# Patient Record
Sex: MALE | Race: WHITE | NOT HISPANIC OR LATINO | Employment: OTHER | URBAN - METROPOLITAN AREA
[De-identification: names, ages, dates, MRNs, and addresses within clinical notes are randomized per-mention and may not be internally consistent; named-entity substitution may affect disease eponyms.]

---

## 2017-01-26 DIAGNOSIS — I10 ESSENTIAL HYPERTENSION: ICD-10-CM

## 2017-01-27 RX ORDER — AMLODIPINE BESYLATE 5 MG/1
5 TABLET ORAL DAILY
Qty: 90 TAB | Refills: 3 | Status: SHIPPED | OUTPATIENT
Start: 2017-01-27 | End: 2018-03-18 | Stop reason: SDUPTHER

## 2017-01-27 RX ORDER — VALSARTAN AND HYDROCHLOROTHIAZIDE 320; 12.5 MG/1; MG/1
TABLET, FILM COATED ORAL
Qty: 90 TAB | Refills: 3 | Status: SHIPPED | OUTPATIENT
Start: 2017-01-27 | End: 2018-01-29 | Stop reason: SDUPTHER

## 2017-02-06 ENCOUNTER — OFFICE VISIT (OUTPATIENT)
Dept: MEDICAL GROUP | Facility: MEDICAL CENTER | Age: 72
End: 2017-02-06
Payer: MEDICARE

## 2017-02-06 VITALS
RESPIRATION RATE: 20 BRPM | HEART RATE: 78 BPM | WEIGHT: 197 LBS | TEMPERATURE: 97.1 F | SYSTOLIC BLOOD PRESSURE: 140 MMHG | OXYGEN SATURATION: 96 % | BODY MASS INDEX: 28.2 KG/M2 | DIASTOLIC BLOOD PRESSURE: 80 MMHG | HEIGHT: 70 IN

## 2017-02-06 DIAGNOSIS — Z85.038 HISTORY OF COLON CANCER, STAGE IV: ICD-10-CM

## 2017-02-06 DIAGNOSIS — I10 ESSENTIAL HYPERTENSION: ICD-10-CM

## 2017-02-06 DIAGNOSIS — E11.9 CONTROLLED TYPE 2 DIABETES MELLITUS WITHOUT COMPLICATION, WITHOUT LONG-TERM CURRENT USE OF INSULIN (HCC): ICD-10-CM

## 2017-02-06 PROCEDURE — 1036F TOBACCO NON-USER: CPT | Performed by: FAMILY MEDICINE

## 2017-02-06 PROCEDURE — 99214 OFFICE O/P EST MOD 30 MIN: CPT | Performed by: FAMILY MEDICINE

## 2017-02-06 PROCEDURE — 3017F COLORECTAL CA SCREEN DOC REV: CPT | Mod: 1P | Performed by: FAMILY MEDICINE

## 2017-02-06 PROCEDURE — 4040F PNEUMOC VAC/ADMIN/RCVD: CPT | Mod: 8P | Performed by: FAMILY MEDICINE

## 2017-02-06 PROCEDURE — G8420 CALC BMI NORM PARAMETERS: HCPCS | Performed by: FAMILY MEDICINE

## 2017-02-06 PROCEDURE — G8432 DEP SCR NOT DOC, RNG: HCPCS | Performed by: FAMILY MEDICINE

## 2017-02-06 PROCEDURE — 1101F PT FALLS ASSESS-DOCD LE1/YR: CPT | Performed by: FAMILY MEDICINE

## 2017-02-06 PROCEDURE — G8482 FLU IMMUNIZE ORDER/ADMIN: HCPCS | Performed by: FAMILY MEDICINE

## 2017-02-06 PROCEDURE — 3045F PR MOST RECENT HEMOGLOBIN A1C LEVEL 7.0-9.0%: CPT | Performed by: FAMILY MEDICINE

## 2017-02-06 NOTE — MR AVS SNAPSHOT
"Magnus Claudio   2017 10:40 AM   Office Visit   MRN: [id number]    Department:  [facility]   Dept Phone:  [phone]    Description:  Male : 1945   Provider:  Barrett Cespedes M.D.           Reason for Visit     Diabetes patient is here for a follow up      Allergies as of 2017     Allergen Noted Reactions    Morphine 2016   Unspecified    Hallucinations, 15+ years ago      You were diagnosed with     Controlled type 2 diabetes mellitus without complication, without long-term current use of insulin (CMS-HCC)   [1309979]         Vital Signs     Blood Pressure Pulse Temperature Respirations Height Weight    140/80 mmHg 78 36.2 °C (97.1 °F) 20 1.778 m (5' 10\") 89.359 kg (197 lb)    Body Mass Index Oxygen Saturation Smoking Status             28.27 kg/m2 96% Never Smoker          Basic Information     Date Of Birth Sex Race Ethnicity Preferred Language    1945 Male White Non- English      Your appointments     Mar 13, 2017  9:00 AM   MR ARAGON WITH/WO with [address] MRI 1   [facility] MRI - [address]    [address]  [facility] [address]   [phone]            Mar 14, 2017 10:40 AM   ONCOLOGY EST PATIENT 30 MIN with Radhika Moreau M.D.   [facility] (--)    [address], [address]  [facility] [address]   [phone]            Aug 08, 2017 10:20 AM   Established Patient with Barrett Cespedes M.D.   [facility] (--)    [address]  [address]  [facility] [id number] [phone]           You will be receiving a confirmation call a few days before your appointment from our automated call confirmation system.              Problem List              ICD-10-CM Priority Class Noted - Resolved    History of colon cancer, stage IV Z85.038   10/8/2015 - Present    Essential hypertension I10   10/8/2015 - Present    Type 2 diabetes mellitus, uncontrolled (CMS-[facility]) E11.65   10/8/2015 - Present    Vertigo R42   " 10/8/2015 - Present    Decreased hearing of both ears H91.93   4/26/2016 - Present      Health Maintenance        Date Due Completion Dates    RETINAL SCREENING 6/15/1962 ---    IMM PNEUMOCOCCAL 65+ (ADULT) LOW/MEDIUM RISK SERIES (1 of 2 - PCV13) 6/15/2009 ---    A1C SCREENING 2/1/2017 8/1/2016, 4/27/2016, 10/12/2015    IMM DTaP/Tdap/Td Vaccine (1 - Tdap) 1/18/2018 (Originally 6/15/1963) ---    IMM ZOSTER VACCINE 1/18/2018 (Originally 6/15/2004) ---    FASTING LIPID PROFILE 4/27/2017 4/27/2016, 10/12/2015    URINE ACR / MICROALBUMIN 4/27/2017 4/27/2016, 10/12/2015    DIABETES MONOFILAMENT / LE EXAM 8/1/2017 8/1/2016, 4/26/2016 (Done)    Override on 4/26/2016: Done    SERUM CREATININE 9/27/2017 9/27/2016, 6/28/2016, 4/27/2016, 10/12/2015    COLONOSCOPY 5/17/2026 5/17/2016 (Postponed)    Override on 5/17/2016: Postponed (Pt states he had a colonoscopy in 2013, is on a 10 yr schedule)            Current Immunizations     Influenza Vaccine Adult HD 10/25/2016, 10/8/2015      Below and/or attached are the medications your provider expects you to take. Review all of your home medications and newly ordered medications with your provider and/or pharmacist. Follow medication instructions as directed by your provider and/or pharmacist. Please keep your medication list with you and share with your provider. Update the information when medications are discontinued, doses are changed, or new medications (including over-the-counter products) are added; and carry medication information at all times in the event of emergency situations     Allergies:  MORPHINE - Unspecified               Medications  Valid as of: February 06, 2017 - 11:12 AM    Generic Name Brand Name Tablet Size Instructions for use    AmLODIPine Besylate (Tab) NORVASC 5 MG Take 1 Tab by mouth every day. Indications: High Blood Pressure        Aspirin (Tablet Delayed Response) aspirin 81 MG Take  by mouth.        Blood Glucose Monitoring Suppl (Misc) Blood Glucose  Monitoring Suppl SUPPLIES 1 Each by Does not apply route 3 times a day. Test strips order: Test strips for Breeze 2 meter. Sig: use three times daily and prn ssx high or low sugar #100 RF x 3        Glimepiride (Tab) AMARYL 4 MG TAKE TWO TABLETS BY MOUTH ONCE DAILY WITH BREAKFAST OR THE FIRST MEAL OF THE DAY        Lancets (Misc) MICROLET LANCETS  by Does not apply route.        MetFORMIN HCl (TABLET SR 24 HR) GLUCOPHAGE  MG TAKE 2 TABLETS BY MOUTH TWICE DAILY        Multiple Vitamins-Minerals (Tab) THERAGRAN-M  Take 1 Tab by mouth every day.        Valsartan-Hydrochlorothiazide (Tab) DIOVAN--12.5 MG TAKE ONE TABLET BY MOUTH EVERY DAY        .                 Medicines prescribed today were sent to:     [facility] [address] [place] [address] AT [facility] & [address] [address]    Phone: [phone] Fax: [phone]    Open 24 Hours?: No      Medication refill instructions:       If your prescription bottle indicates you have medication refills left, it is not necessary to call your provider’s office. Please contact your pharmacy and they will refill your medication.    If your prescription bottle indicates you do not have any refills left, you may request refills at any time through one of the following ways: The online [employer] system (except Urgent Care), by calling your provider’s office, or by asking your pharmacy to contact your provider’s office with a refill request. Medication refills are processed only during regular business hours and may not be available until the next business day. Your provider may request additional information or to have a follow-up visit with you prior to refilling your medication.   *Please Note: Medication refills are assigned a new Rx number when refilled electronically. Your pharmacy may indicate that no refills were authorized even though a new prescription for the same medication is available at the pharmacy.  Please request the medicine by name with the pharmacy before contacting your provider for a refill.        Your To Do List     Future Labs/Procedures Complete By Expires    HEMOGLOBIN A1C  As directed 2/7/2018    LIPID PROFILE  As directed 2/7/2018    MICROALBUMIN CREAT RATIO URINE  As directed 2/7/2018         MyChart Access Code: Activation code not generated  Current [employer] Status: Active

## 2017-02-06 NOTE — PROGRESS NOTES
Chief Complaint   Patient presents with   • Diabetes     patient is here for a follow up     Multiple medical problems    HISTORY OF PRESENT ILLNESS: Patient is a 71 y.o. male established patient who presents today for the following.      1. Controlled type 2 diabetes mellitus without complication, without long-term current use of insulin (CMS-HCC)  Reviewed labs with the patient. Diabetes is well controlled. Takes medications as prescribed. No lower extremity loss of sensation or burning pain. Retinal exam up-to-date. No concerning sores on the feet. Gets regular foot exams.      2. Essential hypertension  Doing well.  Reviewed labs with the patient. Taking medications as prescribed. No chest pain palpitations or shortness of breath. No headache loss or change in vision.        3. History of colon cancer, stage IV  Patient has a history of colon cancer. He recently saw his oncologist. CT scan reveals a lesion on his liver. He is scheduled to have an MRI. Patient is asymptomatic however.      No problem-specific assessment & plan notes found for this encounter.      He  has a past medical history of Hypertension; Diabetes; Cancer; Colon cancer; Hyperlipidemia; and Vertigo, benign positional.    Patient Active Problem List    Diagnosis Date Noted   • Controlled type 2 diabetes mellitus without complication, without long-term current use of insulin (CMS-HCC) 02/06/2017   • Decreased hearing of both ears 04/26/2016   • History of colon cancer, stage IV 10/08/2015   • Essential hypertension 10/08/2015       Allergies:Morphine    Current Outpatient Prescriptions   Medication Sig Dispense Refill   • valsartan-hydrochlorothiazide (DIOVAN-HCT) 320-12.5 MG per tablet TAKE ONE TABLET BY MOUTH EVERY DAY 90 Tab 3   • amlodipine (NORVASC) 5 MG Tab Take 1 Tab by mouth every day. Indications: High Blood Pressure 90 Tab 3   • glimepiride (AMARYL) 4 MG Tab TAKE TWO TABLETS BY MOUTH ONCE DAILY WITH BREAKFAST OR THE FIRST MEAL OF THE  " Tab 1   • therapeutic multivitamin-minerals (THERAGRAN-M) Tab Take 1 Tab by mouth every day.     • Blood Glucose Monitoring Suppl SUPPLIES Misc 1 Each by Does not apply route 3 times a day. Test strips order: Test strips for Breeze 2 meter. Sig: use three times daily and prn ssx high or low sugar #100 RF x 3 100 Each 11   • metformin ER (GLUCOPHAGE XR) 500 MG TABLET SR 24 HR TAKE 2 TABLETS BY MOUTH TWICE DAILY 360 Tab 3   • aspirin (CLAUDINE ASPIRIN EC LOW DOSE) 81 MG EC tablet Take  by mouth.     • Microlet Lancets Misc by Does not apply route.       No current facility-administered medications for this visit.       Social History   Substance Use Topics   • Smoking status: Never Smoker    • Smokeless tobacco: Never Used   • Alcohol Use: No       Family History   Problem Relation Age of Onset   • Diabetes Sister    • Hypertension Father    • Heart Attack Father    • Hyperlipidemia Neg Hx      unknown   • Cancer Mother      Breast       Health Maintenance:      Review of Systems   No fever, chills, nausea, vomiting, diarrhea, chest pain or shortness of breath.  See HPI    Exam:  Blood pressure 140/80, pulse 78, temperature 36.2 °C (97.1 °F), resp. rate 20, height 1.778 m (5' 10\"), weight 89.359 kg (197 lb), SpO2 96 %. Body mass index is 28.27 kg/(m^2).  Constitutional:  NAD, well appearing.  HEENT:   NC/AT, PERRLA, EOMI, OP clear, no lymphadenopathy, no thyromegaly.    Cardiovascular: RRR.   No m/r/g. No carotid bruits.       Lungs:   CTAB, no w/r/r, no respiratory distress.  Abdomen: Soft, NT/ND + BS, no masses, no suprapubic tenderness, no hepatomegaly.  Extremities:  2+ DP and radial pulses bilaterally.  No c/c/e.  Skin:  Warm and dry.    Neurologic: Alert & oriented x 3, CN II-XII grossly intact, strength and sensation grossly intact.  No focal deficits noted.  Psychiatric:  Affect normal, mood normal, judgment normal.    Assessment/Plan:     1. Controlled type 2 diabetes mellitus without complication, " without long-term current use of insulin (CMS-HCC)  Diabetes is well controlled. Patient has normal sensation in the lower extremities. Microfilament exam normal. Continue hypoglycemic medications.  Continue ACEI/ARB  and statin .  Labs as indicated. Continue to monitor    - HEMOGLOBIN A1C; Future  - LIPID PROFILE; Future  - MICROALBUMIN CREAT RATIO URINE; Future    2. Essential hypertension  Well-controlled. Labs as indicated. Continue antihypertensive medications. Discussed decreasing salt intake. Discussed benefits of exercise and diet. Continue to monitor.  Followup as indicated      3. History of colon cancer, stage IV  This is an ongoing problem for the patient. 5 mm lesion on liver seen on CT. MRI pending. Will follow along with oncology.        Followup: Return in about 6 months (around 8/6/2017) for Short.    Please note that this dictation was created using voice recognition software. I have made every reasonable attempt to correct obvious errors, but I expect that there are errors of grammar and possibly content that I did not discover before finalizing the note.

## 2017-02-08 ENCOUNTER — HOSPITAL ENCOUNTER (OUTPATIENT)
Dept: LAB | Facility: MEDICAL CENTER | Age: 72
End: 2017-02-08
Attending: FAMILY MEDICINE
Payer: MEDICARE

## 2017-02-08 ENCOUNTER — HOSPITAL ENCOUNTER (OUTPATIENT)
Dept: LAB | Facility: MEDICAL CENTER | Age: 72
End: 2017-02-08
Attending: INTERNAL MEDICINE
Payer: MEDICARE

## 2017-02-08 DIAGNOSIS — E11.9 CONTROLLED TYPE 2 DIABETES MELLITUS WITHOUT COMPLICATION, WITHOUT LONG-TERM CURRENT USE OF INSULIN (HCC): ICD-10-CM

## 2017-02-08 DIAGNOSIS — Z85.038 HISTORY OF COLON CANCER, STAGE IV: ICD-10-CM

## 2017-02-08 LAB
ALBUMIN SERPL BCP-MCNC: 3.9 G/DL (ref 3.2–4.9)
ALBUMIN/GLOB SERPL: 1.4 G/DL
ALP SERPL-CCNC: 53 U/L (ref 30–99)
ALT SERPL-CCNC: 30 U/L (ref 2–50)
ANION GAP SERPL CALC-SCNC: 8 MMOL/L (ref 0–11.9)
AST SERPL-CCNC: 31 U/L (ref 12–45)
BASOPHILS # BLD AUTO: 0.7 % (ref 0–1.8)
BASOPHILS # BLD: 0.04 K/UL (ref 0–0.12)
BILIRUB SERPL-MCNC: 2.1 MG/DL (ref 0.1–1.5)
BUN SERPL-MCNC: 14 MG/DL (ref 8–22)
CALCIUM SERPL-MCNC: 8.9 MG/DL (ref 8.4–10.2)
CEA SERPL-MCNC: 2.8 NG/ML (ref 0–3)
CHLORIDE SERPL-SCNC: 104 MMOL/L (ref 96–112)
CHOLEST SERPL-MCNC: 142 MG/DL (ref 100–199)
CO2 SERPL-SCNC: 26 MMOL/L (ref 20–33)
CREAT SERPL-MCNC: 1.13 MG/DL (ref 0.5–1.4)
CREAT UR-MCNC: 177.7 MG/DL
EOSINOPHIL # BLD AUTO: 0.07 K/UL (ref 0–0.51)
EOSINOPHIL NFR BLD: 1.2 % (ref 0–6.9)
ERYTHROCYTE [DISTWIDTH] IN BLOOD BY AUTOMATED COUNT: 40.6 FL (ref 35.9–50)
EST. AVERAGE GLUCOSE BLD GHB EST-MCNC: 197 MG/DL
GFR SERPL CREATININE-BSD FRML MDRD: >60 ML/MIN/1.73 M 2
GLOBULIN SER CALC-MCNC: 2.8 G/DL (ref 1.9–3.5)
GLUCOSE SERPL-MCNC: 202 MG/DL (ref 65–99)
HBA1C MFR BLD: 8.5 % (ref 0–5.6)
HCT VFR BLD AUTO: 43.2 % (ref 42–52)
HDLC SERPL-MCNC: 30 MG/DL
HGB BLD-MCNC: 15.2 G/DL (ref 14–18)
IMM GRANULOCYTES # BLD AUTO: 0.01 K/UL (ref 0–0.11)
IMM GRANULOCYTES NFR BLD AUTO: 0.2 % (ref 0–0.9)
LDLC SERPL CALC-MCNC: 69 MG/DL
LYMPHOCYTES # BLD AUTO: 1.41 K/UL (ref 1–4.8)
LYMPHOCYTES NFR BLD: 23.9 % (ref 22–41)
MCH RBC QN AUTO: 30 PG (ref 27–33)
MCHC RBC AUTO-ENTMCNC: 35.2 G/DL (ref 33.7–35.3)
MCV RBC AUTO: 85.4 FL (ref 81.4–97.8)
MICROALBUMIN UR-MCNC: 0.8 MG/DL
MICROALBUMIN/CREAT UR: 5 MG/G (ref 0–30)
MONOCYTES # BLD AUTO: 0.37 K/UL (ref 0–0.85)
MONOCYTES NFR BLD AUTO: 6.3 % (ref 0–13.4)
NEUTROPHILS # BLD AUTO: 4.01 K/UL (ref 1.82–7.42)
NEUTROPHILS NFR BLD: 67.7 % (ref 44–72)
NRBC # BLD AUTO: 0 K/UL
NRBC BLD AUTO-RTO: 0 /100 WBC
PLATELET # BLD AUTO: 140 K/UL (ref 164–446)
PMV BLD AUTO: 9.9 FL (ref 9–12.9)
POTASSIUM SERPL-SCNC: 3.9 MMOL/L (ref 3.6–5.5)
PROT SERPL-MCNC: 6.7 G/DL (ref 6–8.2)
RBC # BLD AUTO: 5.06 M/UL (ref 4.7–6.1)
SODIUM SERPL-SCNC: 138 MMOL/L (ref 135–145)
TRIGL SERPL-MCNC: 217 MG/DL (ref 0–149)
WBC # BLD AUTO: 5.9 K/UL (ref 4.8–10.8)

## 2017-02-08 PROCEDURE — 82570 ASSAY OF URINE CREATININE: CPT

## 2017-02-08 PROCEDURE — 80061 LIPID PANEL: CPT

## 2017-02-08 PROCEDURE — 83036 HEMOGLOBIN GLYCOSYLATED A1C: CPT

## 2017-02-08 PROCEDURE — 82043 UR ALBUMIN QUANTITATIVE: CPT

## 2017-02-08 PROCEDURE — 36415 COLL VENOUS BLD VENIPUNCTURE: CPT

## 2017-03-09 ENCOUNTER — RX ONLY (OUTPATIENT)
Age: 72
Setting detail: RX ONLY
End: 2017-03-09

## 2017-03-13 ENCOUNTER — HOSPITAL ENCOUNTER (OUTPATIENT)
Dept: RADIOLOGY | Facility: MEDICAL CENTER | Age: 72
End: 2017-03-13
Attending: INTERNAL MEDICINE
Payer: MEDICARE

## 2017-03-13 DIAGNOSIS — Z85.038 HISTORY OF COLON CANCER, STAGE IV: ICD-10-CM

## 2017-03-13 PROCEDURE — A9579 GAD-BASE MR CONTRAST NOS,1ML: HCPCS | Performed by: INTERNAL MEDICINE

## 2017-03-13 PROCEDURE — 74183 MRI ABD W/O CNTR FLWD CNTR: CPT

## 2017-03-13 PROCEDURE — 700117 HCHG RX CONTRAST REV CODE 255: Performed by: INTERNAL MEDICINE

## 2017-03-13 RX ADMIN — GADODIAMIDE 20 ML: 287 INJECTION INTRAVENOUS at 09:52

## 2017-03-14 ENCOUNTER — OFFICE VISIT (OUTPATIENT)
Dept: HEMATOLOGY ONCOLOGY | Facility: MEDICAL CENTER | Age: 72
End: 2017-03-14
Payer: MEDICARE

## 2017-03-14 VITALS
DIASTOLIC BLOOD PRESSURE: 92 MMHG | HEART RATE: 71 BPM | RESPIRATION RATE: 16 BRPM | OXYGEN SATURATION: 95 % | SYSTOLIC BLOOD PRESSURE: 158 MMHG | TEMPERATURE: 98.2 F | WEIGHT: 197.97 LBS | BODY MASS INDEX: 28.41 KG/M2

## 2017-03-14 DIAGNOSIS — Z85.038 HISTORY OF COLON CANCER, STAGE IV: ICD-10-CM

## 2017-03-14 PROCEDURE — 1101F PT FALLS ASSESS-DOCD LE1/YR: CPT | Performed by: INTERNAL MEDICINE

## 2017-03-14 PROCEDURE — 1036F TOBACCO NON-USER: CPT | Performed by: INTERNAL MEDICINE

## 2017-03-14 PROCEDURE — 4040F PNEUMOC VAC/ADMIN/RCVD: CPT | Mod: 8P | Performed by: INTERNAL MEDICINE

## 2017-03-14 PROCEDURE — G8432 DEP SCR NOT DOC, RNG: HCPCS | Performed by: INTERNAL MEDICINE

## 2017-03-14 PROCEDURE — 99214 OFFICE O/P EST MOD 30 MIN: CPT | Performed by: INTERNAL MEDICINE

## 2017-03-14 PROCEDURE — 3017F COLORECTAL CA SCREEN DOC REV: CPT | Performed by: INTERNAL MEDICINE

## 2017-03-14 PROCEDURE — G8420 CALC BMI NORM PARAMETERS: HCPCS | Performed by: INTERNAL MEDICINE

## 2017-03-14 PROCEDURE — G8482 FLU IMMUNIZE ORDER/ADMIN: HCPCS | Performed by: INTERNAL MEDICINE

## 2017-03-14 ASSESSMENT — PAIN SCALES - GENERAL: PAINLEVEL: NO PAIN

## 2017-03-14 NOTE — MR AVS SNAPSHOT
Magnus Claudio   3/14/2017 10:40 AM   Office Visit   MRN: [id number]    Department:  [facility]   Dept Phone:  [phone]    Description:  Male : 1945   Provider:  Radhika Moreau M.D.           Reason for Visit     Follow-Up 3m FV      Allergies as of 3/14/2017     Allergen Noted Reactions    Morphine 2016   Unspecified    Hallucinations, 15+ years ago      You were diagnosed with     History of colon cancer, stage IV   [725971]         Vital Signs     Blood Pressure Pulse Temperature Respirations Weight Oxygen Saturation    158/92 mmHg 71 36.8 °C (98.2 °F) 16 89.8 kg (197 lb 15.6 oz) 95%    Smoking Status                   Never Smoker            Basic Information     Date Of Birth Sex Race Ethnicity Preferred Language    1945 Male White Non- English      Your appointments     Aug 08, 2017 10:20 AM   Established Patient with Barrett Cespedes M.D.   [facility] (--)    [address]  [address]  [facility] [address] [phone]           You will be receiving a confirmation call a few days before your appointment from our automated call confirmation system.            Sep 12, 2017 11:00 AM   ONCOLOGY EST PATIENT 30 MIN with Radhika Moreau M.D.   [facility] (--)    [address], [address]  [facility] [address] [phone]              Problem List              ICD-10-CM Priority Class Noted - Resolved    History of colon cancer, stage IV Z85.038   10/8/2015 - Present    Essential hypertension I10   10/8/2015 - Present    Decreased hearing of both ears H91.93   2016 - Present    Controlled type 2 diabetes mellitus without complication, without long-term current use of insulin (CMS-HCC) E11.9   2017 - Present      Health Maintenance        Date Due Completion Dates    RETINAL SCREENING 6/15/1962 ---    IMM PNEUMOCOCCAL 65+ (ADULT) LOW/MEDIUM RISK SERIES (1 of 2 - PCV13) 6/15/2009 ---    IMM DTaP/Tdap/Td Vaccine (1 -  Tdap) 1/18/2018 (Originally 6/15/1963) ---    IMM ZOSTER VACCINE 1/18/2018 (Originally 6/15/2004) ---    DIABETES MONOFILAMENT / LE EXAM 8/1/2017 8/1/2016, 4/26/2016 (Done)    Override on 4/26/2016: Done    A1C SCREENING 8/8/2017 2/8/2017, 8/1/2016, 4/27/2016, 10/12/2015    FASTING LIPID PROFILE 2/8/2018 2/8/2017, 4/27/2016, 10/12/2015    URINE ACR / MICROALBUMIN 2/8/2018 2/8/2017, 4/27/2016, 10/12/2015    SERUM CREATININE 2/8/2018 2/8/2017, 9/27/2016, 6/28/2016, 4/27/2016, 10/12/2015    COLONOSCOPY 2/16/2020 2/16/2017            Current Immunizations     Influenza Vaccine Adult HD 10/25/2016, 10/8/2015      Below and/or attached are the medications your provider expects you to take. Review all of your home medications and newly ordered medications with your provider and/or pharmacist. Follow medication instructions as directed by your provider and/or pharmacist. Please keep your medication list with you and share with your provider. Update the information when medications are discontinued, doses are changed, or new medications (including over-the-counter products) are added; and carry medication information at all times in the event of emergency situations     Allergies:  MORPHINE - Unspecified               Medications  Valid as of: March 14, 2017 - 11:31 AM    Generic Name Brand Name Tablet Size Instructions for use    AmLODIPine Besylate (Tab) NORVASC 5 MG Take 1 Tab by mouth every day. Indications: High Blood Pressure        Aspirin (Tablet Delayed Response) aspirin 81 MG Take  by mouth.        Blood Glucose Monitoring Suppl (Misc) Blood Glucose Monitoring Suppl SUPPLIES 1 Each by Does not apply route 3 times a day. Test strips order: Test strips for Breeze 2 meter. Sig: use three times daily and prn ssx high or low sugar #100 RF x 3        Glimepiride (Tab) AMARYL 4 MG TAKE TWO TABLETS BY MOUTH ONCE DAILY WITH BREAKFAST OR THE FIRST MEAL OF THE DAY        Lancets (Misc) MICROLET LANCETS  by Does not apply  route.        MetFORMIN HCl (TABLET SR 24 HR) GLUCOPHAGE  MG TAKE 2 TABLETS BY MOUTH TWICE DAILY        Multiple Vitamins-Minerals (Tab) THERAGRAN-M  Take 1 Tab by mouth every day.        Valsartan-Hydrochlorothiazide (Tab) DIOVAN--12.5 MG TAKE ONE TABLET BY MOUTH EVERY DAY        .                 Medicines prescribed today were sent to:     [email or web address] [address] [place] [address], [place] - [address] [facility]    [address] [facility] [place] [address]    Phone: [phone] Fax: [phone]    Open 24 Hours?: No      Medication refill instructions:       If your prescription bottle indicates you have medication refills left, it is not necessary to call your provider’s office. Please contact your pharmacy and they will refill your medication.    If your prescription bottle indicates you do not have any refills left, you may request refills at any time through one of the following ways: The online [employer] system (except Urgent Care), by calling your provider’s office, or by asking your pharmacy to contact your provider’s office with a refill request. Medication refills are processed only during regular business hours and may not be available until the next business day. Your provider may request additional information or to have a follow-up visit with you prior to refilling your medication.   *Please Note: Medication refills are assigned a new Rx number when refilled electronically. Your pharmacy may indicate that no refills were authorized even though a new prescription for the same medication is available at the pharmacy. Please request the medicine by name with the pharmacy before contacting your provider for a refill.        Your To Do List     Future Labs/Procedures Complete By Expires    CBC WITH DIFFERENTIAL  As directed 3/14/2018    CEA  As directed 3/14/2018    COMP METABOLIC PANEL  As directed 3/14/2018         [employer] Access Code: Activation code not  generated  Current MyChart Status: Active

## 2017-03-14 NOTE — PROGRESS NOTES
Follow Up Note: Oncology     Date: 3/14/17  Time: 10:40 am     Primary care physician: Dr. Jere Cespedes  Oncologist: Dr. Kemp ([place])   Dr. Cardoso ([place])  Dermatology: Dr. Morin    Diagnosis: colon cancer with liver metastatic disease    Chief Compliant: He is here for a follow up visit.    History of presenting illness:  Mr. Claudio is a pleasant 71-year-old male diagnosed in 2001 with colon cancer.  He was diagnosed secondary to nausea, vomiting and abdominal pain as well as significant anemia requiring blood transfusions. At diagnosis he was found to have a large mass in the colon as well as multiple liver lesions. Anoscopy showed a near obstructing mass biopsy which was positive for a primary colon malignancy. He underwent hemicolectomy on 4/2011 and was found to have T3 N0 M1 disease secondary to liver metastatic disease. Records are currently not available despite multiple requests. Patient also is unable to recall full details of his therapy. He was treated with adjuvant chemotherapy with 5-FU doublet therapy which was given weekly. After 3 months of therapy repeat imaging showed good responses with decrease in size of the liver lesions. He then underwent a partial hepatectomy in 10/2001. Postsurgery he had been on observation without any evidence of recurrence.  CT scan in 10/2015 showed a 5 mm nonspecific low-attenuation lesion in the anterior aspect of the left hepatic lobe and no other evidence of disease.  Continue on observation with low CEAs. 2/16/17 colonoscopy showed previous surgery, diverticulum in the sigmoid colon and internal hemorrhoids. Repeat colonoscopy in 3 years was recommended. MRI of the abdomen done on 3/13/17 showed prior right partial hepatectomy and small cyst in the left lobe of the liver anteriorly corresponding to CT findings. There is no other concerning findings on imaging.      Interval history:   He is here for follow-up visit and is accompanied by his wife  was present in the room. Patient has been feeling fairly well since his last visit without any significant changes. He has good appetite with stable weight. He has stable energy. He is having regular bowel movements without any diarrhea, constipation or black stools a day per rectum. He denies any nausea, vomiting or abdominal pain. He denies any cough or shortness of breath. He denies any fevers, chills or night sweats.      Past Medical History:  1. Colon cancer with metastatic lesion to liver  2. Diabetes  3. Hypertension  4. 2001: PE/DVT, coumadin x 6 months   5. Diverticulosis/diverticulitis  6. Hiatal hernia with h/o GERD  7. Questionable history of nonmelanoma skin cancers      Allergies:  Morphine      Medications:    Current outpatient prescriptions:   •  valsartan-hydrochlorothiazide (DIOVAN-HCT) 320-12.5 MG per tablet, TAKE ONE TABLET BY MOUTH EVERY DAY, Disp: 90 Tab, Rfl: 3  •  amlodipine (NORVASC) 5 MG Tab, Take 1 Tab by mouth every day. Indications: High Blood Pressure, Disp: 90 Tab, Rfl: 3  •  glimepiride (AMARYL) 4 MG Tab, TAKE TWO TABLETS BY MOUTH ONCE DAILY WITH BREAKFAST OR THE FIRST MEAL OF THE DAY, Disp: 180 Tab, Rfl: 1  •  therapeutic multivitamin-minerals (THERAGRAN-M) Tab, Take 1 Tab by mouth every day., Disp: , Rfl:   •  Blood Glucose Monitoring Suppl SUPPLIES Misc, 1 Each by Does not apply route 3 times a day. Test strips order: Test strips for Breeze 2 meter. Sig: use three times daily and prn ssx high or low sugar #100 RF x 3, Disp: 100 Each, Rfl: 11  •  metformin ER (GLUCOPHAGE XR) 500 MG TABLET SR 24 HR, TAKE 2 TABLETS BY MOUTH TWICE DAILY, Disp: 360 Tab, Rfl: 3  •  aspirin (CLAUDINE ASPIRIN EC LOW DOSE) 81 MG EC tablet, Take  by mouth., Disp: , Rfl:   •  Microlet Lancets Misc, by Does not apply route., Disp: , Rfl:       Review of Systems:  All other review of systems are negative except what was mentioned above in the HPI.  Constitutional: Negative for fever, chills, weight loss and  malaise/fatigue.    HENT: Negative for ear pain and nosebleeds.     Eyes: Negative for blurred vision.    Respiratory: Negative for cough, sputum production, and shortness of breath.    Cardiovascular: Negative for chest pain.  Gastrointestinal: Negative for nausea, vomiting and abdominal pain.    Genitourinary: Negative for dysuria.    Musculoskeletal: Negative for myalgias, back pain and joint pain.    Skin: Negative for rash.    Neurological: Negative for dizziness, sensory change, focal weakness and headaches.    Endo/Heme/Allergies: No bruise/bleed easily.    Psychiatric/Behavioral: Negative for depression and memory loss.      Physical Exam:  Vitals:  Filed Vitals:    03/14/17 1032   BP: 158/92   Pulse: 71   Temp: 36.8 °C (98.2 °F)   Resp: 16   Weight: 89.8 kg (197 lb 15.6 oz)   SpO2: 95%       General: Not in acute distress, alert and oriented x 3  HEENT: normalcephalic, atraumatic, pupils equally reactive to light bilaterally, extra ocular muscles intact, moist oral mucus membranes, and oral cavity without any lesions.  Neck: Supple neck and full range of motion  Lymph nodes: No palpable bilateral cervical and supraclavicular lymphadenopathy.    CVS: regular rate and rhythm  RESP: Clear to auscultate bilaterally  ABD: Soft, non tender, non distended, positive bowel sounds, and no hepatomegaly  EXT: Edema of left lower extremity  CNS: Alert and oriented x3, cranial nerves grossly intact, muscle strength grossly intact, and normal gait.     Labs:  No visits with results within 1 Day(s) from this visit.  Latest known visit with results is:    Hospital Outpatient Visit on 02/08/2017   Component Date Value Ref Range Status   • WBC 02/08/2017 5.9  4.8 - 10.8 K/uL Final   • RBC 02/08/2017 5.06  4.70 - 6.10 M/uL Final   • Hemoglobin 02/08/2017 15.2  14.0 - 18.0 g/dL Final   • Hematocrit 02/08/2017 43.2  42.0 - 52.0 % Final   • MCV 02/08/2017 85.4  81.4 - 97.8 fL Final   • MCH 02/08/2017 30.0  27.0 - 33.0 pg Final    • MCHC 02/08/2017 35.2  33.7 - 35.3 g/dL Final   • RDW 02/08/2017 40.6  35.9 - 50.0 fL Final   • Platelet Count 02/08/2017 140* 164 - 446 K/uL Final   • MPV 02/08/2017 9.9  9.0 - 12.9 fL Final   • Neutrophils-Polys 02/08/2017 67.70  44.00 - 72.00 % Final   • Lymphocytes 02/08/2017 23.90  22.00 - 41.00 % Final   • Monocytes 02/08/2017 6.30  0.00 - 13.40 % Final   • Eosinophils 02/08/2017 1.20  0.00 - 6.90 % Final   • Basophils 02/08/2017 0.70  0.00 - 1.80 % Final   • Immature Granulocytes 02/08/2017 0.20  0.00 - 0.90 % Final   • Nucleated RBC 02/08/2017 0.00   Final   • Neutrophils (Absolute) 02/08/2017 4.01  1.82 - 7.42 K/uL Final    Includes immature neutrophils, if present.   • Lymphs (Absolute) 02/08/2017 1.41  1.00 - 4.80 K/uL Final   • Monos (Absolute) 02/08/2017 0.37  0.00 - 0.85 K/uL Final   • Eos (Absolute) 02/08/2017 0.07  0.00 - 0.51 K/uL Final   • Baso (Absolute) 02/08/2017 0.04  0.00 - 0.12 K/uL Final   • Immature Granulocytes (abs) 02/08/2017 0.01  0.00 - 0.11 K/uL Final   • NRBC (Absolute) 02/08/2017 0.00   Final   • Calcium 02/08/2017 8.9  8.4 - 10.2 mg/dL Final   • Sodium 02/08/2017 138  135 - 145 mmol/L Final   • Potassium 02/08/2017 3.9  3.6 - 5.5 mmol/L Final   • Chloride 02/08/2017 104  96 - 112 mmol/L Final   • Co2 02/08/2017 26  20 - 33 mmol/L Final   • Anion Gap 02/08/2017 8.0  0.0 - 11.9 Final   • Glucose 02/08/2017 202* 65 - 99 mg/dL Final   • Bun 02/08/2017 14  8 - 22 mg/dL Final   • Creatinine 02/08/2017 1.13  0.50 - 1.40 mg/dL Final   • AST(SGOT) 02/08/2017 31  12 - 45 U/L Final   • ALT(SGPT) 02/08/2017 30  2 - 50 U/L Final   • Alkaline Phosphatase 02/08/2017 53  30 - 99 U/L Final   • Total Bilirubin 02/08/2017 2.1* 0.1 - 1.5 mg/dL Final   • Albumin 02/08/2017 3.9  3.2 - 4.9 g/dL Final   • Total Protein 02/08/2017 6.7  6.0 - 8.2 g/dL Final   • Globulin 02/08/2017 2.8  1.9 - 3.5 g/dL Final   • A-G Ratio 02/08/2017 1.4   Final   • Carcinoembryonic Antigen 02/08/2017 2.8  0.0 - 3.0 ng/mL  Final    Comment: Effective 2013 the quantitative determination  of Carcinoembryonic Antigen (CEA) will now be performed at  [facility].  The Access CEA paramagnetic  particle chemiluminescent immunoassay is used.  Results  obtained with different test methods or kits cannot be used interchangeably.  Measurement of CEA has been shown to be  clinically relevant in the management of patients with  colorectal, breast, lung, prostatic, pancreatic, and ovarian  carcinomas.  Smokers may have slightly elevated levels of CEA.     • GFR If  2017 >60  >60 mL/min/1.73 m 2 Final   • GFR If Non  2017 >60  >60 mL/min/1.73 m 2 Final   ]    Imagin. 3/13/17 MRI abdomen: Prior RIGHT partial hepatectomy.  Small cyst in the LEFT lobe anteriorly, corresponding to findings on prior CT scan.  No enhancing liver mass.    Assessment and Plan:   1. History of oligo metastatic lung cancer diagnosed in , T3 N0 M1: As diagnosed in primary treated in . At diagnosis he was found to have multiple liver lesions. He completed 3 cycles of 5-FU based chemotherapy followed by partial hepatectomy. Since then he has been on observation without any evidence of disease. His CEAs have been low and stable. Imaging has not shown any evidence of recurrence. Most recent MRI showed liver cysts without any evidence of metastatic disease. He is continued on observation. His next colonoscopy is due in 2020.  2. History of nonmelanoma skin cancers:  He has referral to dermatology  3. He is to get labs with CEA every 3 months and to follow up again in 6 months or sooner as needed.     He agreed and verbalized his agreement and understanding with the current plan.  I answered all questions and concerns he has at this time and advised him to call at any time in the interim with questions or concerns in regards to his care.  Thank you for allowing me to participate in his care, I  will continue to follow.    Please note that this dictation was created using voice recognition software. I have made every reasonable attempt to correct obvious errors, but I expect that there are errors of grammar and possibly content that I did not discover before finalizing the note.

## 2017-03-27 RX ORDER — METFORMIN HYDROCHLORIDE 500 MG/1
TABLET, EXTENDED RELEASE ORAL
Qty: 360 TAB | Refills: 3 | Status: SHIPPED | OUTPATIENT
Start: 2017-03-27 | End: 2018-07-09 | Stop reason: SDUPTHER

## 2017-03-27 NOTE — TELEPHONE ENCOUNTER
Was the patient seen in the last year in this department? Yes    Does patient have an active prescription for medications requested? No   Received Request Via: Pharmacy

## 2017-04-12 ENCOUNTER — TELEPHONE (OUTPATIENT)
Dept: HEMATOLOGY ONCOLOGY | Facility: MEDICAL CENTER | Age: 72
End: 2017-04-12

## 2017-04-12 NOTE — TELEPHONE ENCOUNTER
Patient received letter about Dr. Moreau leaving and wanted to change the provider to Dr. Palacios.

## 2017-06-30 RX ORDER — GLIMEPIRIDE 4 MG/1
TABLET ORAL
Qty: 180 TAB | Refills: 1 | Status: SHIPPED | OUTPATIENT
Start: 2017-06-30 | End: 2018-02-28 | Stop reason: SDUPTHER

## 2017-08-08 ENCOUNTER — OFFICE VISIT (OUTPATIENT)
Dept: URGENT CARE | Facility: CLINIC | Age: 72
End: 2017-08-08
Payer: MEDICARE

## 2017-08-08 ENCOUNTER — OFFICE VISIT (OUTPATIENT)
Dept: MEDICAL GROUP | Facility: MEDICAL CENTER | Age: 72
End: 2017-08-08
Payer: MEDICARE

## 2017-08-08 VITALS
TEMPERATURE: 98.5 F | HEART RATE: 74 BPM | RESPIRATION RATE: 16 BRPM | DIASTOLIC BLOOD PRESSURE: 79 MMHG | SYSTOLIC BLOOD PRESSURE: 140 MMHG | OXYGEN SATURATION: 98 %

## 2017-08-08 VITALS
HEIGHT: 70 IN | DIASTOLIC BLOOD PRESSURE: 90 MMHG | BODY MASS INDEX: 28.49 KG/M2 | RESPIRATION RATE: 20 BRPM | OXYGEN SATURATION: 97 % | HEART RATE: 78 BPM | TEMPERATURE: 97.8 F | SYSTOLIC BLOOD PRESSURE: 150 MMHG | WEIGHT: 199 LBS

## 2017-08-08 DIAGNOSIS — E11.69 TYPE 2 DIABETES MELLITUS WITH OTHER SPECIFIED COMPLICATION (HCC): ICD-10-CM

## 2017-08-08 DIAGNOSIS — H61.23 IMPACTED CERUMEN OF BOTH EARS: ICD-10-CM

## 2017-08-08 DIAGNOSIS — I10 ESSENTIAL HYPERTENSION: ICD-10-CM

## 2017-08-08 DIAGNOSIS — E11.9 CONTROLLED TYPE 2 DIABETES MELLITUS WITHOUT COMPLICATION, WITHOUT LONG-TERM CURRENT USE OF INSULIN (HCC): ICD-10-CM

## 2017-08-08 LAB
HBA1C MFR BLD: 8.3 % (ref ?–5.8)
INT CON NEG: NEGATIVE
INT CON POS: POSITIVE

## 2017-08-08 PROCEDURE — 99213 OFFICE O/P EST LOW 20 MIN: CPT | Performed by: NURSE PRACTITIONER

## 2017-08-08 PROCEDURE — 99214 OFFICE O/P EST MOD 30 MIN: CPT | Performed by: PHYSICIAN ASSISTANT

## 2017-08-08 PROCEDURE — 83036 HEMOGLOBIN GLYCOSYLATED A1C: CPT | Performed by: PHYSICIAN ASSISTANT

## 2017-08-08 RX ORDER — PIOGLITAZONEHYDROCHLORIDE 15 MG/1
15 TABLET ORAL DAILY
Qty: 30 TAB | Refills: 11 | Status: SHIPPED | OUTPATIENT
Start: 2017-08-08 | End: 2017-10-10

## 2017-08-08 ASSESSMENT — ENCOUNTER SYMPTOMS: HEADACHES: 0

## 2017-08-08 ASSESSMENT — PATIENT HEALTH QUESTIONNAIRE - PHQ9: CLINICAL INTERPRETATION OF PHQ2 SCORE: 0

## 2017-08-08 NOTE — PROGRESS NOTES
Subjective:      Magnus Claudio is a 72 y.o. male who presents with Cerumen Impaction            Cerumen Impaction  This is a new problem. Episode onset: Pt states that he recently saw his PCP and he commented on the impacted ear wax in both ears and would like him to have it removed. He does wear hearing aids. The problem occurs constantly. The problem has been unchanged. Pertinent negatives include no headaches. He has tried nothing for the symptoms.       Review of Systems   HENT:        Cerumen impaction   Neurological: Negative for headaches.   All other systems reviewed and are negative.    Past Medical History   Diagnosis Date   • Hypertension    • Diabetes (CMS-HCC)    • Cancer (CMS-HCC)    • Colon cancer (CMS-HCC)    • Hyperlipidemia    • Vertigo, benign positional       Past Surgical History   Procedure Laterality Date   • Colon resection     • Pr resec liver,part lobectomy     • Laminotomy        Social History     Social History   • Marital Status:      Spouse Name: N/A   • Number of Children: N/A   • Years of Education: N/A     Occupational History   • Not on file.     Social History Main Topics   • Smoking status: Never Smoker    • Smokeless tobacco: Never Used   • Alcohol Use: No   • Drug Use: No   • Sexual Activity:     Partners: Female     Other Topics Concern   • Not on file     Social History Narrative         Objective:     /79 mmHg  Pulse 74  Temp(Src) 36.9 °C (98.5 °F)  Resp 16  SpO2 98%     Physical Exam   Constitutional: He is oriented to person, place, and time. Vital signs are normal. He appears well-developed and well-nourished.   HENT:   Head: Normocephalic and atraumatic.   Right Ear: External ear normal.   Left Ear: External ear normal.   Cerumen impaction noted bilaterally   Eyes: EOM are normal. Pupils are equal, round, and reactive to light.   Neck: Normal range of motion.   Cardiovascular: Normal rate and regular rhythm.    Pulmonary/Chest: Effort normal.    Musculoskeletal: Normal range of motion.   Neurological: He is alert and oriented to person, place, and time.   Skin: Skin is warm and dry.   Psychiatric: He has a normal mood and affect. His speech is normal and behavior is normal. Thought content normal.   Vitals reviewed.         Procedure: Cerumen Removal  Risks and benefits of procedure discussed  Cerumen removed with curette and lavage after softening agent instilled  Patient tolerated well  Post procedure exam with clear canal and normal TM       Assessment/Plan:     1. Impacted cerumen of both ears    Pt states his ears feel better after procedure  Hearing aids replaced  Supportive care, differential diagnoses, and indications for immediate follow-up discussed with patient.    Pathogenesis of diagnosis discussed including typical length and natural progression.      Instructed to return to  or nearest emergency department if symptoms fail to improve, for any change in condition, further concerns, or new concerning symptoms.  Patient states understanding of the plan of care and discharge instructions.

## 2017-08-08 NOTE — MR AVS SNAPSHOT
"        Magnus Claudio   2017 10:20 AM   Office Visit   MRN: [id number]    Department:  [facility]   Dept Phone:  [phone]    Description:  Male : 1945   Provider:  Saida Harris PA-C           Reason for Visit     Diabetes patient states he is here for a 6 month follow up      Allergies as of 2017     Allergen Noted Reactions    Morphine 2016   Unspecified    Hallucinations, 15+ years ago      You were diagnosed with     Need for pneumococcal vaccination   [113413]       Type 2 diabetes mellitus with other specified complication (CMS-HCC)   [5614300]       Essential hypertension   [6602395]       Bilateral impacted cerumen   [452017]         Vital Signs     Blood Pressure Pulse Temperature Respirations Height Weight    150/90 mmHg 78 36.6 °C (97.8 °F) 20 1.778 m (5' 10\") 90.266 kg (199 lb)    Body Mass Index Oxygen Saturation Smoking Status             28.55 kg/m2 97% Never Smoker          Basic Information     Date Of Birth Sex Race Ethnicity Preferred Language    1945 Male White Non- English      Your appointments     Aug 15, 2017  1:00 PM   Established Patient with Saida Harris PA-C   [facility] (--)    [address]  [address]  [facility] [address] [phone]           You will be receiving a confirmation call a few days before your appointment from our automated call confirmation system.            Sep 12, 2017 11:00 AM   ONCOLOGY NEW PATIENT 60 MIN with Baljinder Palacios M.D.   [facility] (--)    [address], [address]  [facility] [address] [phone]              Problem List              ICD-10-CM Priority Class Noted - Resolved    History of colon cancer, stage IV Z85.038   10/8/2015 - Present    Essential hypertension I10   10/8/2015 - Present    Decreased hearing of both ears H91.93   2016 - Present    Controlled type 2 diabetes mellitus without complication, without long-term current use of " insulin (CMS-[facility]) E11.9   2/6/2017 - Present      Health Maintenance        Date Due Completion Dates    RETINAL SCREENING 6/15/1962 ---    IMM PNEUMOCOCCAL 65+ (ADULT) LOW/MEDIUM RISK SERIES (1 of 2 - PCV13) 6/15/2009 ---    IMM DTaP/Tdap/Td Vaccine (1 - Tdap) 1/18/2018 (Originally 6/15/1964) ---    IMM ZOSTER VACCINE 1/18/2018 (Originally 6/15/2004) ---    IMM INFLUENZA (1) 9/1/2017 10/25/2016, 10/8/2015    A1C SCREENING 2/8/2018 8/8/2017, 2/8/2017, 8/1/2016, 4/27/2016, 10/12/2015    FASTING LIPID PROFILE 2/8/2018 2/8/2017, 4/27/2016, 10/12/2015    URINE ACR / MICROALBUMIN 2/8/2018 2/8/2017, 4/27/2016, 10/12/2015    SERUM CREATININE 2/8/2018 2/8/2017, 9/27/2016, 6/28/2016, 4/27/2016, 10/12/2015    DIABETES MONOFILAMENT / LE EXAM 8/8/2018 8/8/2017, 8/1/2016, 4/26/2016 (Done)    Override on 4/26/2016: Done    COLONOSCOPY 2/16/2020 2/16/2017            Results     POCT Hemoglobin A1c      Component    Glycohemoglobin    8.3    Internal Control Negative    Negative    Internal Control Positive    Positive                        Current Immunizations     13-VALENT PCV PREVNAR  Incomplete    Influenza Vaccine Adult HD 10/25/2016, 10/8/2015      Below and/or attached are the medications your provider expects you to take. Review all of your home medications and newly ordered medications with your provider and/or pharmacist. Follow medication instructions as directed by your provider and/or pharmacist. Please keep your medication list with you and share with your provider. Update the information when medications are discontinued, doses are changed, or new medications (including over-the-counter products) are added; and carry medication information at all times in the event of emergency situations     Allergies:  MORPHINE - Unspecified               Medications  Valid as of: August 08, 2017 - 11:12 AM    Generic Name Brand Name Tablet Size Instructions for use    AmLODIPine Besylate (Tab) NORVASC 5 MG Take 1 Tab by mouth  every day. Indications: High Blood Pressure        Aspirin (Tablet Delayed Response) aspirin 81 MG Take  by mouth.        Blood Glucose Monitoring Suppl (Misc) Blood Glucose Monitoring Suppl SUPPLIES 1 Each by Does not apply route 3 times a day. Test strips order: Test strips for Breeze 2 meter. Sig: use three times daily and prn ssx high or low sugar #100 RF x 3        Dapagliflozin-Metformin HCl (TABLET SR 24 HR) Dapagliflozin-Metformin HCl ER 5-500 MG Take 1 Tab by mouth 2 Times a Day.        Glimepiride (Tab) AMARYL 4 MG TAKE 2 TABLETS BY MOUTH DAILY WITH BREAKFAST OR THE FIRST MEAL OF THE DAY        Lancets (Misc) MICROLET LANCETS  by Does not apply route.        MetFORMIN HCl (TABLET SR 24 HR) GLUCOPHAGE  MG TAKE TWO TABLETS BY MOUTH TWICE DAILY        Multiple Vitamins-Minerals (Tab) THERAGRAN-M  Take 1 Tab by mouth every day.        Pioglitazone HCl (Tab) ACTOS 15 MG Take 1 Tab by mouth every day.        Valsartan-Hydrochlorothiazide (Tab) DIOVAN--12.5 MG TAKE ONE TABLET BY MOUTH EVERY DAY        .                 Medicines prescribed today were sent to:     [facility] [address] [place] [address] - [id number] [place] [address] & [place] [id number] [facility] [address]    Phone: [phone] Fax: [phone]    Open 24 Hours?: No      Medication refill instructions:       If your prescription bottle indicates you have medication refills left, it is not necessary to call your provider’s office. Please contact your pharmacy and they will refill your medication.    If your prescription bottle indicates you do not have any refills left, you may request refills at any time through one of the following ways: The online [employer] system (except Urgent Care), by calling your provider’s office, or by asking your pharmacy to contact your provider’s office with a refill request. Medication refills are processed only during regular business hours and may not be available until the  next business day. Your provider may request additional information or to have a follow-up visit with you prior to refilling your medication.   *Please Note: Medication refills are assigned a new Rx number when refilled electronically. Your pharmacy may indicate that no refills were authorized even though a new prescription for the same medication is available at the pharmacy. Please request the medicine by name with the pharmacy before contacting your provider for a refill.        Referral     A referral request has been sent to our patient care coordination department. Please allow 3-5 business days for us to process this request and contact you either by phone or mail. If you do not hear from us by the 5th business day, please call us at [phone].        Instructions    Check BG 3 times a day:  Fasting  Before dinner  Before bed time    Stop metformin  Stop Glimepiride    Start Xigduo   Start Actos           MyChart Access Code: Activation code not generated  Current [employer] Status: Active

## 2017-08-08 NOTE — ASSESSMENT & PLAN NOTE
Patient is currently on metformin 1000 twice a day and glimepiride 4 MG twice a day. His diabetes is not controlled as evidenced by A1c of 8.3, last A1c was 8.5.  Patient admits being non-compliant with diet, eating pizza and loving sweets. Does not take BG regularly

## 2017-08-08 NOTE — PATIENT INSTRUCTIONS
Check BG 3 times a day:  Fasting  Before dinner  Before bed time    Stop metformin  Stop Glimepiride    Start Xigduo   Start Actos

## 2017-08-08 NOTE — ASSESSMENT & PLAN NOTE
Pt has known HTN, currently on Diovan--12.5 and amlodipine 5 mg qd. He denies HA, blurred vision, dizziness, shortness of breath, palpitations, or chest pain, lower extremity edema. States he checks his BP at home sometime w numbers not being elevated DBP 80-90.

## 2017-08-08 NOTE — MR AVS SNAPSHOT
Magnus Michelet   2017 2:15 PM   Office Visit   MRN: [id number]    Department:  [facility]   Dept Phone:  [phone]    Description:  Male : 1945   Provider:  MIRZA Thomason           Reason for Visit     Cerumen Impaction told by ear dr hudson get cleaned      Allergies as of 2017     Allergen Noted Reactions    Morphine 2016   Unspecified    Hallucinations, 15+ years ago      You were diagnosed with     Impacted cerumen of both ears   [[id number]]         Vital Signs     Blood Pressure Pulse Temperature Respirations Oxygen Saturation Smoking Status    140/79 mmHg 74 36.9 °C (98.5 °F) 16 98% Never Smoker       Basic Information     Date Of Birth Sex Race Ethnicity Preferred Language    1945 Male White Non- English      Your appointments     Aug 15, 2017  1:00 PM   Established Patient with Saida Harris PA-C   [facility] (--)    [address]  [address]  [place] [facility] [place] [address] [phone]           You will be receiving a confirmation call a few days before your appointment from our automated call confirmation system.            Sep 12, 2017 11:00 AM   ONCOLOGY NEW PATIENT 60 MIN with Baljinder Palacios M.D.   [facility] (--)    [address], [address]  [place] [place] [address] [phone]              Problem List              ICD-10-CM Priority Class Noted - Resolved    History of colon cancer, stage IV Z85.038   10/8/2015 - Present    Essential hypertension I10   10/8/2015 - Present    Decreased hearing of both ears H91.93   2016 - Present    Controlled type 2 diabetes mellitus without complication, without long-term current use of insulin (CMS-HCC) E11.9   2017 - Present      Health Maintenance        Date Due Completion Dates    RETINAL SCREENING 6/15/1962 ---    IMM PNEUMOCOCCAL 65+ (ADULT) LOW/MEDIUM RISK SERIES (1 of 2 - PCV13) 6/15/2009 ---    IMM DTaP/Tdap/Td Vaccine (1 - Tdap) 2018  (Originally 6/15/1963) ---    IMM ZOSTER VACCINE 1/18/2018 (Originally 6/15/2004) ---    IMM INFLUENZA (1) 9/1/2017 10/25/2016, 10/8/2015    A1C SCREENING 2/8/2018 8/8/2017, 2/8/2017, 8/1/2016, 4/27/2016, 10/12/2015    FASTING LIPID PROFILE 2/8/2018 2/8/2017, 4/27/2016, 10/12/2015    URINE ACR / MICROALBUMIN 2/8/2018 2/8/2017, 4/27/2016, 10/12/2015    SERUM CREATININE 2/8/2018 2/8/2017, 9/27/2016, 6/28/2016, 4/27/2016, 10/12/2015    DIABETES MONOFILAMENT / LE EXAM 8/8/2018 8/8/2017, 8/1/2016, 4/26/2016 (Done)    Override on 4/26/2016: Done    COLONOSCOPY 2/16/2020 2/16/2017            Current Immunizations     Influenza Vaccine Adult HD 10/25/2016, 10/8/2015      Below and/or attached are the medications your provider expects you to take. Review all of your home medications and newly ordered medications with your provider and/or pharmacist. Follow medication instructions as directed by your provider and/or pharmacist. Please keep your medication list with you and share with your provider. Update the information when medications are discontinued, doses are changed, or new medications (including over-the-counter products) are added; and carry medication information at all times in the event of emergency situations     Allergies:  MORPHINE - Unspecified               Medications  Valid as of: August 08, 2017 -  3:09 PM    Generic Name Brand Name Tablet Size Instructions for use    AmLODIPine Besylate (Tab) NORVASC 5 MG Take 1 Tab by mouth every day. Indications: High Blood Pressure        Aspirin (Tablet Delayed Response) aspirin 81 MG Take  by mouth.        Blood Glucose Monitoring Suppl (Misc) Blood Glucose Monitoring Suppl SUPPLIES 1 Each by Does not apply route 3 times a day. Test strips order: Test strips for Breeze 2 meter. Sig: use three times daily and prn ssx high or low sugar #100 RF x 3        Dapagliflozin-Metformin HCl (TABLET SR 24 HR) Dapagliflozin-Metformin HCl ER 5-500 MG Take 1 Tab by mouth 2 Times a  Day.        Glimepiride (Tab) AMARYL 4 MG TAKE 2 TABLETS BY MOUTH DAILY WITH BREAKFAST OR THE FIRST MEAL OF THE DAY        Lancets (Misc) MICROLET LANCETS  by Does not apply route.        MetFORMIN HCl (TABLET SR 24 HR) GLUCOPHAGE  MG TAKE TWO TABLETS BY MOUTH TWICE DAILY        Multiple Vitamins-Minerals (Tab) THERAGRAN-M  Take 1 Tab by mouth every day.        Pioglitazone HCl (Tab) ACTOS 15 MG Take 1 Tab by mouth every day.        Valsartan-Hydrochlorothiazide (Tab) DIOVAN--12.5 MG TAKE ONE TABLET BY MOUTH EVERY DAY        .                 Medicines prescribed today were sent to:     [facility] [address] [place] [address], [place] - [address] [facility] [facility]    [address] [facility] [place] [address]    Phone: [phone] Fax: [phone]    Open 24 Hours?: No      Medication refill instructions:       If your prescription bottle indicates you have medication refills left, it is not necessary to call your provider’s office. Please contact your pharmacy and they will refill your medication.    If your prescription bottle indicates you do not have any refills left, you may request refills at any time through one of the following ways: The online [employer] system (except Urgent Care), by calling your provider’s office, or by asking your pharmacy to contact your provider’s office with a refill request. Medication refills are processed only during regular business hours and may not be available until the next business day. Your provider may request additional information or to have a follow-up visit with you prior to refilling your medication.   *Please Note: Medication refills are assigned a new Rx number when refilled electronically. Your pharmacy may indicate that no refills were authorized even though a new prescription for the same medication is available at the pharmacy. Please request the medicine by name with the pharmacy before contacting your provider for a refill.                [employer] Access Code: Activation code not generated  Current [employer] Status: Active

## 2017-08-08 NOTE — PROGRESS NOTES
Subjective:   CC: Magnus Claudio is a 72 y.o. male here today for follow-up on diabetes and hypertension.    Essential hypertension  Pt has known HTN, currently on Diovan--12.5 and amlodipine 5 mg qd. He denies HA, blurred vision, dizziness, shortness of breath, palpitations, or chest pain, pos occasional L lower extremity edema. States he checks his BP at home sometime w numbers not being elevated DBP 80-90.       Controlled type 2 diabetes mellitus without complication, without long-term current use of insulin (CMS-[facility])  Patient is currently on metformin 1000 twice a day and glimepiride 4 MG twice a day. His diabetes is not controlled as evidenced by A1c of 8.3, last A1c was 8.5.  Patient admits being non-compliant with diet, eating pizza and loving sweets. Does not take BG regularly. Denies polyuria polydipsia.       Current medicines (including changes today)  Current Outpatient Prescriptions   Medication Sig Dispense Refill   • Dapagliflozin-Metformin HCl ER 5-500 MG TABLET SR 24 HR Take 1 Tab by mouth 2 Times a Day. 180 Tab 3   • pioglitazone (ACTOS) 15 MG Tab Take 1 Tab by mouth every day. 30 Tab 11   • glimepiride (AMARYL) 4 MG Tab TAKE 2 TABLETS BY MOUTH DAILY WITH BREAKFAST OR THE FIRST MEAL OF THE  Tab 1   • metformin ER (GLUCOPHAGE XR) 500 MG TABLET SR 24 HR TAKE TWO TABLETS BY MOUTH TWICE DAILY 360 Tab 3   • valsartan-hydrochlorothiazide (DIOVAN-HCT) 320-12.5 MG per tablet TAKE ONE TABLET BY MOUTH EVERY DAY 90 Tab 3   • amlodipine (NORVASC) 5 MG Tab Take 1 Tab by mouth every day. Indications: High Blood Pressure 90 Tab 3   • therapeutic multivitamin-minerals (THERAGRAN-M) Tab Take 1 Tab by mouth every day.     • Blood Glucose Monitoring Suppl SUPPLIES Misc 1 Each by Does not apply route 3 times a day. Test strips order: Test strips for Breeze 2 meter. Sig: use three times daily and prn ssx high or low sugar #100 RF x 3 100 Each 11   • aspirin (CLAUDINE ASPIRIN EC LOW DOSE) 81 MG EC  "tablet Take  by mouth.     • Microlet Lancets Misc by Does not apply route.       No current facility-administered medications for this visit.         Past medical, surgical, family, and social history are reviewed in Epic chart by me today.   Medications and allergies reviewed in Epic chart by me today.         ROS   No chest pain, no shortness of breath, no abdominal pain  As documented in history of present illness above     Objective:     Blood pressure 150/90, pulse 78, temperature 36.6 °C (97.8 °F), resp. rate 20, height 1.778 m (5' 10\"), weight 90.266 kg (199 lb), SpO2 97 %. Body mass index is 28.55 kg/(m^2).   Physical Exam:  Constitutional: Alert, oriented in no acute distress.  Psych: Eye contact is good, speech goal directed, affect calm  Lungs: Unlabored respiratory effort, clear to auscultation bilaterally with good excursion, no wheez or rhonci  CV: regular rate and rhythm. No lower extremity edema, pos L extremity swelling, weight skin changes consistent with stasis dermatitis     Ext: no edema, color normal, vascularity normal, temperature normal    Monofilament testing with a 10 gram force: sensation intact: decreased bilaterally  Visual Inspection: Feet without maceration, ulcers, fissures. Nails hypertrophic w yellow discoloration   Pedal pulses: intact bilaterally    Last lab results were reviewed by me today and discussed w patient.    Assessment and Plan:   The following treatment plan was discussed    1.  Type 2 diabetes mellitus with other specified complication (CMS-HCC) [E11.69]   discussed importance of good feet hygiene and inspecting feet daily   patient will check BG 3 times daily: Fasting, before dinner, before bed time. Return w number in 7 days   Stop metformin and glimepiride   Start Xigduo and Actos  ( GFR> 60 --> can start dapagliflozin 5mg bid)   LDL Chol--> 69 at goal, I don't think we need to start statin at this level of LDL      - POCT Hemoglobin A1c --> 8.3   - Diabetic " Monofilament LE Exam  - REFERRAL TO OPHTHALMOLOGY  - Dapagliflozin-Metformin HCl ER 5-500 MG TABLET SR 24 HR; Take 1 Tab by mouth 2 Times a Day.  Dispense: 180 Tab; Refill: 3  - pioglitazone (ACTOS) 15 MG Tab; Take 1 Tab by mouth every day.  Dispense: 30 Tab; Refill: 11  - REFERRAL TO PODIATRY    2. Essential hypertension  Blood pressure can be controlled better  Continue current meds  Dapagliflozin can help improve BP.         Followup: Return in about 1 week (around 8/15/2017).         Please note that this dictation was created using voice recognition software. I have made every reasonable attempt to correct obvious errors, but I expect that there are errors of grammar and possibly content that I did not discover before finalizing the note.

## 2017-08-25 ENCOUNTER — HOSPITAL ENCOUNTER (OUTPATIENT)
Dept: LAB | Facility: MEDICAL CENTER | Age: 72
End: 2017-08-25
Attending: INTERNAL MEDICINE
Payer: MEDICARE

## 2017-08-25 ENCOUNTER — SUPERVISING PHYSICIAN REVIEW (OUTPATIENT)
Dept: MEDICAL GROUP | Facility: MEDICAL CENTER | Age: 72
End: 2017-08-25

## 2017-08-25 DIAGNOSIS — Z85.038 HISTORY OF COLON CANCER, STAGE IV: ICD-10-CM

## 2017-08-25 LAB
ALBUMIN SERPL BCP-MCNC: 3.8 G/DL (ref 3.2–4.9)
ALBUMIN/GLOB SERPL: 1.5 G/DL
ALP SERPL-CCNC: 45 U/L (ref 30–99)
ALT SERPL-CCNC: 36 U/L (ref 2–50)
ANION GAP SERPL CALC-SCNC: 9 MMOL/L (ref 0–11.9)
AST SERPL-CCNC: 33 U/L (ref 12–45)
BASOPHILS # BLD AUTO: 0.6 % (ref 0–1.8)
BASOPHILS # BLD: 0.03 K/UL (ref 0–0.12)
BILIRUB SERPL-MCNC: 1.9 MG/DL (ref 0.1–1.5)
BUN SERPL-MCNC: 14 MG/DL (ref 8–22)
CALCIUM SERPL-MCNC: 8.8 MG/DL (ref 8.5–10.5)
CEA SERPL-MCNC: 2.1 NG/ML (ref 0–3)
CHLORIDE SERPL-SCNC: 107 MMOL/L (ref 96–112)
CO2 SERPL-SCNC: 24 MMOL/L (ref 20–33)
CREAT SERPL-MCNC: 0.91 MG/DL (ref 0.5–1.4)
EOSINOPHIL # BLD AUTO: 0.04 K/UL (ref 0–0.51)
EOSINOPHIL NFR BLD: 0.8 % (ref 0–6.9)
ERYTHROCYTE [DISTWIDTH] IN BLOOD BY AUTOMATED COUNT: 39.5 FL (ref 35.9–50)
GFR SERPL CREATININE-BSD FRML MDRD: >60 ML/MIN/1.73 M 2
GLOBULIN SER CALC-MCNC: 2.6 G/DL (ref 1.9–3.5)
GLUCOSE SERPL-MCNC: 161 MG/DL (ref 65–99)
HCT VFR BLD AUTO: 40.9 % (ref 42–52)
HGB BLD-MCNC: 14.4 G/DL (ref 14–18)
IMM GRANULOCYTES # BLD AUTO: 0.01 K/UL (ref 0–0.11)
IMM GRANULOCYTES NFR BLD AUTO: 0.2 % (ref 0–0.9)
LYMPHOCYTES # BLD AUTO: 1.29 K/UL (ref 1–4.8)
LYMPHOCYTES NFR BLD: 26.1 % (ref 22–41)
MCH RBC QN AUTO: 30.2 PG (ref 27–33)
MCHC RBC AUTO-ENTMCNC: 35.2 G/DL (ref 33.7–35.3)
MCV RBC AUTO: 85.7 FL (ref 81.4–97.8)
MONOCYTES # BLD AUTO: 0.32 K/UL (ref 0–0.85)
MONOCYTES NFR BLD AUTO: 6.5 % (ref 0–13.4)
NEUTROPHILS # BLD AUTO: 3.26 K/UL (ref 1.82–7.42)
NEUTROPHILS NFR BLD: 65.8 % (ref 44–72)
NRBC # BLD AUTO: 0 K/UL
NRBC BLD AUTO-RTO: 0 /100 WBC
PLATELET # BLD AUTO: 139 K/UL (ref 164–446)
PMV BLD AUTO: 11 FL (ref 9–12.9)
POTASSIUM SERPL-SCNC: 3.6 MMOL/L (ref 3.6–5.5)
PROT SERPL-MCNC: 6.4 G/DL (ref 6–8.2)
RBC # BLD AUTO: 4.77 M/UL (ref 4.7–6.1)
SODIUM SERPL-SCNC: 140 MMOL/L (ref 135–145)
WBC # BLD AUTO: 5 K/UL (ref 4.8–10.8)

## 2017-08-25 PROCEDURE — 80053 COMPREHEN METABOLIC PANEL: CPT

## 2017-08-25 PROCEDURE — 36415 COLL VENOUS BLD VENIPUNCTURE: CPT

## 2017-08-25 PROCEDURE — 85025 COMPLETE CBC W/AUTO DIFF WBC: CPT

## 2017-08-25 PROCEDURE — 82378 CARCINOEMBRYONIC ANTIGEN: CPT

## 2017-08-25 NOTE — PROGRESS NOTES
I have reviewed and agree with history, assessment and plan for office encounter on 8/6/17 with midlevel provider: Saida Harris.  Face to face encounter/direct observation: No  Suggested changes to plan or follow-up: Would have patient monitor BP and either increase or add a med if still elevated.  Would consider statin despite low LDL.   Barrett Cespedes M.D.

## 2017-09-12 ENCOUNTER — OFFICE VISIT (OUTPATIENT)
Dept: HEMATOLOGY ONCOLOGY | Facility: MEDICAL CENTER | Age: 72
End: 2017-09-12
Payer: MEDICARE

## 2017-09-12 VITALS
WEIGHT: 196.21 LBS | SYSTOLIC BLOOD PRESSURE: 170 MMHG | HEIGHT: 72 IN | DIASTOLIC BLOOD PRESSURE: 102 MMHG | HEART RATE: 71 BPM | BODY MASS INDEX: 26.58 KG/M2 | OXYGEN SATURATION: 95 % | TEMPERATURE: 97.7 F

## 2017-09-12 DIAGNOSIS — Z85.038 HISTORY OF COLON CANCER, STAGE IV: ICD-10-CM

## 2017-09-12 PROCEDURE — 99214 OFFICE O/P EST MOD 30 MIN: CPT | Performed by: INTERNAL MEDICINE

## 2017-09-12 ASSESSMENT — PAIN SCALES - GENERAL: PAINLEVEL: NO PAIN

## 2017-09-12 NOTE — PROGRESS NOTES
Date of visit: 9/12/2017  11:12 AM      Chief Complaint-  remote history ofcolon cancer with liver metastatic disease      History of presenting illness: Magnus Claudio  is a 72 y.o. year old male who is here for follow-up of history of colon carcinoma.    2001-diagnosed with metastatic colon carcinoma with liver metastases. Status post hemicolectomy on 4/2011 and was found to have T3 N0 M1 disease secondary to liver metastatic disease. Treatment done back in [place]. No records available    -Status post 5-FU based chemotherapy doublet for 3 months with shrinkage of liver metastases  -Status post partial hepatectomy 10/2001.  -On surveillance since then with no evidence of recurrence.  -CEA levels normal  -6/2016-established care with Dr. Moreau  -CT chest, abdomen and pelvis-10/2016 showed a 5 mm nonspecific low-attenuation lesion in the anterior aspect of the left hepatic lobe and no other evidence of disease  -Colonoscopy in 2/2017, grossly normal  - MRI of the abdomen- 3/13/17 showed prior right partial hepatectomy and small cyst in the left lobe of the liver .    His he is here for a six-month follow-up and to establish care with me. No new complaints. No alteration in his bowel or bladder habits.    Past Medical History:      Past Medical History:   Diagnosis Date   • Cancer (CMS-HCC)    • Colon cancer (CMS-HCC)    • Diabetes (CMS-HCC)    • Hyperlipidemia    • Hypertension    • Vertigo, benign positional        Past surgical history:       Past Surgical History:   Procedure Laterality Date   • COLON RESECTION     • LAMINOTOMY     • PB RESEC LIVER,PART LOBECTOMY         Allergies:       Morphine    Medications:         Current Outpatient Prescriptions   Medication Sig Dispense Refill   • glimepiride (AMARYL) 4 MG Tab TAKE 2 TABLETS BY MOUTH DAILY WITH BREAKFAST OR THE FIRST MEAL OF THE  Tab 1   • metformin ER (GLUCOPHAGE XR) 500 MG TABLET SR 24 HR TAKE TWO TABLETS BY MOUTH TWICE DAILY 360 Tab 3    • amlodipine (NORVASC) 5 MG Tab Take 1 Tab by mouth every day. Indications: High Blood Pressure 90 Tab 3   • therapeutic multivitamin-minerals (THERAGRAN-M) Tab Take 1 Tab by mouth every day.     • Blood Glucose Monitoring Suppl SUPPLIES Misc 1 Each by Does not apply route 3 times a day. Test strips order: Test strips for Breeze 2 meter. Sig: use three times daily and prn ssx high or low sugar #100 RF x 3 100 Each 11   • aspirin (CLAUDINE ASPIRIN EC LOW DOSE) 81 MG EC tablet Take  by mouth.     • Microlet Lancets Misc by Does not apply route.     • Dapagliflozin-Metformin HCl ER 5-500 MG TABLET SR 24 HR Take 1 Tab by mouth 2 Times a Day. 180 Tab 3   • pioglitazone (ACTOS) 15 MG Tab Take 1 Tab by mouth every day. 30 Tab 11   • valsartan-hydrochlorothiazide (DIOVAN-HCT) 320-12.5 MG per tablet TAKE ONE TABLET BY MOUTH EVERY DAY 90 Tab 3     No current facility-administered medications for this visit.          Social History:     Social History     Social History   • Marital status:      Spouse name: N/A   • Number of children: N/A   • Years of education: N/A     Occupational History   • Not on file.     Social History Main Topics   • Smoking status: Never Smoker   • Smokeless tobacco: Never Used   • Alcohol use No   • Drug use: No   • Sexual activity: Not Currently     Partners: Female     Other Topics Concern   • Not on file     Social History Narrative   • No narrative on file       Family History:      Family History   Problem Relation Age of Onset   • Hypertension Father    • Heart Attack Father    • Cancer Mother      Breast   • Diabetes Sister    • Hyperlipidemia Neg Hx      unknown       Review of Systems:  All other review of systems are negative except what was mentioned above in the HPI.    Constitutional: Negative for fever, chills, weight loss and malaise/fatigue.    HEENT: No new auditory or visual complaints. No sore throat and neck pain.     Respiratory: Negative for cough, sputum production,  shortness of breath and wheezing.    Cardiovascular: Negative for chest pain, palpitations, orthopnea and leg swelling.    Gastrointestinal: Negative for heartburn, nausea, vomiting and abdominal pain.    Genitourinary: Negative for dysuria, hematuria    Musculoskeletal: No new arthralgias or myalgias   Skin: Negative for rash and itching.    Neurological: Negative for focal weakness and headaches.    Endo/Heme/Allergies: No abnormal bleed/bruise.    Psychiatric/Behavioral: No new depression/anxiety.    Physical Exam:  Vitals: BP (!) 170/102   Pulse 71   Temp 36.5 °C (97.7 °F)   Ht 1.829 m (6')   Wt 89 kg (196 lb 3.4 oz)   SpO2 95%   BMI 26.61 kg/m²     General: Not in acute distress, alert and oriented x 3  HEENT: No pallor, icterus. Oropharynx clear.   Neck: Supple, no palpable masses.  Lymph nodes: No palpable cervical, supraclavicular, axillary or inguinal lymphadenopathy.    CVS: regular rate and rhythm, no rubs or gallops  RESP: Clear to auscultate bilaterally, no wheezing or crackles.   ABD: Soft, non tender, non distended, positive bowel sounds, no palpable organomegaly  EXT: No edema or cyanosis  CNS: Alert and oriented x3, No focal deficits.  Skin- No rash      Labs:   No visits with results within 1 Week(s) from this visit.   Latest known visit with results is:   Hospital Outpatient Visit on 08/25/2017   Component Date Value Ref Range Status   • WBC 08/25/2017 5.0  4.8 - 10.8 K/uL Final   • RBC 08/25/2017 4.77  4.70 - 6.10 M/uL Final   • Hemoglobin 08/25/2017 14.4  14.0 - 18.0 g/dL Final   • Hematocrit 08/25/2017 40.9* 42.0 - 52.0 % Final   • MCV 08/25/2017 85.7  81.4 - 97.8 fL Final   • MCH 08/25/2017 30.2  27.0 - 33.0 pg Final   • MCHC 08/25/2017 35.2  33.7 - 35.3 g/dL Final   • RDW 08/25/2017 39.5  35.9 - 50.0 fL Final   • Platelet Count 08/25/2017 139* 164 - 446 K/uL Final   • MPV 08/25/2017 11.0  9.0 - 12.9 fL Final   • Neutrophils-Polys 08/25/2017 65.80  44.00 - 72.00 % Final   • Lymphocytes  08/25/2017 26.10  22.00 - 41.00 % Final   • Monocytes 08/25/2017 6.50  0.00 - 13.40 % Final   • Eosinophils 08/25/2017 0.80  0.00 - 6.90 % Final   • Basophils 08/25/2017 0.60  0.00 - 1.80 % Final   • Immature Granulocytes 08/25/2017 0.20  0.00 - 0.90 % Final   • Nucleated RBC 08/25/2017 0.00  /100 WBC Final   • Neutrophils (Absolute) 08/25/2017 3.26  1.82 - 7.42 K/uL Final   • Lymphs (Absolute) 08/25/2017 1.29  1.00 - 4.80 K/uL Final   • Monos (Absolute) 08/25/2017 0.32  0.00 - 0.85 K/uL Final   • Eos (Absolute) 08/25/2017 0.04  0.00 - 0.51 K/uL Final   • Baso (Absolute) 08/25/2017 0.03  0.00 - 0.12 K/uL Final   • Immature Granulocytes (abs) 08/25/2017 0.01  0.00 - 0.11 K/uL Final   • NRBC (Absolute) 08/25/2017 0.00  K/uL Final   • Sodium 08/25/2017 140  135 - 145 mmol/L Final   • Potassium 08/25/2017 3.6  3.6 - 5.5 mmol/L Final   • Chloride 08/25/2017 107  96 - 112 mmol/L Final   • Co2 08/25/2017 24  20 - 33 mmol/L Final   • Anion Gap 08/25/2017 9.0  0.0 - 11.9 Final   • Glucose 08/25/2017 161* 65 - 99 mg/dL Final   • Bun 08/25/2017 14  8 - 22 mg/dL Final   • Creatinine 08/25/2017 0.91  0.50 - 1.40 mg/dL Final   • Calcium 08/25/2017 8.8  8.5 - 10.5 mg/dL Final   • AST(SGOT) 08/25/2017 33  12 - 45 U/L Final   • ALT(SGPT) 08/25/2017 36  2 - 50 U/L Final   • Alkaline Phosphatase 08/25/2017 45  30 - 99 U/L Final   • Total Bilirubin 08/25/2017 1.9* 0.1 - 1.5 mg/dL Final   • Albumin 08/25/2017 3.8  3.2 - 4.9 g/dL Final   • Total Protein 08/25/2017 6.4  6.0 - 8.2 g/dL Final   • Globulin 08/25/2017 2.6  1.9 - 3.5 g/dL Final   • A-G Ratio 08/25/2017 1.5  g/dL Final   • Carcinoembryonic Antigen 08/25/2017 2.1  0.0 - 3.0 ng/mL Final    Comment: Effective September 17, 2013 the quantitative determination  of Carcinoembryonic Antigen (CEA) will now be performed at  [facility].  The Access CEA paramagnetic  particle chemiluminescent immunoassay is used.  Results  obtained with different test methods or kits  cannot be used interchangeably.  Measurement of CEA has been shown to be  clinically relevant in the management of patients with  colorectal, breast, lung, prostatic, pancreatic, and ovarian  carcinomas.  Smokers may have slightly elevated levels of CEA.     • GFR If  08/25/2017 >60  >60 mL/min/1.73 m 2 Final   • GFR If Non  08/25/2017 >60  >60 mL/min/1.73 m 2 Final             Assessment and Plan:  Remote history of metastatic colon carcinoma-he underwent resection of the primary followed by 3 months chemotherapy followed by partial hepatectomy in 2001 and has remained in remission since then. No evidence of relapse by imaging or tumor marker studies. Reassured the patient that the chance of recurrence is exceedingly low. I would recommend holding off on further imaging studies unless he develops any new symptoms. We will space out his CEA level to every 6 months. He will return to clinic in one year from now. Consider follow-up with his primary care provider after that. He will be due for surveillance colonoscopy in 2020    I have extensively reviewed her prior medical records as part of establishing care with me and independently verified the details. Reviewed the natural history of colon carcinoma and the reassured him that he does not need aggressive follow-up at this point.  He agreed and verbalized his agreement and understanding with the current plan.  I answered all questions and concerns he has at this time         Please note that this dictation was created using voice recognition software. I have made every reasonable attempt to correct obvious errors, but I expect that there are errors of grammar and possibly content that I did not discover before finalizing the note.      SIGNATURES:  Baljinder Palacios    CC:  Barrett Cespedes M.D.  No ref. provider found

## 2017-10-10 ENCOUNTER — OFFICE VISIT (OUTPATIENT)
Dept: MEDICAL GROUP | Facility: MEDICAL CENTER | Age: 72
End: 2017-10-10
Payer: MEDICARE

## 2017-10-10 VITALS
OXYGEN SATURATION: 98 % | HEIGHT: 72 IN | TEMPERATURE: 97.5 F | HEART RATE: 72 BPM | BODY MASS INDEX: 26.68 KG/M2 | SYSTOLIC BLOOD PRESSURE: 130 MMHG | DIASTOLIC BLOOD PRESSURE: 72 MMHG | RESPIRATION RATE: 20 BRPM | WEIGHT: 197 LBS

## 2017-10-10 DIAGNOSIS — Z85.038 HISTORY OF COLON CANCER, STAGE IV: ICD-10-CM

## 2017-10-10 DIAGNOSIS — I10 ESSENTIAL HYPERTENSION: ICD-10-CM

## 2017-10-10 DIAGNOSIS — Z00.00 HEALTHCARE MAINTENANCE: ICD-10-CM

## 2017-10-10 DIAGNOSIS — E11.9 CONTROLLED TYPE 2 DIABETES MELLITUS WITHOUT COMPLICATION, WITHOUT LONG-TERM CURRENT USE OF INSULIN (HCC): ICD-10-CM

## 2017-10-10 PROCEDURE — 99214 OFFICE O/P EST MOD 30 MIN: CPT | Performed by: FAMILY MEDICINE

## 2017-10-10 NOTE — ASSESSMENT & PLAN NOTE
Hypoglycemic therapy: Metformin ER 1000 mg twice daily, patient states the pills pass straight through him. Also on glimepiride 8 mg daily.  Last A1c: 8/8/17 was 8.3.   10 year ACC risk: 40.6%.  Aspirin: Taking a 1 mg daily.   Statin: Recommended, patient will consider this.   ARB: Taking valsartan.  Urine Microalbumin:  2/8/2017 was normal.  Monofilament Exam:  Normal 10/10/17.   Retinal Screening:  Referred.   Pneumonia vaccine:  UTD per patient.     The patient last saw Saida on 8/8/17 who recommended that he stop metformin and glimepiride and begin Xigduo and actos. He was also sent to podiatry and ophthalmology. He has continued to take metformin and glimepiride as discussed above. Is not checking home BS regularly. Last checked 2 days ago before dinner and it was 91.

## 2017-10-10 NOTE — PROGRESS NOTES
[facility]  Progress Note  Established Patient    Subjective:   Magnus Claudio is a 72 y.o. male here today with a chief complaint of DM II. The patient is accompanied by his wife.     History of colon cancer, stage IV  The patient has a remote history of metastatic colon cancer status post resection followed by 3 months of chemotherapy followed by partial hepatectomy in 2001. Is currently in remission. Last saw medical oncology on 9/12/17. The patient also follows with GI consultants. We have a note from 1/2017 and a colonoscopy report from February 2017.    Controlled type 2 diabetes mellitus without complication, without long-term current use of insulin (CMS-HCC)  Hypoglycemic therapy: Metformin ER 1000 mg twice daily, patient states the pills pass straight through him. Also on glimepiride 8 mg daily.  Last A1c: 8/8/17 was 8.3.   10 year ACC risk: 40.6%.  Aspirin: Taking a 1 mg daily.   Statin: Recommended, patient will consider this.   ARB: Taking valsartan.  Urine Microalbumin:  2/8/2017 was normal.  Monofilament Exam:  Normal 10/10/17.   Retinal Screening:  Referred.   Pneumonia vaccine:  UTD per patient.     The patient last saw [facility] on 8/8/17 who recommended that he stop metformin and glimepiride and begin Xigduo and actos. He was also sent to podiatry and ophthalmology. He has continued to take metformin and glimepiride as discussed above. Is not checking home BS regularly. Last checked 2 days ago before dinner and it was 91.    Healthcare maintenance  Lipids: Feb 2017 , , HDL 30, LDL 69.   10 year ACC risk: 40.6%.   Fasting Glucose: has DM.   Colonoscopy: done Feb 2017 with 3 year recall.   PHQ2: will do in f/u     Flu vaccine: will get later this week.   Pneumonia vaccine: patient states he has had 2 series of pneumonia vaccines.   Tdap: Declined.  Zoster vaccine: declined.       Essential hypertension  The patient controls this with valsartan/hydrochlorothiazide 320 mg/12.5  mg daily. He tolerates this medication well. He is also on amlodipine 5 mg daily.      Current Outpatient Prescriptions on File Prior to Visit   Medication Sig Dispense Refill   • glimepiride (AMARYL) 4 MG Tab TAKE 2 TABLETS BY MOUTH DAILY WITH BREAKFAST OR THE FIRST MEAL OF THE  Tab 1   • metformin ER (GLUCOPHAGE XR) 500 MG TABLET SR 24 HR TAKE TWO TABLETS BY MOUTH TWICE DAILY 360 Tab 3   • valsartan-hydrochlorothiazide (DIOVAN-HCT) 320-12.5 MG per tablet TAKE ONE TABLET BY MOUTH EVERY DAY 90 Tab 3   • amlodipine (NORVASC) 5 MG Tab Take 1 Tab by mouth every day. Indications: High Blood Pressure 90 Tab 3   • therapeutic multivitamin-minerals (THERAGRAN-M) Tab Take 1 Tab by mouth every day.     • Blood Glucose Monitoring Suppl SUPPLIES Misc 1 Each by Does not apply route 3 times a day. Test strips order: Test strips for Breeze 2 meter. Sig: use three times daily and prn ssx high or low sugar #100 RF x 3 100 Each 11   • aspirin (CLAUDINE ASPIRIN EC LOW DOSE) 81 MG EC tablet Take  by mouth.     • Microlet Lancets Misc by Does not apply route.       No current facility-administered medications on file prior to visit.        Past Medical History:   Diagnosis Date   • Cancer (CMS-HCC)     Colon   • Diabetes (CMS-HCC)    • Hypertension    • Vertigo, benign positional        Allergies: Morphine    Surgical History:  has a past surgical history that includes colon resection; resec liver,part lobectomy; and laminotomy.    Family History: family history includes Cancer in his mother; Diabetes in his sister; Heart Attack in his father; Hypertension in his father.    Social History:  reports that he has never smoked. He has never used smokeless tobacco. He reports that he does not drink alcohol or use drugs.    ROS: Denies fever, nausea..        Objective:     Vitals:    10/10/17 1230   BP: 130/72   Pulse: 72   Resp: 20   Temp: 36.4 °C (97.5 °F)   SpO2: 98%   Weight: 89.4 kg (197 lb)   Height: 1.829 m (6')       Physical  Exam:  General: alert in no apparent distress.   Cardio: regular rate and rhythm, no murmurs, rubs or gallops.   Resp: CTAB no w/r/r.     Monofilament testing with a 10 gram force: sensation intact: intact bilaterally  Visual Inspection: Feet without maceration, ulcers, fissures.  Pedal pulses: intact bilaterally          Assessment and Plan:     1. History of colon cancer, stage IV  Established, stable.  -Continue to follow with GI.    2. Controlled type 2 diabetes mellitus without complication, without long-term current use of insulin (CMS-[facility])  I suspect the patient is getting little benefit from his metformin as it is not being absorbed.  - Continue glimepiride 8 mg daily for now, we'll plan on stopping this in the future.  -Stop metformin extended release.  -Check blood sugars every morning before breakfast and every night before bed. Call me with any sugars over 300. The patient advised to email me these values in one week. We will then likely initiate metformin immediate release.  - Continue aspirin.  -Patient will consider statin.  - Diabetic Monofilament Lower Extremity Exam    3. Healthcare maintenance  See history of present illness.    4. Essential hypertension  Established, stable.  - Continue amlodipine 5 mg daily.  -Continue valsartan/hydrochlorothiazide 320 mg/12.5 mg daily.        Followup: Return in about 6 weeks (around 11/21/2017) for DM.

## 2017-10-10 NOTE — ASSESSMENT & PLAN NOTE
The patient controls this with valsartan/hydrochlorothiazide 320 mg/12.5 mg daily. He tolerates this medication well. He is also on amlodipine 5 mg daily.

## 2017-10-10 NOTE — LETTER
Count includes the [facility]  Barrett Cespedes M.D.  [address]  [place] [address]  Fax: [phone]   Authorization for Release/Disclosure of   Protected Health Information   Name: GIORGIO SALMERON : 1945 SSN: [id number]   Address: [address] [place] [address] [place] [address] Phone:    [phone] (home)    I authorize the entity listed below to release/disclose the PHI below to:   Count includes the [facility]/Barrett Cespedes M.D. and Too Brito M.D.   Provider or Entity Name:  [facility]    Address   City, [facility], [facility]   Phone:            [phone]    Fax:            [phone]   Reason for request: continuity of care   Information to be released:    [  ] LAST COLONOSCOPY,  including any PATH REPORT and follow-up  [  ] LAST FIT/COLOGUARD RESULT [  ] LAST DEXA  [  ] LAST MAMMOGRAM  [  ] LAST PAP  [  ] LAST LABS [  ] RETINA EXAM REPORT  [  ] IMMUNIZATION RECORDS  [  ] Release all info      [  ] Check here and initial the line next to each item to release ALL health information INCLUDING  _____ Care and treatment for drug and / or alcohol abuse  _____ HIV testing, infection status, or AIDS  _____ Genetic Testing    DATES OF SERVICE OR TIME PERIOD TO BE DISCLOSED: _____________  I understand and acknowledge that:  * This Authorization may be revoked at any time by you in writing, except if your health information has already been used or disclosed.  * Your health information that will be used or disclosed as a result of you signing this authorization could be re-disclosed by the recipient. If this occurs, your re-disclosed health information may no longer be protected by State or Federal laws.  * You may refuse to sign this Authorization. Your refusal will not affect your ability to obtain treatment.  * This Authorization becomes effective upon signing and will  on (date) __________.      If no date is indicated, this Authorization will  one (1) year from the signature date.    Name:  Magnus Claudio    Signature:   Date:     10/10/2017       PLEASE FAX REQUESTED RECORDS BACK TO: [phone]

## 2017-10-10 NOTE — ASSESSMENT & PLAN NOTE
Lipids: Feb 2017 , , HDL 30, LDL 69.   10 year ACC risk: 40.6%.   Fasting Glucose: has DM.   Colonoscopy: done Feb 2017 with 3 year recall.   PHQ2: will do in f/u     Flu vaccine: will get later this week.   Pneumonia vaccine: patient states he has had 2 series of pneumonia vaccines.   Tdap: Declined.  Zoster vaccine: declined.

## 2017-10-10 NOTE — PATIENT INSTRUCTIONS
Stop taking metformin.   Continue glimepiride.   Check your before breakfast and before bed blood sugars every day.   E-mail these values to me on Monday 10/16.   If your blood sugar is ever > 300, call me.

## 2017-10-10 NOTE — ASSESSMENT & PLAN NOTE
The patient has a remote history of metastatic colon cancer status post resection followed by 3 months of chemotherapy followed by partial hepatectomy in 2001. Is currently in remission. Last saw medical oncology on 9/12/17. The patient also follows with GI consultants. We have a note from 1/2017 and a colonoscopy report from February 2017. GI is aware of the elevated total bilirubin value.

## 2017-10-12 ENCOUNTER — TELEPHONE (OUTPATIENT)
Dept: MEDICAL GROUP | Facility: MEDICAL CENTER | Age: 72
End: 2017-10-12

## 2017-10-13 NOTE — TELEPHONE ENCOUNTER
After hours phone call  Call on 10/12/2017 at 10:46 PM from Magnus Claudio [phone] (home)  who reports PCP: Too Brito M.D..  Pt reports: his Metformin was stopped in Tuesday.  His BS was 303 tonight.  He is asymptomatic.  Plan: Restart Metformin at 500 mg BID.  Call Dr. Brito on Monday with blood sugars  Dejah Steel M.D.

## 2017-11-21 ENCOUNTER — OFFICE VISIT (OUTPATIENT)
Dept: MEDICAL GROUP | Facility: MEDICAL CENTER | Age: 72
End: 2017-11-21
Payer: MEDICARE

## 2017-11-21 VITALS
HEIGHT: 72 IN | WEIGHT: 196.65 LBS | TEMPERATURE: 97 F | RESPIRATION RATE: 20 BRPM | OXYGEN SATURATION: 98 % | HEART RATE: 78 BPM | SYSTOLIC BLOOD PRESSURE: 138 MMHG | DIASTOLIC BLOOD PRESSURE: 82 MMHG | BODY MASS INDEX: 26.64 KG/M2

## 2017-11-21 DIAGNOSIS — E11.9 CONTROLLED TYPE 2 DIABETES MELLITUS WITHOUT COMPLICATION, WITHOUT LONG-TERM CURRENT USE OF INSULIN (HCC): ICD-10-CM

## 2017-11-21 LAB
HBA1C MFR BLD: 8 % (ref ?–5.8)
INT CON NEG: NEGATIVE
INT CON POS: POSITIVE

## 2017-11-21 PROCEDURE — 83036 HEMOGLOBIN GLYCOSYLATED A1C: CPT | Performed by: FAMILY MEDICINE

## 2017-11-21 PROCEDURE — 99213 OFFICE O/P EST LOW 20 MIN: CPT | Performed by: FAMILY MEDICINE

## 2017-11-21 RX ORDER — INFLUENZA A VIRUS A/MICHIGAN/45/2015 X-275 (H1N1) ANTIGEN (FORMALDEHYDE INACTIVATED), INFLUENZA A VIRUS A/SINGAPORE/INFIMH-16-0019/2016 IVR-186 (H3N2) ANTIGEN (FORMALDEHYDE INACTIVATED), AND INFLUENZA B VIRUS B/MARYLAND/15/2016 BX-69A (A B/COLORADO/6/2017-LIKE VIRUS) ANTIGEN (FORMALDEHYDE INACTIVATED) 60; 60; 60 UG/.5ML; UG/.5ML; UG/.5ML
INJECTION, SUSPENSION INTRAMUSCULAR
Refills: 0 | COMMUNITY
Start: 2017-11-14 | End: 2018-02-21

## 2017-11-21 NOTE — PATIENT INSTRUCTIONS
Continue taking Metformin  mg 2 tablets twice daily.     Only take one tablet of glimepiride x 1 day then stop taking the glimepiride the next day.     When you stop taking the glimepiride, start taking Januvia.     Check your blood sugar every morning. Call me if it is < 100 or > 300.

## 2017-11-21 NOTE — PROGRESS NOTES
[facility]  Progress Note  Established Patient    Subjective:   Magnus Claudio is a 72 y.o. male here today with a chief complaint of DM II. The patient is alone.     Controlled type 2 diabetes mellitus without complication, without long-term current use of insulin (CMS-HCC)  Hypoglycemic therapy: Metformin ER 1000 mg twice daily, glimepiride 8 mg daily.  Last A1c: 11/21/17 was 8.0%.  10 year ACC risk: 40.6%.  Aspirin: Taking a 81 mg daily.   Statin: Recommended again, patient will consider this.   ARB: Taking valsartan.  Urine Microalbumin:  2/8/2017 was normal.  Monofilament Exam:  Normal 10/10/17.   Retinal Screening:  Referred again.  Pneumonia vaccine:  UTD per patient.    Fasting BS for the past month have ranged between 129-246.       Current Outpatient Prescriptions on File Prior to Visit   Medication Sig Dispense Refill   • glimepiride (AMARYL) 4 MG Tab TAKE 2 TABLETS BY MOUTH DAILY WITH BREAKFAST OR THE FIRST MEAL OF THE  Tab 1   • metformin ER (GLUCOPHAGE XR) 500 MG TABLET SR 24 HR TAKE TWO TABLETS BY MOUTH TWICE DAILY 360 Tab 3   • valsartan-hydrochlorothiazide (DIOVAN-HCT) 320-12.5 MG per tablet TAKE ONE TABLET BY MOUTH EVERY DAY 90 Tab 3   • amlodipine (NORVASC) 5 MG Tab Take 1 Tab by mouth every day. Indications: High Blood Pressure 90 Tab 3   • therapeutic multivitamin-minerals (THERAGRAN-M) Tab Take 1 Tab by mouth every day.     • Blood Glucose Monitoring Suppl SUPPLIES Misc 1 Each by Does not apply route 3 times a day. Test strips order: Test strips for Breeze 2 meter. Sig: use three times daily and prn ssx high or low sugar #100 RF x 3 100 Each 11   • aspirin (CLAUDINE ASPIRIN EC LOW DOSE) 81 MG EC tablet Take  by mouth.     • Microlet Lancets Misc by Does not apply route.       No current facility-administered medications on file prior to visit.        Past Medical History:   Diagnosis Date   • Cancer (CMS-HCC)     Colon   • Diabetes (CMS-HCC)    • Hypertension    • Vertigo,  benign positional        Allergies: Morphine    Surgical History:  has a past surgical history that includes colon resection; pr resec liver,part lobectomy; and laminotomy.    Family History: family history includes Cancer in his mother; Diabetes in his sister; Heart Attack in his father; Hypertension in his father.    Social History:  reports that he has never smoked. He has never used smokeless tobacco. He reports that he does not drink alcohol or use drugs.    ROS: no fever.        Objective:     Vitals:    11/21/17 1024   BP: 138/82   Pulse: 78   Resp: 20   Temp: 36.1 °C (97 °F)   SpO2: 98%   Weight: 89.2 kg (196 lb 10.4 oz)   Height: 1.829 m (6')       Physical Exam:  General: alert in no apparent distress.   Gait: normal.     POC A1c: 8.0.     Assessment and Plan:     1. Controlled type 2 diabetes mellitus without complication, without long-term current use of insulin (CMS-[facility])  Because of the risk of hypoglycemia, I would like the patient off glimepiride. He seems to be tolerating metformin well now. We'll also re-refer to ophthalmology.  - REFERRAL TO OPHTHALMOLOGY  - stop glimepiride, start Januvia. Counseled on how to do this.   - continue Metformin 1000 ER BID.  - check BS at home and call if < 100 or > 300.         Followup: Return in about 6 weeks (around 1/2/2018), or if symptoms worsen or fail to improve.

## 2017-11-21 NOTE — ASSESSMENT & PLAN NOTE
Hypoglycemic therapy: Metformin ER 1000 mg twice daily, glimepiride 8 mg daily.  Last A1c: 11/21/17 was 8.0%.  10 year ACC risk: 40.6%.  Aspirin: Taking a 81 mg daily.   Statin: Recommended again, patient will consider this.   ARB: Taking valsartan.  Urine Microalbumin:  2/8/2017 was normal.  Monofilament Exam:  Normal 10/10/17.   Retinal Screening:  Referred again.  Pneumonia vaccine:  UTD per patient.    Fasting BS for the past month have ranged between 129-246.

## 2017-12-19 DIAGNOSIS — E11.9 TYPE 2 DIABETES MELLITUS WITHOUT COMPLICATION, WITHOUT LONG-TERM CURRENT USE OF INSULIN (HCC): ICD-10-CM

## 2018-01-10 ENCOUNTER — OFFICE VISIT (OUTPATIENT)
Dept: MEDICAL GROUP | Facility: MEDICAL CENTER | Age: 73
End: 2018-01-10
Payer: MEDICARE

## 2018-01-10 VITALS
TEMPERATURE: 97.5 F | HEIGHT: 72 IN | DIASTOLIC BLOOD PRESSURE: 88 MMHG | OXYGEN SATURATION: 94 % | HEART RATE: 76 BPM | RESPIRATION RATE: 20 BRPM | SYSTOLIC BLOOD PRESSURE: 132 MMHG | WEIGHT: 197 LBS | BODY MASS INDEX: 26.68 KG/M2

## 2018-01-10 DIAGNOSIS — E11.9 CONTROLLED TYPE 2 DIABETES MELLITUS WITHOUT COMPLICATION, WITHOUT LONG-TERM CURRENT USE OF INSULIN (HCC): ICD-10-CM

## 2018-01-10 PROCEDURE — 99213 OFFICE O/P EST LOW 20 MIN: CPT | Performed by: FAMILY MEDICINE

## 2018-01-10 NOTE — ASSESSMENT & PLAN NOTE
Hypoglycemic therapy: Metformin ER 1000 mg twice daily, glimepiride 8 mg daily. Patient states he could not afford the Januvia.  Last A1c: 11/21/17 was 8.0%.  10 year ACC risk: 40.6%.  Aspirin: Taking 81 mg daily.   Statin: Recommended again, patient not currently interested.  ARB: Taking valsartan.  Urine Microalbumin:  2/8/2017 was normal.  Monofilament Exam:  Normal 10/10/17.   Retinal Screening:  Referred.  Pneumonia vaccine:  UTD per patient.    Fasting BS for the past month have ranged between 132 - 251.

## 2018-01-10 NOTE — PROGRESS NOTES
[facility]  Progress Note  Established Patient    Subjective:   Magnus Claudio is a 72 y.o. male here today with a chief complaint of diabetes. The patient is alone.     Controlled type 2 diabetes mellitus without complication, without long-term current use of insulin (CMS-[facility])  Hypoglycemic therapy: Metformin ER 1000 mg twice daily, glimepiride 8 mg daily. Patient states he could not afford the Januvia.  Last A1c: 11/21/17 was 8.0%.  10 year ACC risk: 40.6%.  Aspirin: Taking 81 mg daily.   Statin: Recommended again, patient not currently interested.  ARB: Taking valsartan.  Urine Microalbumin:  2/8/2017 was normal.  Monofilament Exam:  Normal 10/10/17.   Retinal Screening:  Referred.  Pneumonia vaccine:  UTD per patient.    Fasting BS for the past month have ranged between 132 - 251.      Current Outpatient Prescriptions on File Prior to Visit   Medication Sig Dispense Refill   • Glucose Blood (ASCENSIA BREEZE 2) DISK Test blood sugar daily as instructed 100 Each 12   • FLUZONE HIGH-DOSE 0.5 ML Suspension Prefilled Syringe injection ADM 0.5ML IM UTD  0   • glimepiride (AMARYL) 4 MG Tab TAKE 2 TABLETS BY MOUTH DAILY WITH BREAKFAST OR THE FIRST MEAL OF THE  Tab 1   • metformin ER (GLUCOPHAGE XR) 500 MG TABLET SR 24 HR TAKE TWO TABLETS BY MOUTH TWICE DAILY 360 Tab 3   • valsartan-hydrochlorothiazide (DIOVAN-HCT) 320-12.5 MG per tablet TAKE ONE TABLET BY MOUTH EVERY DAY 90 Tab 3   • amlodipine (NORVASC) 5 MG Tab Take 1 Tab by mouth every day. Indications: High Blood Pressure 90 Tab 3   • therapeutic multivitamin-minerals (THERAGRAN-M) Tab Take 1 Tab by mouth every day.     • Blood Glucose Monitoring Suppl SUPPLIES Misc 1 Each by Does not apply route 3 times a day. Test strips order: Test strips for Breeze 2 meter. Sig: use three times daily and prn ssx high or low sugar #100 RF x 3 100 Each 11   • aspirin (CLAUDINE ASPIRIN EC LOW DOSE) 81 MG EC tablet Take  by mouth.     • Microlet Lancets Misc by  Does not apply route.       No current facility-administered medications on file prior to visit.        Past Medical History:   Diagnosis Date   • Cancer (CMS-[facility])     Colon   • Diabetes (CMS-[facility])    • Hypertension    • Vertigo, benign positional        Allergies: Morphine    Surgical History:  has a past surgical history that includes colon resection; pr resec liver,part lobectomy; and laminotomy.    Family History: family history includes Cancer in his mother; Diabetes in his sister; Heart Attack in his father; Hypertension in his father.    Social History:  reports that he has never smoked. He has never used smokeless tobacco. He reports that he does not drink alcohol or use drugs.    ROS: no fever.        Objective:     Vitals:    01/10/18 0900   BP: 132/88   Pulse: 76   Resp: 20   Temp: 36.4 °C (97.5 °F)   SpO2: 94%   Weight: 89.4 kg (197 lb)   Height: 1.829 m (6')       Physical Exam:  General: alert in no apparent distress.   Gait: normal.         Assessment and Plan:     1. Controlled type 2 diabetes mellitus without complication, without long-term current use of insulin (CMS-HCC)  Patient was not able to afford the Januvia. He continues on metformin XL 1000 g twice a day and glyburide 8 mg daily. His fasting sugars are a little elevated. Patient states he does get blood sugars in the 80s about twice per month and this causes him to have symptoms. Patient continues to decline statin therapy.  - Referred for ophthalmology.  - Continue metformin XL 1000 g twice daily, glimepiride 8 mg daily. I would like to change him from glimepiride to either Janumet or, if cost remains a concern, glipizide to reduce the risk of hypoglycemia but the patient would like to hold off for now.   - Continue aspirin 81 mg daily, valsartan.  - Patient declined statin.  - Patient states he is up-to-date on pneumonia vaccines.  - counseled on diet and exercise.        Followup: Return in about 6 weeks (around 2/21/2018), or if  symptoms worsen or fail to improve, for DM.

## 2018-02-21 ENCOUNTER — OFFICE VISIT (OUTPATIENT)
Dept: MEDICAL GROUP | Facility: MEDICAL CENTER | Age: 73
End: 2018-02-21
Payer: MEDICARE

## 2018-02-21 VITALS
BODY MASS INDEX: 25.95 KG/M2 | HEART RATE: 77 BPM | SYSTOLIC BLOOD PRESSURE: 132 MMHG | WEIGHT: 191.6 LBS | DIASTOLIC BLOOD PRESSURE: 76 MMHG | HEIGHT: 72 IN

## 2018-02-21 DIAGNOSIS — E11.9 CONTROLLED TYPE 2 DIABETES MELLITUS WITHOUT COMPLICATION, WITHOUT LONG-TERM CURRENT USE OF INSULIN (HCC): ICD-10-CM

## 2018-02-21 DIAGNOSIS — R79.89 LOW VITAMIN D LEVEL: ICD-10-CM

## 2018-02-21 LAB
HBA1C MFR BLD: 7.8 % (ref ?–5.8)
INT CON NEG: NORMAL
INT CON POS: NORMAL

## 2018-02-21 PROCEDURE — 99214 OFFICE O/P EST MOD 30 MIN: CPT | Performed by: FAMILY MEDICINE

## 2018-02-21 PROCEDURE — 83036 HEMOGLOBIN GLYCOSYLATED A1C: CPT | Performed by: FAMILY MEDICINE

## 2018-02-21 NOTE — PROGRESS NOTES
RN-HIGINIOE Note    Subjective:     Health changes since last visit/interval Hx:  Had incision with biopsy on his gums by dentist last week, was having a difficult time eating.    Medications (including changes made today)  Current Outpatient Prescriptions   Medication Sig Dispense Refill   • valsartan-hydrochlorothiazide (DIOVAN-HCT) 320-12.5 MG per tablet TAKE 1 TABLET BY MOUTH EVERY DAY 90 Tab 3   • glimepiride (AMARYL) 4 MG Tab TAKE 2 TABLETS BY MOUTH DAILY WITH BREAKFAST OR THE FIRST MEAL OF THE  Tab 1   • metformin ER (GLUCOPHAGE XR) 500 MG TABLET SR 24 HR TAKE TWO TABLETS BY MOUTH TWICE DAILY 360 Tab 3   • amlodipine (NORVASC) 5 MG Tab Take 1 Tab by mouth every day. Indications: High Blood Pressure 90 Tab 3   • therapeutic multivitamin-minerals (THERAGRAN-M) Tab Take 1 Tab by mouth every day.     • Blood Glucose Monitoring Suppl SUPPLIES Misc 1 Each by Does not apply route 3 times a day. Test strips order: Test strips for Breeze 2 meter. Sig: use three times daily and prn ssx high or low sugar #100 RF x 3 100 Each 11   • aspirin (CLAUDINE ASPIRIN EC LOW DOSE) 81 MG EC tablet Take  by mouth.     • Microlet Lancets Misc by Does not apply route.     • Glucose Blood (ASCENSIA BREEZE 2) DISK Test blood sugar daily as instructed 100 Each 12     No current facility-administered medications for this visit.        Taking daily ASA: Yes  Taking above medications as prescribed: Yes  Patient Denies side effects of medication.    Exercise: no regular exercise, sedentary  Diet: usually eating about 3 meals per day, breakfast typically about 20 gm of cho and lunch and dinner vary.   Discussed limiting cho to no more than 60 gm at meals.     Health Maintenance:   Health Maintenance Topics with due status: Overdue       Topic Date Due    RETINAL SCREENING 06/15/1962    IMM DTaP/Tdap/Td Vaccine 06/15/1963    IMM ZOSTER VACCINE 06/15/2004    IMM PNEUMOCOCCAL 65+ (ADULT) LOW/MEDIUM RISK SERIES 06/15/2009    Annual Wellness Visit  05/18/2017    IMM INFLUENZA 09/01/2017    FASTING LIPID PROFILE 02/08/2018    URINE ACR / MICROALBUMIN 02/08/2018       Immunizations:   PPSV23: up to date  [id number]: up to date  Tdap: unknown  Flu: up to date      DM:   Last A1c:   Lab Results   Component Value Date/Time    HBA1C 7.8 02/21/2018 10:25 AM      A1c goal: < 7-7.5    Glucose monitoring frequency: testing one time per day fasting  fasting range: 130-160    Hypoglycemic episodes: no     Last Retinal Exam: states completed  Provider: Ester Doe (request for records sent)  Daily Foot Exam: yes  Routine Dental Exams: yes    Lab Results   Component Value Date/Time    MALBCRT 5 02/08/2017 09:39 AM    MICROALBUR 0.8 02/08/2017 09:39 AM        ACR Albumin/Creatinine Ratio goal <30 at goal        HTN:   Blood pressure goal <140/<80 at goal.   Currently Rx ACE/ARB: Yes    Dyslipidemia:    Lab Results   Component Value Date/Time    CHOLSTRLTOT 142 02/08/2017 09:40 AM    LDL 69 02/08/2017 09:40 AM    HDL 30 (A) 02/08/2017 09:40 AM    TRIGLYCERIDE 217 (H) 02/08/2017 09:40 AM       Lab Results   Component Value Date/Time    SODIUM 140 08/25/2017 10:55 AM    POTASSIUM 3.6 08/25/2017 10:55 AM    CHLORIDE 107 08/25/2017 10:55 AM    CO2 24 08/25/2017 10:55 AM    GLUCOSE 161 (H) 08/25/2017 10:55 AM    BUN 14 08/25/2017 10:55 AM    CREATININE 0.91 08/25/2017 10:55 AM     Lab Results   Component Value Date/Time    ALKPHOSPHAT 45 08/25/2017 10:55 AM    ASTSGOT 33 08/25/2017 10:55 AM    ALTSGPT 36 08/25/2017 10:55 AM    TBILIRUBIN 1.9 (H) 08/25/2017 10:55 AM        Currently Rx Statin: No      He  reports that he has never smoked. He has never used smokeless tobacco.    Objective:     Exam:  Monofilament: not done      Plan:     Discussed All medications, side effects and compliance (discussed carefully)  Annual eye examinations at Ophthalmology  Diabetic diet discussed in detail, written exchange diet given  Foot care discussed and Podiatry visits  Glycohemoglobin and  other lab monitoring  Home glucose monitoring emphasized. Discussed foot care  Encouraged aerobic exercise        Patient educated on:  - Diabetic Meal Plan: appropriate CHO value for meals, plate method and reading food labels  - Exercise  - Foot Care: what to look for when checking feet every day and when to contact HCP  - HbA1C: target and what A1C is    Recommended medication changes: none at this time  Patient encouraged to increase his activity especially during this time of year when he is generally sedentary  Recommended no more than 60 gm of cho at meals and if he eats a snack no more than 15 gm snack in between meals.

## 2018-02-21 NOTE — PROGRESS NOTES
[facility]  Progress Note  Established Patient    Subjective:   Magnus Claudio is a 72 y.o. male here today with a chief complaint of diabetes. The patient is alone.     Controlled type 2 diabetes mellitus without complication, without long-term current use of insulin (CMS-[facility])  Hypoglycemic therapy: Metformin ER 1000 mg twice daily, glimepiride 8 mg daily. Patient states he could not afford the Januvia.  Last A1c: 02/21/18 was 7.8.   10 year ACC risk: 40.6%.  Aspirin: Taking 81 mg daily.   Statin: Patient declines.  ARB: Taking valsartan.  Urine Microalbumin:  2/8/2017 was normal.  Monofilament Exam:  Normal 10/10/17.   Retinal Screening:  Referred.  Pneumonia vaccine:  UTD per patient.    The patient intermittently checks his fasting blood sugars. They're usually in the 140 range. He denies hypoglycemia.      Current Outpatient Prescriptions on File Prior to Visit   Medication Sig Dispense Refill   • valsartan-hydrochlorothiazide (DIOVAN-HCT) 320-12.5 MG per tablet TAKE 1 TABLET BY MOUTH EVERY DAY 90 Tab 3   • glimepiride (AMARYL) 4 MG Tab TAKE 2 TABLETS BY MOUTH DAILY WITH BREAKFAST OR THE FIRST MEAL OF THE  Tab 1   • metformin ER (GLUCOPHAGE XR) 500 MG TABLET SR 24 HR TAKE TWO TABLETS BY MOUTH TWICE DAILY 360 Tab 3   • amlodipine (NORVASC) 5 MG Tab Take 1 Tab by mouth every day. Indications: High Blood Pressure 90 Tab 3   • therapeutic multivitamin-minerals (THERAGRAN-M) Tab Take 1 Tab by mouth every day.     • Blood Glucose Monitoring Suppl SUPPLIES Misc 1 Each by Does not apply route 3 times a day. Test strips order: Test strips for Breeze 2 meter. Sig: use three times daily and prn ssx high or low sugar #100 RF x 3 100 Each 11   • aspirin (CLAUDINE ASPIRIN EC LOW DOSE) 81 MG EC tablet Take  by mouth.     • Microlet Lancets Misc by Does not apply route.     • Glucose Blood (ASCENSIA BREEZE 2) DISK Test blood sugar daily as instructed 100 Each 12     No current facility-administered  medications on file prior to visit.        Past Medical History:   Diagnosis Date   • Cancer (CMS-[facility])     Colon   • Diabetes (CMS-[facility])    • Hypertension    • Vertigo, benign positional        Allergies: Morphine    Surgical History:  has a past surgical history that includes colon resection; pr resec liver,part lobectomy; and laminotomy.    Family History: family history includes Cancer in his mother; Diabetes in his sister; Heart Attack in his father; Hypertension in his father.    Social History:  reports that he has never smoked. He has never used smokeless tobacco. He reports that he does not drink alcohol or use drugs.    ROS: no fever.        Objective:     Vitals:    02/21/18 1004   BP: 132/76   Pulse: 77   Weight: 86.9 kg (191 lb 9.6 oz)   Height: 1.829 m (6')       Physical Exam:  General: alert in no apparent distress.   Gait: noraml.         Assessment and Plan:     1. Controlled type 2 diabetes mellitus without complication, without long-term current use of insulin (CMS-HCC)  Unable to afford Januvia.   - met with CDE today.   - continue glimepiride and metformin for now.   - continue ARB.   - POCT Hemoglobin A1C  - LIPID PROFILE; Future  - MICROALBUMIN CREAT RATIO URINE; Future    2. Low vitamin D level  Ordered per CDE.  - VITAMIN D,25 HYDROXY; Future        Followup: Return in about 3 months (around 5/21/2018), or if symptoms worsen or fail to improve, for DM.

## 2018-02-21 NOTE — LETTER
[facility]  Too Brito M.D.  [address]  [place] [address]  Fax: [phone]   Authorization for Release/Disclosure of   Protected Health Information   Name: GIORGIO SALMERON : 1945 SSN: [id number]   Address: [facility] [place] [address] Phone:    [phone] (home)    I authorize the entity listed below to release/disclose the PHI below to:   [facility]/Too Brito M.D. and Too Brito M.D.   Provider or Entity Name:    Ester Doe   Address   City, [facility], [facility]   Phone:      Fax:[phone]     Reason for request: continuity of care   Information to be released:    [  ] LAST COLONOSCOPY,  including any PATH REPORT and follow-up  [  ] LAST FIT/COLOGUARD RESULT [  ] LAST DEXA  [  ] LAST MAMMOGRAM  [  ] LAST PAP  [  ] LAST LABS [ xxx ] RETINA EXAM REPORT  [  ] IMMUNIZATION RECORDS  [  ] Release all info      [  ] Check here and initial the line next to each item to release ALL health information INCLUDING  _____ Care and treatment for drug and / or alcohol abuse  _____ HIV testing, infection status, or AIDS  _____ Genetic Testing    DATES OF SERVICE OR TIME PERIOD TO BE DISCLOSED: _____________  I understand and acknowledge that:  * This Authorization may be revoked at any time by you in writing, except if your health information has already been used or disclosed.  * Your health information that will be used or disclosed as a result of you signing this authorization could be re-disclosed by the recipient. If this occurs, your re-disclosed health information may no longer be protected by State or Federal laws.  * You may refuse to sign this Authorization. Your refusal will not affect your ability to obtain treatment.  * This Authorization becomes effective upon signing and will  on (date) __________.      If no date is indicated, this Authorization will  one (1) year from the signature date.    Name: Giorgio Salmeron    Signature:   Date:     2/21/2018       PLEASE FAX REQUESTED RECORDS BACK TO: [phone]

## 2018-02-21 NOTE — ASSESSMENT & PLAN NOTE
Hypoglycemic therapy: Metformin ER 1000 mg twice daily, glimepiride 8 mg daily. Patient states he could not afford the Januvia.  Last A1c: 02/21/18 was 7.8.   10 year ACC risk: 40.6%.  Aspirin: Taking 81 mg daily.   Statin: Patient declines.  ARB: Taking valsartan.  Urine Microalbumin:  2/8/2017 was normal.  Monofilament Exam:  Normal 10/10/17.   Retinal Screening:  Referred.  Pneumonia vaccine:  UTD per patient.    The patient intermittently checks his fasting blood sugars. They're usually in the 140 range. He denies hypoglycemia.

## 2018-02-26 ENCOUNTER — HOSPITAL ENCOUNTER (OUTPATIENT)
Dept: LAB | Facility: MEDICAL CENTER | Age: 73
End: 2018-02-26
Attending: FAMILY MEDICINE
Payer: MEDICARE

## 2018-02-26 DIAGNOSIS — R79.89 LOW VITAMIN D LEVEL: ICD-10-CM

## 2018-02-26 DIAGNOSIS — E11.9 CONTROLLED TYPE 2 DIABETES MELLITUS WITHOUT COMPLICATION, WITHOUT LONG-TERM CURRENT USE OF INSULIN (HCC): ICD-10-CM

## 2018-02-26 LAB
25(OH)D3 SERPL-MCNC: 14 NG/ML (ref 30–100)
CHOLEST SERPL-MCNC: 129 MG/DL (ref 100–199)
CREAT UR-MCNC: 444.1 MG/DL
HDLC SERPL-MCNC: 27 MG/DL
LDLC SERPL CALC-MCNC: 49 MG/DL
MICROALBUMIN UR-MCNC: 2.2 MG/DL
MICROALBUMIN/CREAT UR: 5 MG/G (ref 0–30)
TRIGL SERPL-MCNC: 264 MG/DL (ref 0–149)

## 2018-02-26 PROCEDURE — 36415 COLL VENOUS BLD VENIPUNCTURE: CPT

## 2018-02-26 PROCEDURE — 82570 ASSAY OF URINE CREATININE: CPT

## 2018-02-26 PROCEDURE — 82306 VITAMIN D 25 HYDROXY: CPT

## 2018-02-26 PROCEDURE — 80061 LIPID PANEL: CPT

## 2018-02-26 PROCEDURE — 82043 UR ALBUMIN QUANTITATIVE: CPT

## 2018-05-22 ENCOUNTER — OFFICE VISIT (OUTPATIENT)
Dept: MEDICAL GROUP | Facility: MEDICAL CENTER | Age: 73
End: 2018-05-22
Payer: MEDICARE

## 2018-05-22 VITALS
WEIGHT: 194.34 LBS | TEMPERATURE: 98 F | SYSTOLIC BLOOD PRESSURE: 136 MMHG | OXYGEN SATURATION: 96 % | BODY MASS INDEX: 26.32 KG/M2 | HEIGHT: 72 IN | DIASTOLIC BLOOD PRESSURE: 78 MMHG | HEART RATE: 80 BPM

## 2018-05-22 DIAGNOSIS — I10 ESSENTIAL HYPERTENSION: ICD-10-CM

## 2018-05-22 DIAGNOSIS — E11.9 CONTROLLED TYPE 2 DIABETES MELLITUS WITHOUT COMPLICATION, WITHOUT LONG-TERM CURRENT USE OF INSULIN (HCC): ICD-10-CM

## 2018-05-22 DIAGNOSIS — D69.6 THROMBOCYTOPENIA (HCC): ICD-10-CM

## 2018-05-22 LAB
HBA1C MFR BLD: 7.6 % (ref ?–5.8)
INT CON NEG: NEGATIVE
INT CON POS: POSITIVE

## 2018-05-22 PROCEDURE — 99214 OFFICE O/P EST MOD 30 MIN: CPT | Performed by: FAMILY MEDICINE

## 2018-05-22 PROCEDURE — 83036 HEMOGLOBIN GLYCOSYLATED A1C: CPT | Performed by: FAMILY MEDICINE

## 2018-05-22 NOTE — ASSESSMENT & PLAN NOTE
Hypoglycemic therapy: Metformin ER 1000 mg twice daily, glimepiride 8 mg daily. Patient states he could not afford the Januvia.  Last A1c: 05/22/18 was 7.6.   10 year ACC risk: 40.6%.  Aspirin: Taking 81 mg daily.   Statin: Patient declines.  ARB: Taking valsartan.  Urine Microalbumin:  2/8/2017 was normal.  Monofilament Exam:  Normal 10/10/17.   Retinal Screening: done 2017.   Pneumonia vaccine:  UTD per patient.

## 2018-05-22 NOTE — PROGRESS NOTES
[facility]  Progress Note  Established Patient    Subjective:   Magnus Claudio is a 72 y.o. male here today with a chief complaint of diabetes. The patient is alone.     Essential hypertension  The patient's blood pressure is at goal on his current BP regimen.    Controlled type 2 diabetes mellitus without complication, without long-term current use of insulin (CMS-[facility])  Hypoglycemic therapy: Metformin ER 1000 mg twice daily, glimepiride 8 mg daily. Patient states he could not afford the Januvia.  Last A1c: 05/22/18 was 7.6.   10 year ACC risk: 40.6%.  Aspirin: Taking 81 mg daily.   Statin: Patient declines.  ARB: Taking valsartan.  Urine Microalbumin:  2/8/2017 was normal.  Monofilament Exam:  Normal 10/10/17.   Retinal Screening: done 2017.   Pneumonia vaccine:  UTD per patient.    Thrombocytopenia (HCC)  Noted on past labs. This is stable.      Current Outpatient Prescriptions on File Prior to Visit   Medication Sig Dispense Refill   • amLODIPine (NORVASC) 5 MG Tab Take 1 Tab by mouth every day. 90 Tab 3   • glimepiride (AMARYL) 4 MG Tab TAKE 2 TABLETS BY MOUTH ONC DAILY WITH BREAKFAST OR THE FIRST MEAL OF THE  Tab 3   • valsartan-hydrochlorothiazide (DIOVAN-HCT) 320-12.5 MG per tablet TAKE 1 TABLET BY MOUTH EVERY DAY 90 Tab 3   • metformin ER (GLUCOPHAGE XR) 500 MG TABLET SR 24 HR TAKE TWO TABLETS BY MOUTH TWICE DAILY 360 Tab 3   • therapeutic multivitamin-minerals (THERAGRAN-M) Tab Take 1 Tab by mouth every day.     • aspirin (CLAUDINE ASPIRIN EC LOW DOSE) 81 MG EC tablet Take  by mouth.     • Glucose Blood (NORIS POLANCOEZE 2) DISK Test blood sugar daily as instructed 100 Each 12   • Blood Glucose Monitoring Suppl SUPPLIES Misc 1 Each by Does not apply route 3 times a day. Test strips order: Test strips for Breeze 2 meter. Sig: use three times daily and prn ssx high or low sugar #100 RF x 3 100 Each 11   • Microlet Lancets Misc by Does not apply route.       No current facility-administered  medications on file prior to visit.        Past Medical History:   Diagnosis Date   • Cancer (CMS-HCC) (HCC)     Colon   • Diabetes (CMS-HCC) (HCC)    • Hypertension    • Vertigo, benign positional        Allergies: Morphine    Surgical History:  has a past surgical history that includes colon resection; pr resec liver,part lobectomy; and laminotomy.    Family History: family history includes Cancer in his mother; Diabetes in his sister; Heart Attack in his father; Hypertension in his father.    Social History:  reports that he has never smoked. He has never used smokeless tobacco. He reports that he does not drink alcohol or use drugs.    ROS: no CP, SOB, easy bleeding.        Objective:     Vitals:    05/22/18 0954   BP: 136/78   Pulse: 80   Temp: 36.7 °C (98 °F)   SpO2: 96%   Weight: 88.2 kg (194 lb 5.4 oz)   Height: 1.829 m (6')       Physical Exam:  General: alert in no apparent distress.   Gait: normal.     POC A1c: 7.6.     Assessment and Plan:     1. Controlled type 2 diabetes mellitus without complication, without long-term current use of insulin (HCC)  A1c improving.   - continue to recommend diet and exercise.   - continue current regimen (see HPI).   - COMP METABOLIC PANEL; Future  - HEMOGLOBIN A1C; Future    2. Essential hypertension  This is an established and stable problem.  - continue current regimen.   - COMP METABOLIC PANEL; Future    3. Thrombocytopenia (HCC)  This is an established and stable problem.  - PLATELET COUNT; Future        Followup: Return in about 4 months (around 9/22/2018), or if symptoms worsen or fail to improve, for Wellness Visit, Long.

## 2018-09-11 ENCOUNTER — OFFICE VISIT (OUTPATIENT)
Dept: HEMATOLOGY ONCOLOGY | Facility: MEDICAL CENTER | Age: 73
End: 2018-09-11
Payer: MEDICARE

## 2018-09-11 VITALS
WEIGHT: 192.9 LBS | DIASTOLIC BLOOD PRESSURE: 96 MMHG | RESPIRATION RATE: 16 BRPM | SYSTOLIC BLOOD PRESSURE: 185 MMHG | TEMPERATURE: 97.5 F | BODY MASS INDEX: 26.13 KG/M2 | HEIGHT: 72 IN | OXYGEN SATURATION: 95 % | HEART RATE: 67 BPM

## 2018-09-11 DIAGNOSIS — Z85.038 HISTORY OF COLON CANCER, STAGE IV: ICD-10-CM

## 2018-09-11 PROCEDURE — 99213 OFFICE O/P EST LOW 20 MIN: CPT | Performed by: INTERNAL MEDICINE

## 2018-09-11 ASSESSMENT — PAIN SCALES - GENERAL: PAINLEVEL: NO PAIN

## 2018-09-11 NOTE — PROGRESS NOTES
Date of visit: 9/11/2018  10:52 AM      Chief Complaint- remote history ofcolon cancer with liver metastatic disease      Identification/Prior relevant history:      2001-diagnosed with metastatic colon carcinoma with liver metastases. Status post hemicolectomy on 4/2011 and was found to have T3 N0 M1 disease secondary to liver metastatic disease. Treatment done back in [place]. No records available     -Status post 5-FU based chemotherapy doublet for 3 months with shrinkage of liver metastases  -Status post partial hepatectomy 10/2001.  -On surveillance since then with no evidence of recurrence.  -CEA levels normal  -6/2016-established care with Dr. Moreau  -CT chest, abdomen and pelvis-10/2016 showed a 5 mm nonspecific low-attenuation lesion in the anterior aspect of the left hepatic lobe and no other evidence of disease  -Colonoscopy in 2/2017, grossly normal  - MRI of the abdomen- 3/13/17 showed prior right partial hepatectomy and small cyst in the left lobe of the liver .    Interim history-he is here for a one-year follow-up. Denies any new complaints. Bowel habits are regular    Past Medical History:      Past Medical History:   Diagnosis Date   • Cancer (CMS-HCC) (HCC)     Colon   • Diabetes (CMS-HCC) (HCC)    • Hypertension    • Vertigo, benign positional        Past surgical history:       Past Surgical History:   Procedure Laterality Date   • COLON RESECTION     • LAMINOTOMY     • PB RESEC LIVER,PART LOBECTOMY         Allergies:       Morphine    Medications:         Current Outpatient Prescriptions   Medication Sig Dispense Refill   • metFORMIN ER (GLUCOPHAGE XR) 500 MG TABLET SR 24 HR TAKE 2 TABLETS BY MOUTH TWICE DAILY 360 Tab 4   • amLODIPine (NORVASC) 5 MG Tab Take 1 Tab by mouth every day. 90 Tab 3   • glimepiride (AMARYL) 4 MG Tab TAKE 2 TABLETS BY MOUTH ONC DAILY WITH BREAKFAST OR THE FIRST MEAL OF THE  Tab 3   • valsartan-hydrochlorothiazide (DIOVAN-HCT) 320-12.5 MG per tablet TAKE 1  TABLET BY MOUTH EVERY DAY 90 Tab 3   • Glucose Blood (ASCENSIA BREEZE 2) DISK Test blood sugar daily as instructed 100 Each 12   • therapeutic multivitamin-minerals (THERAGRAN-M) Tab Take 1 Tab by mouth every day.     • Blood Glucose Monitoring Suppl SUPPLIES Misc 1 Each by Does not apply route 3 times a day. Test strips order: Test strips for Breeze 2 meter. Sig: use three times daily and prn ssx high or low sugar #100 RF x 3 100 Each 11   • aspirin (CLAUDINE ASPIRIN EC LOW DOSE) 81 MG EC tablet Take  by mouth.     • Microlet Lancets Misc by Does not apply route.       No current facility-administered medications for this visit.          Social History:     Social History     Social History   • Marital status:      Spouse name: N/A   • Number of children: N/A   • Years of education: N/A     Occupational History   • Not on file.     Social History Main Topics   • Smoking status: Never Smoker   • Smokeless tobacco: Never Used   • Alcohol use No   • Drug use: No   • Sexual activity: Not Currently     Partners: Female     Other Topics Concern   • Not on file     Social History Narrative   • No narrative on file       Family History:      Family History   Problem Relation Age of Onset   • Hypertension Father    • Heart Attack Father    • Cancer Mother         Breast   • Diabetes Sister    • Hyperlipidemia Neg Hx         unknown       Review of Systems:  All other review of systems are negative except what was mentioned above in the HPI.    Constitutional: Negative for fever, chills, weight loss and malaise/fatigue.    HEENT: No new auditory or visual complaints. No sore throat and neck pain.     Respiratory: Negative for cough, sputum production, shortness of breath and wheezing.    Cardiovascular: Negative for chest pain, palpitations, orthopnea and leg swelling.    Gastrointestinal: Negative for heartburn, nausea, vomiting and abdominal pain.    Genitourinary: Negative for dysuria, hematuria    Musculoskeletal: No  new arthralgias or myalgias   Skin: Negative for rash and itching.    Neurological: Negative for focal weakness and headaches.    Endo/Heme/Allergies: No abnormal bleed/bruise.    Psychiatric/Behavioral: No new depression/anxiety.    Physical Exam:  Vitals: There were no vitals taken for this visit.    General: Not in acute distress, alert and oriented x 3  HEENT: No pallor, icterus. Oropharynx clear.   Neck: Supple, no palpable masses.  Lymph nodes: No palpable cervical, supraclavicular, axillary or inguinal lymphadenopathy.    CVS: regular rate and rhythm, no rubs or gallops  RESP: Clear to auscultate bilaterally, no wheezing or crackles.   ABD: Soft, non tender, non distended, positive bowel sounds, no palpable organomegaly  EXT: No edema or cyanosis  CNS: Alert and oriented x3, No focal deficits.  Skin- No rash      Labs:   No visits with results within 1 Week(s) from this visit.   Latest known visit with results is:   Office Visit on 05/22/2018   Component Date Value Ref Range Status   • Glycohemoglobin 05/22/2018 7.6  % Final   • Internal Control Negative 05/22/2018 Negative   Final   • Internal Control Positive 05/22/2018 Positive   Final             Assessment and Plan:  Remote history of metastatic colon carcinoma-he underwent resection of the primary followed by 3 months chemotherapy followed by partial hepatectomy in 2001 and has remained in remission since then. Reassured the patient that the chance of recurrence is exceedingly low. Last MRI from 2017 mentioned, size small liver nodule which does not appear to be suspicious. We will obtain a follow-up CT scan and after this he can follow-up with his primary care provider. He will be due for surveillance colonoscopy in 2020.  Return to clinic when necessary if the upcoming CT scan is unremarkable.    He agreed and verbalized  agreement and understanding with the current plan.  I answered all questions and concerns at this time         Please note that  this dictation was created using voice recognition software. I have made every reasonable attempt to correct obvious errors, but I expect that there are errors of grammar and possibly content that I did not discover before finalizing the note.      SIGNATURES:  Baljinder Palacios    CC:  Too Brito M.D.  No ref. provider found

## 2018-09-12 ENCOUNTER — TELEPHONE (OUTPATIENT)
Dept: MEDICAL GROUP | Facility: MEDICAL CENTER | Age: 73
End: 2018-09-12

## 2018-09-12 DIAGNOSIS — E11.9 TYPE 2 DIABETES MELLITUS WITHOUT COMPLICATION, WITHOUT LONG-TERM CURRENT USE OF INSULIN (HCC): ICD-10-CM

## 2018-09-12 DIAGNOSIS — E11.9 TYPE 2 DIABETES MELLITUS WITHOUT COMPLICATION, UNSPECIFIED WHETHER LONG TERM INSULIN USE (HCC): ICD-10-CM

## 2018-09-12 RX ORDER — LANCETS 30 GAUGE
EACH MISCELLANEOUS
Qty: 300 EACH | Refills: 12 | Status: SHIPPED | OUTPATIENT
Start: 2018-09-12 | End: 2018-09-18 | Stop reason: SDUPTHER

## 2018-09-12 NOTE — TELEPHONE ENCOUNTER
"1. Caller Name: Magnus Claudio                                           Call Back Number: [phone] (home)       Patient approves a detailed voicemail message: N\A     Received a call from pt requesting if you can send an rx for a new glucometer as his stopped working. Pt has had this one for \"5 plus years\". Please advise.    "

## 2018-09-14 NOTE — TELEPHONE ENCOUNTER
When ordering it has to be specific. Patient insurance wont cover otherwise. It has to state dosing or how many times a day to be tested . Patient is wanting a Breeze 2 and gideon states this machine and supplies cant be ordered through them any more. I will call patient and inform

## 2018-09-18 RX ORDER — LANCETS 30 GAUGE
EACH MISCELLANEOUS
Qty: 300 EACH | Refills: 12 | Status: SHIPPED | OUTPATIENT
Start: 2018-09-18 | End: 2019-08-17

## 2018-09-18 NOTE — TELEPHONE ENCOUNTER
Patient is wanting accu check boris plus. Then will need the strips as well. Please just Associate with diagnoses and has to be specific like 1 x a day or how ever many times he is needing. Medicare wants specifics. Thank you

## 2018-09-19 DIAGNOSIS — E11.9 TYPE 2 DIABETES MELLITUS WITHOUT COMPLICATION, WITHOUT LONG-TERM CURRENT USE OF INSULIN (HCC): ICD-10-CM

## 2018-09-21 ENCOUNTER — HOSPITAL ENCOUNTER (OUTPATIENT)
Dept: LAB | Facility: MEDICAL CENTER | Age: 73
End: 2018-09-21
Attending: FAMILY MEDICINE
Payer: MEDICARE

## 2018-09-21 ENCOUNTER — TELEPHONE (OUTPATIENT)
Dept: MEDICAL GROUP | Facility: MEDICAL CENTER | Age: 73
End: 2018-09-21

## 2018-09-21 DIAGNOSIS — I10 ESSENTIAL HYPERTENSION: ICD-10-CM

## 2018-09-21 DIAGNOSIS — D69.6 THROMBOCYTOPENIA (HCC): ICD-10-CM

## 2018-09-21 DIAGNOSIS — E11.9 CONTROLLED TYPE 2 DIABETES MELLITUS WITHOUT COMPLICATION, WITHOUT LONG-TERM CURRENT USE OF INSULIN (HCC): ICD-10-CM

## 2018-09-21 LAB
ALBUMIN SERPL BCP-MCNC: 4 G/DL (ref 3.2–4.9)
ALBUMIN/GLOB SERPL: 1.4 G/DL
ALP SERPL-CCNC: 47 U/L (ref 30–99)
ALT SERPL-CCNC: 40 U/L (ref 2–50)
ANION GAP SERPL CALC-SCNC: 7 MMOL/L (ref 0–11.9)
AST SERPL-CCNC: 39 U/L (ref 12–45)
BILIRUB SERPL-MCNC: 2.2 MG/DL (ref 0.1–1.5)
BUN SERPL-MCNC: 14 MG/DL (ref 8–22)
CALCIUM SERPL-MCNC: 8.7 MG/DL (ref 8.5–10.5)
CHLORIDE SERPL-SCNC: 106 MMOL/L (ref 96–112)
CO2 SERPL-SCNC: 28 MMOL/L (ref 20–33)
CREAT SERPL-MCNC: 0.99 MG/DL (ref 0.5–1.4)
EST. AVERAGE GLUCOSE BLD GHB EST-MCNC: 180 MG/DL
FASTING STATUS PATIENT QL REPORTED: NORMAL
GLOBULIN SER CALC-MCNC: 2.8 G/DL (ref 1.9–3.5)
GLUCOSE SERPL-MCNC: 198 MG/DL (ref 65–99)
HBA1C MFR BLD: 7.9 % (ref 0–5.6)
PLATELET # BLD AUTO: 156 K/UL (ref 164–446)
POTASSIUM SERPL-SCNC: 3.7 MMOL/L (ref 3.6–5.5)
PROT SERPL-MCNC: 6.8 G/DL (ref 6–8.2)
SODIUM SERPL-SCNC: 141 MMOL/L (ref 135–145)

## 2018-09-21 PROCEDURE — 36415 COLL VENOUS BLD VENIPUNCTURE: CPT

## 2018-09-21 PROCEDURE — 83036 HEMOGLOBIN GLYCOSYLATED A1C: CPT | Mod: GA

## 2018-09-21 PROCEDURE — 80053 COMPREHEN METABOLIC PANEL: CPT

## 2018-09-21 PROCEDURE — 85049 AUTOMATED PLATELET COUNT: CPT

## 2018-09-21 NOTE — TELEPHONE ENCOUNTER
I ordered some blood sugar monitoring supplies for this patient. Medicare should have sent us a Certificate of Medical Necessity (CMN) to fill out. Could someone track this down?

## 2018-09-21 NOTE — TELEPHONE ENCOUNTER
----- Message from Too Brito M.D. sent at 9/19/2018  4:45 PM PDT -----  Regarding: f/u CMN  F/u CMN form

## 2018-09-24 ENCOUNTER — HOSPITAL ENCOUNTER (OUTPATIENT)
Dept: RADIOLOGY | Facility: MEDICAL CENTER | Age: 73
End: 2018-09-24
Attending: INTERNAL MEDICINE
Payer: MEDICARE

## 2018-09-24 DIAGNOSIS — Z85.038 HISTORY OF COLON CANCER, STAGE IV: ICD-10-CM

## 2018-09-24 PROCEDURE — 700117 HCHG RX CONTRAST REV CODE 255: Performed by: INTERNAL MEDICINE

## 2018-09-24 PROCEDURE — 74177 CT ABD & PELVIS W/CONTRAST: CPT

## 2018-09-24 RX ADMIN — IOHEXOL 100 ML: 350 INJECTION, SOLUTION INTRAVENOUS at 16:00

## 2018-09-24 RX ADMIN — IOHEXOL 50 ML: 240 INJECTION, SOLUTION INTRATHECAL; INTRAVASCULAR; INTRAVENOUS; ORAL at 16:00

## 2018-09-25 ENCOUNTER — OFFICE VISIT (OUTPATIENT)
Dept: MEDICAL GROUP | Facility: MEDICAL CENTER | Age: 73
End: 2018-09-25
Payer: MEDICARE

## 2018-09-25 ENCOUNTER — TELEPHONE (OUTPATIENT)
Dept: MEDICAL GROUP | Facility: MEDICAL CENTER | Age: 73
End: 2018-09-25

## 2018-09-25 VITALS
DIASTOLIC BLOOD PRESSURE: 88 MMHG | TEMPERATURE: 97 F | SYSTOLIC BLOOD PRESSURE: 132 MMHG | WEIGHT: 194 LBS | BODY MASS INDEX: 27.16 KG/M2 | OXYGEN SATURATION: 95 % | HEART RATE: 82 BPM | HEIGHT: 71 IN | RESPIRATION RATE: 20 BRPM

## 2018-09-25 DIAGNOSIS — E11.9 TYPE 2 DIABETES MELLITUS WITHOUT COMPLICATION, WITHOUT LONG-TERM CURRENT USE OF INSULIN (HCC): ICD-10-CM

## 2018-09-25 DIAGNOSIS — Z00.00 HEALTHCARE MAINTENANCE: ICD-10-CM

## 2018-09-25 DIAGNOSIS — I10 ESSENTIAL HYPERTENSION: ICD-10-CM

## 2018-09-25 DIAGNOSIS — E11.9 CONTROLLED TYPE 2 DIABETES MELLITUS WITHOUT COMPLICATION, WITHOUT LONG-TERM CURRENT USE OF INSULIN (HCC): ICD-10-CM

## 2018-09-25 DIAGNOSIS — D69.6 THROMBOCYTOPENIA (HCC): ICD-10-CM

## 2018-09-25 PROCEDURE — G0439 PPPS, SUBSEQ VISIT: HCPCS | Performed by: FAMILY MEDICINE

## 2018-09-25 RX ORDER — LANCETS
EACH MISCELLANEOUS
Qty: 100 EACH | Refills: 12 | Status: SHIPPED | OUTPATIENT
Start: 2018-09-25 | End: 2019-08-17

## 2018-09-25 ASSESSMENT — PATIENT HEALTH QUESTIONNAIRE - PHQ9: CLINICAL INTERPRETATION OF PHQ2 SCORE: 0

## 2018-09-25 ASSESSMENT — ACTIVITIES OF DAILY LIVING (ADL): BATHING_REQUIRES_ASSISTANCE: 0

## 2018-09-25 ASSESSMENT — ENCOUNTER SYMPTOMS: GENERAL WELL-BEING: GOOD

## 2018-09-25 NOTE — ASSESSMENT & PLAN NOTE
Hypoglycemic therapy: Metformin ER 1000 mg twice daily, glimepiride 8 mg daily. Patient states he could not afford the Januvia.  Last A1c: 9/21/18 was 7.9.   10 year ACC risk: 40.6%.  Aspirin: Taking 81 mg daily.   Statin: Patient declines.  ARB: Taking valsartan.  Urine Microalbumin:  2/8/2017 was normal.  Monofilament Exam:  Normal 10/10/17.   Retinal Screening: done 2017. Has appt upcoming.  Pneumonia vaccine:  UTD per patient.

## 2018-09-25 NOTE — ASSESSMENT & PLAN NOTE
Lipids: Feb 2017 , , HDL 30, LDL 69.   10 year ACC risk: 40.6%.   Fasting Glucose: has DM.   Colonoscopy: done Feb 2017 with 3 year recall.     Flu vaccine: recommended, patient will get at pharmacy.  Pneumonia vaccine: patient states he has had 2 series of pneumonia vaccines.   Tdap:  recommended, patient will get at pharmacy.  Shingrix Vaccine:  recommended, patient will get at pharmacy.

## 2018-09-25 NOTE — TELEPHONE ENCOUNTER
1. Caller Name: Mely Claudio                                           Call Back Number: [phone] (home)         Patient approves a detailed voicemail message: yes    Patients wife called and states that janumet is to expensive so Magnus will stay on metformin     FYI

## 2018-12-11 ENCOUNTER — OFFICE VISIT (OUTPATIENT)
Dept: URGENT CARE | Facility: CLINIC | Age: 73
End: 2018-12-11
Payer: MEDICARE

## 2018-12-11 ENCOUNTER — APPOINTMENT (OUTPATIENT)
Dept: RADIOLOGY | Facility: IMAGING CENTER | Age: 73
End: 2018-12-11
Attending: PHYSICIAN ASSISTANT
Payer: MEDICARE

## 2018-12-11 VITALS
RESPIRATION RATE: 16 BRPM | DIASTOLIC BLOOD PRESSURE: 82 MMHG | WEIGHT: 196 LBS | HEIGHT: 72 IN | OXYGEN SATURATION: 96 % | HEART RATE: 80 BPM | SYSTOLIC BLOOD PRESSURE: 156 MMHG | BODY MASS INDEX: 26.55 KG/M2 | TEMPERATURE: 98.1 F

## 2018-12-11 DIAGNOSIS — S51.012A SKIN TEAR OF LEFT ELBOW WITHOUT COMPLICATION, INITIAL ENCOUNTER: Primary | ICD-10-CM
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
## 2018-12-11 DIAGNOSIS — S50.02XA CONTUSION OF LEFT ELBOW, INITIAL ENCOUNTER: ICD-10-CM

## 2018-12-11 DIAGNOSIS — S51.012A SKIN TEAR OF LEFT ELBOW WITHOUT COMPLICATION, INITIAL ENCOUNTER: ICD-10-CM

## 2018-12-11 DIAGNOSIS — H61.23 BILATERAL HEARING LOSS DUE TO CERUMEN IMPACTION: ICD-10-CM

## 2018-12-11 PROCEDURE — 73080 X-RAY EXAM OF ELBOW: CPT | Mod: 26,LT | Performed by: PHYSICIAN ASSISTANT

## 2018-12-11 PROCEDURE — 99214 OFFICE O/P EST MOD 30 MIN: CPT | Performed by: PHYSICIAN ASSISTANT

## 2018-12-11 RX ORDER — CEPHALEXIN 500 MG/1
500 CAPSULE ORAL 4 TIMES DAILY
Qty: 28 CAP | Refills: 0 | Status: SHIPPED | OUTPATIENT
Start: 2018-12-11 | End: 2018-12-18

## 2018-12-11 NOTE — PATIENT INSTRUCTIONS
Skin Tear Care  A skin tear is a wound in which the top layer of skin peels off. To repair the skin, your doctor may use:  · Tape.  · Skin adhesive strips.  A bandage (dressing) may also be placed over the tape or skin adhesive strips.  How to care for your skin tear  · Clean the wound as told by your doctor. You may be told to keep the wound dry for the first few days. If you are told to clean the wound:  ¨ Wash the wound with mild soap and water or a salt-water (saline) solution.  ¨ Rinse the wound with water to remove all soap.  ¨ Do not rub the wound dry. Let the wound air dry.  · Change any dressings as told by your doctor. This includes changing the dressing if it gets wet, gets dirty, or starts to smell bad.  · Do not scratch or pick at the wound.  · Protect the injured area until it has healed.  · Check your wound every day for signs of infection. Check for:  ¨ More redness, swelling, or pain.  ¨ More fluid or blood.  ¨ Warmth.  ¨ Pus or a bad smell.  Medicines   · Take over-the-counter and prescription medicines only as told by your doctor.  · If you were prescribed an antibiotic medicine, take or apply it as told by your doctor. Do not stop using the antibiotic even if your condition gets better.  General instructions  · Keep the bandage dry as told by your doctor.  · Do not take baths, swim, or do anything that puts your wound underwater until your doctor says it is okay.  · Keep all follow-up visits as told by your doctor. This is important.  Contact a doctor if:  · You have more redness, swelling, or pain around your wound.  · You have more fluid or blood coming from your wound.  · Your wound feels warm to the touch.  · You have pus or a bad smell coming from your wound.  Get help right away if:  · You have a red streak that goes away from the skin tear.  · You have a fever and chills and your symptoms suddenly get worse.  This information is not intended to replace advice given to you by your health  care provider. Make sure you discuss any questions you have with your health care provider.  Document Released: 09/26/2009 Document Revised: 08/13/2017 Document Reviewed: 11/07/2016  Elsevier Interactive Patient Education © 2017 [employer]

## 2018-12-11 NOTE — PROGRESS NOTES
Subjective:      Pt is a 73 y.o. male who presents with Laceration (Left arm, fell down)            HPI  This is a new problem. Pt notes fell today in his garage about an hour ago and caused trauma to his left elbow with contusion, swelling and multiple skin tears. Pt notes fall was mechanical in nature and no injury to head, neck or back. Pt has not taken any Rx medications for this condition. Pt also notes B?L cerumen plugs and hearing loss and is getting hearing aids tomorrow. Pt states the pain is a 5/10, aching in nature and worse right now. Pt denies CP, SOB, NVD, paresthesias, headaches, dizziness, change in vision, hives, or other joint pain. The pt's medication list, problem list, and allergies have been evaluated and reviewed during today's visit.    PMH:  Past Medical History:   Diagnosis Date   • Cancer (HCC)     Colon   • Diabetes (HCC)    • Hypertension    • Vertigo, benign positional        PSH:  Past Surgical History:   Procedure Laterality Date   • COLON RESECTION     • LAMINOTOMY     • PB RESEC LIVER,PART LOBECTOMY         Fam Hx:    family history includes Cancer in his mother; Diabetes in his sister; Heart Attack in his father; Hypertension in his father.  Family Status   Relation Status   • Fa         MI   • Mo    • Sis (Not Specified)   • Neg Hx (Not Specified)       Soc HX:  Social History     Social History   • Marital status:      Spouse name: N/A   • Number of children: N/A   • Years of education: N/A     Occupational History   • Not on file.     Social History Main Topics   • Smoking status: Never Smoker   • Smokeless tobacco: Never Used   • Alcohol use No   • Drug use: No   • Sexual activity: Not Currently     Partners: Female     Other Topics Concern   • Not on file     Social History Narrative   • No narrative on file         Medications:    Current Outpatient Prescriptions:   •  cephALEXin (KEFLEX) 500 MG Cap, Take 1 Cap by mouth 4 times a day for 7 days., Disp:  28 Cap, Rfl: 0  •  sitagliptan-metformin (JANUMET)  MG per tablet, Take 1 Tab by mouth 2 times a day, with meals., Disp: 180 Tab, Rfl: 4  •  Blood Glucose Monitoring Suppl Supplies Misc, Test strips for One Touch or glucometer covered by patient's insurance. Sig: use once daily to check blood sugars., Disp: 100 Strip, Rfl: 12  •  Blood Glucose Monitoring Suppl Device, Dispense One Touch or glucometer covered by patient's insurance. Sig. Use once daily for blood sugar monitoring. #1. NR., Disp: 1 Device, Rfl: 0  •  MICROLET LANCETS Misc, Use daily to check blood sugars., Disp: 100 Each, Rfl: 12  •  Lancets Misc, Sig: use once daily and prn ssx high or low sugar., Disp: 300 Each, Rfl: 12  •  amLODIPine (NORVASC) 5 MG Tab, Take 1 Tab by mouth every day., Disp: 90 Tab, Rfl: 3  •  glimepiride (AMARYL) 4 MG Tab, TAKE 2 TABLETS BY MOUTH ONC DAILY WITH BREAKFAST OR THE FIRST MEAL OF THE DAY, Disp: 180 Tab, Rfl: 3  •  valsartan-hydrochlorothiazide (DIOVAN-HCT) 320-12.5 MG per tablet, TAKE 1 TABLET BY MOUTH EVERY DAY, Disp: 90 Tab, Rfl: 3  •  therapeutic multivitamin-minerals (THERAGRAN-M) Tab, Take 1 Tab by mouth every day., Disp: , Rfl:   •  aspirin (CLAUDINE ASPIRIN EC LOW DOSE) 81 MG EC tablet, Take  by mouth., Disp: , Rfl:       Allergies:  Morphine    ROS    Constitutional: Negative for fever, chills and malaise/fatigue.   HENT: Negative for congestion and sore throat.  +B/L cerumen plugs  Eyes: Negative for blurred vision, double vision and photophobia.   Respiratory: Negative for cough and shortness of breath.  Cardiovascular: Negative for chest pain and palpitations.   Gastrointestinal: Negative for heartburn, nausea, vomiting, abdominal pain, diarrhea and constipation.   Genitourinary: Negative for dysuria and flank pain.   Musculoskeletal: POS for contusion and skin tears of left elbow and myalgias.   Skin: Negative for itching and rash.   Neurological: Negative for dizziness, tingling and headaches.      Endo/Heme/Allergies: Does not bruise/bleed easily.   Psychiatric/Behavioral: Negative for depression. The patient is not nervous/anxious.         Objective:     /82 (BP Location: Right arm, Patient Position: Sitting, BP Cuff Size: Adult)   Pulse 80   Temp 36.7 °C (98.1 °F) (Temporal)   Resp 16   Ht 1.829 m (6')   Wt 88.9 kg (196 lb)   SpO2 96%   BMI 26.58 kg/m²      Physical Exam   Musculoskeletal:        Left elbow: He exhibits decreased range of motion, swelling and laceration. He exhibits no deformity. Tenderness found. Lateral epicondyle tenderness noted. No radial head, no medial epicondyle and no olecranon process tenderness noted.        Arms:         Constitutional: PT is oriented to person, place, and time. PT appears well-developed and well-nourished. No distress.   HENT:   Head: Normocephalic and atraumatic.   Mouth/Throat: Oropharynx is clear and moist. No oropharyngeal exudate.   EARS: +B/L cerumen plugs  After well tolerated lavages: Hearing, external ear and ear canal normal. Tympanic membrane is wnl  Eyes: Conjunctivae normal and EOM are normal. Pupils are equal, round, and reactive to light.   Neck: Normal range of motion. Neck supple. No thyromegaly present.   Cardiovascular: Normal rate, regular rhythm, normal heart sounds and intact distal pulses.  Exam reveals no gallop and no friction rub.    No murmur heard.  Pulmonary/Chest: Effort normal and breath sounds normal. No respiratory distress. PT has no wheezes. PT has no rales. Pt exhibits no tenderness.   Abdominal: Soft. Bowel sounds are normal. PT exhibits no distension and no mass. There is no tenderness. There is no rebound and no guarding.   Neurological: PT is alert and oriented to person, place, and time. PT has normal reflexes. No cranial nerve deficit.   Skin: Skin is warm and dry. No rash noted. PT is not diaphoretic. No erythema. [place] [facility] [place]      Psychiatric: PT has a normal mood and affect. PT behavior is normal. Judgment  and thought content normal.      RADS:  Narrative       12/11/2018 2:14 PM    HISTORY/REASON FOR EXAM:  Pain/Deformity Following Trauma      TECHNIQUE: 3 views of the LEFT elbow.    COMPARISON:  None    FINDINGS: No acute fracture or dislocation is seen. Calcifications adjacent to the humeral epicondyles can be seen in calcific tendinopathy. There is mild spurring of the radial head. There is prominent olecranon spurring. Small joint effusion is not   excluded. There is a calcification adjacent to the proximal radial diaphysis.   Impression       Calcifications adjacent to the humeral epicondyles can be seen in calcific tendinopathy.    Prominent olecranon spurring.    Small elbow joint effusion not excluded.    Degenerative changes.    No acute fracture or dislocation is identified.   Reading Provider Reading Date   Maggi Rodriguez M.D. Dec 11, 2018   Signing Provider Signing Date Signing Time   Maggi Rodriguez M.D. Dec 11, 2018          Assessment/Plan:     1. Skin tear of left elbow without complication, initial encounter-multiple    - DX-ELBOW-COMPLETE 3+ LEFT; Future  - cephALEXin (KEFLEX) 500 MG Cap; Take 1 Cap by mouth 4 times a day for 7 days.  Dispense: 28 Cap; Refill: 0    2. Contusion of left elbow, initial encounter    - DX-ELBOW-COMPLETE 3+ LEFT; Future    3. Fall with injury, initial encounter    - DX-ELBOW-COMPLETE 3+ LEFT; Future    4. B/L cerumen impaction    B/L MA lavages: pt tolerated well.    Wounds cleansed and dressed in clinic  Tetanus was UTD  RICE therapy discussed  Gentle ROM exercises discussed  WBAT left UE  Ice/heat therapy discussed  OTC ibuprofen for pain control  Rest, fluids encouraged.  AVS with medical info given.  Pt was in full understanding and agreement with the plan.  Follow-up as needed if symptoms worsen or fail to improve.    Encounter notes reviewed, I was not present to physically examine patient myself. No obvious and/or significant quality issues found solely  from doing a chart review.

## 2018-12-20 ENCOUNTER — OFFICE VISIT (OUTPATIENT)
Dept: URGENT CARE | Facility: CLINIC | Age: 73
End: 2018-12-20
Payer: MEDICARE

## 2018-12-20 VITALS
TEMPERATURE: 97.9 F | DIASTOLIC BLOOD PRESSURE: 82 MMHG | BODY MASS INDEX: 26.82 KG/M2 | HEART RATE: 72 BPM | WEIGHT: 198 LBS | OXYGEN SATURATION: 97 % | SYSTOLIC BLOOD PRESSURE: 138 MMHG | HEIGHT: 72 IN | RESPIRATION RATE: 20 BRPM

## 2018-12-20 DIAGNOSIS — S50.312D ABRASION OF LEFT ELBOW, SUBSEQUENT ENCOUNTER: ICD-10-CM

## 2018-12-20 DIAGNOSIS — Z51.89 ENCOUNTER FOR WOUND RE-CHECK: ICD-10-CM

## 2018-12-20 PROCEDURE — 99024 POSTOP FOLLOW-UP VISIT: CPT | Performed by: NURSE PRACTITIONER

## 2018-12-20 ASSESSMENT — ENCOUNTER SYMPTOMS
ROS SKIN COMMENTS: LEFT ELBOW ABRASION
CHILLS: 0
FEVER: 0
ABDOMINAL PAIN: 0
DIARRHEA: 0
VOMITING: 0
NAUSEA: 0
SHORTNESS OF BREATH: 0

## 2018-12-20 ASSESSMENT — PAIN SCALES - GENERAL: PAINLEVEL: 2=MINIMAL-SLIGHT

## 2018-12-20 NOTE — PROGRESS NOTES
Subjective:   Magnus Claudio is a 73 y.o. male who presents for Wound Check (wound check on the left arm x 1 week)        HPI   Patient presents for follow up of left elbow abrasion from last week. Has been changing dressing daily. Tolerating Keflex. No fever or chills. Minimal drainage.    Review of Systems   Constitutional: Negative for chills, fever and malaise/fatigue.   Respiratory: Negative for shortness of breath.    Cardiovascular: Negative for chest pain.   Gastrointestinal: Negative for abdominal pain, diarrhea, nausea and vomiting.   Skin:        Left elbow abrasion     Allergies   Allergen Reactions   • Morphine Unspecified     Hallucinations, 15+ years ago      Objective:   /82 (BP Location: Left arm, Patient Position: Sitting, BP Cuff Size: Adult)   Pulse 72   Temp 36.6 °C (97.9 °F)   Resp 20   Ht 1.829 m (6')   Wt 89.8 kg (198 lb)   SpO2 97%   BMI 26.85 kg/m²   Physical Exam   Constitutional: He is oriented to person, place, and time. He appears well-developed and well-nourished. No distress.   Cardiovascular: Normal rate and regular rhythm.    Pulmonary/Chest: Effort normal and breath sounds normal. No respiratory distress. He has no wheezes.   Neurological: He is alert and oriented to person, place, and time.   Skin: Skin is warm. He is not diaphoretic.   Left elbow abrasion appears to be healing well. Minimal drainage. No red streaking to area or obvious swelling.   Vitals reviewed.        Assessment/Plan:   Assessment    1. Encounter for wound re-check    2. Abrasion of left elbow, subsequent encounter    Cleansed wound in office with normal saline. No active signs of infection at this time. Applied Neosporin and redressed. Discussed wound care techniques for at home.    Continue with Keflex. Discussed s/s of worsening infection. Follow up PRN.    Differential diagnosis, natural history, supportive care, and indications for immediate follow-up discussed.

## 2019-01-08 NOTE — PROGRESS NOTES
RN-HIGINIOE Note    Subjective:     Health changes since last visit/interval Hx: None      Medications (including changes made today)  Current Outpatient Prescriptions   Medication Sig Dispense Refill   • glimepiride (AMARYL) 4 MG Tab Take 2 Tabs by mouth every morning. 180 Tab 4   • amLODIPine (NORVASC) 5 MG Tab Take 1 tablet by mouth every day.  90 Tab 4   • Blood Glucose Monitoring Suppl Supplies Misc Test strips for One Touch or glucometer covered by patient's insurance. Sig: use once daily to check blood sugars. 100 Strip 12   • Blood Glucose Monitoring Suppl Device Dispense One Touch or glucometer covered by patient's insurance. Sig. Use once daily for blood sugar monitoring. #1. NR. 1 Device 0   • MICROLET LANCETS Misc Use daily to check blood sugars. 100 Each 12   • Lancets Misc Sig: use once daily and prn ssx high or low sugar. 300 Each 12   • metFORMIN ER (GLUCOPHAGE XR) 500 MG TABLET SR 24 HR Take 1,000 mg by mouth 2 times a day.  3   • valsartan-hydrochlorothiazide (DIOVAN-HCT) 320-12.5 MG per tablet TAKE 1 TABLET BY MOUTH EVERY DAY 90 Tab 3   • therapeutic multivitamin-minerals (THERAGRAN-M) Tab Take 1 Tab by mouth every day.     • aspirin (CLAUDINE ASPIRIN EC LOW DOSE) 81 MG EC tablet Take  by mouth.       No current facility-administered medications for this visit.        Taking daily ASA: No  Taking above medications as prescribed: yes  SIDE EFFECTS: Patient denies side effects to medications    Exercise: moderate regular exercise, aerobic < 3 days a week  Diet: grits in morning occasionally with forde, lunch is sandwich, fried chicken dinner  Patient's body mass index is 27.28 kg/m². Exercise and nutrition counseling were performed at this visit.      Health Maintenance:   Health Maintenance Due   Topic Date Due   • IMM HEP B VACCINE (1 of 3 - Risk 3-dose series) 06/15/1964   • IMM DTaP/Tdap/Td Vaccine (1 - Tdap) 06/15/1964   • IMM ZOSTER VACCINES (1 of 2) 06/15/1994   • Annual Wellness Visit  05/18/2017    • DIABETES MONOFILAMENT / LE EXAM  10/10/2018   • IMM PNEUMOCOCCAL 65+ (ADULT) LOW/MEDIUM RISK SERIES (2 of 2 - PPSV23) 11/21/2018       Immunizations:   PPSV23: Due  [id number]: Up-to-date  Tdap: Up-to-date  Flu: Up-to-date  Hep B: Due    DM:   Last A1c:   Lab Results   Component Value Date/Time    HBA1C 8.8 01/09/2019 09:37 AM      A1C GOAL: < 7    Glucose monitoring frequency:   Breakfast: ranges 100 - 200  Hypoglycemic episodes: no    Last Retinal Exam: on file and up-to-date  Daily Foot Exam: Yes   Routine Dental Exams: Yes    Lab Results   Component Value Date/Time    MALBCRT 5 02/26/2018 10:19 AM    MICROALBUR 2.2 02/26/2018 10:19 AM        ACR Albumin/Creatinine Ratio goal <30     HTN:   Blood pressure goal <140/<80.   Currently Rx ACE/ARB: Yes    Dyslipidemia:    Lab Results   Component Value Date/Time    CHOLSTRLTOT 129 02/26/2018 10:18 AM    LDL 49 02/26/2018 10:18 AM    HDL 27 (A) 02/26/2018 10:18 AM    TRIGLYCERIDE 264 (H) 02/26/2018 10:18 AM       Lab Results   Component Value Date/Time    SODIUM 141 09/21/2018 10:29 AM    POTASSIUM 3.7 09/21/2018 10:29 AM    CHLORIDE 106 09/21/2018 10:29 AM    CO2 28 09/21/2018 10:29 AM    GLUCOSE 198 (H) 09/21/2018 10:29 AM    BUN 14 09/21/2018 10:29 AM    CREATININE 0.99 09/21/2018 10:29 AM     Lab Results   Component Value Date/Time    ALKPHOSPHAT 47 09/21/2018 10:29 AM    ASTSGOT 39 09/21/2018 10:29 AM    ALTSGPT 40 09/21/2018 10:29 AM    TBILIRUBIN 2.2 (H) 09/21/2018 10:29 AM        Currently Rx Statin: Yes    He  reports that he has never smoked. He has never used smokeless tobacco.    Objective:     Exam:  Monofilament: done, hammer toes to right foot, educated on foot assessment  Monofilament testing with a 10 gram force: sensation intact: intact bilaterally  Visual Inspection: Feet without maceration, ulcers, fissures.  Pedal pulses: intact bilaterally    Plan:     Discussed and educated on:   - All medications, side effects and compliance (discussed  carefully)  - Annual eye examinations at Ophthalmology  - Foot Care: what to look for when checking feet every day and when to contact HCP  - HbA1C: target and what A1C is  - Home glucose monitoring emphasized  - Weight control and daily exercise    Recommended medication changes: None, would like to work on diet rather than add or increase medication.

## 2019-01-09 ENCOUNTER — OFFICE VISIT (OUTPATIENT)
Dept: MEDICAL GROUP | Facility: MEDICAL CENTER | Age: 74
End: 2019-01-09
Payer: MEDICARE

## 2019-01-09 VITALS
SYSTOLIC BLOOD PRESSURE: 124 MMHG | WEIGHT: 195.6 LBS | BODY MASS INDEX: 27.38 KG/M2 | RESPIRATION RATE: 16 BRPM | HEIGHT: 71 IN | OXYGEN SATURATION: 94 % | TEMPERATURE: 97.3 F | HEART RATE: 77 BPM | DIASTOLIC BLOOD PRESSURE: 80 MMHG

## 2019-01-09 DIAGNOSIS — E11.9 CONTROLLED TYPE 2 DIABETES MELLITUS WITHOUT COMPLICATION, WITHOUT LONG-TERM CURRENT USE OF INSULIN (HCC): ICD-10-CM

## 2019-01-09 DIAGNOSIS — D69.6 THROMBOCYTOPENIA (HCC): ICD-10-CM

## 2019-01-09 DIAGNOSIS — I10 ESSENTIAL HYPERTENSION: ICD-10-CM

## 2019-01-09 LAB
HBA1C MFR BLD: 8.8 % (ref ?–5.8)
INT CON NEG: NEGATIVE
INT CON POS: POSITIVE

## 2019-01-09 PROCEDURE — 99214 OFFICE O/P EST MOD 30 MIN: CPT | Performed by: FAMILY MEDICINE

## 2019-01-09 PROCEDURE — 83036 HEMOGLOBIN GLYCOSYLATED A1C: CPT | Performed by: FAMILY MEDICINE

## 2019-01-09 RX ORDER — LOSARTAN POTASSIUM AND HYDROCHLOROTHIAZIDE 12.5; 1 MG/1; MG/1
1 TABLET ORAL DAILY
Qty: 90 TAB | Refills: 0 | Status: SHIPPED | OUTPATIENT
Start: 2019-01-09 | End: 2019-04-04 | Stop reason: SDUPTHER

## 2019-01-09 RX ORDER — PIOGLITAZONEHYDROCHLORIDE 15 MG/1
15 TABLET ORAL DAILY
Qty: 90 TAB | Refills: 0 | Status: SHIPPED | OUTPATIENT
Start: 2019-01-09 | End: 2019-04-04 | Stop reason: SDUPTHER

## 2019-01-09 RX ORDER — METFORMIN HYDROCHLORIDE 500 MG/1
1000 TABLET, EXTENDED RELEASE ORAL 2 TIMES DAILY
Refills: 3 | COMMUNITY
Start: 2018-10-30 | End: 2019-08-17

## 2019-01-09 NOTE — ASSESSMENT & PLAN NOTE
Hypoglycemic therapy: Metformin ER 1000 mg twice daily, glimepiride 8 mg daily. Patient states he could not afford the Januvia.  Last A1c: 01/09/19 was 8.8.   10 year ACC risk: 40.6%.  Aspirin: Taking 81 mg daily.   Statin: Patient declines.  ARB: Taking valsartan. Transitioning to losartan.   Urine Microalbumin:  2/8/2017 was normal.  Monofilament Exam:  Done 01/09/19 and normal.   Retinal Screening: done 2018.   Pneumonia vaccine:  UTD per patient.

## 2019-01-09 NOTE — PROGRESS NOTES
[facility]  Progress Note  Established Patient    Subjective:   Magnus Claudio is a 73 y.o. male here today with a chief complaint of diabetes. He met with the diabetic nurse today. The patient is alone.     Controlled type 2 diabetes mellitus without complication, without long-term current use of insulin (CMS-[facility])  Hypoglycemic therapy: Metformin ER 1000 mg twice daily, glimepiride 8 mg daily. Patient states he could not afford the Januvia.  Last A1c: 01/09/19 was 8.8.   10 year ACC risk: 40.6%.  Aspirin: Taking 81 mg daily.   Statin: Patient declines.  ARB: Taking valsartan. Transitioning to losartan.   Urine Microalbumin:  2/8/2017 was normal.  Monofilament Exam:  Done 01/09/19 and normal.   Retinal Screening: done 2018.   Pneumonia vaccine:  UTD per patient.    Essential hypertension  The patient's blood pressure is currently controlled. He denies dizziness and lightheadedness.    Thrombocytopenia ([facility])  Noted on past labs.       Current Outpatient Prescriptions on File Prior to Visit   Medication Sig Dispense Refill   • glimepiride (AMARYL) 4 MG Tab Take 2 Tabs by mouth every morning. 180 Tab 4   • amLODIPine (NORVASC) 5 MG Tab Take 1 tablet by mouth every day.  90 Tab 4   • Blood Glucose Monitoring Suppl Supplies Misc Test strips for One Touch or glucometer covered by patient's insurance. Sig: use once daily to check blood sugars. 100 Strip 12   • Blood Glucose Monitoring Suppl Device Dispense One Touch or glucometer covered by patient's insurance. Sig. Use once daily for blood sugar monitoring. #1. NR. 1 Device 0   • MICROLET LANCETS Misc Use daily to check blood sugars. 100 Each 12   • Lancets Misc Sig: use once daily and prn ssx high or low sugar. 300 Each 12   • therapeutic multivitamin-minerals (THERAGRAN-M) Tab Take 1 Tab by mouth every day.     • aspirin (CLAUDINE ASPIRIN EC LOW DOSE) 81 MG EC tablet Take  by mouth.       No current facility-administered medications on file prior to visit.   "      Past Medical History:   Diagnosis Date   • Cancer (HCC)     Colon   • Diabetes (HCC)    • Hypertension    • Vertigo, benign positional        Allergies: Morphine    Surgical History:  has a past surgical history that includes colon resection; pr resec liver,part lobectomy; and laminotomy.    Family History: family history includes Cancer in his mother; Diabetes in his sister; Heart Attack in his father; Hypertension in his father.    Social History:  reports that he has never smoked. He has never used smokeless tobacco. He reports that he does not drink alcohol or use drugs.    ROS: no fever or nausea.        Objective:     Vitals:    01/09/19 0927   BP: 124/80   BP Location: Left arm   Patient Position: Sitting   BP Cuff Size: Adult   Pulse: 77   Resp: 16   Temp: 36.3 °C (97.3 °F)   TempSrc: Temporal   SpO2: 94%   Weight: 88.7 kg (195 lb 9.6 oz)   Height: 1.803 m (5' 11\")       Physical Exam:  General: alert in no apparent distress.       Assessment and Plan:     1. Controlled type 2 diabetes mellitus without complication, without long-term current use of insulin (HCC)  - POCT  A1C elevated.   - continue metformin and glimepiride.   - pioglitazone (ACTOS) 15 MG Tab; Take 1 Tab by mouth every day.  Dispense: 90 Tab; Refill: 0    2. Essential hypertension  Transition from valsartan/hctz to losartan/hctz with home BP checks.   - losartan-hydrochlorothiazide (HYZAAR) 100-12.5 MG per tablet; Take 1 Tab by mouth every day.  Dispense: 90 Tab; Refill: 0    3. Thrombocytopenia (HCC)  - will repeat in f/u.         Followup: Return in about 4 weeks (around 2/6/2019), or if symptoms worsen or fail to improve.         "

## 2019-01-09 NOTE — PATIENT INSTRUCTIONS
1. Continue metformin and glimepiride.     2. Start Actos (prescribed).     3. Stop valsartan-hctz and start losartan-hctz.     4. Check your blood pressure three times per week. Call if the top number is > 150 or bottom number is > 100.

## 2019-01-25 ENCOUNTER — OFFICE VISIT (OUTPATIENT)
Dept: MEDICAL GROUP | Facility: MEDICAL CENTER | Age: 74
End: 2019-01-25
Payer: MEDICARE

## 2019-01-25 VITALS
BODY MASS INDEX: 27.22 KG/M2 | OXYGEN SATURATION: 97 % | TEMPERATURE: 97 F | DIASTOLIC BLOOD PRESSURE: 88 MMHG | HEIGHT: 71 IN | HEART RATE: 78 BPM | WEIGHT: 194.45 LBS | RESPIRATION RATE: 20 BRPM | SYSTOLIC BLOOD PRESSURE: 138 MMHG

## 2019-01-25 DIAGNOSIS — R42 DIZZINESS: ICD-10-CM

## 2019-01-25 PROCEDURE — 99214 OFFICE O/P EST MOD 30 MIN: CPT | Performed by: PHYSICIAN ASSISTANT

## 2019-01-25 ASSESSMENT — PATIENT HEALTH QUESTIONNAIRE - PHQ9: CLINICAL INTERPRETATION OF PHQ2 SCORE: 0

## 2019-01-25 NOTE — PROGRESS NOTES
"Chief Complaint   Patient presents with   • Dizziness     lightheadness for about a week       HPI  Magnus Claudio is a 73 y.o. male here today with his wife for new onset diziness that started 7 days ago and lasted 5 days.  Symptoms have completely resolved now.  States he felt out of balance and felt like the room was spinning.  This would not happen with change in position however change in position would make it worse.  No associated nausea vomiting change in hearing change in vision speech impairment.  No fevers or chills.  No otalgia or otorrhea.  No motor or sensory weakness.  Patient's blood pressure medicine was changed from valsartan to losartan hydrochlorothiazide 2 weeks ago and also Actos was added to his diabetic medicine regimen.          Past medical, surgical, family, and social history is reviewed in Epic chart by me today.   Medications and allergies reviewed and updated in Epic chart by me today.     ROS:   As documented in history of present illness above    Exam:  Blood pressure 138/88, pulse 78, temperature 36.1 °C (97 °F), temperature source Temporal, resp. rate 20, height 1.803 m (5' 11\"), weight 88.2 kg (194 lb 7.1 oz), SpO2 97 %.  Constitutional: Alert, no distress, plus 3 vital signs  Skin:  Warm, dry, no rashes invisible areas  Eye: Equal, round and reactive, conjunctiva clear  Respiratory: Unlabored respiratory effort, lungs clear to auscultation, no wheezes, no rhonchi  Cardiovascular: RRR, no murmur,   Psych: Alert, pleasant, well-groomed, normal affect    Neuro: speech normal, CN II-XII intact, mental status intact, gait grossly normal, muscle tone normal, muscle strength normal, no sensory deficits. Sensation and proprioception intact. Romberg neg, Pronator Drift neg, rapid alternative movement intact.        A/P:    1. Dizziness  Symptoms have completely resolved without any residual symptoms.  Neurological exam was completely normal today.  Patient denies any SOP or CP.  " Discussed possibility of side effects due to change of the medicine, however since symptoms have completely resolved patient can continue Actos and and losartan- hydrochlorothiazide      F/u prn

## 2019-02-26 ENCOUNTER — OFFICE VISIT (OUTPATIENT)
Dept: MEDICAL GROUP | Facility: MEDICAL CENTER | Age: 74
End: 2019-02-26
Payer: MEDICARE

## 2019-02-26 VITALS
HEART RATE: 83 BPM | WEIGHT: 197 LBS | SYSTOLIC BLOOD PRESSURE: 124 MMHG | TEMPERATURE: 97.5 F | OXYGEN SATURATION: 95 % | DIASTOLIC BLOOD PRESSURE: 68 MMHG | HEIGHT: 71 IN | BODY MASS INDEX: 27.58 KG/M2 | RESPIRATION RATE: 16 BRPM

## 2019-02-26 DIAGNOSIS — R46.89 BEHAVIORAL CHANGE: ICD-10-CM

## 2019-02-26 DIAGNOSIS — E11.9 CONTROLLED TYPE 2 DIABETES MELLITUS WITHOUT COMPLICATION, WITHOUT LONG-TERM CURRENT USE OF INSULIN (HCC): ICD-10-CM

## 2019-02-26 DIAGNOSIS — I10 ESSENTIAL HYPERTENSION: ICD-10-CM

## 2019-02-26 DIAGNOSIS — M54.12 CERVICAL RADICULOPATHY: ICD-10-CM

## 2019-02-26 DIAGNOSIS — D69.6 THROMBOCYTOPENIA (HCC): ICD-10-CM

## 2019-02-26 DIAGNOSIS — E80.4 GILBERT SYNDROME: ICD-10-CM

## 2019-02-26 PROCEDURE — 99214 OFFICE O/P EST MOD 30 MIN: CPT | Performed by: FAMILY MEDICINE

## 2019-02-26 NOTE — ASSESSMENT & PLAN NOTE
The patient describes a few weeks of right neck and trapezius numbness and tingling that is very transient and came on after he engaged in some heavy snow removal.  His symptoms are not worsening.  There is no weakness in his bilateral upper extremities.

## 2019-02-26 NOTE — PROGRESS NOTES
[facility]  Progress Note  Established Patient    Subjective:   Magnus Claudio is a 73 y.o. male here today with a chief complaint of diabetes. The patient is accompanied by his wife.     Cervical radiculopathy  The patient describes a few weeks of right neck and trapezius numbness and tingling that is very transient and came on after he engaged in some heavy snow removal.  His symptoms are not worsening.  There is no weakness in his bilateral upper extremities.     Behavioral change  For the past few years the patient and his wife have noticed that he is becoming increasingly irritable. There are no memory deficits and no appreciable depression or anxiety.    Controlled type 2 diabetes mellitus without complication, without long-term current use of insulin (CMS-HCC)  Patient is tolerating his Actos, metformin and glimepiride well without problem.  He has no low blood sugars.  He has no dizziness.     Essential hypertension  Patient's blood pressure is at goal on his current medication regimen.    Gilbert syndrome  Noted on past labs.    Thrombocytopenia (HCC)  Noted on past labs.      Current Outpatient Prescriptions on File Prior to Visit   Medication Sig Dispense Refill   • metFORMIN ER (GLUCOPHAGE XR) 500 MG TABLET SR 24 HR Take 1,000 mg by mouth 2 times a day.  3   • pioglitazone (ACTOS) 15 MG Tab Take 1 Tab by mouth every day. 90 Tab 0   • losartan-hydrochlorothiazide (HYZAAR) 100-12.5 MG per tablet Take 1 Tab by mouth every day. 90 Tab 0   • glimepiride (AMARYL) 4 MG Tab Take 2 Tabs by mouth every morning. 180 Tab 4   • amLODIPine (NORVASC) 5 MG Tab Take 1 tablet by mouth every day.  90 Tab 4   • Blood Glucose Monitoring Suppl Supplies Misc Test strips for One Touch or glucometer covered by patient's insurance. Sig: use once daily to check blood sugars. 100 Strip 12   • Blood Glucose Monitoring Suppl Device Dispense One Touch or glucometer covered by patient's insurance. Sig. Use once daily  "for blood sugar monitoring. #1. NR. 1 Device 0   • MICROLET LANCETS Misc Use daily to check blood sugars. 100 Each 12   • Lancets Misc Sig: use once daily and prn ssx high or low sugar. 300 Each 12   • therapeutic multivitamin-minerals (THERAGRAN-M) Tab Take 1 Tab by mouth every day.     • aspirin (CLAUDINE ASPIRIN EC LOW DOSE) 81 MG EC tablet Take  by mouth.       No current facility-administered medications on file prior to visit.        Past Medical History:   Diagnosis Date   • Cancer (HCC)     Colon   • Diabetes (HCC)    • Hypertension    • Vertigo, benign positional        Allergies: Morphine    Surgical History:  has a past surgical history that includes colon resection; pr resec liver,part lobectomy; and laminotomy.    Family History: family history includes Cancer in his mother; Diabetes in his sister; Heart Attack in his father; Hypertension in his father.    Social History:  reports that he has never smoked. He has never used smokeless tobacco. He reports that he does not drink alcohol or use drugs.    ROS: no fever or nausea.        Objective:     Vitals:    02/26/19 1327   BP: 124/68   BP Location: Left arm   Patient Position: Sitting   BP Cuff Size: Large adult   Pulse: 83   Resp: 16   Temp: 36.4 °C (97.5 °F)   TempSrc: Temporal   SpO2: 95%   Weight: 89.4 kg (197 lb)   Height: 1.803 m (5' 11\")       Physical Exam:  General: alert in no apparent distress.   Neuro: CN II - XII intact, finger to nose normal.   MSK: No tenderness to palpation over the spine.  Negative Spurling's bilaterally.  Sensation intact in bilateral upper extremities.  2+ radial pulses bilaterally.     PHQ9: 0,0,0,1,0,0,0,0,0 = 2.   GAD7: 0,0,1,0,0,1,0 = 2.    MiniCog: normal.         Assessment and Plan:     1. Controlled type 2 diabetes mellitus without complication, without long-term current use of insulin (HCC)  - continue current regimen with labs before f/u.   - HEMOGLOBIN A1C; Future  - MICROALBUMIN CREAT RATIO URINE; Future  - " Comp Metabolic Panel; Future    2. Essential hypertension  - continue current regimen.   - Comp Metabolic Panel; Future    3. Thrombocytopenia (HCC)  - PLATELET COUNT; Future    4. Gilbert syndrome  - RETICULOCYTES COUNT; Future  - LDH; Future    5. Cervical radiculopathy  - recommended stretching and exercises (handout given) and Yoga for neck. If no resolution in 6 weeks, patient will get XR.   - DX-CERVICAL SPINE-2 OR 3 VIEWS; Future    6. Behavioral change  Patient has some increasing irritability, however, his neurologic exam, mini cog, PHQ 9 and SCOTT 7 are normal.  I did offer psychology for some therapeutic modalities.  The patient will think about this.  - TSH WITH REFLEX TO FT4; Future        Followup: Return in about 2 months (around 4/26/2019), or if symptoms worsen or fail to improve, for DM.

## 2019-02-26 NOTE — ASSESSMENT & PLAN NOTE
Patient is tolerating his Actos, metformin and glimepiride well without problem.  He has no low blood sugars.  He has no dizziness.

## 2019-02-26 NOTE — ASSESSMENT & PLAN NOTE
For the past few years the patient and his wife have noticed that he is becoming increasingly irritable. There are no memory deficits and no appreciable depression or anxiety.

## 2019-04-18 ENCOUNTER — HOSPITAL ENCOUNTER (OUTPATIENT)
Dept: LAB | Facility: MEDICAL CENTER | Age: 74
End: 2019-04-18
Attending: FAMILY MEDICINE
Payer: MEDICARE

## 2019-04-18 DIAGNOSIS — D69.6 THROMBOCYTOPENIA (HCC): ICD-10-CM

## 2019-04-18 DIAGNOSIS — E11.9 CONTROLLED TYPE 2 DIABETES MELLITUS WITHOUT COMPLICATION, WITHOUT LONG-TERM CURRENT USE OF INSULIN (HCC): ICD-10-CM

## 2019-04-18 DIAGNOSIS — E80.4 GILBERT SYNDROME: ICD-10-CM

## 2019-04-18 DIAGNOSIS — I10 ESSENTIAL HYPERTENSION: ICD-10-CM

## 2019-04-18 LAB
ALBUMIN SERPL BCP-MCNC: 4.1 G/DL (ref 3.2–4.9)
ALBUMIN/GLOB SERPL: 1.6 G/DL
ALP SERPL-CCNC: 42 U/L (ref 30–99)
ALT SERPL-CCNC: 34 U/L (ref 2–50)
ANION GAP SERPL CALC-SCNC: 7 MMOL/L (ref 0–11.9)
AST SERPL-CCNC: 38 U/L (ref 12–45)
BILIRUB SERPL-MCNC: 2.5 MG/DL (ref 0.1–1.5)
BUN SERPL-MCNC: 17 MG/DL (ref 8–22)
CALCIUM SERPL-MCNC: 9.2 MG/DL (ref 8.5–10.5)
CHLORIDE SERPL-SCNC: 106 MMOL/L (ref 96–112)
CO2 SERPL-SCNC: 26 MMOL/L (ref 20–33)
CREAT SERPL-MCNC: 1 MG/DL (ref 0.5–1.4)
CREAT UR-MCNC: 181.7 MG/DL
EST. AVERAGE GLUCOSE BLD GHB EST-MCNC: 192 MG/DL
FASTING STATUS PATIENT QL REPORTED: NORMAL
GLOBULIN SER CALC-MCNC: 2.6 G/DL (ref 1.9–3.5)
GLUCOSE SERPL-MCNC: 159 MG/DL (ref 65–99)
HBA1C MFR BLD: 8.3 % (ref 0–5.6)
HGB RETIC QN AUTO: 33 PG/CELL (ref 29–35)
IMM RETICS NFR: 3.4 % (ref 9.3–17.4)
LDH SERPL L TO P-CCNC: 260 U/L (ref 107–266)
MICROALBUMIN UR-MCNC: 2.8 MG/DL
MICROALBUMIN/CREAT UR: 15 MG/G (ref 0–30)
PLATELET # BLD AUTO: 133 K/UL (ref 164–446)
POTASSIUM SERPL-SCNC: 3.4 MMOL/L (ref 3.6–5.5)
PROT SERPL-MCNC: 6.7 G/DL (ref 6–8.2)
RETICS # AUTO: 0.04 M/UL (ref 0.04–0.06)
RETICS/RBC NFR: 0.9 % (ref 0.8–2.1)
SODIUM SERPL-SCNC: 139 MMOL/L (ref 135–145)

## 2019-04-18 PROCEDURE — 85046 RETICYTE/HGB CONCENTRATE: CPT

## 2019-04-18 PROCEDURE — 82570 ASSAY OF URINE CREATININE: CPT

## 2019-04-18 PROCEDURE — 83036 HEMOGLOBIN GLYCOSYLATED A1C: CPT

## 2019-04-18 PROCEDURE — 36415 COLL VENOUS BLD VENIPUNCTURE: CPT

## 2019-04-18 PROCEDURE — 82043 UR ALBUMIN QUANTITATIVE: CPT

## 2019-04-18 PROCEDURE — 85049 AUTOMATED PLATELET COUNT: CPT

## 2019-04-18 PROCEDURE — 83615 LACTATE (LD) (LDH) ENZYME: CPT

## 2019-04-18 PROCEDURE — 80053 COMPREHEN METABOLIC PANEL: CPT

## 2019-04-23 ENCOUNTER — OFFICE VISIT (OUTPATIENT)
Dept: MEDICAL GROUP | Facility: MEDICAL CENTER | Age: 74
End: 2019-04-23
Payer: MEDICARE

## 2019-04-23 VITALS
BODY MASS INDEX: 27.44 KG/M2 | RESPIRATION RATE: 18 BRPM | DIASTOLIC BLOOD PRESSURE: 74 MMHG | HEART RATE: 82 BPM | HEIGHT: 71 IN | TEMPERATURE: 98.8 F | WEIGHT: 196 LBS | OXYGEN SATURATION: 95 % | SYSTOLIC BLOOD PRESSURE: 124 MMHG

## 2019-04-23 DIAGNOSIS — D69.6 THROMBOCYTOPENIA (HCC): ICD-10-CM

## 2019-04-23 DIAGNOSIS — E11.9 CONTROLLED TYPE 2 DIABETES MELLITUS WITHOUT COMPLICATION, WITHOUT LONG-TERM CURRENT USE OF INSULIN (HCC): ICD-10-CM

## 2019-04-23 DIAGNOSIS — Z23 NEED FOR VACCINATION: ICD-10-CM

## 2019-04-23 DIAGNOSIS — I10 ESSENTIAL HYPERTENSION: ICD-10-CM

## 2019-04-23 DIAGNOSIS — E80.4 GILBERT SYNDROME: ICD-10-CM

## 2019-04-23 DIAGNOSIS — R46.89 BEHAVIORAL CHANGE: ICD-10-CM

## 2019-04-23 PROCEDURE — 99214 OFFICE O/P EST MOD 30 MIN: CPT | Performed by: FAMILY MEDICINE

## 2019-04-23 RX ORDER — PIOGLITAZONEHYDROCHLORIDE 15 MG/1
30 TABLET ORAL
Qty: 100 TAB | Refills: 4
Start: 2019-04-23 | End: 2019-06-04 | Stop reason: SDUPTHER

## 2019-04-23 NOTE — ASSESSMENT & PLAN NOTE
Last visit the patient describes some increasing irritability with no depression, anxiety or memory deficits.  Neurologic and cognitive testing were reassuring.  Today, the patient states that he believes his irritability to rise from the fact that he and his granddaughter have a strained relationship.  States that he does not feel like his granddaughter gives him the respect that he is due.

## 2019-04-23 NOTE — PROGRESS NOTES
[facility]  Progress Note  Established Patient    Subjective:   Magnus Claudio is a 73 y.o. male here today with a chief complaint of diabetes. The patient is alone.     Essential hypertension  Patient's blood pressure remains under excellent control on his current medication regimen.    Controlled type 2 diabetes mellitus without complication, without long-term current use of insulin (CMS-HCC)  Patient's diabetes is better controlled.  There is still room for improvement.    Thrombocytopenia (HCC)  This is a chronic and stable problem.    Gilbert syndrome  Chronic and stable problem.    Behavioral change  Last visit the patient describes some increasing irritability with no depression, anxiety or memory deficits.  Neurologic and cognitive testing were reassuring.  Today, the patient states that he believes his irritability to rise from the fact that he and his granddaughter have a strained relationship.  States that he does not feel like his granddaughter gives him the respect that he is due.      Current Outpatient Prescriptions on File Prior to Visit   Medication Sig Dispense Refill   • losartan-hydrochlorothiazide (HYZAAR) 100-12.5 MG per tablet TAKE 1 TABLET BY MOUTH EVERY  Tab 4   • metFORMIN ER (GLUCOPHAGE XR) 500 MG TABLET SR 24 HR Take 1,000 mg by mouth 2 times a day.  3   • glimepiride (AMARYL) 4 MG Tab Take 2 Tabs by mouth every morning. 180 Tab 4   • amLODIPine (NORVASC) 5 MG Tab Take 1 tablet by mouth every day.  90 Tab 4   • Blood Glucose Monitoring Suppl Supplies Misc Test strips for One Touch or glucometer covered by patient's insurance. Sig: use once daily to check blood sugars. 100 Strip 12   • Blood Glucose Monitoring Suppl Device Dispense One Touch or glucometer covered by patient's insurance. Sig. Use once daily for blood sugar monitoring. #1. NR. 1 Device 0   • MICROLET LANCETS Misc Use daily to check blood sugars. 100 Each 12   • Lancets Misc Sig: use once daily and prn  "ssx high or low sugar. 300 Each 12   • therapeutic multivitamin-minerals (THERAGRAN-M) Tab Take 1 Tab by mouth every day.     • aspirin (CLAUDINE ASPIRIN EC LOW DOSE) 81 MG EC tablet Take  by mouth.       No current facility-administered medications on file prior to visit.        Past Medical History:   Diagnosis Date   • Cancer (HCC)     Colon   • Diabetes (HCC)    • Hypertension    • Vertigo, benign positional        Allergies: Morphine    Surgical History:  has a past surgical history that includes colon resection; pr resec liver,part lobectomy; and laminotomy.    Family History: family history includes Cancer in his mother; Diabetes in his sister; Heart Attack in his father; Hypertension in his father.    Social History:  reports that he has never smoked. He has never used smokeless tobacco. He reports that he does not drink alcohol or use drugs.    ROS: no fever or nausea.        Objective:     Vitals:    04/23/19 1331   BP: 124/74   BP Location: Left arm   Patient Position: Sitting   BP Cuff Size: Adult   Pulse: 82   Resp: 18   Temp: 37.1 °C (98.8 °F)   TempSrc: Temporal   SpO2: 95%   Weight: 88.9 kg (196 lb)   Height: 1.803 m (5' 11\")       Physical Exam:  General: alert in no apparent distress.   Affect: normal.         Assessment and Plan:       1. Gilbert syndrome  - monitor.     2. Controlled type 2 diabetes mellitus without complication, without long-term current use of insulin (HCC)  Increased actos dose, A1c in f/u.   - pioglitazone (ACTOS) 15 MG Tab; Take 2 Tabs by mouth every day.  Dispense: 100 Tab; Refill: 4    3. Essential hypertension  - continue current regimen.     4. Behavioral change  Irritability attributed to some family strife.   - provided patient a listening ear, informed him that we can only really change how we behave toward others and recommended he continue to be loving toward his granddaughter despite her disrespect. Offered psychology, patient declines. Informed patient he should " discuss his granddaughter's behavior with her parents.     5. Thrombocytopenia (HCC)  - monitor.         Followup: Return in about 3 months (around 7/23/2019), or if symptoms worsen or fail to improve, for DM.

## 2019-06-04 DIAGNOSIS — E11.9 CONTROLLED TYPE 2 DIABETES MELLITUS WITHOUT COMPLICATION, WITHOUT LONG-TERM CURRENT USE OF INSULIN (HCC): ICD-10-CM

## 2019-06-04 RX ORDER — PIOGLITAZONEHYDROCHLORIDE 15 MG/1
30 TABLET ORAL
Qty: 180 TAB | Refills: 0 | Status: SHIPPED | OUTPATIENT
Start: 2019-06-04 | End: 2019-09-12 | Stop reason: SDUPTHER

## 2019-07-23 ENCOUNTER — OFFICE VISIT (OUTPATIENT)
Dept: MEDICAL GROUP | Facility: MEDICAL CENTER | Age: 74
End: 2019-07-23
Payer: MEDICARE

## 2019-07-23 ENCOUNTER — HOSPITAL ENCOUNTER (OUTPATIENT)
Dept: RADIOLOGY | Facility: MEDICAL CENTER | Age: 74
End: 2019-07-23
Attending: FAMILY MEDICINE
Payer: MEDICARE

## 2019-07-23 VITALS
HEART RATE: 77 BPM | RESPIRATION RATE: 18 BRPM | DIASTOLIC BLOOD PRESSURE: 78 MMHG | BODY MASS INDEX: 27.75 KG/M2 | WEIGHT: 198.2 LBS | HEIGHT: 71 IN | TEMPERATURE: 97.8 F | SYSTOLIC BLOOD PRESSURE: 140 MMHG | OXYGEN SATURATION: 94 %

## 2019-07-23 DIAGNOSIS — E11.9 CONTROLLED TYPE 2 DIABETES MELLITUS WITHOUT COMPLICATION, WITHOUT LONG-TERM CURRENT USE OF INSULIN (HCC): ICD-10-CM

## 2019-07-23 DIAGNOSIS — G89.29 CHRONIC LEFT SHOULDER PAIN: ICD-10-CM

## 2019-07-23 DIAGNOSIS — M25.512 CHRONIC LEFT SHOULDER PAIN: ICD-10-CM

## 2019-07-23 DIAGNOSIS — I10 ESSENTIAL HYPERTENSION: ICD-10-CM

## 2019-07-23 DIAGNOSIS — R42 VERTIGO: ICD-10-CM

## 2019-07-23 PROBLEM — R46.89 BEHAVIORAL CHANGE: Status: RESOLVED | Noted: 2019-02-26 | Resolved: 2019-07-23

## 2019-07-23 LAB
HBA1C MFR BLD: 7.1 % (ref 0–5.6)
INT CON NEG: NEGATIVE
INT CON POS: POSITIVE

## 2019-07-23 PROCEDURE — 99214 OFFICE O/P EST MOD 30 MIN: CPT | Performed by: FAMILY MEDICINE

## 2019-07-23 PROCEDURE — 73030 X-RAY EXAM OF SHOULDER: CPT | Mod: LT

## 2019-07-23 PROCEDURE — 83036 HEMOGLOBIN GLYCOSYLATED A1C: CPT | Performed by: FAMILY MEDICINE

## 2019-07-23 NOTE — ASSESSMENT & PLAN NOTE
For over 20 years the patient has had intermittent, rare episodes of dizziness.  Another of these occurred on Sunday.  He states that the room was spinning and he had to lie down.  The dizziness was not made worse with standing.  After day, it resolved.  Has been told by other physicians that it was vertigo.  He denies associated chest pain, shortness of breath or palpitations.

## 2019-07-23 NOTE — PROGRESS NOTES
[facility]  Progress Note  Established Patient    Subjective:   Magnus Claudio is a 74 y.o. male here today with a chief complaint of vertigo. The patient is alone.     Controlled type 2 diabetes mellitus without complication, without long-term current use of insulin (CMS-[facility])  Patient's diabetes is better controlled.    Essential hypertension  Patient's blood pressure is at goal.    Left shoulder pain  Patient describes 6 or 7 months of left shoulder pain.  There is no associated trauma.  It is not getting better or worse.  He has tried some home remedies including exercises and oral analgesics without success.    Vertigo  For over 20 years the patient has had intermittent, rare episodes of dizziness.  Another of these occurred on Sunday.  He states that the room was spinning and he had to lie down.  The dizziness was not made worse with standing.  After day, it resolved.  Has been told by other physicians that it was vertigo.  He denies associated chest pain, shortness of breath or palpitations.      Current Outpatient Prescriptions on File Prior to Visit   Medication Sig Dispense Refill   • pioglitazone (ACTOS) 15 MG Tab Take 2 Tabs by mouth every day. 180 Tab 0   • losartan-hydrochlorothiazide (HYZAAR) 100-12.5 MG per tablet TAKE 1 TABLET BY MOUTH EVERY  Tab 4   • metFORMIN ER (GLUCOPHAGE XR) 500 MG TABLET SR 24 HR Take 1,000 mg by mouth 2 times a day.  3   • glimepiride (AMARYL) 4 MG Tab Take 2 Tabs by mouth every morning. 180 Tab 4   • amLODIPine (NORVASC) 5 MG Tab Take 1 tablet by mouth every day.  90 Tab 4   • Blood Glucose Monitoring Suppl Supplies Misc Test strips for One Touch or glucometer covered by patient's insurance. Sig: use once daily to check blood sugars. 100 Strip 12   • Blood Glucose Monitoring Suppl Device Dispense One Touch or glucometer covered by patient's insurance. Sig. Use once daily for blood sugar monitoring. #1. NR. 1 Device 0   • MICROLET LANCETS [facility] Use daily  "to check blood sugars. 100 Each 12   • Lancets Misc Sig: use once daily and prn ssx high or low sugar. 300 Each 12   • therapeutic multivitamin-minerals (THERAGRAN-M) Tab Take 1 Tab by mouth every day.     • aspirin (CLAUDINE ASPIRIN EC LOW DOSE) 81 MG EC tablet Take  by mouth.       No current facility-administered medications on file prior to visit.        Past Medical History:   Diagnosis Date   • Cancer (HCC)     Colon   • Diabetes (HCC)    • Hypertension    • Vertigo, benign positional        Allergies: Morphine    Surgical History:  has a past surgical history that includes colon resection; pr resec liver,part lobectomy; and laminotomy.    Family History: family history includes Cancer in his mother; Diabetes in his sister; Heart Attack in his father; Hypertension in his father.    Social History:  reports that he has never smoked. He has never used smokeless tobacco. He reports that he does not drink alcohol or use drugs.    ROS: see HPI.        Objective:     Vitals:    07/23/19 1351   BP: 140/78   BP Location: Left arm   Patient Position: Sitting   BP Cuff Size: Adult   Pulse: 77   Resp: 18   Temp: 36.6 °C (97.8 °F)   TempSrc: Temporal   SpO2: 94%   Weight: 89.9 kg (198 lb 3.2 oz)   Height: 1.803 m (5' 11\")       Physical Exam:  General: alert in no apparent distress.   Cardio: regular rate and rhythm, no murmurs, rubs or gallops.   Resp: CTAB no w/r/r.   Neck: no carotid bruit.   Neuro: Cranial nerves II through XII intact, finger-nose normal, gait normal.  Strength 5/5: x 4.  Musculoskeletal: No weakness on bilateral shoulder internal rotation, external rotation or empty can testing.  There is pain on left side empty can testing, however.        Assessment and Plan:     1. Controlled type 2 diabetes mellitus without complication, without long-term current use of insulin (HCC)  - continue current regimen.   - POCT A1C    2. Chronic left shoulder pain  Suspect rotator cuff tendinitis nonresponsive to " conservative therapy.  Will get x-ray and bring back in a week to consider injection.  - DX-SHOULDER 2+ LEFT; Future    3. Vertigo  Patient's history is certainly consistent with benign positional paroxysmal vertigo.  His neurologic exam is normal.  Recommended the Epley maneuver for home when this recurs.     4. Essential hypertension  - continue current regimen.         Followup: Return in about 1 week (around 7/30/2019), or if symptoms worsen or fail to improve.

## 2019-07-23 NOTE — ASSESSMENT & PLAN NOTE
Patient describes 6 or 7 months of left shoulder pain.  There is no associated trauma.  It is not getting better or worse.  He has tried some home remedies including exercises and oral analgesics without success.

## 2019-07-30 ENCOUNTER — OFFICE VISIT (OUTPATIENT)
Dept: MEDICAL GROUP | Facility: MEDICAL CENTER | Age: 74
End: 2019-07-30
Payer: MEDICARE

## 2019-07-30 VITALS
WEIGHT: 194 LBS | HEIGHT: 71 IN | HEART RATE: 77 BPM | SYSTOLIC BLOOD PRESSURE: 134 MMHG | BODY MASS INDEX: 27.16 KG/M2 | TEMPERATURE: 97.6 F | DIASTOLIC BLOOD PRESSURE: 68 MMHG | OXYGEN SATURATION: 96 % | RESPIRATION RATE: 18 BRPM

## 2019-07-30 DIAGNOSIS — M25.512 LEFT SHOULDER PAIN, UNSPECIFIED CHRONICITY: ICD-10-CM

## 2019-07-30 PROCEDURE — 20610 DRAIN/INJ JOINT/BURSA W/O US: CPT | Mod: LT | Performed by: FAMILY MEDICINE

## 2019-07-30 RX ORDER — TRIAMCINOLONE ACETONIDE 40 MG/ML
40 INJECTION, SUSPENSION INTRA-ARTICULAR; INTRAMUSCULAR ONCE
Status: COMPLETED | OUTPATIENT
Start: 2019-07-30 | End: 2019-07-30

## 2019-07-30 RX ORDER — LIDOCAINE HYDROCHLORIDE 10 MG/ML
4 INJECTION, SOLUTION EPIDURAL; INFILTRATION; INTRACAUDAL; PERINEURAL ONCE
Status: COMPLETED | OUTPATIENT
Start: 2019-07-30 | End: 2019-07-30

## 2019-07-30 RX ADMIN — TRIAMCINOLONE ACETONIDE 40 MG: 40 INJECTION, SUSPENSION INTRA-ARTICULAR; INTRAMUSCULAR at 11:22

## 2019-07-30 RX ADMIN — LIDOCAINE HYDROCHLORIDE 4 ML: 10 INJECTION, SOLUTION EPIDURAL; INFILTRATION; INTRACAUDAL; PERINEURAL at 11:21

## 2019-07-30 NOTE — ASSESSMENT & PLAN NOTE
The patient continues to complain of atraumatic left shoulder pain nonresponsive to over-the-counter treatments and therapies.  I got an x-ray showing degenerative change.  He is interested in a joint injection today.

## 2019-07-30 NOTE — PROGRESS NOTES
[facility]  Progress Note  Established Patient    Subjective:   Magnus Claudio is a 74 y.o. male here today with a chief complaint of shoulder pain. The patient is alone.     Left shoulder pain  The patient continues to complain of atraumatic left shoulder pain nonresponsive to over-the-counter treatments and therapies.  I got an x-ray showing degenerative change.  He is interested in a joint injection today.      Current Outpatient Prescriptions on File Prior to Visit   Medication Sig Dispense Refill   • metFORMIN ER (GLUCOPHAGE XR) 500 MG TABLET SR 24 HR TAKE 2 TABLETS BY MOUTH TWICE DAILY 360 Tab 4   • pioglitazone (ACTOS) 15 MG Tab Take 2 Tabs by mouth every day. 180 Tab 0   • losartan-hydrochlorothiazide (HYZAAR) 100-12.5 MG per tablet TAKE 1 TABLET BY MOUTH EVERY  Tab 4   • metFORMIN ER (GLUCOPHAGE XR) 500 MG TABLET SR 24 HR Take 1,000 mg by mouth 2 times a day.  3   • glimepiride (AMARYL) 4 MG Tab Take 2 Tabs by mouth every morning. 180 Tab 4   • amLODIPine (NORVASC) 5 MG Tab Take 1 tablet by mouth every day.  90 Tab 4   • Blood Glucose Monitoring Suppl Supplies Misc Test strips for One Touch or glucometer covered by patient's insurance. Sig: use once daily to check blood sugars. 100 Strip 12   • Blood Glucose Monitoring Suppl Device Dispense One Touch or glucometer covered by patient's insurance. Sig. Use once daily for blood sugar monitoring. #1. NR. 1 Device 0   • MICROLET LANCETS Misc Use daily to check blood sugars. 100 Each 12   • Lancets Misc Sig: use once daily and prn ssx high or low sugar. 300 Each 12   • therapeutic multivitamin-minerals (THERAGRAN-M) Tab Take 1 Tab by mouth every day.     • aspirin (CLAUDINE ASPIRIN EC LOW DOSE) 81 MG EC tablet Take  by mouth.       No current facility-administered medications on file prior to visit.        Past Medical History:   Diagnosis Date   • Cancer (HCC)     Colon   • Diabetes (HCC)    • Hypertension    • Vertigo, benign positional   "      Allergies: Morphine    Surgical History:  has a past surgical history that includes colon resection; pr resec liver,part lobectomy; and laminotomy.    Family History: family history includes Cancer in his mother; Diabetes in his sister; Heart Attack in his father; Hypertension in his father.    Social History:  reports that he has never smoked. He has never used smokeless tobacco. He reports that he does not drink alcohol or use drugs.    ROS: no fever.        Objective:     Vitals:    07/30/19 1050   BP: 134/68   BP Location: Left arm   Patient Position: Sitting   BP Cuff Size: Adult   Pulse: 77   Resp: 18   Temp: 36.4 °C (97.6 °F)   TempSrc: Temporal   SpO2: 96%   Weight: 88 kg (194 lb)   Height: 1.803 m (5' 11\")       Physical Exam:  General: alert in no apparent distress.     Shoulder Injection Procedure Note:   Risks and benefits of the procedure were discussed with the patient and informed consent was obtained. The needle insertion site was prepped with betadine and using a posterior approach a 23 gauge 1.5 inch needle was used to inject 4 cc of 1% lidocaine without epinephrine and 1 cc of triamcinolone 40 mg/mL into the glenohumeral joint. The area was cleansed with an alcohol swab and a Band-Aid was applied. No complications were encountered and aftercare was discussed.         Assessment and Plan:     1. Left shoulder pain, unspecified chronicity  - joint injection, see procedure note above.       Followup: Return if symptoms worsen or fail to improve.         "

## 2019-08-02 ENCOUNTER — OFFICE VISIT (OUTPATIENT)
Dept: URGENT CARE | Facility: CLINIC | Age: 74
End: 2019-08-02
Payer: MEDICARE

## 2019-08-02 VITALS
SYSTOLIC BLOOD PRESSURE: 122 MMHG | HEART RATE: 64 BPM | RESPIRATION RATE: 12 BRPM | DIASTOLIC BLOOD PRESSURE: 78 MMHG | OXYGEN SATURATION: 98 % | BODY MASS INDEX: 27.16 KG/M2 | HEIGHT: 71 IN | TEMPERATURE: 98.7 F | WEIGHT: 194 LBS

## 2019-08-02 DIAGNOSIS — H61.21 IMPACTED CERUMEN OF RIGHT EAR: ICD-10-CM

## 2019-08-02 PROCEDURE — 69210 REMOVE IMPACTED EAR WAX UNI: CPT | Performed by: PHYSICIAN ASSISTANT

## 2019-08-02 ASSESSMENT — ENCOUNTER SYMPTOMS
CHILLS: 0
FEVER: 0

## 2019-08-02 NOTE — PROGRESS NOTES
Subjective:   Magnus Clauido is a 74 y.o. male who presents today with   Chief Complaint   Patient presents with   • Cerumen Impaction     bilateral       Cerumen Impaction   This is a new problem. The current episode started today. The problem occurs constantly. The problem has been unchanged. Pertinent negatives include no chills or fever. He has tried nothing for the symptoms. The treatment provided no relief.   Patient went to have hearing aids adjusted and was told he needed to have his ears cleaned out first.     PMH:  has a past medical history of Cancer (HCC), Diabetes (HCC), Hypertension, and Vertigo, benign positional.  MEDS:   Current Outpatient Medications:   •  metFORMIN ER (GLUCOPHAGE XR) 500 MG TABLET SR 24 HR, TAKE 2 TABLETS BY MOUTH TWICE DAILY, Disp: 360 Tab, Rfl: 4  •  pioglitazone (ACTOS) 15 MG Tab, Take 2 Tabs by mouth every day., Disp: 180 Tab, Rfl: 0  •  losartan-hydrochlorothiazide (HYZAAR) 100-12.5 MG per tablet, TAKE 1 TABLET BY MOUTH EVERY DAY, Disp: 100 Tab, Rfl: 4  •  metFORMIN ER (GLUCOPHAGE XR) 500 MG TABLET SR 24 HR, Take 1,000 mg by mouth 2 times a day., Disp: , Rfl: 3  •  glimepiride (AMARYL) 4 MG Tab, Take 2 Tabs by mouth every morning., Disp: 180 Tab, Rfl: 4  •  amLODIPine (NORVASC) 5 MG Tab, Take 1 tablet by mouth every day. , Disp: 90 Tab, Rfl: 4  •  Blood Glucose Monitoring Suppl Supplies Misc, Test strips for One Touch or glucometer covered by patient's insurance. Sig: use once daily to check blood sugars., Disp: 100 Strip, Rfl: 12  •  Blood Glucose Monitoring Suppl Device, Dispense One Touch or glucometer covered by patient's insurance. Sig. Use once daily for blood sugar monitoring. #1. NR., Disp: 1 Device, Rfl: 0  •  MICROLET LANCETS Misc, Use daily to check blood sugars., Disp: 100 Each, Rfl: 12  •  Lancets Misc, Sig: use once daily and prn ssx high or low sugar., Disp: 300 Each, Rfl: 12  •  therapeutic multivitamin-minerals (THERAGRAN-M) Tab, Take 1 Tab by mouth  "every day., Disp: , Rfl:   •  aspirin (CLAUDINE ASPIRIN EC LOW DOSE) 81 MG EC tablet, Take  by mouth., Disp: , Rfl:   ALLERGIES:   Allergies   Allergen Reactions   • Morphine Unspecified     Hallucinations, 15+ years ago     SURGHX:   Past Surgical History:   Procedure Laterality Date   • COLON RESECTION     • LAMINOTOMY     • PB RESEC LIVER,PART LOBECTOMY       SOCHX:  reports that he has never smoked. He has never used smokeless tobacco. He reports that he does not drink alcohol or use drugs.  FH: Reviewed with patient, not pertinent to this visit.       Review of Systems   Constitutional: Negative for chills and fever.   HENT: Positive for hearing loss. Negative for ear discharge and ear pain.         Ear fullness        Objective:   /78   Pulse 64   Temp 37.1 °C (98.7 °F) (Temporal)   Resp 12   Ht 1.803 m (5' 11\")   Wt 88 kg (194 lb)   SpO2 98%   BMI 27.06 kg/m²   Physical Exam   Constitutional: Vital signs are normal. He appears well-developed and well-nourished. No distress.   HENT:   Head: Normocephalic and atraumatic.   Cerumen impaction of right ear canal. Small amount of cerumen in left ear.    Cardiovascular: Normal rate, regular rhythm and normal heart sounds.   Pulmonary/Chest: Effort normal.   Musculoskeletal:   Normal movement in all 4 extremities   Neurological: He is alert. Coordination normal.   Skin: Skin is warm and dry.   Psychiatric: He has a normal mood and affect.   Nursing note and vitals reviewed.  Following ear lavage TMs visualized with no erythema or effusion. Cerumen still present in right ear and removed by myself with ear curette. Large piece of cerumen was removed. Patient tolerated the procedure well.       Assessment/Plan:   Assessment    1. Impacted cerumen of right ear  Discussed with patient OTC remedies to prevent further ear wax.   Differential diagnosis, natural history, supportive care, and indications for immediate follow-up discussed.   Patient given instructions " and understanding of medications and treatment.    If not improving in 3-5 days, F/U with PCP or return to UC if symptoms worsen.    Patient agreeable to plan.      Please note that this dictation was created using voice recognition software. I have made every reasonable attempt to correct obvious errors, but I expect that there are errors of grammar and possibly content that I did not discover before finalizing the note.    Abdulaziz Loya PA-C

## 2019-08-07 ENCOUNTER — PATIENT MESSAGE (OUTPATIENT)
Dept: MEDICAL GROUP | Facility: MEDICAL CENTER | Age: 74
End: 2019-08-07

## 2019-08-07 DIAGNOSIS — H91.90 CHANGE IN HEARING, UNSPECIFIED LATERALITY: ICD-10-CM

## 2019-08-17 ENCOUNTER — APPOINTMENT (OUTPATIENT)
Dept: RADIOLOGY | Facility: MEDICAL CENTER | Age: 74
DRG: 176 | End: 2019-08-17
Attending: EMERGENCY MEDICINE
Payer: MEDICARE

## 2019-08-17 ENCOUNTER — HOSPITAL ENCOUNTER (INPATIENT)
Facility: MEDICAL CENTER | Age: 74
LOS: 2 days | DRG: 176 | End: 2019-08-19
Attending: EMERGENCY MEDICINE | Admitting: INTERNAL MEDICINE
Payer: MEDICARE

## 2019-08-17 DIAGNOSIS — I26.99 OTHER ACUTE PULMONARY EMBOLISM WITHOUT ACUTE COR PULMONALE (HCC): ICD-10-CM

## 2019-08-17 DIAGNOSIS — I82.433 ACUTE DEEP VEIN THROMBOSIS (DVT) OF POPLITEAL VEIN OF BOTH LOWER EXTREMITIES (HCC): ICD-10-CM

## 2019-08-17 DIAGNOSIS — I26.99 ACUTE PULMONARY EMBOLISM WITHOUT ACUTE COR PULMONALE, UNSPECIFIED PULMONARY EMBOLISM TYPE (HCC): ICD-10-CM

## 2019-08-17 LAB
ALBUMIN SERPL BCP-MCNC: 3.9 G/DL (ref 3.2–4.9)
ALBUMIN/GLOB SERPL: 1.4 G/DL
ALP SERPL-CCNC: 43 U/L (ref 30–99)
ALT SERPL-CCNC: 25 U/L (ref 2–50)
ANION GAP SERPL CALC-SCNC: 9 MMOL/L (ref 0–11.9)
APTT PPP: 30.8 SEC (ref 24.7–36)
AST SERPL-CCNC: 33 U/L (ref 12–45)
BASOPHILS # BLD AUTO: 0.2 % (ref 0–1.8)
BASOPHILS # BLD: 0.02 K/UL (ref 0–0.12)
BILIRUB SERPL-MCNC: 2.2 MG/DL (ref 0.1–1.5)
BUN SERPL-MCNC: 15 MG/DL (ref 8–22)
CALCIUM SERPL-MCNC: 8.7 MG/DL (ref 8.4–10.2)
CHLORIDE SERPL-SCNC: 103 MMOL/L (ref 96–112)
CO2 SERPL-SCNC: 24 MMOL/L (ref 20–33)
CREAT SERPL-MCNC: 1.16 MG/DL (ref 0.5–1.4)
EKG IMPRESSION: NORMAL
EOSINOPHIL # BLD AUTO: 0.04 K/UL (ref 0–0.51)
EOSINOPHIL NFR BLD: 0.5 % (ref 0–6.9)
ERYTHROCYTE [DISTWIDTH] IN BLOOD BY AUTOMATED COUNT: 43.9 FL (ref 35.9–50)
GLOBULIN SER CALC-MCNC: 2.8 G/DL (ref 1.9–3.5)
GLUCOSE BLD-MCNC: 121 MG/DL (ref 65–99)
GLUCOSE SERPL-MCNC: 320 MG/DL (ref 65–99)
HCT VFR BLD AUTO: 41.3 % (ref 42–52)
HGB BLD-MCNC: 14.2 G/DL (ref 14–18)
IMM GRANULOCYTES # BLD AUTO: 0.03 K/UL (ref 0–0.11)
IMM GRANULOCYTES NFR BLD AUTO: 0.4 % (ref 0–0.9)
INR PPP: 0.93 (ref 0.87–1.13)
LIPASE SERPL-CCNC: 45 U/L (ref 7–58)
LYMPHOCYTES # BLD AUTO: 1.31 K/UL (ref 1–4.8)
LYMPHOCYTES NFR BLD: 15.3 % (ref 22–41)
MCH RBC QN AUTO: 29.8 PG (ref 27–33)
MCHC RBC AUTO-ENTMCNC: 34.4 G/DL (ref 33.7–35.3)
MCV RBC AUTO: 86.6 FL (ref 81.4–97.8)
MONOCYTES # BLD AUTO: 0.59 K/UL (ref 0–0.85)
MONOCYTES NFR BLD AUTO: 6.9 % (ref 0–13.4)
NEUTROPHILS # BLD AUTO: 6.58 K/UL (ref 1.82–7.42)
NEUTROPHILS NFR BLD: 76.7 % (ref 44–72)
NRBC # BLD AUTO: 0 K/UL
NRBC BLD-RTO: 0 /100 WBC
NT-PROBNP SERPL IA-MCNC: 91 PG/ML (ref 0–125)
PLATELET # BLD AUTO: 138 K/UL (ref 164–446)
PMV BLD AUTO: 10.2 FL (ref 9–12.9)
POTASSIUM SERPL-SCNC: 3.7 MMOL/L (ref 3.6–5.5)
PROT SERPL-MCNC: 6.7 G/DL (ref 6–8.2)
PROTHROMBIN TIME: 12.6 SEC (ref 12–14.6)
RBC # BLD AUTO: 4.77 M/UL (ref 4.7–6.1)
SODIUM SERPL-SCNC: 136 MMOL/L (ref 135–145)
TROPONIN T SERPL-MCNC: 45 NG/L (ref 6–19)
WBC # BLD AUTO: 8.6 K/UL (ref 4.8–10.8)

## 2019-08-17 PROCEDURE — 80053 COMPREHEN METABOLIC PANEL: CPT

## 2019-08-17 PROCEDURE — 83690 ASSAY OF LIPASE: CPT

## 2019-08-17 PROCEDURE — 700102 HCHG RX REV CODE 250 W/ 637 OVERRIDE(OP): Performed by: INTERNAL MEDICINE

## 2019-08-17 PROCEDURE — 93005 ELECTROCARDIOGRAM TRACING: CPT | Performed by: EMERGENCY MEDICINE

## 2019-08-17 PROCEDURE — 85730 THROMBOPLASTIN TIME PARTIAL: CPT

## 2019-08-17 PROCEDURE — 82962 GLUCOSE BLOOD TEST: CPT

## 2019-08-17 PROCEDURE — 770020 HCHG ROOM/CARE - TELE (206)

## 2019-08-17 PROCEDURE — 99223 1ST HOSP IP/OBS HIGH 75: CPT | Mod: AI | Performed by: INTERNAL MEDICINE

## 2019-08-17 PROCEDURE — 71045 X-RAY EXAM CHEST 1 VIEW: CPT

## 2019-08-17 PROCEDURE — 85610 PROTHROMBIN TIME: CPT

## 2019-08-17 PROCEDURE — 700102 HCHG RX REV CODE 250 W/ 637 OVERRIDE(OP): Performed by: EMERGENCY MEDICINE

## 2019-08-17 PROCEDURE — 700111 HCHG RX REV CODE 636 W/ 250 OVERRIDE (IP): Performed by: INTERNAL MEDICINE

## 2019-08-17 PROCEDURE — 700102 HCHG RX REV CODE 250 W/ 637 OVERRIDE(OP)

## 2019-08-17 PROCEDURE — A9270 NON-COVERED ITEM OR SERVICE: HCPCS | Performed by: EMERGENCY MEDICINE

## 2019-08-17 PROCEDURE — 700117 HCHG RX CONTRAST REV CODE 255: Performed by: EMERGENCY MEDICINE

## 2019-08-17 PROCEDURE — 93005 ELECTROCARDIOGRAM TRACING: CPT

## 2019-08-17 PROCEDURE — 85025 COMPLETE CBC W/AUTO DIFF WBC: CPT

## 2019-08-17 PROCEDURE — 96365 THER/PROPH/DIAG IV INF INIT: CPT

## 2019-08-17 PROCEDURE — 83880 ASSAY OF NATRIURETIC PEPTIDE: CPT

## 2019-08-17 PROCEDURE — 71275 CT ANGIOGRAPHY CHEST: CPT

## 2019-08-17 PROCEDURE — A9270 NON-COVERED ITEM OR SERVICE: HCPCS | Performed by: INTERNAL MEDICINE

## 2019-08-17 PROCEDURE — 99285 EMERGENCY DEPT VISIT HI MDM: CPT

## 2019-08-17 PROCEDURE — 84484 ASSAY OF TROPONIN QUANT: CPT

## 2019-08-17 PROCEDURE — 700111 HCHG RX REV CODE 636 W/ 250 OVERRIDE (IP): Performed by: EMERGENCY MEDICINE

## 2019-08-17 PROCEDURE — 96375 TX/PRO/DX INJ NEW DRUG ADDON: CPT

## 2019-08-17 RX ORDER — ACETAMINOPHEN 325 MG/1
650 TABLET ORAL EVERY 6 HOURS PRN
Status: DISCONTINUED | OUTPATIENT
Start: 2019-08-17 | End: 2019-08-19 | Stop reason: HOSPADM

## 2019-08-17 RX ORDER — GLIMEPIRIDE 2 MG/1
8 TABLET ORAL EVERY MORNING
Status: DISCONTINUED | OUTPATIENT
Start: 2019-08-18 | End: 2019-08-19 | Stop reason: HOSPADM

## 2019-08-17 RX ORDER — HEPARIN SODIUM 5000 [USP'U]/100ML
INJECTION, SOLUTION INTRAVENOUS CONTINUOUS
Status: DISCONTINUED | OUTPATIENT
Start: 2019-08-17 | End: 2019-08-17

## 2019-08-17 RX ORDER — OXYCODONE HYDROCHLORIDE 5 MG/1
2.5 TABLET ORAL EVERY 4 HOURS PRN
Status: DISCONTINUED | OUTPATIENT
Start: 2019-08-17 | End: 2019-08-19 | Stop reason: HOSPADM

## 2019-08-17 RX ORDER — ONDANSETRON 2 MG/ML
4 INJECTION INTRAMUSCULAR; INTRAVENOUS EVERY 4 HOURS PRN
Status: DISCONTINUED | OUTPATIENT
Start: 2019-08-17 | End: 2019-08-19 | Stop reason: HOSPADM

## 2019-08-17 RX ORDER — HEPARIN SODIUM 1000 [USP'U]/ML
3200 INJECTION, SOLUTION INTRAVENOUS; SUBCUTANEOUS PRN
Status: DISCONTINUED | OUTPATIENT
Start: 2019-08-17 | End: 2019-08-17

## 2019-08-17 RX ORDER — METFORMIN HYDROCHLORIDE 500 MG/1
1000 TABLET, EXTENDED RELEASE ORAL
Status: DISCONTINUED | OUTPATIENT
Start: 2019-08-17 | End: 2019-08-17

## 2019-08-17 RX ORDER — HYDROCHLOROTHIAZIDE 25 MG/1
12.5 TABLET ORAL
Status: DISCONTINUED | OUTPATIENT
Start: 2019-08-18 | End: 2019-08-19 | Stop reason: HOSPADM

## 2019-08-17 RX ORDER — LOSARTAN POTASSIUM AND HYDROCHLOROTHIAZIDE 12.5; 1 MG/1; MG/1
1 TABLET ORAL
Status: DISCONTINUED | OUTPATIENT
Start: 2019-08-17 | End: 2019-08-17

## 2019-08-17 RX ORDER — ONDANSETRON 2 MG/ML
4 INJECTION INTRAMUSCULAR; INTRAVENOUS ONCE
Status: COMPLETED | OUTPATIENT
Start: 2019-08-17 | End: 2019-08-17

## 2019-08-17 RX ORDER — M-VIT,TX,IRON,MINS/CALC/FOLIC 27MG-0.4MG
1 TABLET ORAL DAILY
Status: DISCONTINUED | OUTPATIENT
Start: 2019-08-18 | End: 2019-08-19 | Stop reason: HOSPADM

## 2019-08-17 RX ORDER — TRAMADOL HYDROCHLORIDE 50 MG/1
50 TABLET ORAL EVERY 4 HOURS PRN
Status: DISCONTINUED | OUTPATIENT
Start: 2019-08-17 | End: 2019-08-19 | Stop reason: HOSPADM

## 2019-08-17 RX ORDER — ASPIRIN 600 MG/1
300 SUPPOSITORY RECTAL ONCE
Status: COMPLETED | OUTPATIENT
Start: 2019-08-17 | End: 2019-08-17

## 2019-08-17 RX ORDER — LOSARTAN POTASSIUM 25 MG/1
100 TABLET ORAL
Status: DISCONTINUED | OUTPATIENT
Start: 2019-08-18 | End: 2019-08-19 | Stop reason: HOSPADM

## 2019-08-17 RX ORDER — ASPIRIN 81 MG/1
324 TABLET, CHEWABLE ORAL ONCE
Status: COMPLETED | OUTPATIENT
Start: 2019-08-17 | End: 2019-08-17

## 2019-08-17 RX ORDER — PIOGLITAZONEHYDROCHLORIDE 15 MG/1
30 TABLET ORAL
Status: DISCONTINUED | OUTPATIENT
Start: 2019-08-18 | End: 2019-08-19 | Stop reason: HOSPADM

## 2019-08-17 RX ORDER — AMLODIPINE BESYLATE 5 MG/1
5 TABLET ORAL DAILY
Status: DISCONTINUED | OUTPATIENT
Start: 2019-08-18 | End: 2019-08-19 | Stop reason: HOSPADM

## 2019-08-17 RX ORDER — ONDANSETRON 4 MG/1
4 TABLET, ORALLY DISINTEGRATING ORAL EVERY 4 HOURS PRN
Status: DISCONTINUED | OUTPATIENT
Start: 2019-08-17 | End: 2019-08-19 | Stop reason: HOSPADM

## 2019-08-17 RX ORDER — HEPARIN SODIUM 1000 [USP'U]/ML
6000 INJECTION, SOLUTION INTRAVENOUS; SUBCUTANEOUS ONCE
Status: COMPLETED | OUTPATIENT
Start: 2019-08-17 | End: 2019-08-17

## 2019-08-17 RX ADMIN — ENOXAPARIN SODIUM 80 MG: 100 INJECTION SUBCUTANEOUS at 20:45

## 2019-08-17 RX ADMIN — ONDANSETRON 4 MG: 2 INJECTION INTRAMUSCULAR; INTRAVENOUS at 15:14

## 2019-08-17 RX ADMIN — HEPARIN SODIUM 25000 UNITS: 5000 INJECTION, SOLUTION INTRAVENOUS at 17:20

## 2019-08-17 RX ADMIN — HEPARIN SODIUM 6000 UNITS: 1000 INJECTION INTRAVENOUS; SUBCUTANEOUS at 17:19

## 2019-08-17 RX ADMIN — ASPIRIN 81 MG CHEWABLE TABLET 324 MG: 81 TABLET CHEWABLE at 15:14

## 2019-08-17 RX ADMIN — FENTANYL CITRATE 50 MCG: 50 INJECTION INTRAMUSCULAR; INTRAVENOUS at 15:14

## 2019-08-17 RX ADMIN — OXYCODONE HYDROCHLORIDE 2.5 MG: 5 TABLET ORAL at 20:57

## 2019-08-17 RX ADMIN — IOHEXOL 75 ML: 350 INJECTION, SOLUTION INTRAVENOUS at 16:35

## 2019-08-17 RX ADMIN — TRAMADOL HYDROCHLORIDE 50 MG: 50 TABLET, FILM COATED ORAL at 23:18

## 2019-08-17 SDOH — HEALTH STABILITY: MENTAL HEALTH: HOW OFTEN DO YOU HAVE 6 OR MORE DRINKS ON ONE OCCASION?: NEVER

## 2019-08-17 SDOH — HEALTH STABILITY: MENTAL HEALTH: HOW OFTEN DO YOU HAVE A DRINK CONTAINING ALCOHOL?: NEVER

## 2019-08-17 ASSESSMENT — LIFESTYLE VARIABLES
TOTAL SCORE: 0
EVER HAD A DRINK FIRST THING IN THE MORNING TO STEADY YOUR NERVES TO GET RID OF A HANGOVER: NO
HAVE PEOPLE ANNOYED YOU BY CRITICIZING YOUR DRINKING: NO
DO YOU DRINK ALCOHOL: NO
ON A TYPICAL DAY WHEN YOU DRINK ALCOHOL HOW MANY DRINKS DO YOU HAVE: 0
EVER FELT BAD OR GUILTY ABOUT YOUR DRINKING: NO
EVER_SMOKED: NEVER
HOW MANY TIMES IN THE PAST YEAR HAVE YOU HAD 5 OR MORE DRINKS IN A DAY: 0
CONSUMPTION TOTAL: NEGATIVE
AVERAGE NUMBER OF DAYS PER WEEK YOU HAVE A DRINK CONTAINING ALCOHOL: 0
ALCOHOL_USE: NO
TOTAL SCORE: 0
TOTAL SCORE: 0
HAVE YOU EVER FELT YOU SHOULD CUT DOWN ON YOUR DRINKING: NO

## 2019-08-17 ASSESSMENT — PATIENT HEALTH QUESTIONNAIRE - PHQ9
SUM OF ALL RESPONSES TO PHQ9 QUESTIONS 1 AND 2: 0
1. LITTLE INTEREST OR PLEASURE IN DOING THINGS: NOT AT ALL
2. FEELING DOWN, DEPRESSED, IRRITABLE, OR HOPELESS: NOT AT ALL

## 2019-08-17 ASSESSMENT — COGNITIVE AND FUNCTIONAL STATUS - GENERAL
DAILY ACTIVITIY SCORE: 24
SUGGESTED CMS G CODE MODIFIER MOBILITY: CH
MOBILITY SCORE: 24
SUGGESTED CMS G CODE MODIFIER DAILY ACTIVITY: CH

## 2019-08-17 ASSESSMENT — ENCOUNTER SYMPTOMS
SHORTNESS OF BREATH: 0
FOCAL WEAKNESS: 0
CHILLS: 0
VOMITING: 0
SPUTUM PRODUCTION: 0
CONSTIPATION: 0
PALPITATIONS: 0
HEADACHES: 0
NECK PAIN: 0
COUGH: 0
WHEEZING: 0
MYALGIAS: 0
DIARRHEA: 0
ORTHOPNEA: 0
DIAPHORESIS: 1
HEMOPTYSIS: 0
FEVER: 0
DIZZINESS: 0
BACK PAIN: 1
NAUSEA: 0

## 2019-08-17 NOTE — ED TRIAGE NOTES
"Chief Complaint   Patient presents with   • Chest Pain     pt woke up at 0300 with CP with diaphoresis for 10 minutes. pt had another episode 20 minutes ago. pt denies SOB. EKG cleared in triage   hx of DVT   /86   Pulse 87   Temp 37.3 °C (99.1 °F) (Temporal)   Resp 20   Ht 1.803 m (5' 11\")   Wt 89.2 kg (196 lb 10.4 oz)   SpO2 95%   BMI 27.43 kg/m²     "

## 2019-08-17 NOTE — ED NOTES
Med Rec completed per patient's S/O   Allergies reviewed  No ORAL antibiotics in last 14 days

## 2019-08-18 ENCOUNTER — APPOINTMENT (OUTPATIENT)
Dept: CARDIOLOGY | Facility: MEDICAL CENTER | Age: 74
DRG: 176 | End: 2019-08-18
Attending: INTERNAL MEDICINE
Payer: MEDICARE

## 2019-08-18 LAB
ANION GAP SERPL CALC-SCNC: 8 MMOL/L (ref 0–11.9)
BASOPHILS # BLD AUTO: 0.4 % (ref 0–1.8)
BASOPHILS # BLD: 0.03 K/UL (ref 0–0.12)
BUN SERPL-MCNC: 12 MG/DL (ref 8–22)
CALCIUM SERPL-MCNC: 8.4 MG/DL (ref 8.4–10.2)
CEA SERPL-MCNC: 1.8 NG/ML (ref 0–3)
CHLORIDE SERPL-SCNC: 105 MMOL/L (ref 96–112)
CO2 SERPL-SCNC: 26 MMOL/L (ref 20–33)
CREAT SERPL-MCNC: 1.05 MG/DL (ref 0.5–1.4)
EOSINOPHIL # BLD AUTO: 0.05 K/UL (ref 0–0.51)
EOSINOPHIL NFR BLD: 0.7 % (ref 0–6.9)
ERYTHROCYTE [DISTWIDTH] IN BLOOD BY AUTOMATED COUNT: 44.9 FL (ref 35.9–50)
GLUCOSE BLD-MCNC: 115 MG/DL (ref 65–99)
GLUCOSE BLD-MCNC: 136 MG/DL (ref 65–99)
GLUCOSE BLD-MCNC: 147 MG/DL (ref 65–99)
GLUCOSE BLD-MCNC: 195 MG/DL (ref 65–99)
GLUCOSE SERPL-MCNC: 127 MG/DL (ref 65–99)
HCT VFR BLD AUTO: 38.3 % (ref 42–52)
HGB BLD-MCNC: 13 G/DL (ref 14–18)
IMM GRANULOCYTES # BLD AUTO: 0.01 K/UL (ref 0–0.11)
IMM GRANULOCYTES NFR BLD AUTO: 0.1 % (ref 0–0.9)
LV EJECT FRACT  99904: 60
LV EJECT FRACT MOD 2C 99903: 62.81
LV EJECT FRACT MOD 4C 99902: 71.8
LV EJECT FRACT MOD BP 99901: 66.15
LYMPHOCYTES # BLD AUTO: 1.45 K/UL (ref 1–4.8)
LYMPHOCYTES NFR BLD: 21.6 % (ref 22–41)
MCH RBC QN AUTO: 29.3 PG (ref 27–33)
MCHC RBC AUTO-ENTMCNC: 33.9 G/DL (ref 33.7–35.3)
MCV RBC AUTO: 86.5 FL (ref 81.4–97.8)
MONOCYTES # BLD AUTO: 0.46 K/UL (ref 0–0.85)
MONOCYTES NFR BLD AUTO: 6.9 % (ref 0–13.4)
NEUTROPHILS # BLD AUTO: 4.71 K/UL (ref 1.82–7.42)
NEUTROPHILS NFR BLD: 70.3 % (ref 44–72)
NRBC # BLD AUTO: 0 K/UL
NRBC BLD-RTO: 0 /100 WBC
PLATELET # BLD AUTO: 126 K/UL (ref 164–446)
PMV BLD AUTO: 10.5 FL (ref 9–12.9)
POTASSIUM SERPL-SCNC: 3.4 MMOL/L (ref 3.6–5.5)
RBC # BLD AUTO: 4.43 M/UL (ref 4.7–6.1)
SODIUM SERPL-SCNC: 139 MMOL/L (ref 135–145)
WBC # BLD AUTO: 6.7 K/UL (ref 4.8–10.8)

## 2019-08-18 PROCEDURE — 85025 COMPLETE CBC W/AUTO DIFF WBC: CPT

## 2019-08-18 PROCEDURE — A9270 NON-COVERED ITEM OR SERVICE: HCPCS | Performed by: INTERNAL MEDICINE

## 2019-08-18 PROCEDURE — 93306 TTE W/DOPPLER COMPLETE: CPT | Mod: 26 | Performed by: INTERNAL MEDICINE

## 2019-08-18 PROCEDURE — 770020 HCHG ROOM/CARE - TELE (206)

## 2019-08-18 PROCEDURE — 93306 TTE W/DOPPLER COMPLETE: CPT

## 2019-08-18 PROCEDURE — 80048 BASIC METABOLIC PNL TOTAL CA: CPT

## 2019-08-18 PROCEDURE — 82962 GLUCOSE BLOOD TEST: CPT | Mod: 91

## 2019-08-18 PROCEDURE — 99233 SBSQ HOSP IP/OBS HIGH 50: CPT | Performed by: INTERNAL MEDICINE

## 2019-08-18 PROCEDURE — 700102 HCHG RX REV CODE 250 W/ 637 OVERRIDE(OP): Performed by: INTERNAL MEDICINE

## 2019-08-18 PROCEDURE — 700111 HCHG RX REV CODE 636 W/ 250 OVERRIDE (IP): Performed by: INTERNAL MEDICINE

## 2019-08-18 PROCEDURE — 82378 CARCINOEMBRYONIC ANTIGEN: CPT

## 2019-08-18 RX ORDER — POTASSIUM CHLORIDE 20 MEQ/1
40 TABLET, EXTENDED RELEASE ORAL ONCE
Status: COMPLETED | OUTPATIENT
Start: 2019-08-18 | End: 2019-08-18

## 2019-08-18 RX ADMIN — MULTIPLE VITAMINS W/ MINERALS TAB 1 TABLET: TAB at 06:16

## 2019-08-18 RX ADMIN — TRAMADOL HYDROCHLORIDE 50 MG: 50 TABLET, FILM COATED ORAL at 19:24

## 2019-08-18 RX ADMIN — OXYCODONE HYDROCHLORIDE 2.5 MG: 5 TABLET ORAL at 09:21

## 2019-08-18 RX ADMIN — INSULIN HUMAN 2 UNITS: 100 INJECTION, SOLUTION PARENTERAL at 20:11

## 2019-08-18 RX ADMIN — HYDROCHLOROTHIAZIDE 12.5 MG: 25 TABLET ORAL at 06:16

## 2019-08-18 RX ADMIN — TRAMADOL HYDROCHLORIDE 50 MG: 50 TABLET, FILM COATED ORAL at 11:18

## 2019-08-18 RX ADMIN — POTASSIUM CHLORIDE 40 MEQ: 1500 TABLET, EXTENDED RELEASE ORAL at 08:20

## 2019-08-18 RX ADMIN — ENOXAPARIN SODIUM 80 MG: 100 INJECTION SUBCUTANEOUS at 06:14

## 2019-08-18 RX ADMIN — GLIMEPIRIDE 8 MG: 2 TABLET ORAL at 06:15

## 2019-08-18 RX ADMIN — ENOXAPARIN SODIUM 80 MG: 100 INJECTION SUBCUTANEOUS at 17:26

## 2019-08-18 RX ADMIN — LOSARTAN POTASSIUM 100 MG: 25 TABLET, FILM COATED ORAL at 06:14

## 2019-08-18 RX ADMIN — PIOGLITAZONE 30 MG: 15 TABLET ORAL at 08:20

## 2019-08-18 RX ADMIN — AMLODIPINE BESYLATE 5 MG: 5 TABLET ORAL at 06:16

## 2019-08-18 ASSESSMENT — ENCOUNTER SYMPTOMS
VOMITING: 0
DIARRHEA: 0
CONSTIPATION: 0
CHILLS: 0
FOCAL WEAKNESS: 0
ABDOMINAL PAIN: 0
NAUSEA: 0
PALPITATIONS: 0
SHORTNESS OF BREATH: 0
FEVER: 0

## 2019-08-18 NOTE — PROGRESS NOTES
2 RN skin check complete.   Devices in place cardiac monitor.  Skin assessed under devices N/A.  Confirmed pressure ulcers found on N/A.  New potential pressure ulcers noted on N/A. Wound consult placed N/A.  The following interventions in place patient is independent with ambulation and turns self in bed, has intact skin, right forearm bruising, left elbow has a raise mole about quarter size, discoloration to left lower leg from previous injury

## 2019-08-18 NOTE — CARE PLAN
Problem: Communication  Goal: The ability to communicate needs accurately and effectively will improve  Outcome: PROGRESSING AS EXPECTED     Problem: Safety  Goal: Will remain free from injury  Outcome: PROGRESSING AS EXPECTED  Goal: Will remain free from falls  Outcome: PROGRESSING AS EXPECTED     Problem: Pain Management  Goal: Pain level will decrease to patient's comfort goal  Outcome: PROGRESSING SLOWER THAN EXPECTED

## 2019-08-18 NOTE — ASSESSMENT & PLAN NOTE
Treated in 2001 surgically and with chemotherapy  Followed by oncology Dr. Palacios with CT of abdomen/pelvis with contrast in 2018 showing post surgical changes but no new findings of cancer  Will need repeat follow up with Dr. Palacios after discharge and repeat CT scan in the next few weeks, will not order a CT scan this admission as the patient already had a contrast bolus with his CT scan of the chest for his pulmonary embolus

## 2019-08-18 NOTE — PROGRESS NOTES
Telemetry Strip     Strip printed: 0711  Measurements from am strip were as follows:  Rhythm:sr  HR: 62  Measurements: .16/.10/.40       Telemetry Shift Summary:    Rhythm: sr  HR: 60-70s  Ectopy:           Normal Values  Rhythm SR  HR Range    Measurements 0.12-0.20 / 0.06-0.10  / 0.30-0.52

## 2019-08-18 NOTE — PROGRESS NOTES
Telemetry Shift Summary    Rhythm SR  HR Range 64-70  Ectopy none  Measurements 0.16/0.08/0.36    Per Lolis    Normal Values  Rhythm SR  HR Range    Measurements 0.12-0.20 / 0.06-0.10  / 0.30-0.52

## 2019-08-18 NOTE — ASSESSMENT & PLAN NOTE
Will continue glimepiride and pioglitazone to treat  Will hold metformin due to contrast injection with CT of the chest and use regular insulin as needed for elevated blood sugars, the patient has hyperglycemia on admission at 320 likely partly due to stress response, will monitor blood sugars

## 2019-08-18 NOTE — PROGRESS NOTES
Received report from Nohemy MORRELL. Pt resting in bed, eating breakfast. States that he did not sleep well because of the pain in his chest. Says that he feels a lot better this morning. No other concerns at this time. Safety precautions in place.

## 2019-08-18 NOTE — ASSESSMENT & PLAN NOTE
Will continue norvasc, losartan and hydrochlorothiazide and monitor blood pressure  Electrolytes are in good range

## 2019-08-18 NOTE — PROGRESS NOTES
Report given to PORSCHE Johnson. Care released    Kranick education given for lovenox and echo test.

## 2019-08-18 NOTE — ASSESSMENT & PLAN NOTE
Hearing aids are in place and the patient is able to communicate appropriately, this is not a communication barrier at this time

## 2019-08-18 NOTE — ED NOTES
Heparin double verified with PORSCHE Delgado. Patient aware of plan of care. Consulted pharmacy to ensure correct dosage based on weight, pharmacist states patients height dictates the rate chosen.

## 2019-08-18 NOTE — ASSESSMENT & PLAN NOTE
Patient has history of metastatic colon cancer in 2001 with deep venous thrombosis at that time but no clot since then  He has no inciting factors for clot formation by history obtained so it is concerning for recurrent cancer, as he had a CT scan with contrast for the embolus finding I will not repeat a CT scan of the abdomen/pelvis with contrast at this time but I did discuss this concern to the patient and wife at the bedside and recommend follow up with his oncologist Dr. Palacios for work up and repeat CT scan after discharge  I will treat him with tramadol, tylenol and oxycodone as needed for his pain acutely given his morphine allergy, morphine will be avoided  I will check an echocardiogram to evaluate right heart strain and intracardiac pressures   The patient will be monitored on telemetry as he is high risk for arrhythmias given his pulmonary emboli  I will start him on weight based lovenox for treatment of his emboli and he will need anticoagulation on discharge home

## 2019-08-18 NOTE — ED PROVIDER NOTES
ED Provider Note    CHIEF COMPLAINT  Chief Complaint   Patient presents with   • Chest Pain     pt woke up at 0300 with CP with diaphoresis for 10 minutes. pt had another episode 20 minutes ago. pt denies SOB. EKG cleared in triage       HPI  Magnus Claudio is a 74 y.o. male who presents to the emergency department complaining of intermittent episodes of right-sided chest pain since 3 AM this morning.  The patient woke up at 3 AM with this pain on the right side and he felt diaphoretic.  Symptoms lasted about 10 minutes and he went back to bed.  About 30 minutes ago he developed similar symptoms again with pain on the right side and diaphoresis.  A family member brought him to the emergency department for evaluation.  He does not recognize any specific precipitating events or exacerbating alleviating factors although says that he has a history of colon and liver cancer and is been in remission for 19 years but during cancer treatment many years ago he did have a DVT in 1 of his legs.  He is not short of breath nor has he experienced any hemoptysis.    REVIEW OF SYSTEMS no fever or chills no abdominal pain no change in bowel or bladder function.  No midsternal pain.  All other systems negative    PAST MEDICAL HISTORY  Past Medical History:   Diagnosis Date   • Cancer (HCC)     Colon   • Diabetes (HCC)    • Hypertension    • Vertigo, benign positional        FAMILY HISTORY  Family History   Problem Relation Age of Onset   • Hypertension Father    • Heart Attack Father    • Cancer Mother         Breast   • Diabetes Sister    • Hyperlipidemia Neg Hx         unknown       SOCIAL HISTORY  Social History     Socioeconomic History   • Marital status:      Spouse name: Not on file   • Number of children: Not on file   • Years of education: Not on file   • Highest education level: Not on file   Occupational History   • Not on file   Social Needs   • Financial resource strain: Not on file   • Food insecurity:      Worry: Not on file     Inability: Not on file   • Transportation needs:     Medical: Not on file     Non-medical: Not on file   Tobacco Use   • Smoking status: Never Smoker   • Smokeless tobacco: Never Used   Substance and Sexual Activity   • Alcohol use: No     Alcohol/week: 0.0 oz   • Drug use: No   • Sexual activity: Not Currently     Partners: Female   Lifestyle   • Physical activity:     Days per week: Not on file     Minutes per session: Not on file   • Stress: Not on file   Relationships   • Social connections:     Talks on phone: Not on file     Gets together: Not on file     Attends Hindu service: Not on file     Active member of club or organization: Not on file     Attends meetings of clubs or organizations: Not on file     Relationship status: Not on file   • Intimate partner violence:     Fear of current or ex partner: Not on file     Emotionally abused: Not on file     Physically abused: Not on file     Forced sexual activity: Not on file   Other Topics Concern   • Not on file   Social History Narrative   • Not on file       SURGICAL HISTORY  Past Surgical History:   Procedure Laterality Date   • COLON RESECTION     • LAMINOTOMY     • PB RESEC LIVER,PART LOBECTOMY         CURRENT MEDICATIONS  Home Medications     Reviewed by Bing Valente (Pharmacy Tech) on 08/17/19 at 1528  Med List Status: Complete   Medication Last Dose Status   amLODIPine (NORVASC) 5 MG Tab 8/17/2019 Active   glimepiride (AMARYL) 4 MG Tab 8/17/2019 Active   losartan-hydrochlorothiazide (HYZAAR) 100-12.5 MG per tablet 8/17/2019 Active   metFORMIN ER (GLUCOPHAGE XR) 500 MG TABLET SR 24 HR 8/17/2019 Active   pioglitazone (ACTOS) 15 MG Tab 8/17/2019 Active   therapeutic multivitamin-minerals (THERAGRAN-M) Tab 8/17/2019 Active                ALLERGIES  Allergies   Allergen Reactions   • Morphine Unspecified     Hallucinations, 15+ years ago       PHYSICAL EXAM  VITAL SIGNS: /78   Pulse 77   Temp 37.3 °C (99.1 °F)  "(Temporal)   Resp 16   Ht 1.803 m (5' 11\")   Wt 89.2 kg (196 lb 10.4 oz)   SpO2 99%   BMI 27.43 kg/m²    Oxygen saturation is interpreted as adequate  Constitutional: Awake verbal nontoxic-appearing  HENT: Mucous membranes are moist  Eyes: No erythema discharge or jaundice  Neck: Trachea midline no JVD  Cardiovascular: Regular rate and rhythm  Lungs: Clear and equal bilaterally with no apparent difficulty breathing  Abdomen/Back: Soft nontender nondistended no rebound guarding or  Skin: Warm and dry  Musculoskeletal: No leg edema or calf tenderness  Neurologic: Awake verbal moving all extremities    Laboratory  CBC shows white blood cell count of 8.6 hemoglobin is adequate at 14.2 basic metabolic panel shows an elevated blood sugar of 320 total bilirubin is minimally elevated at 2.2 lipase is normal at 45 troponin is slightly elevated at 45 BNP is normal at 91 INR is 0.93    EKG interpretation  Twelve-lead EKG shows sinus rhythm 89 bpm there is a left axis deviation there is no ST elevation there is slight ST depression in V5 and V6 large voltages are noted in V3 through V6 suggesting LVH    Radiology  CT-CTA CHEST PULMONARY ARTERY W/ RECONS   Final Result         1. Bilateral pulmonary emboli in the segmental and subsegmental branches.      2. Slightly elevated RV/LV ratio.      3. Small areas of airspace opacities in the right middle lobe and right lower lobe could relate to small infarcts.         CRITICAL RESULT READ BACK: Preliminary findings discussed with and critical read back performed by Dr. KARISSA WATSON in the Emergency Department via telephone on 8/17/2019 4:39 PM            DX-CHEST-PORTABLE (1 VIEW)   Final Result         Minimal bibasilar opacities, likely atelectasis.          MEDICAL DECISION MAKING and DISPOSITION  In the emergency department an IV was established and the patient was placed on a cardiac monitor he initially requested something for pain and was given 50 mcg of intravenous " fentanyl.  He says that the pain is not completely resolved but he does not require any additional pain medicine at this time and has not developed any difficulty breathing or hemodynamic instability.  I ordered intravenous heparin for the patient I reviewed all the findings with the patient I discussed the CT scan with the radiologist and I reviewed the case with the hospitalist.  The patient is admitted to the hospital service for further eval    IMPRESSION  1.  Bilateral pulmonary emboli  2.  Hyperglycemia  3.  Critical care time 35 minutes         Electronically signed by: Bhanu Larsen, 8/17/2019 5:02 PM

## 2019-08-18 NOTE — PROGRESS NOTES
1930 Dr. Garcia called for order clarification.  Ordered weight based lovenox and wants to d/c heparin drip.     1947 Huseyin, pharmacist, called to verify that it is safe to give lovenox now following d/c of heparin drip.

## 2019-08-18 NOTE — H&P
[facility] Medicine History & Physical Note    Date of Service  8/17/2019    Primary Care Physician  Too Brito M.D.    Consultants  none    Code Status  full    Chief Complaint  Right side chest pain since last night    History of Presenting Illness  74 y.o. male who presented 8/17/2019 with history of metastatic colon cancer to the liver in 2001. He underwent resection of the primary followed by 3 months chemotherapy followed by partial hepatectomy in 2001 and has remained in remission since then.  Last MRI from 2017 mentioned, size small liver nodule which does not appear to be suspicious. Follow up CT scan abdomen/pelvis was done 2018 as ordered by Dr. Palacios showing post-surgical changes and no findings suggestive of cancer recurrence. He is due for surveillance colonoscopy in 2020.  He denies any recent trauma, long travel or bruising. Last night he noticed right side chest pain into his right shoulder and upper back that resolved on its own. He felt in his usual state of health this morning then around 3pm the pain recurred and seemed more severe. He did have some sweating with this but no shortness of breath. He denies any cough, sputum or hemoptysis. He denies any leg swelling diarrhea, headache or chest palpitations.    Review of Systems  Review of Systems   Constitutional: Positive for diaphoresis. Negative for chills and fever.   HENT: Positive for hearing loss. Negative for congestion.         Chronic poor hearing   Respiratory: Negative for cough, hemoptysis, sputum production, shortness of breath and wheezing.    Cardiovascular: Positive for chest pain. Negative for palpitations, orthopnea and leg swelling.   Gastrointestinal: Negative for constipation, diarrhea, nausea and vomiting.   Genitourinary: Negative for dysuria, frequency and urgency.   Musculoskeletal: Positive for back pain. Negative for myalgias and neck pain.   Skin: Negative for itching and rash.   Neurological: Negative for  dizziness, focal weakness and headaches.       Past Medical History   has a past medical history of Cancer (HCC), Diabetes (HCC), Hypertension, and Vertigo, benign positional.    Surgical History   has a past surgical history that includes colon resection; pr resec liver,part lobectomy; and laminotomy.     Family History  family history includes Cancer in his mother; Diabetes in his sister; Heart Attack in his father; Hypertension in his father.     Social History   reports that he has never smoked. He has never used smokeless tobacco. He reports that he does not drink alcohol or use drugs.    Allergies  Allergies   Allergen Reactions   • Morphine Unspecified     Hallucinations, 15+ years ago       Medications  Prior to Admission Medications   Prescriptions Last Dose Informant Patient Reported? Taking?   amLODIPine (NORVASC) 5 MG Tab 8/17/2019 at AM Significant Other No No   Sig: Take 1 tablet by mouth every day.    glimepiride (AMARYL) 4 MG Tab 8/17/2019 at AM Significant Other No No   Sig: Take 2 Tabs by mouth every morning.   losartan-hydrochlorothiazide (HYZAAR) 100-12.5 MG per tablet 8/17/2019 at AM Significant Other No No   Sig: TAKE 1 TABLET BY MOUTH EVERY DAY   metFORMIN ER (GLUCOPHAGE XR) 500 MG TABLET SR 24 HR 8/17/2019 at AM Significant Other No No   Sig: TAKE 2 TABLETS BY MOUTH TWICE DAILY   pioglitazone (ACTOS) 15 MG Tab 8/17/2019 at AM Significant Other No No   Sig: Take 2 Tabs by mouth every day.   therapeutic multivitamin-minerals (THERAGRAN-M) Tab 8/17/2019 at AM Significant Other Yes No   Sig: Take 1 Tab by mouth every day.      Facility-Administered Medications: None       Physical Exam  Temp:  [37.3 °C (99.1 °F)] 37.3 °C (99.1 °F)  Pulse:  [70-87] 70  Resp:  [16-20] 20  BP: (145-159)/(78-88) 145/88  SpO2:  [92 %-99 %] 92 %    Physical Exam   Constitutional: He is oriented to person, place, and time. No distress.   HENT:   Mouth/Throat: Mucous membranes are dry. No oropharyngeal exudate.   Hearing  aids present   Neck: Neck supple. No JVD present. No tracheal deviation present.   Cardiovascular: Normal rate, regular rhythm and normal heart sounds.   No murmur heard.  Pulmonary/Chest: Breath sounds normal. No stridor. No respiratory distress. He has no wheezes. He has no rales.   Abdominal: Soft. Bowel sounds are normal. He exhibits no distension.   Musculoskeletal: He exhibits no edema.   Neurological: He is alert and oriented to person, place, and time. Coordination normal.   Skin: Skin is dry. No rash noted. He is not diaphoretic. No erythema.   Psychiatric: His behavior is normal. Cognition and memory are impaired.   Some memory difficulty repeating the plan of care       Laboratory:  Recent Labs     08/17/19  1506   WBC 8.6   RBC 4.77   HEMOGLOBIN 14.2   HEMATOCRIT 41.3*   MCV 86.6   MCH 29.8   MCHC 34.4   RDW 43.9   PLATELETCT 138*   MPV 10.2     Recent Labs     08/17/19  1506   SODIUM 136   POTASSIUM 3.7   CHLORIDE 103   CO2 24   GLUCOSE 320*   BUN 15   CREATININE 1.16   CALCIUM 8.7     Recent Labs     08/17/19  1506   ALTSGPT 25   ASTSGOT 33   ALKPHOSPHAT 43   TBILIRUBIN 2.2*   LIPASE 45   GLUCOSE 320*     Recent Labs     08/17/19  1506   APTT 30.8   INR 0.93     Recent Labs     08/17/19  1506   NTPROBNP 91         Recent Labs     08/17/19  1506   TROPONINT 45*       Urinalysis:    No results found     Imaging:  CT-CTA CHEST PULMONARY ARTERY W/ RECONS   Final Result         1. Bilateral pulmonary emboli in the segmental and subsegmental branches.      2. Slightly elevated RV/LV ratio.      3. Small areas of airspace opacities in the right middle lobe and right lower lobe could relate to small infarcts.         CRITICAL RESULT READ BACK: Preliminary findings discussed with and critical read back performed by Dr. KARISSA WATSON in the Emergency Department via telephone on 8/17/2019 4:39 PM            DX-CHEST-PORTABLE (1 VIEW)   Final Result         Minimal bibasilar opacities, likely atelectasis.       EC-ECHOCARDIOGRAM COMPLETE W/O CONT    (Results Pending)         Assessment/Plan:  I anticipate this patient will require at least two midnights for appropriate medical management, necessitating inpatient admission.    Acute pulmonary embolism without acute cor pulmonale (HCC)  Assessment & Plan  Patient has history of metastatic colon cancer in 2001 with deep venous thrombosis at that time but no clot since then  He has no inciting factors for clot formation by history obtained so it is concerning for recurrent cancer, as he had a CT scan with contrast for the embolus finding I will not repeat a CT scan of the abdomen/pelvis with contrast at this time but I did discuss this concern to the patient and wife at the bedside and recommend follow up with his oncologist Dr. Palacios for work up and repeat CT scan after discharge  I will treat him with tramadol, tylenol and oxycodone as needed for his pain acutely given his morphine allergy, morphine will be avoided  I will check an echocardiogram to evaluate right heart strain and intracardiac pressures   The patient will be monitored on telemetry as he is high risk for arrhythmias given his pulmonary emboli  I will start him on weight based lovenox for treatment of his emboli and he will need anticoagulation on discharge home    Controlled type 2 diabetes mellitus without complication, without long-term current use of insulin (HCC)- (present on admission)  Assessment & Plan  Will continue glimepiride and pioglitazone to treat  Will hold metformin due to contrast injection with CT of the chest and use regular insulin as needed for elevated blood sugars, the patient has hyperglycemia on admission at 320 likely partly due to stress response, will monitor blood sugars    Decreased hearing of both ears- (present on admission)  Assessment & Plan  Hearing aids are in place and the patient is able to communicate appropriately, this is not a communication barrier at this  time    Essential hypertension- (present on admission)  Assessment & Plan  Will continue norvasc, losartan and hydrochlorothiazide and monitor blood pressure  Electrolytes are in good range    History of colon cancer, stage IV- (present on admission)  Assessment & Plan  Treated in 2001 surgically and with chemotherapy  Followed by oncology Dr. Palacios with CT of abdomen/pelvis with contrast in 2018 showing post surgical changes but no new findings of cancer  Will need repeat follow up with Dr. Palacios after discharge and repeat CT scan in the next few weeks, will not order a CT scan this admission as the patient already had a contrast bolus with his CT scan of the chest for his pulmonary embolus      VTE prophylaxis: lovenox weight based

## 2019-08-19 ENCOUNTER — APPOINTMENT (OUTPATIENT)
Dept: RADIOLOGY | Facility: MEDICAL CENTER | Age: 74
DRG: 176 | End: 2019-08-19
Attending: INTERNAL MEDICINE
Payer: MEDICARE

## 2019-08-19 ENCOUNTER — PATIENT OUTREACH (OUTPATIENT)
Dept: HEALTH INFORMATION MANAGEMENT | Facility: OTHER | Age: 74
End: 2019-08-19

## 2019-08-19 VITALS
HEIGHT: 71 IN | SYSTOLIC BLOOD PRESSURE: 151 MMHG | HEART RATE: 66 BPM | TEMPERATURE: 97.9 F | WEIGHT: 197.53 LBS | BODY MASS INDEX: 27.65 KG/M2 | OXYGEN SATURATION: 90 % | DIASTOLIC BLOOD PRESSURE: 76 MMHG | RESPIRATION RATE: 16 BRPM

## 2019-08-19 PROBLEM — I82.433 DEEP VEIN THROMBOSIS (DVT) OF POPLITEAL VEIN OF BOTH LOWER EXTREMITIES (HCC): Status: ACTIVE | Noted: 2019-08-19

## 2019-08-19 LAB
ANION GAP SERPL CALC-SCNC: 9 MMOL/L (ref 0–11.9)
BASOPHILS # BLD AUTO: 0.4 % (ref 0–1.8)
BASOPHILS # BLD: 0.02 K/UL (ref 0–0.12)
BUN SERPL-MCNC: 11 MG/DL (ref 8–22)
CALCIUM SERPL-MCNC: 8.6 MG/DL (ref 8.4–10.2)
CHLORIDE SERPL-SCNC: 103 MMOL/L (ref 96–112)
CO2 SERPL-SCNC: 28 MMOL/L (ref 20–33)
CREAT SERPL-MCNC: 1.06 MG/DL (ref 0.5–1.4)
EOSINOPHIL # BLD AUTO: 0.09 K/UL (ref 0–0.51)
EOSINOPHIL NFR BLD: 1.6 % (ref 0–6.9)
ERYTHROCYTE [DISTWIDTH] IN BLOOD BY AUTOMATED COUNT: 46 FL (ref 35.9–50)
GLUCOSE BLD-MCNC: 128 MG/DL (ref 65–99)
GLUCOSE BLD-MCNC: 205 MG/DL (ref 65–99)
GLUCOSE SERPL-MCNC: 112 MG/DL (ref 65–99)
HCT VFR BLD AUTO: 40.9 % (ref 42–52)
HGB BLD-MCNC: 13.6 G/DL (ref 14–18)
IMM GRANULOCYTES # BLD AUTO: 0.02 K/UL (ref 0–0.11)
IMM GRANULOCYTES NFR BLD AUTO: 0.4 % (ref 0–0.9)
LYMPHOCYTES # BLD AUTO: 1.28 K/UL (ref 1–4.8)
LYMPHOCYTES NFR BLD: 23.2 % (ref 22–41)
MCH RBC QN AUTO: 29.5 PG (ref 27–33)
MCHC RBC AUTO-ENTMCNC: 33.3 G/DL (ref 33.7–35.3)
MCV RBC AUTO: 88.7 FL (ref 81.4–97.8)
MONOCYTES # BLD AUTO: 0.49 K/UL (ref 0–0.85)
MONOCYTES NFR BLD AUTO: 8.9 % (ref 0–13.4)
NEUTROPHILS # BLD AUTO: 3.61 K/UL (ref 1.82–7.42)
NEUTROPHILS NFR BLD: 65.5 % (ref 44–72)
NRBC # BLD AUTO: 0 K/UL
NRBC BLD-RTO: 0 /100 WBC
PLATELET # BLD AUTO: 134 K/UL (ref 164–446)
PMV BLD AUTO: 10.5 FL (ref 9–12.9)
POTASSIUM SERPL-SCNC: 3.7 MMOL/L (ref 3.6–5.5)
RBC # BLD AUTO: 4.61 M/UL (ref 4.7–6.1)
SODIUM SERPL-SCNC: 140 MMOL/L (ref 135–145)
WBC # BLD AUTO: 5.5 K/UL (ref 4.8–10.8)

## 2019-08-19 PROCEDURE — 80048 BASIC METABOLIC PNL TOTAL CA: CPT

## 2019-08-19 PROCEDURE — 700111 HCHG RX REV CODE 636 W/ 250 OVERRIDE (IP): Performed by: INTERNAL MEDICINE

## 2019-08-19 PROCEDURE — 99239 HOSP IP/OBS DSCHRG MGMT >30: CPT | Performed by: INTERNAL MEDICINE

## 2019-08-19 PROCEDURE — 700102 HCHG RX REV CODE 250 W/ 637 OVERRIDE(OP): Performed by: INTERNAL MEDICINE

## 2019-08-19 PROCEDURE — A9270 NON-COVERED ITEM OR SERVICE: HCPCS | Performed by: INTERNAL MEDICINE

## 2019-08-19 PROCEDURE — 85025 COMPLETE CBC W/AUTO DIFF WBC: CPT

## 2019-08-19 PROCEDURE — 70450 CT HEAD/BRAIN W/O DYE: CPT

## 2019-08-19 PROCEDURE — 93970 EXTREMITY STUDY: CPT

## 2019-08-19 PROCEDURE — 82962 GLUCOSE BLOOD TEST: CPT

## 2019-08-19 RX ORDER — WARFARIN SODIUM 2.5 MG/1
5 TABLET ORAL DAILY
Qty: 60 TAB | Refills: 0 | Status: SHIPPED | OUTPATIENT
Start: 2019-08-20 | End: 2019-09-17 | Stop reason: SDUPTHER

## 2019-08-19 RX ORDER — WARFARIN SODIUM 5 MG/1
5 TABLET ORAL
Status: COMPLETED | OUTPATIENT
Start: 2019-08-19 | End: 2019-08-19

## 2019-08-19 RX ADMIN — GLIMEPIRIDE 8 MG: 2 TABLET ORAL at 06:39

## 2019-08-19 RX ADMIN — ACETAMINOPHEN 650 MG: 325 TABLET, FILM COATED ORAL at 06:37

## 2019-08-19 RX ADMIN — AMLODIPINE BESYLATE 5 MG: 5 TABLET ORAL at 05:18

## 2019-08-19 RX ADMIN — ENOXAPARIN SODIUM 80 MG: 100 INJECTION SUBCUTANEOUS at 05:20

## 2019-08-19 RX ADMIN — WARFARIN SODIUM 5 MG: 5 TABLET ORAL at 11:37

## 2019-08-19 RX ADMIN — INSULIN HUMAN 4 UNITS: 100 INJECTION, SOLUTION PARENTERAL at 11:35

## 2019-08-19 RX ADMIN — LOSARTAN POTASSIUM 100 MG: 25 TABLET, FILM COATED ORAL at 05:18

## 2019-08-19 RX ADMIN — TRAMADOL HYDROCHLORIDE 50 MG: 50 TABLET, FILM COATED ORAL at 05:17

## 2019-08-19 RX ADMIN — HYDROCHLOROTHIAZIDE 12.5 MG: 25 TABLET ORAL at 05:18

## 2019-08-19 RX ADMIN — MULTIPLE VITAMINS W/ MINERALS TAB 1 TABLET: TAB at 05:19

## 2019-08-19 NOTE — DISCHARGE INSTRUCTIONS
Take your first dose of coumadin starting tomorrow, 8/20/19.  Continue Lovenox injection until the INR is between 2-3.      DIET: Resume home diet previous to admission    ACTIVITY: Resume previous level of activities.    DIAGNOSIS: DVT, pulmonary embolism    Return to ER if fever greater than 38 degree Celsius or 100.4 degree Fahrenheit, worsening pain, chest pain at rest or associated with exertion, worsening shortness of breath, bloody coughs, bloody bowel movement, severe unrelenting abdominal pain, focal weakness.    Iveth Stone M.D.     Discharge Instructions    Discharged to home by car with relative. Discharged via walking, hospital escort: Refused.  Special equipment needed: Not Applicable    Be sure to schedule a follow-up appointment with your primary care doctor or any specialists as instructed.     Discharge Plan:   Influenza Vaccine Indication: Not indicated: Previously immunized this influenza season and > 8 years of age    I understand that a diet low in cholesterol, fat, and sodium is recommended for good health. Unless I have been given specific instructions below for another diet, I accept this instruction as my diet prescription.   Other diet: regular    Special Instructions:   Deep Vein Thrombosis Discharge Instructions    A deep vein thrombosis (DVT) is a blood clot (thrombus) that develops in a deep vein. A DVT is a clot in the deep, larger veins of the leg, arm, or pelvis. These are more dangerous than clots that might form in veins on the surface of the body. Deep vein thrombosis can lead to complications if the clot breaks off and travels in the bloodstream to the lungs.     CAUSES  Blood clots form in a vein for different reasons. Usually several things cause blood clots. They include:   · The flow of blood slows down.   · The inside of the vein is damaged in some way.   · The person has a condition that makes blood clot more easily. These conditions may include:  · Older age (especially  over 75 years old).  · Having a history of blood clots.  · Having major or lengthy surgery. Hip surgery is particularly high-risk.   · Breaking a hip or leg.  · Sitting or lying still for a long time.  · Cancer or cancer treatment.  · Having a long, thin tube (catheter) placed inside a vein during a medical procedure.   · Being overweight (obese).  · Pregnancy and childbirth.  · Medicines with estrogen.  · Smoking.  · Other circulation or heart problems.     SYMPTOMS  When a clot forms, it can either partially or totally block the blood flow in that vein. Symptoms of a DVT can include:  · Swelling of the leg or arm, especially if one side is much worse.  · Warmth and redness of the leg or arm, especially if one side is much worse.   · Pain in an arm or leg. If the clot is in the leg, symptoms may be more noticeable or worse when standing or walking.  If the blood clot travels to the lung, it may cause:  · Shortness of breath.  · Chest pain. The pain may be worsened by deep breaths.   · Coughing up thick mucus (phlegm), possibly flecked with blood.   Anyone with these symptoms should get emergency medical treatment right away. Call your local emergency  Services (911 in [place] if you have these symptoms.     DIAGNOSIS  If a DVT is suspected, your caregiver will take a full medical history. He or she will also perform a physical exam. Tests that also may be required include:   · Studies of the clotting properties of the blood.   · An ultrasound scan.   · X-rays to show the flow of blood when special dye is injected into the veins (venography).   · Studies of your lungs if you have any chest symptoms.     PREVENTION  · Exercise the legs regularly. Take a brisk 30 minute walk every day.   · Maintain a weight that is appropriate for your height.  · Avoid sitting or lying in bed for long periods of time without moving your legs.   · Women, particularly those over the age of 35, should consider the risks and benefits of  taking estrogen medicine, including birth control pills.   · Do not smoke, especially if you take estrogen medicines.   · Long-distance travel can increase your risk. You should exercise your legs by walking or pumping the muscles every hour.   · In hospital prevention: Prevention may include medical and non medical measures.     TREATMENT  · The most common treatment for DVT is blood thinning (anticoagulant) medicine, which reduces the blood's tendency to clot. Anticoagulants can stop new blood clots from forming and old ones from growing. They cannot dissolve existing clots. Your body does this by itself over time. Anticoagulants can be given by mouth, by intravenous (IV) access, or by injection. Your caregiver will determine the best program for you.   · Less commonly, clot-dissolving drugs (thrombolytics) are used to dissolve a DVT. They carry a high risk of bleeding, so they are used mainly in severe cases.   · Very rarely, a blood clot in the leg needs to be removed surgically.   · If you are unable to take anticoagulants, your caregiver may arrange for you to have a filter placed in a main vein in your belly (abdomen). This filter prevents clots from traveling to your lungs.     HOME CARE INSTRUCTIONS  Take all medicines prescribed by your caregiver. Follow the directions carefully.   · You will most likely continue taking anticoagulants after you leave the hospital. Your caregiver will advise you on the length of treatment (usually 3 to 5 months, sometimes for life).   · Taking too much or too little of an anticoagulant is dangerous. While taking this type of medicine, you will need to have regular blood tests to be sure the dose is correct. The dose can change for many reasons. It is critically important that you take this medicine exactly as prescribed, and that you have blood tests exactly as directed.   · Many foods can interfere with anticoagulants. These include foods high in vitamin K, such as  spinach, kale, broccoli, cabbage, césar and turnip greens, [place] sprouts, peas, cauliflower, seaweed, parsley, beef and pork liver, green tea, and soybean oil. Your caregiver should discuss limits on these foods with you or you should arrange a visit with a dietician to answer your questions.   · Many medicines can interfere with anticoagulants. You must tell your caregiver about any and all medicines you take. This includes all vitamins and supplements. Be especially cautious with aspirin and anti-inflammatory medicines. Ask your caregiver before taking these.   · Anticoagulants can have side effects, mostly excessive bruising or bleeding. You will need to hold pressure over cuts for longer than usual. Avoid alcoholic drinks or consume only very small amounts while taking this medicine.    If you are taking an anticoagulant:  · Wear a medical alert bracelet.  · Notify your dentist or other caregivers before procedures.  · Avoid contact sports.    · Ask your caregiver how soon you can go back to normal activities. Not being active can lead to new clots. Ask for a list of what you should and should not do.   · Exercise your lower leg muscles. This is important while traveling.   · You may need to wear compression stockings. These are tight elastic stockings that apply pressure to the lower legs. This can help keep the blood in the legs from clotting.   · If you are a smoker, you should quit.   · Learn as much as you can about DVT.     SEEK MEDICAL CARE IF:  · You have unusual bruising or any bleeding problems.  · The swelling or pain in your affected arm or leg is not gradually improving.   · You anticipate surgery or long-distance travel. You should get specific advice on DVT prevention.   · You discover other family members with blood clots. This may require further testing for inherited diseases or conditions.     SEEK IMMEDIATE MEDICAL CARE IF:  · You develop chest pain.  · You develop severe shortness of  breath.  · You begin to cough up bloody mucus or phlegm (sputum).  · You feel dizzy or faint.   · You develop swelling or pain in the leg.  · You have breathing problems after traveling.    MAKE SURE YOU:  · Understand these instructions.  · Will watch your condition.  · Will get help right away if you are not doing well or get worse.       · Is patient discharged on Warfarin / Coumadin?   Yes    You are receiving the drug warfarin. Please understand the importance of monitoring warfarin with scheduled PT/INR blood draws.  Follow-up with the [facility] in one week for INR lab..    IMPORTANT: HOW TO USE THIS INFORMATION:  This is a summary and does NOT have all possible information about this product. This information does not assure that this product is safe, effective, or appropriate for you. This information is not individual medical advice and does not substitute for the advice of your health care professional. Always ask your health care professional for complete information about this product and your specific health needs.      WARFARIN - ORAL (WARF-uh-rin)      COMMON BRAND NAME(S): Coumadin      WARNING:  Warfarin can cause very serious (possibly fatal) bleeding. This is more likely to occur when you first start taking this medication or if you take too much warfarin. To decrease your risk for bleeding, your doctor or other health care provider will monitor you closely and check your lab results (INR test) to make sure you are not taking too much warfarin. Keep all medical and laboratory appointments. Tell your doctor right away if you notice any signs of serious bleeding. See also Side Effects section.      USES:  This medication is used to treat blood clots (such as in deep vein thrombosis-DVT or pulmonary embolus-PE) and/or to prevent new clots from forming in your body. Preventing harmful blood clots helps to reduce the risk of a stroke or heart attack. Conditions that increase your risk of  "developing blood clots include a certain type of irregular heart rhythm (atrial fibrillation), heart valve replacement, recent heart attack, and certain surgeries (such as hip/knee replacement). Warfarin is commonly called a \"blood thinner,\" but the more correct term is \"anticoagulant.\" It helps to keep blood flowing smoothly in your body by decreasing the amount of certain substances (clotting proteins) in your blood.      HOW TO USE:  Read the Medication Guide provided by your pharmacist before you start taking warfarin and each time you get a refill. If you have any questions, ask your doctor or pharmacist. Take this medication by mouth with or without food as directed by your doctor or other health care professional, usually once a day. It is very important to take it exactly as directed. Do not increase the dose, take it more frequently, or stop using it unless directed by your doctor. Dosage is based on your medical condition, laboratory tests (such as INR), and response to treatment. Your doctor or other health care provider will monitor you closely while you are taking this medication to determine the right dose for you. Use this medication regularly to get the most benefit from it. To help you remember, take it at the same time each day. It is important to eat a balanced, consistent diet while taking warfarin. Some foods can affect how warfarin works in your body and may affect your treatment and dose. Avoid sudden large increases or decreases in your intake of foods high in vitamin K (such as broccoli, cauliflower, cabbage, brussels sprouts, kale, spinach, and other green leafy vegetables, liver, green tea, certain vitamin supplements). If you are trying to lose weight, check with your doctor before you try to go on a diet. Cranberry products may also affect how your warfarin works. Limit the amount of cranberry juice (16 ounces/480 milliliters a day) or other cranberry products you may drink or eat.    "   SIDE EFFECTS:  Nausea, loss of appetite, or stomach/abdominal pain may occur. If any of these effects persist or worsen, tell your doctor or pharmacist promptly. Remember that your doctor has prescribed this medication because he or she has judged that the benefit to you is greater than the risk of side effects. Many people using this medication do not have serious side effects. This medication can cause serious bleeding if it affects your blood clotting proteins too much (shown by unusually high INR lab results). Even if your doctor stops your medication, this risk of bleeding can continue for up to a week. Tell your doctor right away if you have any signs of serious bleeding, including: unusual pain/swelling/discomfort, unusual/easy bruising, prolonged bleeding from cuts or gums, persistent/frequent nosebleeds, unusually heavy/prolonged menstrual flow, pink/dark urine, coughing up blood, vomit that is bloody or looks like coffee grounds, severe headache, dizziness/fainting, unusual or persistent tiredness/weakness, bloody/black/tarry stools, chest pain, shortness of breath, difficulty swallowing. Tell your doctor right away if any of these unlikely but serious side effects occur: persistent nausea/vomiting, severe stomach/abdominal pain, yellowing eyes/skin. This drug rarely has caused very serious (possibly fatal) problems if its effects lead to small blood clots (usually at the beginning of treatment). This can lead to severe skin/tissue damage that may require surgery or amputation if left untreated. Patients with certain blood conditions (protein C or S deficiency) may be at greater risk. Get medical help right away if any of these rare but serious side effects occur: painful/red/purplish patches on the skin (such as on the toe, breast, abdomen), change in the amount of urine, vision changes, confusion, slurred speech, weakness on one side of the body. A very serious allergic reaction to this drug is rare.  However, get medical help right away if you notice any symptoms of a serious allergic reaction, including: rash, itching/swelling (especially of the face/tongue/throat), severe dizziness, trouble breathing. This is not a complete list of possible side effects. If you notice other effects not listed above, contact your doctor or pharmacist. In the [place] - Call your doctor for medical advice about side effects. You may report side effects to FDA at 8-950-OWP-0052. In [place] - Call your doctor for medical advice about side effects. You may report side effects to [facility] [place] at 1-608.828.6632.      PRECAUTIONS:  Before taking warfarin, tell your doctor or pharmacist if you are allergic to it; or if you have any other allergies. This product may contain inactive ingredients, which can cause allergic reactions or other problems. Talk to your pharmacist for more details. Before using this medication, tell your doctor or pharmacist your medical history, especially of: blood disorders (such as anemia, hemophilia), bleeding problems (such as bleeding of the stomach/intestines, bleeding in the brain), blood vessel disorders (such as aneurysms), recent major injury/surgery, liver disease, alcohol use, mental/mood disorders (including memory problems), frequent falls/injuries. It is important that all your doctors and dentists know that you take warfarin. Before having surgery or any medical/dental procedures, tell your doctor or dentist that you are taking this medication and about all the products you use (including prescription drugs, nonprescription drugs, and herbal products). Avoid getting injections into the muscles. If you must have an injection into a muscle (for example, a flu shot), it should be given in the arm. This way, it will be easier to check for bleeding and/or apply pressure bandages. This medication may cause stomach bleeding. Daily use of alcohol while using this medicine will increase your risk for stomach  bleeding and may also affect how this medication works. Limit or avoid alcoholic beverages. If you have not been eating well, if you have an illness or infection that causes fever, vomiting, or diarrhea for more than 2 days, or if you start using any antibiotic medications, contact your doctor or pharmacist immediately because these conditions can affect how warfarin works. This medication can cause heavy bleeding. To lower the chance of getting cut, bruised, or injured, use great caution with sharp objects like safety razors and nail cutters. Use an electric razor when shaving and a soft toothbrush when brushing your teeth. Avoid activities such as contact sports. If you fall or injure yourself, especially if you hit your head, call your doctor immediately. Your doctor may need to check you. The Food & Drug Administration has stated that generic warfarin products are interchangeable. However, consult your doctor or pharmacist before switching warfarin products. Be careful not to take more than one medication that contains warfarin unless specifically directed by the doctor or health care provider who is monitoring your warfarin treatment. Older adults may be at greater risk for bleeding while using this drug. This medication is not recommended for use during pregnancy because of serious (possibly fatal) harm to an unborn baby. Discuss the use of reliable forms of birth control with your doctor. If you become pregnant or think you may be pregnant, tell your doctor immediately. If you are planning pregnancy, discuss a plan for managing your condition with your doctor before you become pregnant. Your doctor may switch the type of medication you use during pregnancy. Very small amounts of this medication may pass into breast milk but is unlikely to harm a nursing infant. Consult your doctor before breast-feeding.      DRUG INTERACTIONS:  Drug interactions may change how your medications work or increase your risk for  "serious side effects. This document does not contain all possible drug interactions. Keep a list of all the products you use (including prescription/nonprescription drugs and herbal products) and share it with your doctor and pharmacist. Do not start, stop, or change the dosage of any medicines without your doctor's approval. Warfarin interacts with many prescription, nonprescription, vitamin, and herbal products. This includes medications that are applied to the skin or inside the vagina or rectum. The interactions with warfarin usually result in an increase or decrease in the \"blood-thinning\" (anticoagulant) effect. Your doctor or other health care professional should closely monitor you to prevent serious bleeding or clotting problems. While taking warfarin, it is very important to tell your doctor or pharmacist of any changes in medications, vitamins, or herbal products that you are taking. Some products that may interact with this drug include: capecitabine, imatinib, mifepristone. Aspirin, aspirin-like drugs (salicylates), and nonsteroidal anti-inflammatory drugs (NSAIDs such as ibuprofen, naproxen, celecoxib) may have effects similar to warfarin. These drugs may increase the risk of bleeding problems if taken during treatment with warfarin. Carefully check all prescription/nonprescription product labels (including drugs applied to the skin such as pain-relieving creams) since the products may contain NSAIDs or salicylates. Talk to your doctor about using a different medication (such as acetaminophen) to treat pain/fever. Low-dose aspirin and related drugs (such as clopidogrel, ticlopidine) should be continued if prescribed by your doctor for specific medical reasons such as heart attack or stroke prevention. Consult your doctor or pharmacist for more details. Many herbal products interact with warfarin. Tell your doctor before taking any herbal products, especially bromelains, coenzyme Q10, cranberry, " danshen, dong quai, fenugreek, garlic, ginkgo biloba, ginseng, and Shivani's wort, among others. This medication may interfere with a certain laboratory test to measure theophylline levels, possibly causing false test results. Make sure laboratory personnel and all your doctors know you use this drug.      OVERDOSE:  If overdose is suspected, contact a poison control center or emergency room immediately. [place] residents can call the  National Poison Hotline at 1-740.583.4565. [place] residents can call a provincial poison control center. Symptoms of overdose may include: bloody/black/tarry stools, pink/dark urine, unusual/prolonged bleeding.      NOTES:  Do not share this medication with others. Laboratory and/or medical tests (such as INR, complete blood count) must be performed periodically to monitor your progress or check for side effects. Consult your doctor for more details.      MISSED DOSE:  For the best possible benefit, do not miss any doses. If you do miss a dose and remember on the same day, take it as soon as you remember. If you remember on the next day, skip the missed dose and resume your usual dosing schedule. Do not double the dose to catch up because this could increase your risk for bleeding. Keep a record of missed doses to give to your doctor or pharmacist. Contact your doctor or pharmacist if you miss 2 or more doses in a row.      STORAGE:  Store at room temperature away from light and moisture. Do not store in the bathroom. Keep all medications away from children and pets. Do not flush medications down the toilet or pour them into a drain unless instructed to do so. Properly discard this product when it is  or no longer needed. Consult your pharmacist or local waste disposal company for more details about how to safely discard your product.      MEDICAL ALERT:  Your condition and medication can cause complications in a medical emergency. For information about enrolling in MedicAlert,  call 1-454.535.9307 ([place]) or 1-508.379.8813 ([place]).      Information last revised October 2010 Copyright(c) 2010 [employer]             Depression / Suicide Risk    As you are discharged from this [facility] facility, it is important to learn how to keep safe from harming yourself.    Recognize the warning signs:  · Abrupt changes in personality, positive or negative- including increase in energy   · Giving away possessions  · Change in eating patterns- significant weight changes-  positive or negative  · Change in sleeping patterns- unable to sleep or sleeping all the time   · Unwillingness or inability to communicate  · Depression  · Unusual sadness, discouragement and loneliness  · Talk of wanting to die  · Neglect of personal appearance   · Rebelliousness- reckless behavior  · Withdrawal from people/activities they love  · Confusion- inability to concentrate     If you or a loved one observes any of these behaviors or has concerns about self-harm, here's what you can do:  · Talk about it- your feelings and reasons for harming yourself  · Remove any means that you might use to hurt yourself (examples: pills, rope, extension cords, firearm)  · Get professional help from the community (Mental Health, Substance Abuse, psychological counseling)  · Do not be alone:Call your Safe Contact- someone whom you trust who will be there for you.  · Call your local CRISIS HOTLINE 491-4820 or 738-290-8004  · Call your local Children's Mobile Crisis Response Team [place] (802) 868-9503 or www."[employer]  · Call the toll free National Suicide Prevention Hotlines   · National Suicide Prevention Lifeline 993-482-UKZP (3710)  · National Hope Line Network 800-SUICIDE (652-1107)       Pulmonary Embolism  A pulmonary embolism (PE) is a sudden blockage or decrease of blood flow in one lung or both lungs. Most blockages come from a blood clot that travels from the legs or the pelvis to the lungs. PE is a  dangerous and potentially life-threatening condition if it is not treated right away.  What are the causes?  A pulmonary embolism occurs most commonly when a blood clot travels from one of your veins to your lungs. Rarely, PE is caused by air, fat, amniotic fluid, or part of a tumor traveling through your veins to your lungs.  What increases the risk?  A PE is more likely to develop in:  · People who smoke.  · People who are older, especially over 60 years of age.  · People who are overweight (obese).  · People who sit or lie still for a long time, such as during long-distance travel (over 4 hours), bed rest, hospitalization, or during recovery from certain medical conditions like a stroke.  · People who do not engage in much physical activity (sedentary lifestyle).  · People who have chronic breathing disorders.  · People who have a personal or family history of blood clots or blood clotting disease.  · People who have peripheral vascular disease (PVD), diabetes, or some types of cancer.  · People who have heart disease, especially if the person had a recent heart attack or has congestive heart failure.  · People who have neurological diseases that affect the legs (leg paresis).  · People who have had a traumatic injury, such as breaking a hip or leg.  · People who have recently had major or lengthy surgery, especially on the hip, knee, or abdomen.  · People who have had a central line placed inside a large vein.  · People who take medicines that contain the hormone estrogen. These include birth control pills and hormone replacement therapy.  · Pregnancy or during childbirth or the postpartum period.  What are the signs or symptoms?  The symptoms of a PE usually start suddenly and include:  · Shortness of breath while active or at rest.  · Coughing or coughing up blood or blood-tinged mucus.  · Chest pain that is often worse with deep breaths.  · Rapid or irregular heartbeat.  · Feeling light-headed or  dizzy.  · Fainting.  · Feeling anxious.  · Sweating.  There may also be pain and swelling in a leg if that is where the blood clot started.  These symptoms may represent a serious problem that is an emergency. Do not wait to see if the symptoms will go away. Get medical help right away. Call your local emergency services (911 in the [place] Do not drive yourself to the hospital.   How is this diagnosed?  Your health care provider will take a medical history and perform a physical exam. You may also have other tests, including:  · Blood tests to assess the clotting properties of your blood, assess oxygen levels in your blood, and find blood clots.  · Imaging tests, such as CT, ultrasound, MRI, X-ray, and other tests to see if you have clots anywhere in your body.  · An electrocardiogram (ECG) to look for heart strain from blood clots in the lungs.  How is this treated?  The main goals of PE treatment are:  · To stop a blood clot from growing larger.  · To stop new blood clots from forming.  The type of treatment that you receive depends on many factors, such as the cause of your PE, your risk for bleeding or developing more clots, and other medical conditions that you have. Sometimes, a combination of treatments is necessary.  This condition may be treated with:  · Medicines, including newer oral blood thinners (anticoagulants), warfarin, low molecular weight heparins, thrombolytics, or heparins.  · Wearing compression stockings or using different types of devices.  · Surgery (rare) to remove the blood clot or to place a filter in your abdomen to stop the blood clot from traveling to your lungs.  Treatments for a PE are often divided into immediate treatment, long-term treatment (up to 3 months after PE), and extended treatment (more than 3 months after PE). Your treatment may continue for several months. This is called maintenance therapy, and it is used to prevent the forming of new blood clots. You can work with  your health care provider to choose the treatment program that is best for you.  What are anticoagulants?   Anticoagulants are medicines that treat PEs. They can stop current blood clots from growing and stop new clots from forming. They cannot dissolve existing clots. Your body dissolves clots by itself over time. Anticoagulants are given by mouth, by injection, or through an IV tube.  What are thrombolytics?   Thrombolytics are clot-dissolving medicines that are used to dissolve a PE. They carry a high risk of bleeding, so they tend to be used only in severe cases or if you have very low blood pressure.  Follow these instructions at home:  If you are taking a newer oral anticoagulant:  · Take the medicine every single day at the same time each day.  · Understand what foods and drugs interact with this medicine.  · Understand that there are no regular blood tests required when using this medicine.  · Understand the side effects of this medicine, including excessive bruising or bleeding. Ask your health care provider or pharmacist about other possible side effects.  If you are taking warfarin:  · Understand how to take warfarin and know which foods can affect how warfarin works in your body.  · Understand that it is dangerous to take too much or too little warfarin. Too much warfarin increases the risk of bleeding. Too little warfarin continues to allow the risk for blood clots.  · Follow your PT and INR blood testing schedule. The PT and INR results allow your health care provider to adjust your dose of warfarin. It is very important that you have your PT and INR tested as often as told by your health care provider.  · Avoid major changes in your diet, or tell your health care provider before you change your diet. Arrange a visit with a registered dietitian to answer your questions. Many foods, especially foods that are high in vitamin K, can interfere with warfarin and affect the PT and INR results. Eat a  consistent amount of foods that are high in vitamin K, such as:  ¨ Spinach, kale, broccoli, cabbage, césar greens, turnip greens, [place] sprouts, peas, cauliflower, seaweed, and parsley.  ¨ Beef liver and pork liver.  ¨ Green tea.  ¨ Soybean oil.  · Tell your health care provider about any and all medicines, vitamins, and supplements that you take, including aspirin and other over-the-counter anti-inflammatory medicines. Be especially cautious with aspirin and anti-inflammatory medicines. Do not take those before you ask your health care provider if it is safe to do so. This is important because many medicines can interfere with warfarin and affect the PT and INR results.  · Do not start or stop taking any over-the-counter or prescription medicine unless your health care provider or pharmacist tells you to do so.  If you take warfarin, you will also need to do these things:  · Hold pressure over cuts for longer than usual.  · Tell your dentist and other health care providers that you are taking warfarin before you have any procedures in which bleeding may occur.  · Avoid alcohol or drink very small amounts. Tell your health care provider if you change your alcohol intake.  · Do not use tobacco products, including cigarettes, chewing tobacco, and e-cigarettes. If you need help quitting, ask your health care provider.  · Avoid contact sports.  General instructions  · Take over-the-counter and prescription medicines only as told by your health care provider. Anticoagulant medicines can have side effects, including easy bruising and difficulty stopping bleeding. If you are prescribed an anticoagulant, you will also need to do these things:  ¨ Hold pressure over cuts for longer than usual.  ¨ Tell your dentist and other health care providers that you are taking anticoagulants before you have any procedures in which bleeding may occur.  ¨ Avoid contact sports.  · Wear a medical alert bracelet or carry a medical alert  card that says you have had a PE.  · Ask your health care provider how soon you can go back to your normal activities. Stay active to prevent new blood clots from forming.  · Make sure to exercise while traveling or when you have been sitting or standing for a long period of time. It is very important to exercise. Exercise your legs by walking or by tightening and relaxing your leg muscles often. Take frequent walks.  · Wear compression stockings as told by your health care provider to help prevent more blood clots from forming.  · Do not use tobacco products, including cigarettes, chewing tobacco, and e-cigarettes. If you need help quitting, ask your health care provider.  · Keep all follow-up appointments with your health care provider. This is important.  How is this prevented?  Take these actions to decrease your risk of developing another PE:  · Exercise regularly. For at least 30 minutes every day, engage in:  ¨ Activity that involves moving your arms and legs.  ¨ Activity that encourages good blood flow through your body by increasing your heart rate.  · Exercise your arms and legs every hour during long-distance travel (over 4 hours). Drink plenty of water and avoid drinking alcohol while traveling.  · Avoid sitting or lying in bed for long periods of time without moving your legs.  · Maintain a weight that is appropriate for your height. Ask your health care provider what weight is healthy for you.  · If you are a woman who is over 35 years of age, avoid unnecessary use of medicines that contain estrogen. These include birth control pills.  · Do not smoke, especially if you take estrogen medicines. If you need help quitting, ask your health care provider.  · If you are at very high risk for PE, wear compression stockings.  · If you recently had a PE, have regularly scheduled ultrasound testing on your legs to check for new blood clots.  If you are hospitalized, prevention measures may include:  · Early  walking after surgery, as soon as your health care provider says that it is safe.  · Receiving anticoagulants to prevent blood clots. If you cannot take anticoagulants, other options may be available, such as wearing compression stockings or using different types of devices.  Get help right away if:  · You have new or increased pain, swelling, or redness in an arm or leg.  · You have numbness or tingling in an arm or leg.  · You have shortness of breath while active or at rest.  · You have chest pain.  · You have a rapid or irregular heartbeat.  · You feel light-headed or dizzy.  · You cough up blood.  · You notice blood in your vomit, bowel movement, or urine.  · You have a fever.  These symptoms may represent a serious problem that is an emergency. Do not wait to see if the symptoms will go away. Get medical help right away. Call your local emergency services (911 in the [place] Do not drive yourself to the hospital.   This information is not intended to replace advice given to you by your health care provider. Make sure you discuss any questions you have with your health care provider.  Document Released: 12/15/2001 Document Revised: 05/25/2017 Document Reviewed: 04/13/2016  [employer] Interactive Patient Education © 2017 [employer]      Deep Vein Thrombosis Discharge Instructions    A deep vein thrombosis (DVT) is a blood clot (thrombus) that develops in a deep vein. A DVT is a clot in the deep, larger veins of the leg, arm, or pelvis. These are more dangerous than clots that might form in veins on the surface of the body. Deep vein thrombosis can lead to complications if the clot breaks off and travels in the bloodstream to the lungs.     CAUSES  Blood clots form in a vein for different reasons. Usually several things cause blood clots. They include:   · The flow of blood slows down.   · The inside of the vein is damaged in some way.   · The person has a condition that makes blood clot more easily. These  conditions may include:  · Older age (especially over 75 years old).  · Having a history of blood clots.  · Having major or lengthy surgery. Hip surgery is particularly high-risk.   · Breaking a hip or leg.  · Sitting or lying still for a long time.  · Cancer or cancer treatment.  · Having a long, thin tube (catheter) placed inside a vein during a medical procedure.   · Being overweight (obese).  · Pregnancy and childbirth.  · Medicines with estrogen.  · Smoking.  · Other circulation or heart problems.     SYMPTOMS  When a clot forms, it can either partially or totally block the blood flow in that vein. Symptoms of a DVT can include:  · Swelling of the leg or arm, especially if one side is much worse.  · Warmth and redness of the leg or arm, especially if one side is much worse.   · Pain in an arm or leg. If the clot is in the leg, symptoms may be more noticeable or worse when standing or walking.  If the blood clot travels to the lung, it may cause:  · Shortness of breath.  · Chest pain. The pain may be worsened by deep breaths.   · Coughing up thick mucus (phlegm), possibly flecked with blood.   Anyone with these symptoms should get emergency medical treatment right away. Call your local emergency  Services (911 in [place] if you have these symptoms.     DIAGNOSIS  If a DVT is suspected, your caregiver will take a full medical history. He or she will also perform a physical exam. Tests that also may be required include:   · Studies of the clotting properties of the blood.   · An ultrasound scan.   · X-rays to show the flow of blood when special dye is injected into the veins (venography).   · Studies of your lungs if you have any chest symptoms.     PREVENTION  · Exercise the legs regularly. Take a brisk 30 minute walk every day.   · Maintain a weight that is appropriate for your height.  · Avoid sitting or lying in bed for long periods of time without moving your legs.   · Women, particularly those over the age of  35, should consider the risks and benefits of taking estrogen medicine, including birth control pills.   · Do not smoke, especially if you take estrogen medicines.   · Long-distance travel can increase your risk. You should exercise your legs by walking or pumping the muscles every hour.   · In hospital prevention: Prevention may include medical and non medical measures.     TREATMENT  · The most common treatment for DVT is blood thinning (anticoagulant) medicine, which reduces the blood's tendency to clot. Anticoagulants can stop new blood clots from forming and old ones from growing. They cannot dissolve existing clots. Your body does this by itself over time. Anticoagulants can be given by mouth, by intravenous (IV) access, or by injection. Your caregiver will determine the best program for you.   · Less commonly, clot-dissolving drugs (thrombolytics) are used to dissolve a DVT. They carry a high risk of bleeding, so they are used mainly in severe cases.   · Very rarely, a blood clot in the leg needs to be removed surgically.   · If you are unable to take anticoagulants, your caregiver may arrange for you to have a filter placed in a main vein in your belly (abdomen). This filter prevents clots from traveling to your lungs.     HOME CARE INSTRUCTIONS  Take all medicines prescribed by your caregiver. Follow the directions carefully.   · You will most likely continue taking anticoagulants after you leave the hospital. Your caregiver will advise you on the length of treatment (usually 3 to 5 months, sometimes for life).   · Taking too much or too little of an anticoagulant is dangerous. While taking this type of medicine, you will need to have regular blood tests to be sure the dose is correct. The dose can change for many reasons. It is critically important that you take this medicine exactly as prescribed, and that you have blood tests exactly as directed.   · Many foods can interfere with anticoagulants. These  include foods high in vitamin K, such as spinach, kale, broccoli, cabbage, césar and turnip greens, [place] sprouts, peas, cauliflower, seaweed, parsley, beef and pork liver, green tea, and soybean oil. Your caregiver should discuss limits on these foods with you or you should arrange a visit with a dietician to answer your questions.   · Many medicines can interfere with anticoagulants. You must tell your caregiver about any and all medicines you take. This includes all vitamins and supplements. Be especially cautious with aspirin and anti-inflammatory medicines. Ask your caregiver before taking these.   · Anticoagulants can have side effects, mostly excessive bruising or bleeding. You will need to hold pressure over cuts for longer than usual. Avoid alcoholic drinks or consume only very small amounts while taking this medicine.    If you are taking an anticoagulant:  · Wear a medical alert bracelet.  · Notify your dentist or other caregivers before procedures.  · Avoid contact sports.    · Ask your caregiver how soon you can go back to normal activities. Not being active can lead to new clots. Ask for a list of what you should and should not do.   · Exercise your lower leg muscles. This is important while traveling.   · You may need to wear compression stockings. These are tight elastic stockings that apply pressure to the lower legs. This can help keep the blood in the legs from clotting.   · If you are a smoker, you should quit.   · Learn as much as you can about DVT.     SEEK MEDICAL CARE IF:  · You have unusual bruising or any bleeding problems.  · The swelling or pain in your affected arm or leg is not gradually improving.   · You anticipate surgery or long-distance travel. You should get specific advice on DVT prevention.   · You discover other family members with blood clots. This may require further testing for inherited diseases or conditions.     SEEK IMMEDIATE MEDICAL CARE IF:  · You develop chest  pain.  · You develop severe shortness of breath.  · You begin to cough up bloody mucus or phlegm (sputum).  · You feel dizzy or faint.   · You develop swelling or pain in the leg.  · You have breathing problems after traveling.    MAKE SURE YOU:  · Understand these instructions.  · Will watch your condition.  · Will get help right away if you are not doing well or get worse.     Enoxaparin injection  What is this medicine?  ENOXAPARIN (ee nox a PA rin) is used after knee, hip, or abdominal surgeries to prevent blood clotting. It is also used to treat existing blood clots in the lungs or in the veins.  This medicine may be used for other purposes; ask your health care provider or pharmacist if you have questions.  COMMON BRAND NAME(S): Lovenox  What should I tell my health care provider before I take this medicine?  They need to know if you have any of these conditions:  -bleeding disorders, hemorrhage, or hemophilia  -infection of the heart or heart valves  -kidney or liver disease  -previous stroke  -prosthetic heart valve  -recent surgery or delivery of a baby  -ulcer in the stomach or intestine, diverticulitis, or other bowel disease  -an unusual or allergic reaction to enoxaparin, heparin, pork or pork products, other medicines, foods, dyes, or preservatives  -pregnant or trying to get pregnant  -breast-feeding  How should I use this medicine?  This medicine is for injection under the skin. It is usually given by a health-care professional. You or a family member may be trained on how to give the injections. If you are to give yourself injections, make sure you understand how to use the syringe, measure the dose if necessary, and give the injection. To avoid bruising, do not rub the site where this medicine has been injected. Do not take your medicine more often than directed. Do not stop taking except on the advice of your doctor or health care professional.  Make sure you receive a puncture-resistant container  to dispose of the needles and syringes once you have finished with them. Do not reuse these items. Return the container to your doctor or health care professional for proper disposal.  Talk to your pediatrician regarding the use of this medicine in children. Special care may be needed.  Overdosage: If you think you have taken too much of this medicine contact a poison control center or emergency room at once.  NOTE: This medicine is only for you. Do not share this medicine with others.  What if I miss a dose?  If you miss a dose, take it as soon as you can. If it is almost time for your next dose, take only that dose. Do not take double or extra doses.  What may interact with this medicine?  -aspirin and aspirin-like medicines  -certain medicines that treat or prevent blood clots  -dipyridamole  -NSAIDs, medicines for pain and inflammation, like ibuprofen or naproxen  This list may not describe all possible interactions. Give your health care provider a list of all the medicines, herbs, non-prescription drugs, or dietary supplements you use. Also tell them if you smoke, drink alcohol, or use illegal drugs. Some items may interact with your medicine.  What should I watch for while using this medicine?  Visit your doctor or health care professional for regular checks on your progress. Your condition will be monitored carefully while you are receiving this medicine.  Notify your doctor or health care professional and seek emergency treatment if you develop breathing problems; changes in vision; chest pain; severe, sudden headache; pain, swelling, warmth in the leg; trouble speaking; sudden numbness or weakness of the face, arm, or leg. These can be signs that your condition has gotten worse.  If you are going to have surgery, tell your doctor or health care professional that you are taking this medicine.  Do not stop taking this medicine without first talking to your doctor. Be sure to refill your prescription before  you run out of medicine.  Avoid sports and activities that might cause injury while you are using this medicine. Severe falls or injuries can cause unseen bleeding. Be careful when using sharp tools or knives. Consider using an electric razor. Take special care brushing or flossing your teeth. Report any injuries, bruising, or red spots on the skin to your doctor or health care professional.  What side effects may I notice from receiving this medicine?  Side effects that you should report to your doctor or health care professional as soon as possible:  -allergic reactions like skin rash, itching or hives, swelling of the face, lips, or tongue  -feeling faint or lightheaded, falls  -signs and symptoms of bleeding such as bloody or black, tarry stools; red or dark-brown urine; spitting up blood or brown material that looks like coffee grounds; red spots on the skin; unusual bruising or bleeding from the eye, gums, or nose  Side effects that usually do not require medical attention (report to your doctor or health care professional if they continue or are bothersome):  -pain, redness, or irritation at site where injected  This list may not describe all possible side effects. Call your doctor for medical advice about side effects. You may report side effects to FDA at 2-403-FDA-6953.  Where should I keep my medicine?  Keep out of the reach of children.  Store at room temperature between 15 and 30 degrees C (59 and 86 degrees F). Do not freeze. If your injections have been specially prepared, you may need to store them in the refrigerator. Ask your pharmacist. Throw away any unused medicine after the expiration date.  NOTE: This sheet is a summary. It may not cover all possible information. If you have questions about this medicine, talk to your doctor, pharmacist, or health care provider.  © 2018 [employer] (2015-04-21 16:06:21)

## 2019-08-19 NOTE — DISCHARGE SUMMARY
Discharge Summary    CHIEF COMPLAINT ON ADMISSION  Chief Complaint   Patient presents with   • Chest Pain     pt woke up at 0300 with CP with diaphoresis for 10 minutes. pt had another episode 20 minutes ago. pt denies SOB. EKG cleared in triage       Reason for Admission  PE    Admission Date  8/17/2019    CODE STATUS  Full Code    HPI & HOSPITAL COURSE  74 years old man who has a history of metastatic colon cancer to the liver in 2011, presented with chief complain of intermittent pleuritic chest pain started 1 day ago.  CT chest angio in the ER diagnosed bilateral segmental and subsegmental PE, so he was admitted to the hospital for anticoagulation with Lovenox 1mg/kg BID.  He did not have hypoxia.  His VS remained stable through his hospitalization.  A US of his lower extremities shows subacute and acute DVT in the right popliteal vein and left common femoral vein, respectively, per my discussion with Dr. Brown, Radiologist.  I suspect that a thrombus traveled to his lungs on Friday when he first experienced chest pain.    He is an active individual, so the cause of his spontaneous thromboembolic condition is puzzling.  Given his cancer history, both Dr. Garcia, who admitted him, and I are concerned about recurrent of his colon cancer.  I reviewed his abdomen and pelvis CT on 9/2018.  The result showed stable findings without mention of new lesions or mass or pathologic process.  He report up-to-date with his routine cancer screening.  The chest CT angio in the ER showed no lesion suspicious for lung cancer.  A head CT non contrast to eval for headache that has not occurred for 19 years per patient was negative for mass or lesion, except for atrophy and chronic microvascular ischemic type changes.  I ordered a CEA level, which is normal at 1.8. I advised him and his wife to speak with Dr. Brito and his oncologist at the time of follow-up to consider additional work-up if they feel warranted.     His echo  reveals LV EF 60% with RVSP 50 mmHg.  The elevated RVSP could be due to the clot burden, but perhaps a repeat echo in the near future is warranted to document resolution of the elevated pressure.  Otherwise, consider work up for etiologies of pulmonary hypertension.    Anticoagulation options with Coumadin versus direct oral anticoagulant were presented to the patient and his wife at bedside extensively.  They wanted to take either Eliquis or Xarelto, but unfortunately the Xarelto that is covered under his insurance plan has a co-pay of over $500, which the patient his wife said they could not afford.  Therefore, I sent a prescription for Lovenox bridging per the pharmacy, his cough is approximately $100.  This was acceptable to the patient.  Therefore, he will be anticoagulated with Coumadin, bridges with Lovenox subcutaneous.  Referral for outpatient anticoagulation clinic was sent.  Patient has had a PE in the past that was provoked and took Coumadin so he is familiar with the process.    After 3 days in the hospital, he is remained clinically stable without need for supplemental oxygen.  His blood pressure and heart rates are stable.  He has no hemoptysis despite being on subcutaneous treatment dose Lovenox.  Work-up for his acute thromboembolic condition is complete.  Therefore, he is discharged in good and stable condition to home with close outpatient follow-up.    The patient met 2-midnight criteria for an inpatient stay at the time of discharge.    Discharge Date  August 19, 2019    FOLLOW UP ITEMS POST DISCHARGE  None    DISCHARGE DIAGNOSES  Principal Problem:    Acute pulmonary embolism without acute cor pulmonale (HCC) POA: Unknown  Active Problems:    History of colon cancer, stage IV POA: Yes      Overview: 2000    Essential hypertension POA: Yes    Decreased hearing of both ears POA: Yes    Controlled type 2 diabetes mellitus without complication, without long-term current use of insulin (HCC) POA:  Yes    Deep vein thrombosis (DVT) of popliteal vein of both lower extremities (HCC) POA: Yes  Resolved Problems:    * No resolved hospital problems. *      FOLLOW UP  Future Appointments   Date Time Provider Department Center   12/2/2019 11:00 AM Too Brito M.D. [facility] Brito M.D.  [address]  [address]  [place] [id number]  [phone]    In 1 day  Pulmonary embolism, DVT, anticoagulatioin      MEDICATIONS ON DISCHARGE     Medication List      START taking these medications      Instructions   enoxaparin 80 MG/0.8ML Soln inj  Commonly known as:  LOVENOX   Inject 80 mg as instructed 2 Times a Day for 5 days.  Dose:  80 mg     warfarin 2.5 MG Tabs  Commonly known as:  COUMADIN   Take 2 Tabs by mouth every day for 30 days.  Dose:  5 mg        CONTINUE taking these medications      Instructions   amLODIPine 5 MG Tabs  Commonly known as:  NORVASC   Take 1 tablet by mouth every day.     glimepiride 4 MG Tabs  Commonly known as:  AMARYL   Take 2 Tabs by mouth every morning.  Dose:  8 mg     losartan-hydrochlorothiazide 100-12.5 MG per tablet  Commonly known as:  HYZAAR   TAKE 1 TABLET BY MOUTH EVERY DAY     metFORMIN  MG Tb24  Commonly known as:  GLUCOPHAGE XR   TAKE 2 TABLETS BY MOUTH TWICE DAILY     pioglitazone 15 MG Tabs  Commonly known as:  ACTOS   Take 2 Tabs by mouth every day.  Dose:  30 mg     therapeutic multivitamin-minerals Tabs   Take 1 Tab by mouth every day.  Dose:  1 Tab            Allergies  Allergies   Allergen Reactions   • Morphine Unspecified     Hallucinations, 15+ years ago       DIET  Orders Placed This Encounter   Procedures   • Diet Order Consistent Carbohydrate     Standing Status:   Standing     Number of Occurrences:   1     Order Specific Question:   Diet:     Answer:   Consistent Carbohydrate [4]       ACTIVITY  As tolerated.  15-lb lifting restriction    CONSULTATIONS  None    PROCEDURES  None    LABORATORY  Lab Results   Component Value Date     SODIUM 140 08/19/2019    POTASSIUM 3.7 08/19/2019    CHLORIDE 103 08/19/2019    CO2 28 08/19/2019    GLUCOSE 112 (H) 08/19/2019    BUN 11 08/19/2019    CREATININE 1.06 08/19/2019        Lab Results   Component Value Date    WBC 5.5 08/19/2019    HEMOGLOBIN 13.6 (L) 08/19/2019    HEMATOCRIT 40.9 (L) 08/19/2019    PLATELETCT 134 (L) 08/19/2019      CEA level 1.8, WNL.    Total time of the discharge process exceeds 40 minutes.

## 2019-08-19 NOTE — CARE PLAN
Problem: Safety  Goal: Will remain free from injury  Outcome: PROGRESSING AS EXPECTED  Note:   Remind patient to use call light and provide assistance. Bed in low position, bed locked, and appropriate alarms set. Patient wearing non-slip socks. Call light and personal belongings are within reach.     Problem: Knowledge Deficit  Goal: Knowledge of disease process/condition, treatment plan, diagnostic tests, and medications will improve  Outcome: PROGRESSING AS EXPECTED  Note:   Encourage patient and family to ask questions and be involved in plan of care. Provide education on treatment plan, diagnostic testing, and medications; have patient and family verbalize understanding.

## 2019-08-19 NOTE — PROGRESS NOTES
Gave bedside report to PORSCHE Johnson. Plan of care discussed. Safety precautions in place. Personal belongings and call light are with in reach. Patient has no additional needs at this time.

## 2019-08-19 NOTE — PROGRESS NOTES
Patient and family given discharge information, verbalized understanding. IV and tele discontinued. Pharmacy notified about lovenox education.

## 2019-08-19 NOTE — PROGRESS NOTES
Patient waiting for to speak with doctor before discharge. Patient and family taught how to administer lovenox, verbalized understanding. coumadin given before discharge. Bed in low locked position, call bell within reach. Continue to monitor.

## 2019-08-19 NOTE — PROGRESS NOTES
Telemetry Shift Summary    Rhythm SR  HR Range 60s-70s  Ectopy rPVC  Measurements 0.14/0.10/0.36        Normal Values  Rhythm SR  HR Range    Measurements 0.12-0.20 / 0.06-0.10  / 0.30-0.52

## 2019-08-19 NOTE — PROGRESS NOTES
Report received from Herberth MORRELL. No new changes. Patient should be discharging today with PO anticoag. Bed in low locked position, call bell within reach. Continue to monitor.

## 2019-08-19 NOTE — PROGRESS NOTES
HOSPITAL MEDICINE PROGRESS NOTE    Date of service  8/18/2019    Chief Complaint  Chest Pain (pt woke up at 0300 with CP with diaphoresis for 10 minutes. pt had another episode 20 minutes ago. pt denies SOB. EKG cleared in triage)      Hospital Course:      74 years old man who has a history of metastatic colon cancer to the liver in 2011, presented with a spontaneous bilateral PE.          Interval History Updates:  Hospital Day #Hospital Day: 2  No event overnight.  Afebrile.    Wife is at bedside.  Patient is being weaned off of oxygen with normal O2 saturation on room air.  Still complains of pleuritic chest pain with deep breathing.  Denies any cough pain, but endorses cramping symptom of lower legs.    Consultants/Specialty  None    Review of Systems   Review of Systems   Constitutional: Negative for chills and fever.   Respiratory: Negative for shortness of breath.    Cardiovascular: Positive for chest pain. Negative for palpitations and leg swelling.        Chest pain is pleuritic in nature.   Gastrointestinal: Negative for abdominal pain, constipation, diarrhea, nausea and vomiting.   Skin: Negative for rash.   Neurological: Negative for focal weakness.   Endo/Heme/Allergies: Negative.    All other systems reviewed and are negative.       Medications:  • amLODIPine  5 mg DAILY   • glimepiride  8 mg QAM   • pioglitazone  30 mg QDAY   • therapeutic multivitamin-minerals  1 Tab DAILY   • enoxaparin  80 mg BID   • acetaminophen  650 mg Q6HRS PRN   • ondansetron  4 mg Q4HRS PRN   • ondansetron  4 mg Q4HRS PRN   • insulin regular  2-10 Units 4X/DAY ACHS   • tramadol  50 mg Q4HRS PRN   • oxyCODONE immediate-release  2.5 mg Q4HRS PRN   • losartan  100 mg Q DAY    And   • hydroCHLOROthiazide  12.5 mg Q DAY       Physical Exam   Vitals:    08/18/19 1144 08/18/19 1229 08/18/19 1557 08/18/19 2032   BP: (!) 164/72 140/75 146/78 127/64   Pulse: 68  68 68   Resp: 17  17 16   Temp: 36.9 °C (98.4 °F)  36.7 °C (98.1 °F) 36.7  °C (98.1 °F)   TempSrc: Oral  Oral Oral   SpO2: 89%  89% 92%   Weight:       Height:           Physical Exam   Constitutional: He is oriented to person, place, and time and well-developed, well-nourished, and in no distress. No distress.   HENT:   Head: Normocephalic and atraumatic.   Eyes: Pupils are equal, round, and reactive to light. Conjunctivae are normal. No scleral icterus.   Neck: Normal range of motion. Neck supple. No JVD present.   Cardiovascular: Normal rate, regular rhythm and normal heart sounds. Exam reveals no gallop and no friction rub.   No murmur heard.  Pulmonary/Chest: Effort normal and breath sounds normal. No respiratory distress. He has no wheezes. He has no rales. He exhibits no tenderness.   Abdominal: Soft. Bowel sounds are normal. There is no tenderness. There is no rebound.   Musculoskeletal: Normal range of motion. He exhibits no edema, tenderness or deformity.   Neurological: He is alert and oriented to person, place, and time.   Skin: Skin is warm and dry. No rash noted. He is not diaphoretic. No erythema.   Psychiatric: Mood and affect normal.   Nursing note and vitals reviewed.         Laboratory   Recent Labs     08/17/19  1506 08/18/19  0327   WBC 8.6 6.7   RBC 4.77 4.43*   HEMOGLOBIN 14.2 13.0*   HEMATOCRIT 41.3* 38.3*       Recent Labs     08/17/19  1506 08/18/19  0327   SODIUM 136 139   POTASSIUM 3.7 3.4*   CHLORIDE 103 105   CO2 24 26   GLUCOSE 320* 127*   BUN 15 12   CREATININE 1.16 1.05         No results found for: BLOODCULTU, BLDCULT, BCHOLD          Radiology  EC-ECHOCARDIOGRAM COMPLETE W/O CONT  Transthoracic  Echo Report    Echocardiography Laboratory    CONCLUSIONS  No prior study is available for comparison.   Left ventricular ejection fraction is visually estimated to be 60%.  Right ventricular systolic pressure is estimated to be 50  mmHg.  Moderately dilated right ventricle.    GIORGIO SALMERON  Exam Date:         08/18/2019                      13:06  Exam  Location:     Inpatient  Priority:          Routine    Ordering Physician:        DANA HERNANDEZ  Referring Physician:       [id number] Enriquez  Sonographer:               Patti Dominguez RDCS, JAMIAT    Age:    74     Gender:    M  MRN:    [id number]  :    1945  BSA:    2.09   Ht (in):    71     Wt (lb):    196  Exam Type:     Complete    Indications:     Pulmonary HTN  ICD Codes:       416.8    CPT Codes:       62069    BP:   152    /   78     HR:  Technical Quality:       Technically difficult study -                            adequate information is obtained    MEASUREMENTS  (Male / Female) Normal Values  2D ECHO  LV Diastolic Diameter PLAX        3.8 cm                4.2 - 5.9 / 3.9 - 5.3   cm  LV Systolic Diameter PLAX         2.7 cm                2.1 - 4.0 cm  IVS Diastolic Thickness           1.1 cm                  LVPW Diastolic Thickness          1.1 cm                  LVOT Diameter                     2 cm                    Estimated LV Ejection Fraction    60 %                    LV Ejection Fraction MOD BP       66.2 %                >= 55  %  LV Ejection Fraction MOD 4C       71.8 %                  LV Ejection Fraction MOD 2C       62.8 %                  IVC Diameter                      1.9 cm                    DOPPLER  AV Peak Velocity                  1.7 m/s                 AV Peak Gradient                  12.1 mmHg               AV Mean Gradient                  6.2 mmHg                LVOT Peak Velocity                1.1 m/s                 AV Area Cont Eq vti               2.3 cm²                 MV Velocity Time Integral         32.4 cm                 Mitral E Point Velocity           0.86 m/s                Mitral E to A Ratio               1.3                     MV Pressure Half Time             50.9 ms                 MV Area PHT                       4.3 cm²                 MV Deceleration Time              175 ms                  TR Peak  Velocity                  304 cm/s                PV Peak Velocity                  0.82 m/s                PV Peak Gradient                  2.7 mmHg                RVOT Peak Velocity                0.57 m/s                  * Indicates values subject to auto-interpretation  LV EF:  60    %    FINDINGS  Left Ventricle  Normal left ventricular chamber size. Normal left ventricular wall   thickness. Normal left ventricular systolic function. Left ventricular   ejection fraction is visually estimated to be 60%. Normal regional wall   motion. Normal diastolic function.    Right Ventricle  Moderately dilated right ventricle. Normal right ventricular systolic   function.    Right Atrium  Normal right atrial size.    Left Atrium  Mildly dilated left atrium. Left atrial volume index is 37   mL/sq m.    Mitral Valve  Mitral annular calcification. No mitral stenosis. Trace mitral   regurgitation.    Aortic Valve  The aortic valve is not well visualized. No aortic stenosis. No aortic   insufficiency.    Tricuspid Valve  The tricuspid valve is not well visualized. No tricuspid stenosis.   Moderate tricuspid regurgitation. Right ventricular systolic pressure   is estimated to be 50  mmHg.    Pulmonic Valve  The pulmonic valve is not well visualized. No pulmonic stenosis. Mild   pulmonic insufficiency.    Pericardium  Normal pericardium without effusion.    Aorta  Normal aortic root for body surface area.    Nando Hi MD  (Electronically Signed)  Final Date:     18 August 2019                   14:56       No results found for this or any previous visit.     Assessment and Plan    Principal Problem:    Acute pulmonary embolism without acute cor pulmonale (HCC) POA: Yes.  Hemodynamically stable.  Does not require supplemental oxygen as of this morning.  Unclear etiology, therefore, I order a lower extremity ultrasound with Doppler to evaluate for DVT.  Has a history of malignancy, so a possibility of recurrent.  Check  CEA marker.  Continue Lovenox 1 marquise per kick subcutaneous twice daily.  I discussed at length with him and his wife regarding choices of anticoagulation, including Coumadin and direct oral anticoagulant.  They would like to be on the DOAC.  I checked their pharmacy formulary, Eliquis is not colder, but Xarelto is.  Monitor CBC for anemia.    Possible moderate to severe pulmonary hypertension POA yes.  Elevated right ventricular systolic pressure of 50 mmg by echo.  Could be acute representing effect of acute pulmonary embolism above.  Will most likely benefit from repeat echo in a few months.  If elevated right-sided pressure persists, would encourage work-up for DAVID and other etiologies of pulmonary hypertension.    Acute hypokalemia:  Low potassium.  I order 40 mEq of KDur PO x1.  Check BMP tomorrow.    Active Problems:    History of colon cancer, stage IV POA: Yes.  Patient is up-to-date for his colon cancer screening.  I am checking CEA better as above.    Essential hypertension POA: Yes.  Blood pressure is controlled on amlodipine, hydrochlorothiazide, Cozaar.    Controlled type 2 diabetes mellitus without complication, without long-term current use of insulin (HCC) POA: Yes.  Continue Actos, glimepiride, and cover with regular insulin sliding scale.    DVT prophylaxis: Fully anticoagulated with Lovenox.    CODE STATUS:  Full Code    Disposition: Anticipate home in 1-2 days.    Case was discussed with Charge Nurse, Medical SW, , and Pharmacist on IDTround in addition to individual(s) mentioned above.    I have performed a physical exam and reviewed and updated ROS as of today, 8/18/2019.  In review of yesterday's note dated, 8/17/2019, there are no changes except as documented above.    I spent approximately 38 minutes in evaulating and treating the patient, more than 50% of the time in discussing, counseling, coordinating care, and answering patient's and wife's questions as detailed above.       Iveth Stone M.D.

## 2019-08-19 NOTE — DISCHARGE PLANNING
LSW placed call to pts pharmacy. Pts Rx Lovenox is $515 with insurance. Per pharmacist, pt may have not met out of pocket deductible yet.     Updated:  LSW called pts pharmacy, new Rx Lovenox is $122.17. Pt stated he can afford co-pay

## 2019-08-19 NOTE — PROGRESS NOTES
Received bedside report from PORSCHE Andrade and PORSCHE Johnson. Plan of care discussed. Safety precautions in place. Call light and personal belongings within reach. Patient is requesting pain medication will administer. Wife at bedside.

## 2019-08-20 ENCOUNTER — ANTICOAGULATION VISIT (OUTPATIENT)
Dept: VASCULAR LAB | Facility: MEDICAL CENTER | Age: 74
End: 2019-08-20
Attending: INTERNAL MEDICINE
Payer: MEDICARE

## 2019-08-20 VITALS — HEART RATE: 73 BPM | SYSTOLIC BLOOD PRESSURE: 147 MMHG | DIASTOLIC BLOOD PRESSURE: 70 MMHG

## 2019-08-20 DIAGNOSIS — I82.433 ACUTE DEEP VEIN THROMBOSIS (DVT) OF POPLITEAL VEIN OF BOTH LOWER EXTREMITIES (HCC): ICD-10-CM

## 2019-08-20 DIAGNOSIS — I26.99 OTHER ACUTE PULMONARY EMBOLISM WITHOUT ACUTE COR PULMONALE (HCC): ICD-10-CM

## 2019-08-20 LAB — INR PPP: 1.1 (ref 2–3.5)

## 2019-08-20 PROCEDURE — 99213 OFFICE O/P EST LOW 20 MIN: CPT

## 2019-08-20 PROCEDURE — 85610 PROTHROMBIN TIME: CPT

## 2019-08-20 ASSESSMENT — CHA2DS2 SCORE
DIABETES: YES
AGE 65 TO 74: NO
HYPERTENSION: YES
VASCULAR DISEASE: NO
PRIOR STROKE OR TIA OR THROMBOEMBOLISM: NO
SEX: MALE
CHA2DS2 VASC SCORE: 4
DIABETES: YES
VASCULAR DISEASE: NO
AGE 75 OR GREATER: YES
HYPERTENSION: YES
AGE 65 TO 74: NO
CHA2DS2 VASC SCORE: 4
CHF OR LEFT VENTRICULAR DYSFUNCTION: NO
AGE 75 OR GREATER: YES
PRIOR STROKE OR TIA OR THROMBOEMBOLISM: NO
CHF OR LEFT VENTRICULAR DYSFUNCTION: NO
SEX: MALE

## 2019-08-20 NOTE — PROGRESS NOTES
Anticoagulation Summary  As of 2019    INR goal:   2.0-3.0   TTR:   --   INR used for dosin.10! (2019)   Warfarin maintenance plan:   5 mg (2.5 mg x 2) every day   Weekly warfarin total:   35 mg   Plan last modified:   Trisha Deng (2019)   Next INR check:   2019   Target end date:   Indefinite    Indications    Acute pulmonary embolism without acute cor pulmonale (HCC) [I26.99]  Deep vein thrombosis (DVT) of popliteal vein of both lower extremities (HCC) [I82.433]             Anticoagulation Episode Summary     INR check location:       Preferred lab:       Send INR reminders to:       Comments:         Anticoagulation Care Providers     Provider Role Specialty Phone number    Iveth Stone M.D. Referring Internal Medicine [phone]    [facility] Responsible  [phone]        Anticoagulation Patient Findings  Patient Findings     Negatives:   Signs/symptoms of thrombosis, Signs/symptoms of bleeding, Laboratory test error suspected, Change in health, Change in alcohol use, Change in activity, Upcoming invasive procedure, Emergency department visit, Upcoming dental procedure, Missed doses, Extra doses, Change in medications, Change in diet/appetite, Hospital admission, Bruising, Other complaints         HPI:  Patient presented to ED with complaint of chest pain. Patient has PMH of colon/liver CA. Upon CT scan, pt found to have bilateral PE.   US of lower extremities found acute and subacute DVT in right popliteal and left common femoral vein.  Patient started on warfarin and Lovenox and discharged to follow up in anticoagulation clinic.    Pt is new to [facility], however, has been on warfarin before many years ago for provoked PE.  Discussed indication for warfarin therapy and INR goal range. Explained our services, hours of operation, warfarin therapy, potential SE, potential DI. Discussed diet at length, with an emphasis on foods rich in vitamin K.   Discussed monitoring parameters, such as blood in urine, blood in stool, discussed what to do if a dose is missed, or suspected as missed.  Emphasized importance of compliance and consistency including follow up. Discussed lifestyle choices of ETOH & smoking and its impact on therapy.    Chads-Vasc= 4 (age, Htn, DM)    HAS-BLED= 1 (age)    Antiplatelet therapy= NONE       Can pt be transitioned to DOAC? Yes, but it was too expensive     A/P   INR is SUB-therapeutic today at 1.1.  Patient will continue with 5mg of warfarin and Lovenox injections Q12H and will follow up again in 48 hrs.     Next appt: Thursday, Aug 22, 2019  11:45am    Ian Deng PharmD

## 2019-08-20 NOTE — PROGRESS NOTES
Telemetry Strip     Strip printed: 0727  Measurements from am strip were as follows:  Rhythm:sr  HR: 67  Measurements: .16/.20/.40       Telemetry Shift Summary:    Rhythm: sr  HR: 60-70s  Ectopy:rpvc           Normal Values  Rhythm SR  HR Range    Measurements 0.12-0.20 / 0.06-0.10  / 0.30-0.52

## 2019-08-21 ENCOUNTER — OFFICE VISIT (OUTPATIENT)
Dept: MEDICAL GROUP | Facility: MEDICAL CENTER | Age: 74
End: 2019-08-21
Payer: MEDICARE

## 2019-08-21 VITALS
HEART RATE: 52 BPM | HEIGHT: 71 IN | WEIGHT: 190.2 LBS | BODY MASS INDEX: 26.63 KG/M2 | OXYGEN SATURATION: 96 % | SYSTOLIC BLOOD PRESSURE: 136 MMHG | TEMPERATURE: 96.5 F | DIASTOLIC BLOOD PRESSURE: 62 MMHG

## 2019-08-21 DIAGNOSIS — Z85.038 HISTORY OF COLON CANCER, STAGE IV: ICD-10-CM

## 2019-08-21 DIAGNOSIS — I27.20 PULMONARY HYPERTENSION (HCC): ICD-10-CM

## 2019-08-21 DIAGNOSIS — I26.99 OTHER ACUTE PULMONARY EMBOLISM WITHOUT ACUTE COR PULMONALE (HCC): ICD-10-CM

## 2019-08-21 DIAGNOSIS — I82.433 ACUTE DEEP VEIN THROMBOSIS (DVT) OF POPLITEAL VEIN OF BOTH LOWER EXTREMITIES (HCC): ICD-10-CM

## 2019-08-21 PROCEDURE — 99214 OFFICE O/P EST MOD 30 MIN: CPT | Performed by: FAMILY MEDICINE

## 2019-08-21 NOTE — PROGRESS NOTES
[facility]  Progress Note  Established Patient    Subjective:   Magnus Claudio is a 74 y.o. male here today with a chief complaint of PE. The patient is alone.     Acute pulmonary embolism without acute cor pulmonale (HCC)  Patient recently had a pulmonary embolism and is currently maintained on Coumadin.  He follows with the Coumadin clinic.  He is doing well on the Coumadin and states that he does not have chest pain, shortness of breath or leg pain.    Deep vein thrombosis (DVT) of popliteal vein of both lower extremities (HCC)  Patient was recently treated for a DVT.  He is on Coumadin.  He is tolerating this medication well without leg pain.  He follows with the Coumadin clinic.  He is bridging with Lovenox.  Patient has a history of a previous DVT in the setting of surgery for previous colon cancer.  He has had a fairly thorough work-up to assess for recurrence of cancer and it is all been reassuring.    History of colon cancer, stage IV  The patient has a remote history of metastatic colon cancer status post resection followed by 3 months of chemotherapy followed by partial hepatectomy in 2001. Is currently in remission.  He follows with oncology but recently had a pulmonary embolism.    Pulmonary hypertension (HCC)  Noted on most recent echocardiogram in the setting of a pulmonary embolus.  The patient currently denies chest pain or shortness of breath      Current Outpatient Medications on File Prior to Visit   Medication Sig Dispense Refill   • enoxaparin (LOVENOX) 80 MG/0.8ML Solution inj Inject 80 mg as instructed 2 Times a Day for 5 days. 10 Syringe 0   • warfarin (COUMADIN) 2.5 MG Tab Take 2 Tabs by mouth every day for 30 days. 60 Tab 0   • metFORMIN ER (GLUCOPHAGE XR) 500 MG TABLET SR 24 HR TAKE 2 TABLETS BY MOUTH TWICE DAILY 360 Tab 4   • pioglitazone (ACTOS) 15 MG Tab Take 2 Tabs by mouth every day. 180 Tab 0   • losartan-hydrochlorothiazide (HYZAAR) 100-12.5 MG per tablet TAKE 1  "TABLET BY MOUTH EVERY  Tab 4   • glimepiride (AMARYL) 4 MG Tab Take 2 Tabs by mouth every morning. 180 Tab 4   • amLODIPine (NORVASC) 5 MG Tab Take 1 tablet by mouth every day.  90 Tab 4   • therapeutic multivitamin-minerals (THERAGRAN-M) Tab Take 1 Tab by mouth every day.       No current facility-administered medications on file prior to visit.        Past Medical History:   Diagnosis Date   • Cancer (HCC)     Colon   • Diabetes (HCC)    • Hypertension    • Vertigo, benign positional        Allergies: Morphine    Surgical History:  has a past surgical history that includes colon resection; pr resec liver,part lobectomy; and laminotomy.    Family History: family history includes Cancer in his mother; Diabetes in his sister; Heart Attack in his father; Hypertension in his father.    Social History:  reports that he has never smoked. He has never used smokeless tobacco. He reports that he does not drink alcohol or use drugs.    ROS: no CP or SOB.        Objective:     Vitals:    08/21/19 1139   BP: 136/62   BP Location: Left arm   Patient Position: Sitting   BP Cuff Size: Adult   Pulse: (!) 52   Temp: 35.8 °C (96.5 °F)   TempSrc: Temporal   SpO2: 96%   Weight: 86.3 kg (190 lb 3.2 oz)   Height: 1.803 m (5' 11\")       Physical Exam:  General: alert in no apparent distress.   Cardio: regular rate and rhythm, no murmurs, rubs or gallops.   Resp: CTAB no w/r/r.   Legs: no TTP of deep venous system.         Assessment and Plan:     1. Other acute pulmonary embolism without acute cor pulmonale (HCC)  - anticoagulation per [facility] clinic.   - REFERRAL TO HEMATOLOGY ONCOLOGY to assess for cancer recurrence or perhaps a thrombophilia.      2. Acute deep vein thrombosis (DVT) of popliteal vein of both lower extremities (HCC)  - REFERRAL TO HEMATOLOGY ONCOLOGY Referral to? Other    3. History of colon cancer, stage IV  - REFERRAL TO HEMATOLOGY ONCOLOGY Referral to? Other    4. Pulmonary hypertension (HCC)  - " EC-ECHOCARDIOGRAM COMPLETE W/O CONT; Future        Followup: Return in about 3 months (around 11/21/2019), or if symptoms worsen or fail to improve.

## 2019-08-21 NOTE — ASSESSMENT & PLAN NOTE
Patient was recently treated for a DVT.  He is on Coumadin.  He is tolerating this medication well without leg pain.  He follows with the Coumadin clinic.  He is bridging with Lovenox.  Patient has a history of a previous DVT in the setting of surgery for previous colon cancer.  He has had a fairly thorough work-up to assess for recurrence of cancer and it is all been reassuring.

## 2019-08-21 NOTE — ASSESSMENT & PLAN NOTE
Noted on most recent echocardiogram in the setting of a pulmonary embolus.  The patient currently denies chest pain or shortness of breath

## 2019-08-21 NOTE — ASSESSMENT & PLAN NOTE
The patient has a remote history of metastatic colon cancer status post resection followed by 3 months of chemotherapy followed by partial hepatectomy in 2001. Is currently in remission.  He follows with oncology but recently had a pulmonary embolism.

## 2019-08-21 NOTE — ASSESSMENT & PLAN NOTE
Patient recently had a pulmonary embolism and is currently maintained on Coumadin.  He follows with the Coumadin clinic.  He is doing well on the Coumadin and states that he does not have chest pain, shortness of breath or leg pain.

## 2019-08-22 ENCOUNTER — ANTICOAGULATION VISIT (OUTPATIENT)
Dept: VASCULAR LAB | Facility: MEDICAL CENTER | Age: 74
End: 2019-08-22
Attending: INTERNAL MEDICINE
Payer: MEDICARE

## 2019-08-22 VITALS — HEART RATE: 61 BPM | DIASTOLIC BLOOD PRESSURE: 77 MMHG | SYSTOLIC BLOOD PRESSURE: 132 MMHG

## 2019-08-22 DIAGNOSIS — Z79.01 CHRONIC ANTICOAGULATION: ICD-10-CM

## 2019-08-22 LAB — INR PPP: 1.3 (ref 2–3.5)

## 2019-08-22 PROCEDURE — 85610 PROTHROMBIN TIME: CPT

## 2019-08-22 PROCEDURE — 99212 OFFICE O/P EST SF 10 MIN: CPT

## 2019-08-22 NOTE — PROGRESS NOTES
Anticoagulation Summary  As of 2019    INR goal:   2.0-3.0   TTR:   --   INR used for dosin.30! (2019)   Warfarin maintenance plan:   5 mg (2.5 mg x 2) every day   Weekly warfarin total:   35 mg   Plan last modified:   Trisha Deng (2019)   Next INR check:   2019   Target end date:       Indications    Acute pulmonary embolism without acute cor pulmonale (HCC) [I26.99]  Deep vein thrombosis (DVT) of popliteal vein of both lower extremities (HCC) [I82.433]             Anticoagulation Episode Summary     INR check location:       Preferred lab:       Send INR reminders to:       Comments:         Anticoagulation Care Providers     Provider Role Specialty Phone number    Iveth Stone M.D. Referring Internal Medicine [phone]    [facility] Responsible  [phone]        Anticoagulation Patient Findings      HPI:  Magnus Claudio seen in clinic today, on anticoagulation therapy with warfarin for Recent PE.   Changes to current medical/health status since last appt: none  Denies signs/symptoms of bleeding and/or thrombosis since the last appt.    Denies any interval changes to diet  Denies any interval changes to medications since last appt.   Denies any complications or cost restrictions with current therapy.   BP recorded in vitals.  Confirmed dosing regimen.     A/P   INR  SUB-therapeutic.   Continue enoxaparin, new Rx sent for quantity of 4.  Bolus warfarin 7.5 mg x2 days then Pt is to continue with current warfarin dosing regimen.     Follow up appointment in 4 days (earliest pt can return)    Deo Gil, PharmD

## 2019-08-26 ENCOUNTER — ANTICOAGULATION VISIT (OUTPATIENT)
Dept: VASCULAR LAB | Facility: MEDICAL CENTER | Age: 74
End: 2019-08-26
Attending: INTERNAL MEDICINE
Payer: MEDICARE

## 2019-08-26 VITALS — DIASTOLIC BLOOD PRESSURE: 79 MMHG | HEART RATE: 78 BPM | SYSTOLIC BLOOD PRESSURE: 141 MMHG

## 2019-08-26 DIAGNOSIS — I82.433 DEEP VEIN THROMBOSIS (DVT) OF POPLITEAL VEIN OF BOTH LOWER EXTREMITIES, UNSPECIFIED CHRONICITY (HCC): ICD-10-CM

## 2019-08-26 DIAGNOSIS — I26.99 OTHER ACUTE PULMONARY EMBOLISM WITHOUT ACUTE COR PULMONALE (HCC): ICD-10-CM

## 2019-08-26 LAB
INR BLD: 2.2 (ref 0.9–1.2)
INR PPP: 2.2 (ref 2–3.5)

## 2019-08-26 PROCEDURE — 99212 OFFICE O/P EST SF 10 MIN: CPT | Performed by: NURSE PRACTITIONER

## 2019-08-26 PROCEDURE — 85610 PROTHROMBIN TIME: CPT

## 2019-08-26 NOTE — PROGRESS NOTES
Anticoagulation Summary  As of 2019    INR goal:   2.0-3.0   TTR:   --   INR used for dosin.20 (2019)   Warfarin maintenance plan:   5 mg (2.5 mg x 2) every day   Weekly warfarin total:   35 mg   Plan last modified:   Trisha Deng (2019)   Next INR check:   2019   Target end date:       Indications    Acute pulmonary embolism without acute cor pulmonale (HCC) [I26.99]  Deep vein thrombosis (DVT) of popliteal vein of both lower extremities (HCC) [I82.433]             Anticoagulation Episode Summary     INR check location:       Preferred lab:       Send INR reminders to:       Comments:         Anticoagulation Care Providers     Provider Role Specialty Phone number    Iveth Stone M.D. Referring Internal Medicine [phone]    [facility] Responsible  [phone]        Anticoagulation Patient Findings      HPI:  Magnus Claudio seen in clinic today for follow up on anticoagulation therapy in the presence of DVT, PE.   Denies any changes to current medical/health status since last appointment.   Denies any medication or diet changes.   No current symptoms of bleeding or thrombosis reported.    A/P:   INR therapeutic. INR up from 1.3 on Thursday. He has completed > 5 days of overlap with Lovenox and warfarin. He can stop Lovenox.  Will have pt take 5 mg tonight and tomorrow.  BP recorded in vitals.    Follow up appointment in 2 days.    Next Appointment: 2019 at 2:45 pm.    Radha VILLEDA

## 2019-08-27 ENCOUNTER — TELEPHONE (OUTPATIENT)
Dept: VASCULAR LAB | Facility: MEDICAL CENTER | Age: 74
End: 2019-08-27

## 2019-08-28 ENCOUNTER — ANTICOAGULATION VISIT (OUTPATIENT)
Dept: VASCULAR LAB | Facility: MEDICAL CENTER | Age: 74
End: 2019-08-28
Attending: INTERNAL MEDICINE
Payer: MEDICARE

## 2019-08-28 VITALS — HEART RATE: 74 BPM | SYSTOLIC BLOOD PRESSURE: 144 MMHG | DIASTOLIC BLOOD PRESSURE: 64 MMHG

## 2019-08-28 DIAGNOSIS — I26.99 OTHER ACUTE PULMONARY EMBOLISM WITHOUT ACUTE COR PULMONALE (HCC): ICD-10-CM

## 2019-08-28 DIAGNOSIS — I82.433 DEEP VEIN THROMBOSIS (DVT) OF POPLITEAL VEIN OF BOTH LOWER EXTREMITIES, UNSPECIFIED CHRONICITY (HCC): ICD-10-CM

## 2019-08-28 LAB
INR BLD: 2.4 (ref 0.9–1.2)
INR PPP: 2.4 (ref 2–3.5)

## 2019-08-28 PROCEDURE — 99211 OFF/OP EST MAY X REQ PHY/QHP: CPT | Performed by: NURSE PRACTITIONER

## 2019-08-28 PROCEDURE — 85610 PROTHROMBIN TIME: CPT

## 2019-08-28 NOTE — TELEPHONE ENCOUNTER
Initial anticoagulation clinic note and most recent discharge summary reviewed  Pending further recommendations, we will continue with indefinite anticoagulation for what sounds like a recurrent DVT-PE  Patient does have a history of cancer in the past  Will defer any further indicated work-up for residual, recurrent, or occult malignancy to PCP    Michael J. Bloch, MD  Anticoagulation Center    CC:  Too Brito

## 2019-08-28 NOTE — PROGRESS NOTES
Anticoagulation Summary  As of 2019    INR goal:   2.0-3.0   TTR:   --   INR used for dosin.40 (2019)   Warfarin maintenance plan:   5 mg (2.5 mg x 2) every day   Weekly warfarin total:   35 mg   Plan last modified:   Trisha Deng (2019)   Next INR check:   9/3/2019   Target end date:       Indications    Acute pulmonary embolism without acute cor pulmonale (HCC) [I26.99]  Deep vein thrombosis (DVT) of popliteal vein of both lower extremities (HCC) [I82.433]             Anticoagulation Episode Summary     INR check location:       Preferred lab:       Send INR reminders to:       Comments:         Anticoagulation Care Providers     Provider Role Specialty Phone number    Iveth Stone M.D. Referring Internal Medicine [phone]    [facility] Responsible  [phone]        Anticoagulation Patient Findings      HPI:  Magnus Claudio seen in clinic today for follow up on anticoagulation therapy in the presence of PE, DVT hx.   Denies any changes to current medical/health status since last appointment.   Denies any medication or diet changes.   No current symptoms of bleeding or thrombosis reported.    A/P:   INR therapeutic.   Continue current regimen.   BP recorded in vitals.    Follow up appointment on Tuesday due to upcoming holiday weekend.    Next Appointment: Tuesday, Sept 3, 2019 at 2:00 pm.    Radha VILLEDA

## 2019-09-03 ENCOUNTER — ANTICOAGULATION VISIT (OUTPATIENT)
Dept: VASCULAR LAB | Facility: MEDICAL CENTER | Age: 74
End: 2019-09-03
Attending: INTERNAL MEDICINE
Payer: MEDICARE

## 2019-09-03 VITALS — HEART RATE: 69 BPM | DIASTOLIC BLOOD PRESSURE: 78 MMHG | SYSTOLIC BLOOD PRESSURE: 140 MMHG

## 2019-09-03 DIAGNOSIS — I82.433 DEEP VEIN THROMBOSIS (DVT) OF POPLITEAL VEIN OF BOTH LOWER EXTREMITIES, UNSPECIFIED CHRONICITY (HCC): ICD-10-CM

## 2019-09-03 DIAGNOSIS — I26.99 ACUTE PULMONARY EMBOLISM WITHOUT ACUTE COR PULMONALE, UNSPECIFIED PULMONARY EMBOLISM TYPE (HCC): ICD-10-CM

## 2019-09-03 LAB — INR PPP: 3.1 (ref 2–3.5)

## 2019-09-03 PROCEDURE — 99212 OFFICE O/P EST SF 10 MIN: CPT

## 2019-09-03 PROCEDURE — 85610 PROTHROMBIN TIME: CPT

## 2019-09-04 ENCOUNTER — HOSPITAL ENCOUNTER (OUTPATIENT)
Dept: CARDIOLOGY | Facility: MEDICAL CENTER | Age: 74
End: 2019-09-04
Attending: FAMILY MEDICINE
Payer: MEDICARE

## 2019-09-04 DIAGNOSIS — I27.20 PULMONARY HYPERTENSION (HCC): ICD-10-CM

## 2019-09-04 LAB
LV EJECT FRACT  99904: 60
LV EJECT FRACT MOD 2C 99903: 57.67
LV EJECT FRACT MOD 4C 99902: 60.56
LV EJECT FRACT MOD BP 99901: 59.72

## 2019-09-04 PROCEDURE — 93306 TTE W/DOPPLER COMPLETE: CPT

## 2019-09-04 PROCEDURE — 93306 TTE W/DOPPLER COMPLETE: CPT | Mod: 26 | Performed by: INTERNAL MEDICINE

## 2019-09-05 ENCOUNTER — HOSPITAL ENCOUNTER (OUTPATIENT)
Dept: RADIOLOGY | Facility: MEDICAL CENTER | Age: 74
End: 2019-09-05
Attending: FAMILY MEDICINE
Payer: MEDICARE

## 2019-09-05 ENCOUNTER — OFFICE VISIT (OUTPATIENT)
Dept: MEDICAL GROUP | Facility: MEDICAL CENTER | Age: 74
End: 2019-09-05
Payer: MEDICARE

## 2019-09-05 VITALS
BODY MASS INDEX: 27.02 KG/M2 | TEMPERATURE: 96.9 F | HEIGHT: 71 IN | DIASTOLIC BLOOD PRESSURE: 68 MMHG | SYSTOLIC BLOOD PRESSURE: 114 MMHG | HEART RATE: 67 BPM | OXYGEN SATURATION: 98 % | RESPIRATION RATE: 18 BRPM | WEIGHT: 193 LBS

## 2019-09-05 DIAGNOSIS — M54.2 NECK PAIN: ICD-10-CM

## 2019-09-05 DIAGNOSIS — Z79.01 WARFARIN ANTICOAGULATION: ICD-10-CM

## 2019-09-05 DIAGNOSIS — G90.01 CAROTID ARTERY TENDERNESS: ICD-10-CM

## 2019-09-05 DIAGNOSIS — Z91.89 AT RISK FOR INTRACRANIAL BLEEDING: ICD-10-CM

## 2019-09-05 DIAGNOSIS — R51.9 NONINTRACTABLE HEADACHE, UNSPECIFIED CHRONICITY PATTERN, UNSPECIFIED HEADACHE TYPE: ICD-10-CM

## 2019-09-05 PROCEDURE — 70496 CT ANGIOGRAPHY HEAD: CPT

## 2019-09-05 PROCEDURE — 70498 CT ANGIOGRAPHY NECK: CPT

## 2019-09-05 PROCEDURE — 700117 HCHG RX CONTRAST REV CODE 255: Performed by: FAMILY MEDICINE

## 2019-09-05 PROCEDURE — 99214 OFFICE O/P EST MOD 30 MIN: CPT | Performed by: FAMILY MEDICINE

## 2019-09-05 RX ADMIN — IOHEXOL 100 ML: 350 INJECTION, SOLUTION INTRAVENOUS at 15:59

## 2019-09-05 NOTE — ASSESSMENT & PLAN NOTE
The patient describes a right sided headache with radiations into the R neck and R trapezius associated with tenderness in the region of the carotid artery that waxes and wanes in severity. At its worse it can be severe. It have even woken him from sleep. He denies nausea and vomiting. He has never really had a headache before. He denies visual change and fever. He has tried tylenol but isn't sure if this helps. Of note, he is on Coumadin for a DVT. There is no trauma.

## 2019-09-06 NOTE — PROGRESS NOTES
[facility]  Progress Note  Established Patient    Subjective:   Magnus Claudio is a 74 y.o. male here today with a chief complaint of headache. The patient is alone.     Headache  The patient describes a right sided headache with radiations into the R neck and R trapezius associated with tenderness in the region of the carotid artery that waxes and wanes in severity. At its worse it can be severe. It have even woken him from sleep. He denies nausea and vomiting. He has never really had a headache before. He denies visual change and fever. He has tried tylenol but isn't sure if this helps. Of note, he is on Coumadin for a DVT. There is no trauma.    At risk for intracranial bleeding  The patient is maintained on coumadin.     Carotid artery tenderness  See discussion regarding headache.     Neck pain  See discussion regarding headache.     Warfarin anticoagulation  The patient is maintained on Coumadin.       Current Outpatient Medications on File Prior to Visit   Medication Sig Dispense Refill   • warfarin (COUMADIN) 2.5 MG Tab Take 2 Tabs by mouth every day for 30 days. 60 Tab 0   • metFORMIN ER (GLUCOPHAGE XR) 500 MG TABLET SR 24 HR TAKE 2 TABLETS BY MOUTH TWICE DAILY 360 Tab 4   • pioglitazone (ACTOS) 15 MG Tab Take 2 Tabs by mouth every day. 180 Tab 0   • losartan-hydrochlorothiazide (HYZAAR) 100-12.5 MG per tablet TAKE 1 TABLET BY MOUTH EVERY  Tab 4   • glimepiride (AMARYL) 4 MG Tab Take 2 Tabs by mouth every morning. 180 Tab 4   • amLODIPine (NORVASC) 5 MG Tab Take 1 tablet by mouth every day.  90 Tab 4   • therapeutic multivitamin-minerals (THERAGRAN-M) Tab Take 1 Tab by mouth every day.       No current facility-administered medications on file prior to visit.        Past Medical History:   Diagnosis Date   • Cancer (HCC)     Colon   • Diabetes (HCC)    • Hypertension    • Vertigo, benign positional        Allergies: Morphine    Surgical History:  has a past surgical history that  "includes colon resection; pr resec liver,part lobectomy; and laminotomy.    Family History: family history includes Cancer in his mother; Diabetes in his sister; Heart Attack in his father; Hypertension in his father.    Social History:  reports that he has never smoked. He has never used smokeless tobacco. He reports that he does not drink alcohol or use drugs.    ROS: no fever or nausea.        Objective:     Vitals:    09/05/19 1354   BP: 114/68   BP Location: Left arm   Patient Position: Sitting   BP Cuff Size: Adult   Pulse: 67   Resp: 18   Temp: 36.1 °C (96.9 °F)   TempSrc: Temporal   SpO2: 98%   Weight: 87.5 kg (193 lb)   Height: 1.803 m (5' 11\")       Physical Exam:  General: alert in no apparent distress.   Cardio: regular rate and rhythm, no murmurs, rubs or gallops.  No carotid bruit  Neuro: Cranial nerves II through XII intact.  Finger-nose normal.  Gait normal.         Assessment and Plan:     1. Nonintractable headache, unspecified chronicity pattern, unspecified headache type  This is an undiagnosed new problem with an uncertain prognosis. The patient is 74 years old with a new headache that can be quite severe with a number of red flags.  It has woken him from sleep.  He is also maintained on anticoagulation.  Neurologic exam is normal, however, with the number of red flags the patient certainly requires neuro imaging.  I will send him for a CT angiogram of his head and neck and a CT scan of his head.  If this is normal, I will treat him for a migraine or tension headache.  I am inclined to think that this is more tension related if there is no evidence of intracranial bleed or other intracranial process.     Followup: Return if symptoms worsen or fail to improve.         "

## 2019-09-10 ENCOUNTER — ANTICOAGULATION VISIT (OUTPATIENT)
Dept: VASCULAR LAB | Facility: MEDICAL CENTER | Age: 74
End: 2019-09-10
Attending: INTERNAL MEDICINE
Payer: MEDICARE

## 2019-09-10 VITALS — DIASTOLIC BLOOD PRESSURE: 83 MMHG | SYSTOLIC BLOOD PRESSURE: 138 MMHG | HEART RATE: 72 BPM

## 2019-09-10 DIAGNOSIS — Z79.01 CHRONIC ANTICOAGULATION: ICD-10-CM

## 2019-09-10 LAB
INR BLD: 3.8 (ref 0.9–1.2)
INR PPP: 3.8 (ref 2–3.5)

## 2019-09-10 PROCEDURE — 85610 PROTHROMBIN TIME: CPT

## 2019-09-10 PROCEDURE — 99212 OFFICE O/P EST SF 10 MIN: CPT

## 2019-09-10 NOTE — PROGRESS NOTES
Anticoagulation Summary  As of 9/10/2019    INR goal:   2.0-3.0   TTR:   78.6 % (4 d)   INR used for dosing:      Warfarin maintenance plan:   2.5 mg (2.5 mg x 1) every Tue; 5 mg (2.5 mg x 2) all other days   Weekly warfarin total:   32.5 mg   Plan last modified:   Brooklyn Lewis, PharmD (9/3/2019)   Next INR check:      Target end date:       Indications    Acute pulmonary embolism without acute cor pulmonale (HCC) [I26.99]  Deep vein thrombosis (DVT) of popliteal vein of both lower extremities (HCC) [I82.433]             Anticoagulation Episode Summary     INR check location:       Preferred lab:       Send INR reminders to:       Comments:         Anticoagulation Care Providers     Provider Role Specialty Phone number    Iveth Stone M.D. Referring Internal Medicine [phone]    [facility] Responsible  [phone]        Anticoagulation Patient Findings    History of Present Illness: follow up appointment for chronic anticoagulation with the high risk medication, warfarin for Acute PE.     Last INR was out of range, dosage adjusted: pt still SUPRA therapeutic.    Pt to hold dose tonight, then decrease weekly dose by ~15%    Follow up in 1 weeks, to reduce the risk of adverse events related to this high risk medication, warfarin.    Sarath Nelson, Pharmacy Intern     I have reviewed and concur with the above plan on 09/10/2019.    Natalie Martins, Clinical Pharmacist

## 2019-09-17 ENCOUNTER — ANTICOAGULATION VISIT (OUTPATIENT)
Dept: VASCULAR LAB | Facility: MEDICAL CENTER | Age: 74
End: 2019-09-17
Attending: INTERNAL MEDICINE
Payer: MEDICARE

## 2019-09-17 VITALS — SYSTOLIC BLOOD PRESSURE: 128 MMHG | HEART RATE: 76 BPM | DIASTOLIC BLOOD PRESSURE: 76 MMHG

## 2019-09-17 DIAGNOSIS — I26.99 OTHER ACUTE PULMONARY EMBOLISM WITHOUT ACUTE COR PULMONALE (HCC): ICD-10-CM

## 2019-09-17 DIAGNOSIS — I82.433 DEEP VEIN THROMBOSIS (DVT) OF POPLITEAL VEIN OF BOTH LOWER EXTREMITIES, UNSPECIFIED CHRONICITY (HCC): ICD-10-CM

## 2019-09-17 LAB — INR PPP: 2.2 (ref 2–3.5)

## 2019-09-17 PROCEDURE — 85610 PROTHROMBIN TIME: CPT

## 2019-09-17 PROCEDURE — 99211 OFF/OP EST MAY X REQ PHY/QHP: CPT

## 2019-09-17 RX ORDER — WARFARIN SODIUM 2.5 MG/1
TABLET ORAL
Qty: 60 TAB | Refills: 2 | Status: SHIPPED | OUTPATIENT
Start: 2019-09-17 | End: 2019-12-23

## 2019-09-17 NOTE — PROGRESS NOTES
Anticoagulation Summary  As of 2019    INR goal:   2.0-3.0   TTR:   36.9 % (2.6 wk)   INR used for dosin.20 (2019)   Warfarin maintenance plan:   2.5 mg (2.5 mg x 1) every Tue, Thu, Sat; 5 mg (2.5 mg x 2) all other days   Weekly warfarin total:   27.5 mg   Plan last modified:   Natalie Martins (9/10/2019)   Next INR check:   2019   Target end date:   Indefinite    Indications    Acute pulmonary embolism without acute cor pulmonale (HCC) [I26.99]  Deep vein thrombosis (DVT) of popliteal vein of both lower extremities (HCC) [I82.433]             Anticoagulation Episode Summary     INR check location:       Preferred lab:       Send INR reminders to:       Comments:         Anticoagulation Care Providers     Provider Role Specialty Phone number    Iveth Stone M.D. Referring Internal Medicine [phone]    [facility] Responsible  [phone]        Anticoagulation Patient Findings  Patient Findings     Negatives:   Signs/symptoms of thrombosis, Signs/symptoms of bleeding, Laboratory test error suspected, Change in health, Change in alcohol use, Change in activity, Upcoming invasive procedure, Emergency department visit, Upcoming dental procedure, Missed doses, Extra doses, Change in medications, Change in diet/appetite, Hospital admission, Bruising, Other complaints          HPI:  Magnus Whitesmooth Claudio seen in clinic today, on anticoagulation therapy with warfarin for PE.  Changes to current medical/health status since last appt: none  Denies signs/symptoms of bleeding and/or thrombosis since the last appt.    Denies any interval changes to diet  Denies any interval changes to medications since last appt.   Denies any complications or cost restrictions with current therapy.   BP recorded in vitals.  Confirmed current dosing regimen.     Patient's previous INR was supratherapeutic at 3.8 on 9/10/19, at which time patient was instructed to HOLD, then continue with current  warfarin regimen. He returns to clinic today to recheck INR to ensure it is therapeutic and thus preventing possible clotting and/or bleeding/bruising complications.    A/P   INR is therapeutic today at 2.2.  Patient instructed to continue with the current warfarin dosing regimen, and asked to follow up again in 1 week.     Next appt: Tuesday, Sept 24, 2019  3pm    Ian MarkD

## 2019-09-19 ENCOUNTER — HOSPITAL ENCOUNTER (OUTPATIENT)
Dept: RADIOLOGY | Facility: MEDICAL CENTER | Age: 74
End: 2019-09-19
Attending: INTERNAL MEDICINE
Payer: MEDICARE

## 2019-09-19 DIAGNOSIS — C18.8 MALIGNANT NEOPLASM OF OVERLAPPING SITES OF COLON (HCC): ICD-10-CM

## 2019-09-19 PROCEDURE — 74177 CT ABD & PELVIS W/CONTRAST: CPT

## 2019-09-19 PROCEDURE — 700117 HCHG RX CONTRAST REV CODE 255: Performed by: INTERNAL MEDICINE

## 2019-09-19 RX ADMIN — IOHEXOL 100 ML: 350 INJECTION, SOLUTION INTRAVENOUS at 13:00

## 2019-09-19 RX ADMIN — IOHEXOL 25 ML: 240 INJECTION, SOLUTION INTRATHECAL; INTRAVASCULAR; INTRAVENOUS; ORAL at 13:00

## 2019-09-23 LAB — INR BLD: 2.2 (ref 0.9–1.2)

## 2019-09-24 ENCOUNTER — ANTICOAGULATION VISIT (OUTPATIENT)
Dept: VASCULAR LAB | Facility: MEDICAL CENTER | Age: 74
End: 2019-09-24
Attending: INTERNAL MEDICINE
Payer: MEDICARE

## 2019-09-24 VITALS — HEART RATE: 73 BPM | SYSTOLIC BLOOD PRESSURE: 148 MMHG | DIASTOLIC BLOOD PRESSURE: 87 MMHG

## 2019-09-24 DIAGNOSIS — I82.433 DEEP VEIN THROMBOSIS (DVT) OF POPLITEAL VEIN OF BOTH LOWER EXTREMITIES, UNSPECIFIED CHRONICITY (HCC): ICD-10-CM

## 2019-09-24 DIAGNOSIS — I26.99 OTHER ACUTE PULMONARY EMBOLISM WITHOUT ACUTE COR PULMONALE (HCC): ICD-10-CM

## 2019-09-24 LAB — INR PPP: 1.8 (ref 2–3.5)

## 2019-09-24 PROCEDURE — 99212 OFFICE O/P EST SF 10 MIN: CPT

## 2019-09-24 PROCEDURE — 85610 PROTHROMBIN TIME: CPT

## 2019-09-24 NOTE — PROGRESS NOTES
Anticoagulation Summary  As of 2019    INR goal:   2.0-3.0   TTR:   40.6 % (3.6 wk)   INR used for dosin.80! (2019)   Warfarin maintenance plan:   2.5 mg (2.5 mg x 1) every Tue, Thu, Sat; 5 mg (2.5 mg x 2) all other days   Weekly warfarin total:   27.5 mg   Plan last modified:   Natalie CARRILLO Filter (9/10/2019)   Next INR check:   10/1/2019   Target end date:   Indefinite    Indications    Acute pulmonary embolism without acute cor pulmonale (HCC) [I26.99]  Deep vein thrombosis (DVT) of popliteal vein of both lower extremities (HCC) [I82.433]             Anticoagulation Episode Summary     INR check location:       Preferred lab:       Send INR reminders to:       Comments:         Anticoagulation Care Providers     Provider Role Specialty Phone number    Iveth Stone M.D. Referring Internal Medicine [phone]    [facility] Responsible  [phone]        Anticoagulation Patient Findings      HPI:  Magnus Gudino Shanon seen in clinic today, on anticoagulation therapy with warfarin for PE.   Changes to current medical/health status since last appt: none  Denies signs/symptoms of bleeding and/or thrombosis since the last appt.    Denies any interval changes to diet  Denies any interval changes to medications since last appt.   Denies any complications or cost restrictions with current therapy.   BP recorded in vitals.  BP elevated, requested pt to return with home BP values at next visit.   Confirmed dosing regimen.  Pt did have a missed dose this last week.     A/P   INR  SUB-therapeutic.   Pt accidentally missed a dose recently.     Bolus warfarin today then Pt is to continue with current warfarin dosing regimen.     No enoxaparin in the setting of labile INR due to bleeding risks.     Follow up appointment in 1 week(s).    Deo Gil, DeedeeD

## 2019-09-25 LAB — INR BLD: 1.8 (ref 0.9–1.2)

## 2019-10-01 ENCOUNTER — ANTICOAGULATION VISIT (OUTPATIENT)
Dept: VASCULAR LAB | Facility: MEDICAL CENTER | Age: 74
End: 2019-10-01
Attending: INTERNAL MEDICINE
Payer: MEDICARE

## 2019-10-01 VITALS — DIASTOLIC BLOOD PRESSURE: 95 MMHG | SYSTOLIC BLOOD PRESSURE: 150 MMHG | HEART RATE: 70 BPM

## 2019-10-01 DIAGNOSIS — Z79.01 CHRONIC ANTICOAGULATION: ICD-10-CM

## 2019-10-01 LAB — INR PPP: 2.4 (ref 2–3.5)

## 2019-10-01 PROCEDURE — 99211 OFF/OP EST MAY X REQ PHY/QHP: CPT

## 2019-10-01 PROCEDURE — 85610 PROTHROMBIN TIME: CPT

## 2019-10-01 NOTE — PROGRESS NOTES
Anticoagulation Summary  As of 10/1/2019    INR goal:   2.0-3.0   TTR:   46.3 % (1.1 mo)   INR used for dosin.40 (10/1/2019)   Warfarin maintenance plan:   2.5 mg (2.5 mg x 1) every Thu, Sat; 5 mg (2.5 mg x 2) all other days   Weekly warfarin total:   30 mg   Plan last modified:   Deedee GilbertD (2019)   Next INR check:   10/15/2019   Target end date:   Indefinite    Indications    Acute pulmonary embolism without acute cor pulmonale (HCC) [I26.99]  Deep vein thrombosis (DVT) of popliteal vein of both lower extremities (HCC) [I82.433]             Anticoagulation Episode Summary     INR check location:       Preferred lab:       Send INR reminders to:       Comments:         Anticoagulation Care Providers     Provider Role Specialty Phone number    Iveth Stone M.D. Referring Internal Medicine [phone]    [facility] Responsible  [phone]        Anticoagulation Patient Findings      HPI:  Magnus Gudino Shanon seen in clinic today, on anticoagulation therapy with warfarin for history of Pe, DVT  Changes to current medical/health status since last appt: none  Denies signs/symptoms of bleeding and/or thrombosis since the last appt.    Denies any interval changes to diet  Denies any interval changes to medications since last appt.   Denies any complications or cost restrictions with current therapy.   BP recorded in vitals.  BP was normal at doctor appointment earlier today per patient; continue to trend.     A/P   INR therapeutic.   Continue with current dosing regimen    Follow up appointment in 2 week(s).    Deo Gil, PharmD

## 2019-10-02 LAB — INR BLD: 2.4 (ref 0.9–1.2)

## 2019-10-15 ENCOUNTER — ANTICOAGULATION VISIT (OUTPATIENT)
Dept: VASCULAR LAB | Facility: MEDICAL CENTER | Age: 74
End: 2019-10-15
Attending: INTERNAL MEDICINE
Payer: MEDICARE

## 2019-10-15 VITALS — SYSTOLIC BLOOD PRESSURE: 160 MMHG | HEART RATE: 77 BPM | DIASTOLIC BLOOD PRESSURE: 89 MMHG

## 2019-10-15 DIAGNOSIS — Z79.01 CHRONIC ANTICOAGULATION: ICD-10-CM

## 2019-10-15 LAB — INR PPP: 2 (ref 2–3.5)

## 2019-10-15 PROCEDURE — 99211 OFF/OP EST MAY X REQ PHY/QHP: CPT

## 2019-10-15 PROCEDURE — 85610 PROTHROMBIN TIME: CPT

## 2019-10-15 NOTE — PROGRESS NOTES
Anticoagulation Summary  As of 10/15/2019    INR goal:   2.0-3.0   TTR:   62.6 % (1.5 mo)   INR used for dosin.00 (10/15/2019)   Warfarin maintenance plan:   2.5 mg (2.5 mg x 1) every Thu, Sat; 5 mg (2.5 mg x 2) all other days   Weekly warfarin total:   30 mg   Plan last modified:   Deo Gil PharmD (2019)   Next INR check:   10/29/2019   Target end date:   Indefinite    Indications    Acute pulmonary embolism without acute cor pulmonale (HCC) [I26.99]  Deep vein thrombosis (DVT) of popliteal vein of both lower extremities (HCC) [I82.433]             Anticoagulation Episode Summary     INR check location:       Preferred lab:       Send INR reminders to:       Comments:         Anticoagulation Care Providers     Provider Role Specialty Phone number    Iveth Stone M.D. Referring Internal Medicine [phone]    [facility] Responsible  [phone]        Anticoagulation Patient Findings      HPI:  Magnus Claudio seen in clinic today, on anticoagulation therapy with warfarin for PE.   Changes to current medical/health status since last appt: none  Denies signs/symptoms of bleeding and/or thrombosis since the last appt.    Denies any interval changes to diet  Denies any interval changes to medications since last appt.   Denies any complications or cost restrictions with current therapy.   BP recorded in vitals.  Confirmed dosing regimen.     A/P   INR  therapeutic.   Pt is to continue with current warfarin dosing regimen.     Follow up appointment in 2 week(s).    Deo Gil, PharmD

## 2019-10-16 ENCOUNTER — TELEPHONE (OUTPATIENT)
Dept: MEDICAL GROUP | Facility: MEDICAL CENTER | Age: 74
End: 2019-10-16

## 2019-10-16 NOTE — TELEPHONE ENCOUNTER
Pt. Pharmacy reached out to us stating the combo for Losartan HCTZ is not available only the medications separately. They would like to know if you can send in a new Rx along with strength, directions, quantity and refills.

## 2019-10-17 ENCOUNTER — PATIENT MESSAGE (OUTPATIENT)
Dept: MEDICAL GROUP | Facility: CLINIC | Age: 74
End: 2019-10-17

## 2019-10-17 LAB — INR BLD: 2 (ref 0.9–1.2)

## 2019-10-17 RX ORDER — LOSARTAN POTASSIUM 100 MG/1
100 TABLET ORAL DAILY
Qty: 100 TAB | Refills: 4 | Status: SHIPPED | OUTPATIENT
Start: 2019-10-17 | End: 2020-12-21

## 2019-10-17 RX ORDER — HYDROCHLOROTHIAZIDE 12.5 MG/1
12.5 TABLET ORAL DAILY
Qty: 100 TAB | Refills: 4 | Status: SHIPPED | OUTPATIENT
Start: 2019-10-17 | End: 2020-12-21

## 2019-10-17 NOTE — TELEPHONE ENCOUNTER
Please check with other nearby pharmacies to see if they have it in stock. If so, please check with patient to see if he'd be willing to go to an alternative pharmacy.

## 2019-10-17 NOTE — TELEPHONE ENCOUNTER
Called [facility]/[address] and they have it.  I tried to call pt but was unable to get through is cell or home numbers.   I send pt a mess via my chart.

## 2019-10-29 ENCOUNTER — ANTICOAGULATION VISIT (OUTPATIENT)
Dept: VASCULAR LAB | Facility: MEDICAL CENTER | Age: 74
End: 2019-10-29
Attending: INTERNAL MEDICINE
Payer: MEDICARE

## 2019-10-29 DIAGNOSIS — Z79.01 CHRONIC ANTICOAGULATION: ICD-10-CM

## 2019-10-29 LAB — INR PPP: 1.8 (ref 2–3.5)

## 2019-10-29 PROCEDURE — 85610 PROTHROMBIN TIME: CPT

## 2019-10-29 PROCEDURE — 99212 OFFICE O/P EST SF 10 MIN: CPT

## 2019-10-29 NOTE — PROGRESS NOTES
OP Anticoagulation Service Note    Date: 10/29/2019    Anticoagulation Summary  As of 10/29/2019    INR goal:   2.0-3.0   TTR:   48.0 % (2 mo)   INR used for dosin.80! (10/29/2019)   Warfarin maintenance plan:   2.5 mg (2.5 mg x 1) every Sat; 5 mg (2.5 mg x 2) all other days   Weekly warfarin total:   32.5 mg   Plan last modified:   Chau Hawkins, PharmD (10/29/2019)   Next INR check:   2019   Target end date:   Indefinite    Indications    Acute pulmonary embolism without acute cor pulmonale (HCC) [I26.99]  Deep vein thrombosis (DVT) of popliteal vein of both lower extremities (HCC) [I82.433]             Anticoagulation Episode Summary     INR check location:       Preferred lab:       Send INR reminders to:       Comments:         Anticoagulation Care Providers     Provider Role Specialty Phone number    Iveth Stone M.D. Referring Internal Medicine [phone]    [facility]tico[facility] Responsible  [phone]        Anticoagulation Patient Findings      HPI:   Magnus Claudio seen in clinic today, they are here today for a INR check on anticoagulation therapy     The reason for today's visit is to prevent morbidity and mortality from a blood clot or stroke and to reduce the risk of bleeding while on a anticoagulant.     Dose the patient in the medical group have a [facility] primary care provider and proper insurance?  Too Brito M.D.  [address]  [place] [id number] [phone]      Additional education provided today regarding reducing bleed risk and dietary constraints:  About how vitamin K and foods work with warfarin and the bleeding risk on a anticoagulant     Any upcoming procedures:   none    Confirmed warfarin dosing regimen  Interval Changes with foods rich in vitamin K: No  Interval Changes in ETOH:   No  Interval Changes in smoking status:  No  Interval Changes in medication:  No   Cost restriction:  No  S/S of bleeding or bruising:   No  Signs/symptoms  thrombosis since the last appt:  No  Bleed risk is:  moderate,       Assessment:   INR  sub-therapeutic.     Medications reviewed and updated--    3 vitals included with today's appt :  (BP, HR, weight, ht, RR)   There were no vitals filed for this visit.      Plan:  Increase weekly warfarin dose as noted      Follow up:  Follow up appointment in 2 week(s)       Other info:  Pt educated to contact our clinic with any changes in medications or s/s of bleeding or thrombosis    CHEST guidelines recommend frequent INR monitoring at regular intervals (a few days up to a max of 12 weeks) to ensure they are on the proper dose of warfarin and not having any complications from therapy.  INRs can dramatically change over a short time period due to diet, medications, and medical conditions.     GERARDO Banks.S., Pharm.D., [facility], [facility]    This note was created using voice recognition software (Dragon). The accuracy of the dictation is limited by the abilities of the software. I have reviewed the note prior to signing, however some errors in grammar and context are still possible. If you have any questions related to this note please do not hesitate to contact our office.

## 2019-11-12 ENCOUNTER — ANTICOAGULATION VISIT (OUTPATIENT)
Dept: VASCULAR LAB | Facility: MEDICAL CENTER | Age: 74
End: 2019-11-12
Attending: INTERNAL MEDICINE
Payer: MEDICARE

## 2019-11-12 VITALS — HEART RATE: 81 BPM | DIASTOLIC BLOOD PRESSURE: 69 MMHG | SYSTOLIC BLOOD PRESSURE: 135 MMHG

## 2019-11-12 DIAGNOSIS — I26.99 OTHER ACUTE PULMONARY EMBOLISM WITHOUT ACUTE COR PULMONALE (HCC): ICD-10-CM

## 2019-11-12 DIAGNOSIS — I82.433 DEEP VEIN THROMBOSIS (DVT) OF POPLITEAL VEIN OF BOTH LOWER EXTREMITIES, UNSPECIFIED CHRONICITY (HCC): ICD-10-CM

## 2019-11-12 LAB
INR BLD: 2.6 (ref 0.9–1.2)
INR PPP: 2.6 (ref 2–3.5)

## 2019-11-12 PROCEDURE — 99211 OFF/OP EST MAY X REQ PHY/QHP: CPT

## 2019-11-12 PROCEDURE — 85610 PROTHROMBIN TIME: CPT

## 2019-12-02 ENCOUNTER — OFFICE VISIT (OUTPATIENT)
Dept: MEDICAL GROUP | Facility: MEDICAL CENTER | Age: 74
End: 2019-12-02
Payer: MEDICARE

## 2019-12-02 VITALS
DIASTOLIC BLOOD PRESSURE: 82 MMHG | HEIGHT: 71 IN | HEART RATE: 88 BPM | OXYGEN SATURATION: 99 % | RESPIRATION RATE: 16 BRPM | TEMPERATURE: 98 F | SYSTOLIC BLOOD PRESSURE: 144 MMHG | WEIGHT: 198 LBS | BODY MASS INDEX: 27.72 KG/M2

## 2019-12-02 DIAGNOSIS — I10 ESSENTIAL HYPERTENSION: ICD-10-CM

## 2019-12-02 DIAGNOSIS — I26.99 OTHER ACUTE PULMONARY EMBOLISM WITHOUT ACUTE COR PULMONALE (HCC): ICD-10-CM

## 2019-12-02 DIAGNOSIS — Z23 NEED FOR VACCINATION: ICD-10-CM

## 2019-12-02 DIAGNOSIS — R51.9 NONINTRACTABLE HEADACHE, UNSPECIFIED CHRONICITY PATTERN, UNSPECIFIED HEADACHE TYPE: ICD-10-CM

## 2019-12-02 DIAGNOSIS — E11.9 CONTROLLED TYPE 2 DIABETES MELLITUS WITHOUT COMPLICATION, WITHOUT LONG-TERM CURRENT USE OF INSULIN (HCC): ICD-10-CM

## 2019-12-02 LAB
HBA1C MFR BLD: 6.6 % (ref 0–5.6)
INT CON NEG: NEGATIVE
INT CON POS: POSITIVE

## 2019-12-02 PROCEDURE — 99214 OFFICE O/P EST MOD 30 MIN: CPT | Mod: 25 | Performed by: FAMILY MEDICINE

## 2019-12-02 PROCEDURE — 83036 HEMOGLOBIN GLYCOSYLATED A1C: CPT | Performed by: FAMILY MEDICINE

## 2019-12-02 PROCEDURE — G0009 ADMIN PNEUMOCOCCAL VACCINE: HCPCS | Performed by: FAMILY MEDICINE

## 2019-12-02 PROCEDURE — 90732 PPSV23 VACC 2 YRS+ SUBQ/IM: CPT | Performed by: FAMILY MEDICINE

## 2019-12-02 NOTE — PROGRESS NOTES
[facility]  Progress Note  Established Patient    Subjective:   Magnus Claudio is a 74 y.o. male here today with a chief complaint of diabetes. The patient is alone.     Headache  This has improved.     Essential hypertension  A bit elevated today but generally at goal.     Controlled type 2 diabetes mellitus without complication, without long-term current use of insulin (HCC)  Patient's diabetes is better controlled. He states fasting sugars are 120-140. He has no lows.     Acute pulmonary embolism without acute cor pulmonale (HCC)  The patient is now following with heme/onc and anticoag clinic.       Current Outpatient Medications on File Prior to Visit   Medication Sig Dispense Refill   • losartan (COZAAR) 100 MG Tab Take 1 Tab by mouth every day. 100 Tab 4   • hydroCHLOROthiazide (HYDRODIURIL) 12.5 MG tablet Take 1 Tab by mouth every day. 100 Tab 4   • warfarin (COUMADIN) 2.5 MG Tab Take one to two tablets by mouth each day OR as directed by the Anticoagulation Clinic 60 Tab 2   • pioglitazone (ACTOS) 15 MG Tab TAKE 2 TABLETS BY MOUTH EVERY  Tab 4   • metFORMIN ER (GLUCOPHAGE XR) 500 MG TABLET SR 24 HR TAKE 2 TABLETS BY MOUTH TWICE DAILY 360 Tab 4   • glimepiride (AMARYL) 4 MG Tab Take 2 Tabs by mouth every morning. 180 Tab 4   • amLODIPine (NORVASC) 5 MG Tab Take 1 tablet by mouth every day.  90 Tab 4   • therapeutic multivitamin-minerals (THERAGRAN-M) Tab Take 1 Tab by mouth every day.       No current facility-administered medications on file prior to visit.        Past Medical History:   Diagnosis Date   • Cancer (HCC)     Colon   • Cataract    • Diabetes (HCC)    • Hypertension    • Vertigo, benign positional        Allergies: Morphine    Surgical History:  has a past surgical history that includes colon resection; pr resec liver,part lobectomy; laminotomy; and eye surgery (Bilateral).    Family History: family history includes Cancer in his mother; Diabetes in his sister; Heart  "Attack in his father; Hypertension in his father.    Social History:  reports that he has never smoked. He has never used smokeless tobacco. He reports that he does not drink alcohol or use drugs.    ROS: no fever or nausea.        Objective:     Vitals:    12/02/19 1054   BP: 144/82   BP Location: Left arm   Patient Position: Sitting   BP Cuff Size: Adult   Pulse: 88   Resp: 16   Temp: 36.7 °C (98 °F)   TempSrc: Temporal   SpO2: 99%   Weight: 89.8 kg (198 lb)   Height: 1.803 m (5' 11\")       Physical Exam:  General: alert in no apparent distress.       A1c: 6.6.         Assessment and Plan:     1. Controlled type 2 diabetes mellitus without complication, without long-term current use of insulin (HCC)  - continue current regimen.   - POCT A1C    2. Need for vaccination  - Pneumoccocal Polysaccharide Vaccine 23-Valent =>3yo SQ/IM    3. Other acute pulmonary embolism without acute cor pulmonale (HCC)  - f/u heme/onc and anticoag clinic.   - lifetime anticoag.     4. Essential hypertension  - continue current regimen for now.     5. Nonintractable headache, unspecified chronicity pattern, unspecified headache type  - improved.         Followup: Return in about 4 months (around 4/2/2020), or if symptoms worsen or fail to improve.         "

## 2019-12-03 ENCOUNTER — ANTICOAGULATION VISIT (OUTPATIENT)
Dept: VASCULAR LAB | Facility: MEDICAL CENTER | Age: 74
End: 2019-12-03
Attending: INTERNAL MEDICINE
Payer: MEDICARE

## 2019-12-03 DIAGNOSIS — Z79.01 CHRONIC ANTICOAGULATION: ICD-10-CM

## 2019-12-03 LAB — INR PPP: 3.2 (ref 2–3.5)

## 2019-12-03 PROCEDURE — 85610 PROTHROMBIN TIME: CPT

## 2019-12-03 PROCEDURE — 99212 OFFICE O/P EST SF 10 MIN: CPT

## 2019-12-03 NOTE — PROGRESS NOTES
Anticoagulation Summary  As of 12/3/2019    INR goal:   2.0-3.0   TTR:   56.1 % (3.2 mo)   INR used for dosing:   3.20! (12/3/2019)   Warfarin maintenance plan:   2.5 mg (2.5 mg x 1) every Sat; 5 mg (2.5 mg x 2) all other days   Weekly warfarin total:   32.5 mg   Plan last modified:   Chau Hawkins, PharmD (10/29/2019)   Next INR check:   12/23/2019   Target end date:   Indefinite    Indications    Acute pulmonary embolism without acute cor pulmonale (HCC) [I26.99]  Deep vein thrombosis (DVT) of popliteal vein of both lower extremities (HCC) [I82.433]             Anticoagulation Episode Summary     INR check location:       Preferred lab:       Send INR reminders to:       Comments:         Anticoagulation Care Providers     Provider Role Specialty Phone number    Iveth Stone M.D. Referring Internal Medicine [phone]    [facility] Responsible  [phone]        Anticoagulation Patient Findings  Patient Findings     Negatives:   Signs/symptoms of thrombosis, Signs/symptoms of bleeding, Laboratory test error suspected, Change in health, Change in alcohol use, Change in activity, Upcoming invasive procedure, Emergency department visit, Upcoming dental procedure, Missed doses, Extra doses, Change in medications, Change in diet/appetite, Hospital admission, Bruising, Other complaints              History of Present Illness: follow up appointment for chronic anticoagulation with the high risk medication, warfarin for PE    Last INR was at goal, pt is now supra therapeutic.  Will decrease tonight's dose to 2.5mg then resume current dosing regimen.  (pt enjoyed some cranberries for the holiday)  Follow up in 3 weeks, to reduce the risk of adverse events related to this high risk medication, warfarin.  Pt declines vitals today  Natalie Martins, Clinical Pharmacist

## 2019-12-04 LAB — INR BLD: 3.2 (ref 0.9–1.2)

## 2019-12-18 DIAGNOSIS — E11.9 TYPE 2 DIABETES MELLITUS WITHOUT COMPLICATION, WITHOUT LONG-TERM CURRENT USE OF INSULIN (HCC): ICD-10-CM

## 2019-12-23 ENCOUNTER — ANTICOAGULATION VISIT (OUTPATIENT)
Dept: VASCULAR LAB | Facility: MEDICAL CENTER | Age: 74
End: 2019-12-23
Attending: INTERNAL MEDICINE
Payer: MEDICARE

## 2019-12-23 DIAGNOSIS — Z79.01 CHRONIC ANTICOAGULATION: ICD-10-CM

## 2019-12-23 LAB — INR PPP: 2.3 (ref 2–3.5)

## 2019-12-23 PROCEDURE — 85610 PROTHROMBIN TIME: CPT

## 2019-12-23 PROCEDURE — 99211 OFF/OP EST MAY X REQ PHY/QHP: CPT

## 2019-12-23 NOTE — PROGRESS NOTES
Anticoagulation Summary  As of 2019    INR goal:   2.0-3.0   TTR:   59.9 % (3.8 mo)   INR used for dosin.30 (2019)   Warfarin maintenance plan:   2.5 mg (2.5 mg x 1) every Sat; 5 mg (2.5 mg x 2) all other days   Weekly warfarin total:   32.5 mg   Plan last modified:   Chau Hawkins PharmD (10/29/2019)   Next INR check:   2020   Target end date:   Indefinite    Indications    Acute pulmonary embolism without acute cor pulmonale (HCC) [I26.99]  Deep vein thrombosis (DVT) of popliteal vein of both lower extremities (HCC) [I82.433]             Anticoagulation Episode Summary     INR check location:       Preferred lab:       Send INR reminders to:       Comments:         Anticoagulation Care Providers     Provider Role Specialty Phone number    Iveth Stone M.D. Referring Internal Medicine [phone]    [facility] Responsible  [phone]        Anticoagulation Patient Findings      HPI:  Magnus Gudino Shanon seen in clinic today, on anticoagulation therapy with warfarin for PE.   Changes to current medical/health status since last appt: none  Pt reports one episode    Denies any interval changes to diet  Denies any interval changes to medications since last appt.   Denies any complications or cost restrictions with current therapy.   Declines vitals.     A/P   INR  therapeutic.   Pt is to continue with current warfarin dosing regimen.     Follow up appointment in 3 week(s).    Deo Gil, DeedeeD

## 2020-01-13 ENCOUNTER — ANTICOAGULATION VISIT (OUTPATIENT)
Dept: VASCULAR LAB | Facility: MEDICAL CENTER | Age: 75
End: 2020-01-13
Attending: INTERNAL MEDICINE
Payer: MEDICARE

## 2020-01-13 DIAGNOSIS — Z79.01 CHRONIC ANTICOAGULATION: ICD-10-CM

## 2020-01-13 LAB — INR PPP: 3 (ref 2–3.5)

## 2020-01-13 PROCEDURE — 99211 OFF/OP EST MAY X REQ PHY/QHP: CPT | Performed by: PHARMACIST

## 2020-01-13 PROCEDURE — 85610 PROTHROMBIN TIME: CPT

## 2020-01-13 NOTE — PROGRESS NOTES
Anticoagulation Summary  As of 1/13/2020    INR goal:   2.0-3.0   TTR:   66.1 % (4.5 mo)   INR used for dosing:   3.00 (1/13/2020)   Warfarin maintenance plan:   2.5 mg (2.5 mg x 1) every Sat; 5 mg (2.5 mg x 2) all other days   Weekly warfarin total:   32.5 mg   Plan last modified:   Deedee BanksD (10/29/2019)   Next INR check:   2/3/2020   Target end date:   Indefinite    Indications    Acute pulmonary embolism without acute cor pulmonale (HCC) [I26.99]  Deep vein thrombosis (DVT) of popliteal vein of both lower extremities (HCC) [I82.433]             Anticoagulation Episode Summary     INR check location:       Preferred lab:       Send INR reminders to:       Comments:         Anticoagulation Care Providers     Provider Role Specialty Phone number    Iveth Stone M.D. Referring Internal Medicine [phone]    [facility] Responsible  [phone]                Anticoagulation Patient Findings      HPI:  Magnus Claudio seen in clinic today, on anticoagulation therapy with warfarin for Acute PE/DVT  Changes to current medical/health status since last appt: denies  Denies signs/symptoms of bleeding and/or thrombosis since the last appt.    Denies any interval changes to diet  Denies any interval changes to medications since last appt.   Denies any complications or cost restrictions with current therapy.   BP declined.       A/P   INR  is-therapeutic.   Will continue with the same warfarin dosing.     Follow up appointment in 3 week(s).    Michelle Saldivar, PharmD

## 2020-01-14 LAB — INR BLD: 3 (ref 0.9–1.2)

## 2020-02-03 ENCOUNTER — ANTICOAGULATION VISIT (OUTPATIENT)
Dept: VASCULAR LAB | Facility: MEDICAL CENTER | Age: 75
End: 2020-02-03
Attending: INTERNAL MEDICINE
Payer: MEDICARE

## 2020-02-03 DIAGNOSIS — Z79.01 CHRONIC ANTICOAGULATION: ICD-10-CM

## 2020-02-03 LAB
INR BLD: 3.1 (ref 0.9–1.2)
INR PPP: 3.1 (ref 2–3.5)

## 2020-02-03 PROCEDURE — 99211 OFF/OP EST MAY X REQ PHY/QHP: CPT

## 2020-02-03 PROCEDURE — 85610 PROTHROMBIN TIME: CPT

## 2020-02-03 NOTE — PROGRESS NOTES
Anticoagulation Summary  As of 2/3/2020    INR goal:   2.0-3.0   TTR:   57.2 % (5.2 mo)   INR used for dosing:   3.10! (2/3/2020)   Warfarin maintenance plan:   2.5 mg (2.5 mg x 1) every Sat; 5 mg (2.5 mg x 2) all other days   Weekly warfarin total:   32.5 mg   Plan last modified:   Chau Hawkins, PharmD (10/29/2019)   Next INR check:   2/25/2020   Target end date:   Indefinite    Indications    Acute pulmonary embolism without acute cor pulmonale (HCC) [I26.99]  Deep vein thrombosis (DVT) of popliteal vein of both lower extremities (HCC) [I82.433]             Anticoagulation Episode Summary     INR check location:       Preferred lab:       Send INR reminders to:       Comments:         Anticoagulation Care Providers     Provider Role Specialty Phone number    Iveth Stone M.D. Referring Internal Medicine [phone]    [facility] Responsible  [phone]                Anticoagulation Patient Findings  Patient Findings     Positives:   Change in health (new onset cold)    Negatives:   Signs/symptoms of thrombosis, Signs/symptoms of bleeding, Laboratory test error suspected, Change in alcohol use, Change in activity, Upcoming invasive procedure, Emergency department visit, Upcoming dental procedure, Missed doses, Extra doses, Change in medications, Change in diet/appetite, Hospital admission, Bruising, Other complaints          HPI:  Magnus Gudino Shanon seen in clinic today, on anticoagulation therapy with warfarin for hx of PE and DVT  Changes to current medical/health status since last appt: Pt reports a mild cold onset a day ago  Denies signs/symptoms of bleeding and/or thrombosis since the last appt.    Denies any interval changes to diet  Denies any interval changes to medications since last appt.   Denies any complications or cost restrictions with current therapy.   BP denied by patient  Patient confirmed current dosing regimen    A/P   INR is Supra-therapeutic at 3.1.   Pt with  recent cold, likely causing INR elevation. Not taking any antibiotics. Given stability over past 2 months on current regimen, will not make any changes at this time.     Instructed patient to continue current regimen    Follow up appointment in 3 weeks, per patient preference.    Willie Coffey, DeedeeD

## 2020-02-10 DIAGNOSIS — I10 ESSENTIAL HYPERTENSION: ICD-10-CM

## 2020-02-10 RX ORDER — AMLODIPINE BESYLATE 5 MG/1
5 TABLET ORAL
Qty: 90 TAB | Refills: 4 | Status: SHIPPED | OUTPATIENT
Start: 2020-02-10 | End: 2021-02-16

## 2020-02-25 ENCOUNTER — ANTICOAGULATION VISIT (OUTPATIENT)
Dept: VASCULAR LAB | Facility: MEDICAL CENTER | Age: 75
End: 2020-02-25
Attending: INTERNAL MEDICINE
Payer: MEDICARE

## 2020-02-25 VITALS — HEART RATE: 76 BPM | DIASTOLIC BLOOD PRESSURE: 81 MMHG | SYSTOLIC BLOOD PRESSURE: 167 MMHG

## 2020-02-25 DIAGNOSIS — Z79.01 CHRONIC ANTICOAGULATION: ICD-10-CM

## 2020-02-25 LAB
INR BLD: 2.6 (ref 0.9–1.2)
INR PPP: 2.6 (ref 2–3.5)

## 2020-02-25 PROCEDURE — 85610 PROTHROMBIN TIME: CPT

## 2020-02-25 PROCEDURE — 99211 OFF/OP EST MAY X REQ PHY/QHP: CPT

## 2020-02-25 NOTE — PROGRESS NOTES
Anticoagulation Summary  As of 2020    INR goal:   2.0-3.0   TTR:   60.0 % (6 mo)   INR used for dosin.60 (2020)   Warfarin maintenance plan:   2.5 mg (2.5 mg x 1) every Sat; 5 mg (2.5 mg x 2) all other days   Weekly warfarin total:   32.5 mg   Plan last modified:   Chau Hawkins, PharmD (10/29/2019)   Next INR check:   3/24/2020   Target end date:   Indefinite    Indications    Acute pulmonary embolism without acute cor pulmonale (HCC) [I26.99]  Deep vein thrombosis (DVT) of popliteal vein of both lower extremities (HCC) [I82.433]             Anticoagulation Episode Summary     INR check location:       Preferred lab:       Send INR reminders to:       Comments:         Anticoagulation Care Providers     Provider Role Specialty Phone number    Iveth Stone M.D. Referring Internal Medicine [phone]    [facility] Anticoagulation Services Responsible  [phone]        Anticoagulation Patient Findings  Patient Findings     Negatives:   Signs/symptoms of thrombosis, Signs/symptoms of bleeding, Laboratory test error suspected, Change in health, Change in alcohol use, Change in activity, Upcoming invasive procedure, Emergency department visit, Upcoming dental procedure, Missed doses, Extra doses, Change in medications, Change in diet/appetite, Hospital admission, Bruising, Other complaints              History of Present Illness: follow up appointment for chronic anticoagulation with the high risk medication, warfarin for PE/DVT    Last INR was out of range, dosage adjusted: pt is now at goal. Continue current dosing regimen.  Follow up in 4 weeks, to reduce the risk of adverse events related to this high risk medication, warfarin.      Natalie Martins, Clinical Pharmacist

## 2020-03-23 ENCOUNTER — HOSPITAL ENCOUNTER (OUTPATIENT)
Dept: LAB | Facility: MEDICAL CENTER | Age: 75
End: 2020-03-23
Attending: INTERNAL MEDICINE
Payer: MEDICARE

## 2020-03-23 LAB
ALBUMIN SERPL BCP-MCNC: 4.2 G/DL (ref 3.2–4.9)
ALBUMIN/GLOB SERPL: 1.5 G/DL
ALP SERPL-CCNC: 51 U/L (ref 30–99)
ALT SERPL-CCNC: 19 U/L (ref 2–50)
ANION GAP SERPL CALC-SCNC: 12 MMOL/L (ref 7–16)
AST SERPL-CCNC: 25 U/L (ref 12–45)
BASOPHILS # BLD AUTO: 0.3 % (ref 0–1.8)
BASOPHILS # BLD: 0.02 K/UL (ref 0–0.12)
BILIRUB SERPL-MCNC: 2 MG/DL (ref 0.1–1.5)
BUN SERPL-MCNC: 14 MG/DL (ref 8–22)
CALCIUM SERPL-MCNC: 9.1 MG/DL (ref 8.4–10.2)
CEA SERPL-MCNC: 2.7 NG/ML (ref 0–3)
CHLORIDE SERPL-SCNC: 103 MMOL/L (ref 96–112)
CO2 SERPL-SCNC: 26 MMOL/L (ref 20–33)
CREAT SERPL-MCNC: 0.95 MG/DL (ref 0.5–1.4)
EOSINOPHIL # BLD AUTO: 0.06 K/UL (ref 0–0.51)
EOSINOPHIL NFR BLD: 1 % (ref 0–6.9)
ERYTHROCYTE [DISTWIDTH] IN BLOOD BY AUTOMATED COUNT: 43.6 FL (ref 35.9–50)
FASTING STATUS PATIENT QL REPORTED: NORMAL
GLOBULIN SER CALC-MCNC: 2.8 G/DL (ref 1.9–3.5)
GLUCOSE SERPL-MCNC: 151 MG/DL (ref 65–99)
HCT VFR BLD AUTO: 43.9 % (ref 42–52)
HGB BLD-MCNC: 14.7 G/DL (ref 14–18)
IMM GRANULOCYTES # BLD AUTO: 0.03 K/UL (ref 0–0.11)
IMM GRANULOCYTES NFR BLD AUTO: 0.5 % (ref 0–0.9)
LYMPHOCYTES # BLD AUTO: 1.22 K/UL (ref 1–4.8)
LYMPHOCYTES NFR BLD: 20.3 % (ref 22–41)
MCH RBC QN AUTO: 29.6 PG (ref 27–33)
MCHC RBC AUTO-ENTMCNC: 33.5 G/DL (ref 33.7–35.3)
MCV RBC AUTO: 88.3 FL (ref 81.4–97.8)
MONOCYTES # BLD AUTO: 0.31 K/UL (ref 0–0.85)
MONOCYTES NFR BLD AUTO: 5.2 % (ref 0–13.4)
NEUTROPHILS # BLD AUTO: 4.36 K/UL (ref 1.82–7.42)
NEUTROPHILS NFR BLD: 72.7 % (ref 44–72)
NRBC # BLD AUTO: 0 K/UL
NRBC BLD-RTO: 0 /100 WBC
PLATELET # BLD AUTO: 150 K/UL (ref 164–446)
PMV BLD AUTO: 9.6 FL (ref 9–12.9)
POTASSIUM SERPL-SCNC: 3.7 MMOL/L (ref 3.6–5.5)
PROT SERPL-MCNC: 7 G/DL (ref 6–8.2)
RBC # BLD AUTO: 4.97 M/UL (ref 4.7–6.1)
SODIUM SERPL-SCNC: 141 MMOL/L (ref 135–145)
WBC # BLD AUTO: 6 K/UL (ref 4.8–10.8)

## 2020-03-23 PROCEDURE — 80053 COMPREHEN METABOLIC PANEL: CPT

## 2020-03-23 PROCEDURE — 36415 COLL VENOUS BLD VENIPUNCTURE: CPT

## 2020-03-23 PROCEDURE — 85025 COMPLETE CBC W/AUTO DIFF WBC: CPT

## 2020-03-23 PROCEDURE — 82378 CARCINOEMBRYONIC ANTIGEN: CPT

## 2020-03-24 ENCOUNTER — ANTICOAGULATION VISIT (OUTPATIENT)
Dept: VASCULAR LAB | Facility: MEDICAL CENTER | Age: 75
End: 2020-03-24
Attending: INTERNAL MEDICINE
Payer: MEDICARE

## 2020-03-24 DIAGNOSIS — I82.433 DEEP VEIN THROMBOSIS (DVT) OF POPLITEAL VEIN OF BOTH LOWER EXTREMITIES, UNSPECIFIED CHRONICITY (HCC): ICD-10-CM

## 2020-03-24 LAB — INR PPP: 2.4 (ref 2–3.5)

## 2020-03-24 PROCEDURE — 99211 OFF/OP EST MAY X REQ PHY/QHP: CPT

## 2020-03-24 PROCEDURE — 85610 PROTHROMBIN TIME: CPT

## 2020-03-24 NOTE — PROGRESS NOTES
OP Anticoagulation Service Note    Date: 3/24/2020    Anticoagulation Summary  As of 3/24/2020    INR goal:   2.0-3.0   TTR:   65.4 % (6.9 mo)   INR used for dosin.40 (3/24/2020)   Warfarin maintenance plan:   2.5 mg (2.5 mg x 1) every Sat; 5 mg (2.5 mg x 2) all other days   Weekly warfarin total:   32.5 mg   Plan last modified:   Chau Hawkins, PharmD (10/29/2019)   Next INR check:   2020   Target end date:   Indefinite    Indications    Acute pulmonary embolism without acute cor pulmonale (HCC) [I26.99]  Deep vein thrombosis (DVT) of popliteal vein of both lower extremities (HCC) [I82.433]             Anticoagulation Episode Summary     INR check location:       Preferred lab:       Send INR reminders to:       Comments:         Anticoagulation Care Providers     Provider Role Specialty Phone number    Iveth Stone M.D. Referring Internal Medicine [phone]    [facility] Responsible  [phone]        Anticoagulation Patient Findings      HPI:   Magnus Claudio seen in clinic today, they are here today for a INR check on anticoagulation therapy     The reason for today's visit is to prevent morbidity and mortality from a blood clot or stroke and to reduce the risk of bleeding while on a anticoagulant.     Dose the patient in the medical group have a [facility] primary care provider and proper insurance?  Too Brito M.D.  [address]  [place] [facility] [id number] [phone]      Additional education provided today regarding reducing bleed risk and dietary constraints:  About how vitamin K and foods work with warfarin and the bleeding risk on a anticoagulant     Any upcoming procedures:   none    Confirmed warfarin dosing regimen  Interval Changes with foods rich in vitamin K: No  Interval Changes in ETOH:   No  Interval Changes in smoking status:  No  Interval Changes in medication:  No   Cost restriction:  No  S/S of bleeding or bruising:   No  Signs/symptoms  thrombosis since the last appt:  No  Bleed risk is:  moderate,       Assessment:   INR  therapeutic.     Medications reviewed and updated--    3 vitals included with today's appt :  (BP, HR, weight, ht, RR)   There were no vitals filed for this visit.      Plan:  Continue weekly warfarin dose as noted      Follow up:  Follow up appointment in 6 week(s)       Other info:  Pt educated to contact our clinic with any changes in medications or s/s of bleeding or thrombosis    CHEST guidelines recommend frequent INR monitoring at regular intervals (a few days up to a max of 12 weeks) to ensure they are on the proper dose of warfarin and not having any complications from therapy.  INRs can dramatically change over a short time period due to diet, medications, and medical conditions.     Chau Hawkins, PharmD, MS, BCACP, [facility]    This note was created using voice recognition software (Dragon). The accuracy of the dictation is limited by the abilities of the software. I have reviewed the note prior to signing, however some errors in grammar and context are still possible. If you have any questions related to this note please do not hesitate to contact our office.

## 2020-03-31 ENCOUNTER — HOSPITAL ENCOUNTER (OUTPATIENT)
Dept: RADIOLOGY | Facility: MEDICAL CENTER | Age: 75
End: 2020-03-31
Attending: INTERNAL MEDICINE
Payer: MEDICARE

## 2020-03-31 DIAGNOSIS — C18.8 MALIGNANT NEOPLASM OF OVERLAPPING SITES OF COLON (HCC): ICD-10-CM

## 2020-03-31 PROCEDURE — 71260 CT THORAX DX C+: CPT

## 2020-03-31 PROCEDURE — 700117 HCHG RX CONTRAST REV CODE 255: Performed by: INTERNAL MEDICINE

## 2020-03-31 RX ADMIN — IOHEXOL 25 ML: 240 INJECTION, SOLUTION INTRATHECAL; INTRAVASCULAR; INTRAVENOUS; ORAL at 11:30

## 2020-03-31 RX ADMIN — IOHEXOL 100 ML: 350 INJECTION, SOLUTION INTRAVENOUS at 11:30

## 2020-05-05 ENCOUNTER — ANTICOAGULATION VISIT (OUTPATIENT)
Dept: VASCULAR LAB | Facility: MEDICAL CENTER | Age: 75
End: 2020-05-05
Attending: INTERNAL MEDICINE
Payer: MEDICARE

## 2020-05-05 DIAGNOSIS — I26.99 ACUTE PULMONARY EMBOLISM WITHOUT ACUTE COR PULMONALE, UNSPECIFIED PULMONARY EMBOLISM TYPE (HCC): ICD-10-CM

## 2020-05-05 DIAGNOSIS — I82.433 DEEP VEIN THROMBOSIS (DVT) OF POPLITEAL VEIN OF BOTH LOWER EXTREMITIES, UNSPECIFIED CHRONICITY (HCC): ICD-10-CM

## 2020-05-05 LAB — INR PPP: 2.4 (ref 2–3.5)

## 2020-05-05 PROCEDURE — 85610 PROTHROMBIN TIME: CPT

## 2020-05-05 PROCEDURE — 99211 OFF/OP EST MAY X REQ PHY/QHP: CPT

## 2020-05-05 NOTE — PROGRESS NOTES
Anticoagulation Summary  As of 2020    INR goal:   2.0-3.0   TTR:   71.3 % (8.3 mo)   INR used for dosin.40 (2020)   Warfarin maintenance plan:   2.5 mg (2.5 mg x 1) every Sat; 5 mg (2.5 mg x 2) all other days   Weekly warfarin total:   32.5 mg   Plan last modified:   Chau Hawkins, PharmD (10/29/2019)   Next INR check:   2020   Target end date:   Indefinite    Indications    Acute pulmonary embolism without acute cor pulmonale (HCC) [I26.99]  Deep vein thrombosis (DVT) of popliteal vein of both lower extremities (HCC) [I82.433]             Anticoagulation Episode Summary     INR check location:       Preferred lab:       Send INR reminders to:       Comments:         Anticoagulation Care Providers     Provider Role Specialty Phone number    Iveth Stone M.D. Referring Internal Medicine [phone]    [facility] Responsible  [phone]        Anticoagulation Patient Findings          History of Present Illness: follow up appointment for chronic anticoagulation with the high risk medication, warfarin for PE    Pt remains therapeutic today.Continue current dosing regimen.  Follow up in 8/ weeks, to reduce the risk of adverse events related to this high risk medication, warfarin.  Pt declines vitals today      Natalie Martins, Clinical Pharmacist

## 2020-06-10 ENCOUNTER — OFFICE VISIT (OUTPATIENT)
Dept: MEDICAL GROUP | Facility: MEDICAL CENTER | Age: 75
End: 2020-06-10
Payer: MEDICARE

## 2020-06-10 ENCOUNTER — HOSPITAL ENCOUNTER (OUTPATIENT)
Facility: MEDICAL CENTER | Age: 75
End: 2020-06-10
Attending: FAMILY MEDICINE
Payer: MEDICARE

## 2020-06-10 VITALS
HEIGHT: 71 IN | SYSTOLIC BLOOD PRESSURE: 134 MMHG | HEART RATE: 70 BPM | DIASTOLIC BLOOD PRESSURE: 78 MMHG | WEIGHT: 199.6 LBS | OXYGEN SATURATION: 96 % | BODY MASS INDEX: 27.94 KG/M2 | RESPIRATION RATE: 16 BRPM | TEMPERATURE: 98.2 F

## 2020-06-10 DIAGNOSIS — Z00.00 HEALTHCARE MAINTENANCE: ICD-10-CM

## 2020-06-10 DIAGNOSIS — I10 ESSENTIAL HYPERTENSION: ICD-10-CM

## 2020-06-10 DIAGNOSIS — Z86.718 HISTORY OF VENOUS THROMBOEMBOLISM: ICD-10-CM

## 2020-06-10 DIAGNOSIS — E11.9 CONTROLLED TYPE 2 DIABETES MELLITUS WITHOUT COMPLICATION, WITHOUT LONG-TERM CURRENT USE OF INSULIN (HCC): ICD-10-CM

## 2020-06-10 DIAGNOSIS — Z85.038 HISTORY OF COLON CANCER, STAGE IV: ICD-10-CM

## 2020-06-10 DIAGNOSIS — L82.1 SEBORRHEIC KERATOSES: ICD-10-CM

## 2020-06-10 PROBLEM — I82.433 DEEP VEIN THROMBOSIS (DVT) OF POPLITEAL VEIN OF BOTH LOWER EXTREMITIES (HCC): Status: RESOLVED | Noted: 2019-08-19 | Resolved: 2020-06-10

## 2020-06-10 LAB
CREAT UR-MCNC: 111.29 MG/DL
HBA1C MFR BLD: 6.3 % (ref 0–5.6)
INT CON NEG: NEGATIVE
INT CON POS: POSITIVE
MICROALBUMIN UR-MCNC: <1.2 MG/DL
MICROALBUMIN/CREAT UR: NORMAL MG/G (ref 0–30)

## 2020-06-10 PROCEDURE — G0439 PPPS, SUBSEQ VISIT: HCPCS | Performed by: FAMILY MEDICINE

## 2020-06-10 PROCEDURE — 83036 HEMOGLOBIN GLYCOSYLATED A1C: CPT | Performed by: FAMILY MEDICINE

## 2020-06-10 PROCEDURE — 82043 UR ALBUMIN QUANTITATIVE: CPT

## 2020-06-10 PROCEDURE — 17110 DESTRUCTION B9 LES UP TO 14: CPT | Performed by: FAMILY MEDICINE

## 2020-06-10 PROCEDURE — 99000 SPECIMEN HANDLING OFFICE-LAB: CPT | Performed by: FAMILY MEDICINE

## 2020-06-10 PROCEDURE — 82570 ASSAY OF URINE CREATININE: CPT

## 2020-06-10 RX ORDER — PIOGLITAZONEHYDROCHLORIDE 15 MG/1
15 TABLET ORAL DAILY
Qty: 200 TAB | Refills: 4
Start: 2020-06-10 | End: 2021-04-13

## 2020-06-10 ASSESSMENT — ENCOUNTER SYMPTOMS: GENERAL WELL-BEING: GOOD

## 2020-06-10 ASSESSMENT — PATIENT HEALTH QUESTIONNAIRE - PHQ9: CLINICAL INTERPRETATION OF PHQ2 SCORE: 0

## 2020-06-10 ASSESSMENT — ACTIVITIES OF DAILY LIVING (ADL): BATHING_REQUIRES_ASSISTANCE: 0

## 2020-06-10 ASSESSMENT — FIBROSIS 4 INDEX: FIB4 SCORE: 2.867696673382022074

## 2020-06-10 NOTE — ASSESSMENT & PLAN NOTE
Lipids: done 2018 and normal.   Fasting Glucose: has DM.   Colonoscopy: done Feb 2017 with 3 year recall.     Pneumonia vaccine: has had both shots.

## 2020-06-10 NOTE — PROGRESS NOTES
Chief Complaint   Patient presents with   • Annual Wellness Visit         HPI:  Magnus is a 74 y.o. here for Medicare Annual Wellness Visit    Controlled type 2 diabetes mellitus without complication, without long-term current use of insulin ([facility])  His A1c is 6.3 today.    Essential hypertension  This is at goal.    Healthcare maintenance  Lipids: done 2018 and normal.   Fasting Glucose: has DM.   Colonoscopy: done Feb 2017 with 3 year recall.     Pneumonia vaccine: has had both shots.     History of colon cancer, stage IV  Patient is due for his colonoscopy.    History of venous thromboembolism  Patient is maintained on Coumadin.  He follows with hematology.    Seborrheic keratoses  Patient has a skin lesion on the right temporal region and right chest that are bothersome as they tend to catch on clothing.      Patient Active Problem List    Diagnosis Date Noted   • Seborrheic keratoses 06/10/2020   • Headache 09/05/2019   • Pulmonary hypertension (HCC) 08/21/2019   • History of venous thromboembolism 08/17/2019   • Left shoulder pain 07/23/2019   • Gilbert syndrome 02/26/2019   • Cervical radiculopathy 02/26/2019   • Thrombocytopenia ([facility]) 05/22/2018   • Healthcare maintenance 10/10/2017   • Controlled type 2 diabetes mellitus without complication, without long-term current use of insulin ([facility]) 02/06/2017   • Decreased hearing of both ears 04/26/2016   • History of colon cancer, stage IV 10/08/2015   • Essential hypertension 10/08/2015   • Vertigo 10/08/2015       Current Outpatient Medications   Medication Sig Dispense Refill   • pioglitazone (ACTOS) 15 MG Tab Take 1 Tab by mouth every day. 200 Tab 4   • glucose blood (ONE TOUCH ULTRA TEST) strip USE TO TEST ONCE DAILY 100 Strip 4   • glimepiride (AMARYL) 4 MG Tab TAKE 2 TABLETS BY MOUTH EVERY MORNING 180 Tab 4   • amLODIPine (NORVASC) 5 MG Tab Take 1 Tab by mouth every day. 90 Tab 4   • warfarin (COUMADIN) 2.5 MG Tab TAKE 1 TO 2 TABLETS BY MOUTH DAILY OR AS  DIRECTED BY ANTICOAGULATION CLINIC 180 Tab 1   • losartan (COZAAR) 100 MG Tab Take 1 Tab by mouth every day. 100 Tab 4   • hydroCHLOROthiazide (HYDRODIURIL) 12.5 MG tablet Take 1 Tab by mouth every day. 100 Tab 4   • metFORMIN ER (GLUCOPHAGE XR) 500 MG TABLET SR 24 HR TAKE 2 TABLETS BY MOUTH TWICE DAILY 360 Tab 4   • therapeutic multivitamin-minerals (THERAGRAN-M) Tab Take 1 Tab by mouth every day.       No current facility-administered medications for this visit.         Patient is taking medications as noted in medication list.  Current supplements as per medication list.     Allergies: Morphine    Current social contact/activities: PT. Enjoys staying in touch with family.     Is patient current with immunizations? No, due for HEPATITIS B, TDAP and SHINGRIX (Shingles). Patient is interested in receiving NONE today.    He  reports that he has never smoked. He has never used smokeless tobacco. He reports that he does not drink alcohol or use drugs.  Counseling given: Not Answered        DPA/Advanced directive: Patient does not have an Advanced Directive.  A packet and workshop information was given on Advanced Directives.    ROS:    Gait: Uses no assistive device   Ostomy: No   Other tubes: No   Amputations: No   Chronic oxygen use No   Last eye exam few mo ago.   Wears hearing aids: Yes   : Denies any urinary leakage during the last 6 months      Screening:    DIABETES    Has patient ever had diabetes education? Yes, and is NOT interested in more at this time.            Depression Screening    Little interest or pleasure in doing things?  0 - not at all  Feeling down, depressed, or hopeless? 0 - not at all  Patient Health Questionnaire Score: 0    If depressive symptoms identified deferred to follow up visit unless specifically addressed in assessment and plan.    Interpretation of PHQ-9 Total Score   Score Severity   1-4 No Depression   5-9 Mild Depression   10-14 Moderate Depression   15-19 Moderately Severe  Depression   20-27 Severe Depression    Screening for Cognitive Impairment    Three Minute Recall (village, kitchen, baby)  3/3    Darren clock face with all 12 numbers and set the hands to show 10 past 10.  Yes 5/5  If cognitive concerns identified, deferred for follow up unless specifically addressed in assessment and plan.    Fall Risk Assessment    Has the patient had two or more falls in the last year or any fall with injury in the last year?  No  If fall risk identified, deferred for follow up unless specifically addressed in assessment and plan.    Safety Assessment    Throw rugs on floor.  Yes  Handrails on all stairs.  Yes  Good lighting in all hallways.  Yes  Difficulty hearing.  Yes  Patient counseled about all safety risks that were identified.    Functional Assessment ADLs    Are there any barriers preventing you from cooking for yourself or meeting nutritional needs?  No.    Are there any barriers preventing you from driving safely or obtaining transportation?  No.    Are there any barriers preventing you from using a telephone or calling for help?  No.    Are there any barriers preventing you from shopping?  No.    Are there any barriers preventing you from taking care of your own finances?  No.    Are there any barriers preventing you from managing your medications?  No.    Are there any barriers preventing you from showering, bathing or dressing yourself?  No.    Are you currently engaging in any exercise or physical activity?  Yes.  Pt. Works on his workshop, walks when goes out shopping.  What is your perception of your health?  Good.    Health Maintenance Summary                IMM HEP B VACCINE Overdue 6/15/1963     IMM DTaP/Tdap/Td Vaccine Overdue 6/15/1963     IMM ZOSTER VACCINES Overdue 6/15/1994     Annual Wellness Visit Overdue 5/18/2017      Done 5/17/2016      Patient has more history with this topic...    FASTING LIPID PROFILE Overdue 2/26/2019      Done 2/26/2018 LIPID PROFILE     Patient  has more history with this topic...    DIABETES MONOFILAMENT / LE EXAM Overdue 1/9/2020      Done 1/9/2019 AMB DIABETIC MONOFILAMENT LOWER EXTREMITY EXAM     Patient has more history with this topic...    COLONOSCOPY Overdue 2/16/2020      Done 2/16/2017 REFERRAL TO GI FOR COLONOSCOPY    URINE ACR / MICROALBUMIN Overdue 4/18/2020      Done 4/18/2019 MICROALBUMIN CREAT RATIO URINE     Patient has more history with this topic...    A1C SCREENING Overdue 6/2/2020      Done 12/2/2019 POCT A1C     Patient has more history with this topic...    RETINAL SCREENING Next Due 10/15/2020      Done 10/15/2019 REFERRAL FOR RETINAL SCREENING EXAM     Patient has more history with this topic...    SERUM CREATININE Next Due 3/23/2021      Done 3/23/2020 COMP METABOLIC PANEL     Patient has more history with this topic...          Patient Care Team:  Too Brito M.D. as PCP - General (Family Medicine)  Baljinder Palacios M.D. as [facility] (Oncology)  Ester Doe M.D. as Consulting Physician (Ophthalmology)    Social History     Tobacco Use   • Smoking status: Never Smoker   • Smokeless tobacco: Never Used   Substance Use Topics   • Alcohol use: Never     Alcohol/week: 0.0 oz     Frequency: Never     Binge frequency: Never   • Drug use: No     Family History   Problem Relation Age of Onset   • Hypertension Father    • Heart Attack Father    • Cancer Mother         Breast   • Diabetes Sister    • Hyperlipidemia Neg Hx         unknown     He  has a past medical history of Cancer (HCC), Cataract, Diabetes (HCC), Hypertension, and Vertigo, benign positional.   Past Surgical History:   Procedure Laterality Date   • COLON RESECTION     • EYE SURGERY Bilateral     Cataract surgery   • LAMINOTOMY     • PB RESEC LIVER,PART LOBECTOMY             Exam:     /78 (BP Location: Left arm, Patient Position: Sitting, BP Cuff Size: Large adult)   Pulse 70   Temp 36.8 °C (98.2 °F) (Temporal)   Resp 16   Ht 1.803 m (5'  "11\")   Wt 90.5 kg (199 lb 9.6 oz)   SpO2 96%  Body mass index is 27.84 kg/m².    Hearing poor, has hearing aids.   Dentition fair  Alert, oriented in no acute distress.  Eye contact is good, speech goal directed, affect calm  Skin: R temporal and chest SK.     Cryotherapy Procedure Note:  I discussed the risks and benefits of cryotherapy with the patient who gave verbal consent for the procedure. Two applications of cryotherapy were applied to skin lesions described above. The patient tolerated the procedure well and there were no complications. Aftercare was discussed.    Monofilament testing with a 10 gram force: sensation intact: intact bilaterally  Visual Inspection: Feet without maceration, ulcers, fissures.  Pedal pulses: intact bilaterally      Assessment and Plan. The following treatment and monitoring plan is recommended:      1. Controlled type 2 diabetes mellitus without complication, without long-term current use of insulin (HCC)  - reduce actos to 1 tablet daiyl.   - POCT A1C  - Diabetic Monofilament LE Exam  - MICROALBUMIN CREAT RATIO URINE; Future  - pioglitazone (ACTOS) 15 MG Tab; Take 1 Tab by mouth every day.  Dispense: 200 Tab; Refill: 4    2. Healthcare maintenance  - Subsequent Annual Wellness Visit - Includes PPPS ()    3. Essential hypertension  - continue current regimen.     4. History of colon cancer, stage IV  - REFERRAL TO GASTROENTEROLOGY    5. History of venous thromboembolism  - continue coumadin.    6. Seborrheic keratoses  - cryotherapy (see procedure note above).       Services suggested: none needed.   Health Care Screening recommendations as per orders if indicated.  Referrals offered: PT/OT/Nutrition counseling/Behavioral Health/Smoking cessation as per orders if indicated.    Discussion today about general wellness and lifestyle habits:    · Prevent falls and reduce trip hazards; Cautioned about securing or removing rugs.  · Have a working fire alarm and carbon monoxide " detector;   · Engage in regular physical activity and social activities       Follow-up: Return in about 6 months (around 12/10/2020), or if symptoms worsen or fail to improve.

## 2020-06-10 NOTE — ASSESSMENT & PLAN NOTE
Patient has a skin lesion on the right temporal region and right chest that are bothersome as they tend to catch on clothing.

## 2020-06-16 DIAGNOSIS — Z79.01 CHRONIC ANTICOAGULATION: ICD-10-CM

## 2020-06-16 RX ORDER — WARFARIN SODIUM 2.5 MG/1
TABLET ORAL
Qty: 180 TAB | Refills: 1 | Status: SHIPPED | OUTPATIENT
Start: 2020-06-16 | End: 2020-09-03

## 2020-06-30 ENCOUNTER — ANTICOAGULATION VISIT (OUTPATIENT)
Dept: VASCULAR LAB | Facility: MEDICAL CENTER | Age: 75
End: 2020-06-30
Attending: INTERNAL MEDICINE
Payer: MEDICARE

## 2020-06-30 DIAGNOSIS — Z86.718 HISTORY OF VENOUS THROMBOEMBOLISM: ICD-10-CM

## 2020-06-30 LAB — INR PPP: 2.8 (ref 2–3.5)

## 2020-06-30 PROCEDURE — 99212 OFFICE O/P EST SF 10 MIN: CPT

## 2020-06-30 PROCEDURE — 85610 PROTHROMBIN TIME: CPT

## 2020-06-30 NOTE — PROGRESS NOTES
Anticoagulation Summary  As of 2020    INR goal:   2.0-3.0   TTR:   76.5 % (10.2 mo)   INR used for dosin.80 (2020)   Warfarin maintenance plan:   2.5 mg (2.5 mg x 1) every Sat; 5 mg (2.5 mg x 2) all other days   Weekly warfarin total:   32.5 mg   Plan last modified:   Chau Hawkins, PharmD (10/29/2019)   Next INR check:   2020   Target end date:   Indefinite    Indications    History of venous thromboembolism [Z86.718]  Deep vein thrombosis (DVT) of popliteal vein of both lower extremities (HCC) (Resolved) [I82.433]             Anticoagulation Episode Summary     INR check location:       Preferred lab:       Send INR reminders to:       Comments:         Anticoagulation Care Providers     Provider Role Specialty Phone number    Iveth Stone M.D. Referring Internal Medicine [phone]    [facility] Responsible  [phone]        Anticoagulation Patient Findings  Patient Findings     Negatives:   Signs/symptoms of thrombosis, Signs/symptoms of bleeding, Laboratory test error suspected, Change in health, Change in alcohol use, Change in activity, Upcoming invasive procedure, Emergency department visit, Upcoming dental procedure, Missed doses, Extra doses, Change in medications, Change in diet/appetite, Hospital admission, Bruising, Other complaints              History of Present Illness: follow up appointment for chronic anticoagulation with the high risk medication, warfarin for DVT    Pt remains therapeutic today. Continue current dosing regimen.  Follow up in 10 weeks, to reduce the risk of adverse events related to this high risk medication, warfarin.    Natalie Martins, Clinical Pharmacist

## 2020-07-22 ENCOUNTER — TELEPHONE (OUTPATIENT)
Dept: VASCULAR LAB | Facility: MEDICAL CENTER | Age: 75
End: 2020-07-22

## 2020-07-22 NOTE — TELEPHONE ENCOUNTER
----- Message from Sylvia Potts sent at 7/22/2020 10:59 AM PDT -----  Regarding: Demetrius Pederson @ [facility] requesting a call back on an update on the medical clearance faxed over.    Ph: [phone]

## 2020-07-22 NOTE — TELEPHONE ENCOUNTER
Left msg asking for the clearance to be faxed to our clinic, [phone].     Michelle Saldivar, PharmD

## 2020-07-28 ENCOUNTER — ANTICOAGULATION MONITORING (OUTPATIENT)
Dept: VASCULAR LAB | Facility: MEDICAL CENTER | Age: 75
End: 2020-07-28

## 2020-07-28 ENCOUNTER — OFFICE VISIT (OUTPATIENT)
Dept: URGENT CARE | Facility: CLINIC | Age: 75
End: 2020-07-28
Payer: MEDICARE

## 2020-07-28 VITALS
SYSTOLIC BLOOD PRESSURE: 140 MMHG | OXYGEN SATURATION: 95 % | WEIGHT: 198 LBS | RESPIRATION RATE: 16 BRPM | HEART RATE: 78 BPM | HEIGHT: 71 IN | TEMPERATURE: 98.5 F | BODY MASS INDEX: 27.72 KG/M2 | DIASTOLIC BLOOD PRESSURE: 80 MMHG

## 2020-07-28 DIAGNOSIS — H61.23 BILATERAL IMPACTED CERUMEN: ICD-10-CM

## 2020-07-28 DIAGNOSIS — Z86.718 HISTORY OF VENOUS THROMBOEMBOLISM: ICD-10-CM

## 2020-07-28 PROCEDURE — 99212 OFFICE O/P EST SF 10 MIN: CPT | Performed by: PHYSICIAN ASSISTANT

## 2020-07-28 ASSESSMENT — ENCOUNTER SYMPTOMS
SWOLLEN GLANDS: 0
FATIGUE: 0
ABDOMINAL PAIN: 0
TINGLING: 0
COUGH: 0
FEVER: 0
BLURRED VISION: 0
SORE THROAT: 0
VOMITING: 0
NUMBNESS: 0
HEADACHES: 0
PALPITATIONS: 0
WHEEZING: 0
SHORTNESS OF BREATH: 0
SINUS PAIN: 0
CHILLS: 0
MYALGIAS: 0
DIAPHORESIS: 0
VERTIGO: 0
DOUBLE VISION: 0
NAUSEA: 0
WEAKNESS: 0
DIZZINESS: 0

## 2020-07-28 ASSESSMENT — FIBROSIS 4 INDEX: FIB4 SCORE: 2.91

## 2020-07-28 NOTE — PROGRESS NOTES
Magnus has a colonoscopy scheduled and will need to hold warfarin for 5 days prior.  Likely will need to bridge with Lovenox.  I called and left him a message asking him to call us back so we can coordinate this.

## 2020-07-28 NOTE — PROGRESS NOTES
Subjective:   Magnus Claudio is a 75 y.o. male who presents for Cerumen Impaction      Cerumen Impaction   This is a new (Past week patient has noticed gradual difficulty with hearing.  He has a past medical history significant for recurrent cerumen impactions with resolution after ear lavage in clinic.) problem. The current episode started in the past 7 days. The problem occurs constantly. The problem has been unchanged. Pertinent negatives include no abdominal pain, chest pain, chills, congestion, coughing, diaphoresis, fatigue, fever, headaches, myalgias, nausea, numbness, sore throat, swollen glands, vertigo, vomiting or weakness. He has tried nothing for the symptoms.       Review of Systems   Constitutional: Negative for chills, diaphoresis, fatigue, fever and malaise/fatigue.   HENT: Positive for hearing loss. Negative for congestion, ear discharge, ear pain, sinus pain, sore throat and tinnitus.    Eyes: Negative for blurred vision and double vision.   Respiratory: Negative for cough, shortness of breath and wheezing.    Cardiovascular: Negative for chest pain and palpitations.   Gastrointestinal: Negative for abdominal pain, nausea and vomiting.   Musculoskeletal: Negative for myalgias.   Neurological: Negative for dizziness, vertigo, tingling, weakness, numbness and headaches.       Medications:    • amLODIPine Tabs  • glimepiride Tabs  • hydroCHLOROthiazide  • losartan Tabs  • metFORMIN ER Tb24  • ONE TOUCH ULTRA TEST Strp  • pioglitazone Tabs  • therapeutic multivitamin-minerals Tabs  • warfarin Tabs    Allergies: Morphine    Problem List: Magnus Claudio has History of colon cancer, stage IV; Essential hypertension; Vertigo; Decreased hearing of both ears; Controlled type 2 diabetes mellitus without complication, without long-term current use of insulin (HCC); Healthcare maintenance; Thrombocytopenia (HCC); Gilbert syndrome; Cervical radiculopathy; Left shoulder pain; History of venous  "thromboembolism; Pulmonary hypertension (HCC); Headache; and Seborrheic keratoses on their problem list.    Surgical History:  Past Surgical History:   Procedure Laterality Date   • COLON RESECTION     • EYE SURGERY Bilateral     Cataract surgery   • LAMINOTOMY     • PB RESEC LIVER,PART LOBECTOMY         Past Social Hx: Magnus Claudio  reports that he has never smoked. He has never used smokeless tobacco. He reports that he does not drink alcohol or use drugs.     Past Family Hx:  Magnus Claudio family history includes Cancer in his mother; Diabetes in his sister; Heart Attack in his father; Hypertension in his father.     Problem list, medications, and allergies reviewed by myself today in Epic.     Objective:     /80   Pulse 78   Temp 36.9 °C (98.5 °F) (Temporal)   Resp 16   Ht 1.803 m (5' 11\")   Wt 89.8 kg (198 lb)   SpO2 95%   BMI 27.62 kg/m²     Physical Exam  Constitutional:       General: He is not in acute distress.     Appearance: Normal appearance. He is not ill-appearing, toxic-appearing or diaphoretic.   HENT:      Head: Normocephalic and atraumatic.      Right Ear: External ear normal. Decreased hearing noted. No drainage, swelling or tenderness. There is impacted cerumen.      Left Ear: External ear normal. Decreased hearing noted. No drainage, swelling or tenderness. There is impacted cerumen.      Nose: Nose normal. No congestion or rhinorrhea.      Mouth/Throat:      Mouth: Mucous membranes are moist.      Pharynx: No oropharyngeal exudate or posterior oropharyngeal erythema.   Eyes:      Conjunctiva/sclera: Conjunctivae normal.   Neck:      Musculoskeletal: Normal range of motion. No muscular tenderness.   Cardiovascular:      Rate and Rhythm: Normal rate and regular rhythm.      Pulses: Normal pulses.      Heart sounds: Normal heart sounds.   Pulmonary:      Effort: Pulmonary effort is normal.      Breath sounds: Normal breath sounds. No wheezing.   Lymphadenopathy:      " Cervical: No cervical adenopathy.   Skin:     General: Skin is warm and dry.      Capillary Refill: Capillary refill takes less than 2 seconds.   Neurological:      Mental Status: He is alert.   Psychiatric:         Mood and Affect: Mood normal.         Thought Content: Thought content normal.       Bilateral ear lavage was performed in clinic by the medical assistant.  Significant cerumen was removed bilaterally.  Patient tolerated the procedure well and noticed immediate relief.  TMs were visualized after cerumen removal there were no signs of infection and no TM perforations.    Assessment/Plan:     Diagnosis and associated orders:     1. Bilateral impacted cerumen        Comments/MDM:       • Patient has a past medical history significant for recurrent cerumen impactions.  I gave the patient a list of over-the-counter recommendations for his symptoms.  If symptoms return follow-up in clinic for further evaluation.  • Red flag symptoms and signs of infection were discussed with patient today.  If any these present return to clinic or nearest emergency department.           Differential diagnosis, natural history, supportive care, and indications for immediate follow-up discussed.    Advised the patient to follow-up with the primary care physician for recheck, reevaluation, and consideration of further management.    Please note that this dictation was created using voice recognition software. I have made reasonable attempt to correct obvious errors, but I expect that there are errors of grammar and possibly content that I did not discover before finalizing the note.    This note was electronically signed by KENNEDY Pop PA-C

## 2020-07-28 NOTE — PATIENT INSTRUCTIONS
Earwax Buildup, Adult  The ears produce a substance called earwax that helps keep bacteria out of the ear and protects the skin in the ear canal. Occasionally, earwax can build up in the ear and cause discomfort or hearing loss.  What increases the risk?  This condition is more likely to develop in people who:  · Are male.  · Are elderly.  · Naturally produce more earwax.  · Clean their ears often with cotton swabs.  · Use earplugs often.  · Use in-ear headphones often.  · Wear hearing aids.  · Have narrow ear canals.  · Have earwax that is overly thick or sticky.  · Have eczema.  · Are dehydrated.  · Have excess hair in the ear canal.  What are the signs or symptoms?  Symptoms of this condition include:  · Reduced or muffled hearing.  · A feeling of fullness in the ear or feeling that the ear is plugged.  · Fluid coming from the ear.  · Ear pain.  · Ear itch.  · Ringing in the ear.  · Coughing.  · An obvious piece of earwax that can be seen inside the ear canal.  How is this diagnosed?  This condition may be diagnosed based on:  · Your symptoms.  · Your medical history.  · An ear exam. During the exam, your health care provider will look into your ear with an instrument called an otoscope.  You may have tests, including a hearing test.  How is this treated?  This condition may be treated by:  · Using ear drops to soften the earwax.  · Having the earwax removed by a health care provider. The health care provider may:  ? Flush the ear with water.  ? Use an instrument that has a loop on the end (curette).  ? Use a suction device.  · Surgery to remove the wax buildup. This may be done in severe cases.  Follow these instructions at home:    · Take over-the-counter and prescription medicines only as told by your health care provider.  · Do not put any objects, including cotton swabs, into your ear. You can clean the opening of your ear canal with a washcloth or facial tissue.  · Follow instructions from your health care  provider about cleaning your ears. Do not over-clean your ears.  · Drink enough fluid to keep your urine clear or pale yellow. This will help to thin the earwax.  · Keep all follow-up visits as told by your health care provider. If earwax builds up in your ears often or if you use hearing aids, consider seeing your health care provider for routine, preventive ear cleanings. Ask your health care provider how often you should schedule your cleanings.  · If you have hearing aids, clean them according to instructions from the  and your health care provider.  Contact a health care provider if:  · You have ear pain.  · You develop a fever.  · You have blood, pus, or other fluid coming from your ear.  · You have hearing loss.  · You have ringing in your ears that does not go away.  · Your symptoms do not improve with treatment.  · You feel like the room is spinning (vertigo).  Summary  · Earwax can build up in the ear and cause discomfort or hearing loss.  · The most common symptoms of this condition include reduced or muffled hearing and a feeling of fullness in the ear or feeling that the ear is plugged.  · This condition may be diagnosed based on your symptoms, your medical history, and an ear exam.  · This condition may be treated by using ear drops to soften the earwax or by having the earwax removed by a health care provider.  · Do not put any objects, including cotton swabs, into your ear. You can clean the opening of your ear canal with a washcloth or facial tissue.  This information is not intended to replace advice given to you by your health care provider. Make sure you discuss any questions you have with your health care provider.  Document Released: 01/25/2006 Document Revised: 11/30/2018 Document Reviewed: 02/28/2018  Elsevier Patient Education © 2020 [employer]

## 2020-07-29 ENCOUNTER — TELEPHONE (OUTPATIENT)
Dept: VASCULAR LAB | Facility: MEDICAL CENTER | Age: 75
End: 2020-07-29

## 2020-07-29 ENCOUNTER — ANTICOAGULATION MONITORING (OUTPATIENT)
Dept: VASCULAR LAB | Facility: MEDICAL CENTER | Age: 75
End: 2020-07-29

## 2020-07-29 DIAGNOSIS — Z86.718 HISTORY OF VENOUS THROMBOEMBOLISM: ICD-10-CM

## 2020-07-29 NOTE — TELEPHONE ENCOUNTER
They will ask pt to call us back as well to discuss warfarin hold with potential bridging.     Michelle Saldivar, PharmD

## 2020-07-29 NOTE — TELEPHONE ENCOUNTER
----- Message from Sylvia Potts sent at 7/29/2020  9:59 AM PDT -----  Regarding: Demetrius Cruz @ [facility] requesting a call back to go over bridging instructions, she received the letter but she had additional questions.    Nancy can be reached at Ph: [phone]

## 2020-07-29 NOTE — PROGRESS NOTES
Pt to have procedure on 8/10/2020. Due to pt history lovenox bridging is indicated.   Pt to follow instructions as below, after procedure to ask operating MD when safe to resume anticoagulation, if no instructions given, pt will follow as below.     Current weight 89kg  CrCl = 84 ml/min    Used Lovenox before yes        Date  Warfarin dosing PM Lovenox   5 Days before procedure 8/5 0mg NONE   4 Days before procedure 8/6 0mg Lovenox injection   3 Days before procedure 8/7 0mg Lovenox injection   2 Days before procedure 8/8 0mg Lovenox injection   1 Days before procedure 8/9 0mg NONE   PROCEDURE 8/10 7.5mg NONE   1 Day after procedure 8/11 7.5mg Restart Lovenox injections      2 Days after procedure 8/12 5mg Lovenox injection   3 Days after procedure 8/13  GET INR checked     Sent [employer] message of instructions above  F/U INR scheduled for 8/13     Brooklyn Lewis, DeedeeD

## 2020-07-31 ENCOUNTER — ANTICOAGULATION MONITORING (OUTPATIENT)
Dept: MEDICAL GROUP | Facility: PHYSICIAN GROUP | Age: 75
End: 2020-07-31

## 2020-07-31 DIAGNOSIS — Z86.718 HISTORY OF VENOUS THROMBOEMBOLISM: ICD-10-CM

## 2020-07-31 NOTE — PROGRESS NOTES
Spoke with his wife.  He has a upcoming colonoscopy but the cost of Lovenox is approximately $500.  They are not able to afford it.  They are going to call to possibly cancel the colonoscopy.  I encouraged them to speak with the doctor to determine if the colonoscopy is warranted as he has a history of cancer.  If it is strongly encouraged that he should continue with the colonoscopy we can come up with a alternative plan.  One option would obviously be holding warfarin for 5 days without bridge therapy.  Another option would be using Xarelto or another non-FDA approved drug to bridge him due to cost constraints.    Chau Hawkins, PharmD, MS, BCACP, [facility]  Phone [phone] fax [phone]    This note was created using voice recognition software (Dragon). The accuracy of the dictation is limited by the abilities of the software. I have reviewed the note prior to signing, however some errors in grammar and context are still possible. If you have any questions related to this note please do not hesitate to contact our office.

## 2020-08-07 DIAGNOSIS — Z79.01 CHRONIC ANTICOAGULATION: ICD-10-CM

## 2020-09-03 ENCOUNTER — APPOINTMENT (OUTPATIENT)
Dept: RADIOLOGY | Facility: MEDICAL CENTER | Age: 75
DRG: 392 | End: 2020-09-03
Attending: EMERGENCY MEDICINE
Payer: MEDICARE

## 2020-09-03 ENCOUNTER — HOSPITAL ENCOUNTER (INPATIENT)
Facility: MEDICAL CENTER | Age: 75
LOS: 3 days | DRG: 392 | End: 2020-09-06
Attending: EMERGENCY MEDICINE | Admitting: HOSPITALIST
Payer: MEDICARE

## 2020-09-03 DIAGNOSIS — K57.92 DIVERTICULITIS: ICD-10-CM

## 2020-09-03 LAB
ALBUMIN SERPL BCP-MCNC: 3.7 G/DL (ref 3.2–4.9)
ALBUMIN/GLOB SERPL: 1.2 G/DL
ALP SERPL-CCNC: 53 U/L (ref 30–99)
ALT SERPL-CCNC: 20 U/L (ref 2–50)
ANION GAP SERPL CALC-SCNC: 12 MMOL/L (ref 7–16)
APPEARANCE UR: CLEAR
APTT PPP: 86.5 SEC (ref 24.7–36)
AST SERPL-CCNC: 19 U/L (ref 12–45)
BASOPHILS # BLD AUTO: 0.3 % (ref 0–1.8)
BASOPHILS # BLD: 0.02 K/UL (ref 0–0.12)
BILIRUB SERPL-MCNC: 3.4 MG/DL (ref 0.1–1.5)
BILIRUB UR QL STRIP.AUTO: NEGATIVE
BUN SERPL-MCNC: 13 MG/DL (ref 8–22)
CALCIUM SERPL-MCNC: 8.7 MG/DL (ref 8.4–10.2)
CHLORIDE SERPL-SCNC: 100 MMOL/L (ref 96–112)
CO2 SERPL-SCNC: 25 MMOL/L (ref 20–33)
COLOR UR: YELLOW
COVID ORDER STATUS COVID19: NORMAL
CREAT SERPL-MCNC: 1.15 MG/DL (ref 0.5–1.4)
EOSINOPHIL # BLD AUTO: 0.04 K/UL (ref 0–0.51)
EOSINOPHIL NFR BLD: 0.5 % (ref 0–6.9)
EPI CELLS #/AREA URNS HPF: ABNORMAL /HPF
ERYTHROCYTE [DISTWIDTH] IN BLOOD BY AUTOMATED COUNT: 43.6 FL (ref 35.9–50)
GLOBULIN SER CALC-MCNC: 3 G/DL (ref 1.9–3.5)
GLUCOSE BLD-MCNC: 93 MG/DL (ref 65–99)
GLUCOSE SERPL-MCNC: 233 MG/DL (ref 65–99)
GLUCOSE UR STRIP.AUTO-MCNC: NEGATIVE MG/DL
GRAN CASTS #/AREA URNS LPF: ABNORMAL /LPF
HCT VFR BLD AUTO: 40.5 % (ref 42–52)
HGB BLD-MCNC: 13.8 G/DL (ref 14–18)
HYALINE CASTS #/AREA URNS LPF: ABNORMAL /LPF
IMM GRANULOCYTES # BLD AUTO: 0.03 K/UL (ref 0–0.11)
IMM GRANULOCYTES NFR BLD AUTO: 0.4 % (ref 0–0.9)
INR PPP: 3.48 (ref 0.87–1.13)
KETONES UR STRIP.AUTO-MCNC: NEGATIVE MG/DL
LEUKOCYTE ESTERASE UR QL STRIP.AUTO: NEGATIVE
LIPASE SERPL-CCNC: 26 U/L (ref 7–58)
LYMPHOCYTES # BLD AUTO: 1.13 K/UL (ref 1–4.8)
LYMPHOCYTES NFR BLD: 15.1 % (ref 22–41)
MCH RBC QN AUTO: 29.3 PG (ref 27–33)
MCHC RBC AUTO-ENTMCNC: 34.1 G/DL (ref 33.7–35.3)
MCV RBC AUTO: 86 FL (ref 81.4–97.8)
MICRO URNS: ABNORMAL
MONOCYTES # BLD AUTO: 0.66 K/UL (ref 0–0.85)
MONOCYTES NFR BLD AUTO: 8.8 % (ref 0–13.4)
MUCOUS THREADS #/AREA URNS HPF: ABNORMAL /HPF
NEUTROPHILS # BLD AUTO: 5.62 K/UL (ref 1.82–7.42)
NEUTROPHILS NFR BLD: 74.9 % (ref 44–72)
NITRITE UR QL STRIP.AUTO: NEGATIVE
NRBC # BLD AUTO: 0 K/UL
NRBC BLD-RTO: 0 /100 WBC
PH UR STRIP.AUTO: 6 [PH] (ref 5–8)
PLATELET # BLD AUTO: 123 K/UL (ref 164–446)
PMV BLD AUTO: 9.7 FL (ref 9–12.9)
POTASSIUM SERPL-SCNC: 3.4 MMOL/L (ref 3.6–5.5)
PROT SERPL-MCNC: 6.7 G/DL (ref 6–8.2)
PROT UR QL STRIP: NEGATIVE MG/DL
PROTHROMBIN TIME: 34.4 SEC (ref 12–14.6)
RBC # BLD AUTO: 4.71 M/UL (ref 4.7–6.1)
RBC # URNS HPF: ABNORMAL /HPF
RBC UR QL AUTO: ABNORMAL
SARS-COV-2 RNA RESP QL NAA+PROBE: NOTDETECTED
SODIUM SERPL-SCNC: 137 MMOL/L (ref 135–145)
SP GR UR STRIP.AUTO: 1.02
SPECIMEN SOURCE: NORMAL
UNIDENT CRYS URNS QL MICRO: ABNORMAL /HPF
WBC # BLD AUTO: 7.5 K/UL (ref 4.8–10.8)
WBC #/AREA URNS HPF: ABNORMAL /HPF

## 2020-09-03 PROCEDURE — 99285 EMERGENCY DEPT VISIT HI MDM: CPT

## 2020-09-03 PROCEDURE — 85025 COMPLETE CBC W/AUTO DIFF WBC: CPT

## 2020-09-03 PROCEDURE — 700105 HCHG RX REV CODE 258: Performed by: HOSPITALIST

## 2020-09-03 PROCEDURE — C9803 HOPD COVID-19 SPEC COLLECT: HCPCS | Performed by: HOSPITALIST

## 2020-09-03 PROCEDURE — 85730 THROMBOPLASTIN TIME PARTIAL: CPT

## 2020-09-03 PROCEDURE — 85610 PROTHROMBIN TIME: CPT

## 2020-09-03 PROCEDURE — 82962 GLUCOSE BLOOD TEST: CPT | Mod: 91

## 2020-09-03 PROCEDURE — 700102 HCHG RX REV CODE 250 W/ 637 OVERRIDE(OP): Performed by: HOSPITALIST

## 2020-09-03 PROCEDURE — 700117 HCHG RX CONTRAST REV CODE 255: Performed by: EMERGENCY MEDICINE

## 2020-09-03 PROCEDURE — 700111 HCHG RX REV CODE 636 W/ 250 OVERRIDE (IP): Performed by: EMERGENCY MEDICINE

## 2020-09-03 PROCEDURE — 96365 THER/PROPH/DIAG IV INF INIT: CPT

## 2020-09-03 PROCEDURE — 96367 TX/PROPH/DG ADDL SEQ IV INF: CPT

## 2020-09-03 PROCEDURE — 81001 URINALYSIS AUTO W/SCOPE: CPT

## 2020-09-03 PROCEDURE — 700105 HCHG RX REV CODE 258: Performed by: EMERGENCY MEDICINE

## 2020-09-03 PROCEDURE — 83690 ASSAY OF LIPASE: CPT

## 2020-09-03 PROCEDURE — 700101 HCHG RX REV CODE 250: Performed by: EMERGENCY MEDICINE

## 2020-09-03 PROCEDURE — 80053 COMPREHEN METABOLIC PANEL: CPT

## 2020-09-03 PROCEDURE — 99223 1ST HOSP IP/OBS HIGH 75: CPT | Performed by: HOSPITALIST

## 2020-09-03 PROCEDURE — 36415 COLL VENOUS BLD VENIPUNCTURE: CPT

## 2020-09-03 PROCEDURE — 700101 HCHG RX REV CODE 250: Performed by: HOSPITALIST

## 2020-09-03 PROCEDURE — 74177 CT ABD & PELVIS W/CONTRAST: CPT

## 2020-09-03 PROCEDURE — 770006 HCHG ROOM/CARE - MED/SURG/GYN SEMI*

## 2020-09-03 PROCEDURE — U0003 INFECTIOUS AGENT DETECTION BY NUCLEIC ACID (DNA OR RNA); SEVERE ACUTE RESPIRATORY SYNDROME CORONAVIRUS 2 (SARS-COV-2) (CORONAVIRUS DISEASE [COVID-19]), AMPLIFIED PROBE TECHNIQUE, MAKING USE OF HIGH THROUGHPUT TECHNOLOGIES AS DESCRIBED BY CMS-2020-01-R: HCPCS

## 2020-09-03 RX ORDER — ONDANSETRON 2 MG/ML
4 INJECTION INTRAMUSCULAR; INTRAVENOUS EVERY 4 HOURS PRN
Status: DISCONTINUED | OUTPATIENT
Start: 2020-09-03 | End: 2020-09-06 | Stop reason: HOSPADM

## 2020-09-03 RX ORDER — BISACODYL 10 MG
10 SUPPOSITORY, RECTAL RECTAL
Status: DISCONTINUED | OUTPATIENT
Start: 2020-09-03 | End: 2020-09-06 | Stop reason: HOSPADM

## 2020-09-03 RX ORDER — LOSARTAN POTASSIUM 25 MG/1
100 TABLET ORAL DAILY
Status: DISCONTINUED | OUTPATIENT
Start: 2020-09-04 | End: 2020-09-06 | Stop reason: HOSPADM

## 2020-09-03 RX ORDER — SODIUM CHLORIDE, SODIUM LACTATE, POTASSIUM CHLORIDE, CALCIUM CHLORIDE 600; 310; 30; 20 MG/100ML; MG/100ML; MG/100ML; MG/100ML
INJECTION, SOLUTION INTRAVENOUS CONTINUOUS
Status: DISCONTINUED | OUTPATIENT
Start: 2020-09-03 | End: 2020-09-06 | Stop reason: HOSPADM

## 2020-09-03 RX ORDER — DEXTROSE MONOHYDRATE 25 G/50ML
50 INJECTION, SOLUTION INTRAVENOUS
Status: DISCONTINUED | OUTPATIENT
Start: 2020-09-03 | End: 2020-09-06 | Stop reason: HOSPADM

## 2020-09-03 RX ORDER — OXYCODONE HYDROCHLORIDE 5 MG/1
2.5 TABLET ORAL
Status: DISCONTINUED | OUTPATIENT
Start: 2020-09-03 | End: 2020-09-06 | Stop reason: HOSPADM

## 2020-09-03 RX ORDER — HYDROMORPHONE HYDROCHLORIDE 1 MG/ML
0.25 INJECTION, SOLUTION INTRAMUSCULAR; INTRAVENOUS; SUBCUTANEOUS
Status: DISCONTINUED | OUTPATIENT
Start: 2020-09-03 | End: 2020-09-06 | Stop reason: HOSPADM

## 2020-09-03 RX ORDER — POLYETHYLENE GLYCOL 3350 17 G/17G
1 POWDER, FOR SOLUTION ORAL
Status: DISCONTINUED | OUTPATIENT
Start: 2020-09-03 | End: 2020-09-06 | Stop reason: HOSPADM

## 2020-09-03 RX ORDER — ACETAMINOPHEN 325 MG/1
650 TABLET ORAL EVERY 6 HOURS PRN
Status: DISCONTINUED | OUTPATIENT
Start: 2020-09-03 | End: 2020-09-06 | Stop reason: HOSPADM

## 2020-09-03 RX ORDER — ONDANSETRON 4 MG/1
4 TABLET, ORALLY DISINTEGRATING ORAL EVERY 4 HOURS PRN
Status: DISCONTINUED | OUTPATIENT
Start: 2020-09-03 | End: 2020-09-06 | Stop reason: HOSPADM

## 2020-09-03 RX ORDER — WARFARIN SODIUM 5 MG/1
2.5-5 TABLET ORAL DAILY
COMMUNITY
End: 2020-12-16

## 2020-09-03 RX ORDER — AMOXICILLIN 250 MG
2 CAPSULE ORAL 2 TIMES DAILY
Status: DISCONTINUED | OUTPATIENT
Start: 2020-09-03 | End: 2020-09-06 | Stop reason: HOSPADM

## 2020-09-03 RX ORDER — OXYCODONE HYDROCHLORIDE 5 MG/1
5 TABLET ORAL
Status: DISCONTINUED | OUTPATIENT
Start: 2020-09-03 | End: 2020-09-06 | Stop reason: HOSPADM

## 2020-09-03 RX ORDER — AMLODIPINE BESYLATE 5 MG/1
5 TABLET ORAL DAILY
Status: DISCONTINUED | OUTPATIENT
Start: 2020-09-04 | End: 2020-09-06 | Stop reason: HOSPADM

## 2020-09-03 RX ADMIN — SODIUM CHLORIDE, POTASSIUM CHLORIDE, SODIUM LACTATE AND CALCIUM CHLORIDE: 600; 310; 30; 20 INJECTION, SOLUTION INTRAVENOUS at 23:57

## 2020-09-03 RX ADMIN — SODIUM CHLORIDE, POTASSIUM CHLORIDE, SODIUM LACTATE AND CALCIUM CHLORIDE: 600; 310; 30; 20 INJECTION, SOLUTION INTRAVENOUS at 16:23

## 2020-09-03 RX ADMIN — METRONIDAZOLE 500 MG: 500 INJECTION, SOLUTION INTRAVENOUS at 21:42

## 2020-09-03 RX ADMIN — CEFTRIAXONE SODIUM 1 G: 1 INJECTION, POWDER, FOR SOLUTION INTRAMUSCULAR; INTRAVENOUS at 13:50

## 2020-09-03 RX ADMIN — IOHEXOL 100 ML: 350 INJECTION, SOLUTION INTRAVENOUS at 12:31

## 2020-09-03 RX ADMIN — METRONIDAZOLE 500 MG: 500 INJECTION, SOLUTION INTRAVENOUS at 14:32

## 2020-09-03 ASSESSMENT — LIFESTYLE VARIABLES
HAVE YOU EVER FELT YOU SHOULD CUT DOWN ON YOUR DRINKING: NO
AVERAGE NUMBER OF DAYS PER WEEK YOU HAVE A DRINK CONTAINING ALCOHOL: 0
TOTAL SCORE: 0
DO YOU DRINK ALCOHOL: NO
TOTAL SCORE: 0
EVER FELT BAD OR GUILTY ABOUT YOUR DRINKING: NO
EVER HAD A DRINK FIRST THING IN THE MORNING TO STEADY YOUR NERVES TO GET RID OF A HANGOVER: NO
HAVE PEOPLE ANNOYED YOU BY CRITICIZING YOUR DRINKING: NO
CONSUMPTION TOTAL: NEGATIVE
HOW MANY TIMES IN THE PAST YEAR HAVE YOU HAD 5 OR MORE DRINKS IN A DAY: 0
ON A TYPICAL DAY WHEN YOU DRINK ALCOHOL HOW MANY DRINKS DO YOU HAVE: 0
TOTAL SCORE: 0

## 2020-09-03 ASSESSMENT — COGNITIVE AND FUNCTIONAL STATUS - GENERAL
HELP NEEDED FOR BATHING: A LITTLE
MOBILITY SCORE: 21
DRESSING REGULAR UPPER BODY CLOTHING: A LITTLE
SUGGESTED CMS G CODE MODIFIER DAILY ACTIVITY: CJ
MOVING FROM LYING ON BACK TO SITTING ON SIDE OF FLAT BED: A LITTLE
DAILY ACTIVITIY SCORE: 21
SUGGESTED CMS G CODE MODIFIER MOBILITY: CJ
STANDING UP FROM CHAIR USING ARMS: A LITTLE
EATING MEALS: A LITTLE
CLIMB 3 TO 5 STEPS WITH RAILING: A LITTLE

## 2020-09-03 ASSESSMENT — ENCOUNTER SYMPTOMS
STRIDOR: 0
VOMITING: 0
HALLUCINATIONS: 0
SPUTUM PRODUCTION: 0
SORE THROAT: 0
MYALGIAS: 0
DIARRHEA: 0
ABDOMINAL PAIN: 1
EYE PAIN: 0
FEVER: 0
EYE REDNESS: 0
SPEECH CHANGE: 0
NERVOUS/ANXIOUS: 0
SEIZURES: 0
SHORTNESS OF BREATH: 0
FOCAL WEAKNESS: 0
CHILLS: 0
COUGH: 0
BRUISES/BLEEDS EASILY: 0
HEMOPTYSIS: 0
FLANK PAIN: 0
ROS GI COMMENTS: ANOREXIA
PALPITATIONS: 0
EYE DISCHARGE: 0
LOSS OF CONSCIOUSNESS: 0
BLOOD IN STOOL: 0

## 2020-09-03 ASSESSMENT — FIBROSIS 4 INDEX: FIB4 SCORE: 2.91

## 2020-09-03 ASSESSMENT — PATIENT HEALTH QUESTIONNAIRE - PHQ9
1. LITTLE INTEREST OR PLEASURE IN DOING THINGS: NOT AT ALL
2. FEELING DOWN, DEPRESSED, IRRITABLE, OR HOPELESS: NOT AT ALL
SUM OF ALL RESPONSES TO PHQ9 QUESTIONS 1 AND 2: 0

## 2020-09-03 ASSESSMENT — PAIN DESCRIPTION - DESCRIPTORS: DESCRIPTORS: ACHING

## 2020-09-03 NOTE — ASSESSMENT & PLAN NOTE
CT shows evidence for acute diverticulitis of the mid/proximal sigmoid colon which appears moderate in degree.  There is possible single bubble of extraluminal air, not an unexpected finding.   Surgery consulted no surgical indication at this time  Started on ceftriaxone and metronidazole in the emergency department, will continue for now follow on cultures and sensitivities. Clear liquid diet for now, multimodal pain control, will advance diet as tolerated according to clinical course

## 2020-09-03 NOTE — ASSESSMENT & PLAN NOTE
On Coumadin, INR supratherapeutic, I will hold for now.  Consider switching to Lovenox as the patient may need surgical intervention based on CT abdomen finding

## 2020-09-03 NOTE — H&P
Hospital Medicine History & Physical Note    Date of Service  9/3/2020    Primary Care Physician  Too rBito M.D.    Consultants  G Surgery     Code Status  Full Code    Chief Complaint  Chief Complaint   Patient presents with   • Abdominal Pain       History of Presenting Illness  75 y.o. male with a past medical history of diabetes, hypertension, venous thrombosis/pulmonary embolism on anticoagulation with warfarin who presented 9/3/2020 with abdominal pain of 2 days duration.  Patient reports that his back pain is severe lower part of the abdomen more on the left side started the day prior to admission rated as 10 out of 10 worse after eating no relieving factors no radiation to other places.       Review of Systems  Review of Systems   Constitutional: Negative for chills and fever.   HENT: Negative for congestion and sore throat.    Eyes: Negative for pain, discharge and redness.   Respiratory: Negative for cough, hemoptysis, sputum production, shortness of breath and stridor.    Cardiovascular: Negative for chest pain, palpitations and leg swelling.   Gastrointestinal: Positive for abdominal pain (lower abdomen left and right ). Negative for blood in stool, diarrhea and vomiting.        Anorexia    Genitourinary: Negative for flank pain, hematuria and urgency.   Musculoskeletal: Negative for myalgias.   Skin: Negative.    Neurological: Negative for speech change, focal weakness, seizures and loss of consciousness.   Endo/Heme/Allergies: Does not bruise/bleed easily.   Psychiatric/Behavioral: Negative for hallucinations and suicidal ideas. The patient is not nervous/anxious.      Past Medical History  Diabetes   Hypertension     Surgical History   has a past surgical history that includes colon resection; pr resec liver,part lobectomy; laminotomy; and eye surgery (Bilateral).     Family History  family history includes Cancer in his mother; Diabetes in his sister; Heart Attack in his father; Hypertension  in his father.     Social History   reports that he has never smoked. He has never used smokeless tobacco. He reports that he does not drink alcohol or use drugs.    Allergies  Allergies   Allergen Reactions   • Morphine Unspecified     Hallucinations, 15+ years ago       Medications  Prior to Admission Medications   Prescriptions Last Dose Informant Patient Reported? Taking?   amLODIPine (NORVASC) 5 MG Tab 9/3/2020 at AM Significant Other No No   Sig: Take 1 Tab by mouth every day.   glimepiride (AMARYL) 4 MG Tab 9/3/2020 at AM Significant Other No No   Sig: TAKE 2 TABLETS BY MOUTH EVERY MORNING   hydroCHLOROthiazide (HYDRODIURIL) 12.5 MG tablet 9/3/2020 at AM Significant Other No No   Sig: Take 1 Tab by mouth every day.   losartan (COZAAR) 100 MG Tab 9/3/2020 at AM Significant Other No No   Sig: Take 1 Tab by mouth every day.   metFORMIN ER (GLUCOPHAGE XR) 500 MG TABLET SR 24 HR 9/3/2020 at AM Significant Other No No   Sig: TAKE 2 TABLETS BY MOUTH TWICE DAILY   pioglitazone (ACTOS) 15 MG Tab 9/3/2020 at AM Significant Other No No   Sig: Take 1 Tab by mouth every day.   therapeutic multivitamin-minerals (THERAGRAN-M) Tab 9/3/2020 at AM Significant Other Yes No   Sig: Take 1 Tab by mouth every day.   warfarin (COUMADIN) 5 MG Tab 9/2/2020 at PM Significant Other Yes Yes   Sig: Take 2.5-5 mg by mouth every day. Saturday= 2.5 mg  All other days is 5 mg      Facility-Administered Medications: None       Physical Exam  Temp:  [36.7 °C (98 °F)] 36.7 °C (98 °F)  Pulse:  [66-82] 70  Resp:  [16] 16  BP: (124-161)/(67-87) 127/78  SpO2:  [92 %-97 %] 92 %    Physical Exam  Constitutional:       General: He is not in acute distress.     Appearance: He is not ill-appearing or diaphoretic.   HENT:      Head: Atraumatic.      Right Ear: External ear normal.      Left Ear: External ear normal.      Nose: No congestion or rhinorrhea.      Mouth/Throat:      Mouth: Mucous membranes are dry.   Eyes:      General: No scleral icterus.         Right eye: No discharge.         Left eye: No discharge.      Pupils: Pupils are equal, round, and reactive to light.   Neck:      Musculoskeletal: Neck supple. No neck rigidity or muscular tenderness.   Cardiovascular:      Rate and Rhythm: Normal rate and regular rhythm.      Heart sounds: No friction rub. No gallop.    Pulmonary:      Effort: No respiratory distress.      Breath sounds: No stridor. No wheezing.   Abdominal:      Palpations: Abdomen is soft.      Tenderness: There is abdominal tenderness. There is no guarding or rebound.   Musculoskeletal:      Right lower leg: No edema.      Left lower leg: No edema.   Skin:     General: Skin is dry.      Capillary Refill: Capillary refill takes 2 to 3 seconds.      Coloration: Skin is pale. Skin is not jaundiced.   Neurological:      Mental Status: He is alert and oriented to person, place, and time.      Coordination: Coordination normal.   Psychiatric:         Mood and Affect: Mood normal.         Behavior: Behavior normal.       Laboratory:  Recent Labs     09/03/20  1030   WBC 7.5   RBC 4.71   HEMOGLOBIN 13.8*   HEMATOCRIT 40.5*   MCV 86.0   MCH 29.3   MCHC 34.1   RDW 43.6   PLATELETCT 123*   MPV 9.7     Recent Labs     09/03/20  1030   SODIUM 137   POTASSIUM 3.4*   CHLORIDE 100   CO2 25   GLUCOSE 233*   BUN 13   CREATININE 1.15   CALCIUM 8.7     Recent Labs     09/03/20  1030   ALTSGPT 20   ASTSGOT 19   ALKPHOSPHAT 53   TBILIRUBIN 3.4*   LIPASE 26   GLUCOSE 233*     Recent Labs     09/03/20  1030   APTT 86.5*   INR 3.48*     No results for input(s): NTPROBNP in the last 72 hours.      No results for input(s): TROPONINT in the last 72 hours.    Imaging:  CT-ABDOMEN-PELVIS WITH   Final Result      1.  Acute diverticulitis of the mid/proximal sigmoid colon which appears moderate in degree.      2.  Possible single bubble of extraluminal air, not an unexpected finding      3.  Negative for abscess      4.  Postoperative changes consistent with right  hepatectomy and partial colectomy      5.  Hepatic steatosis      6.  Prior cholecystectomy      7.  Aortic atherosclerotic plaque        Assessment/Plan:  I anticipate this patient will require at least two midnights for appropriate medical management, necessitating inpatient admission.    * Diverticulitis  Assessment & Plan  CT shows evidence for acute diverticulitis of the mid/proximal sigmoid colon which appears moderate in degree.  There is possible single bubble of extraluminal air, not an unexpected finding.   Surgery consulted by EDP.   INR supratherapeutic, I will hold Coumadin for now   Started on ceftriaxone and metronidazole in the emergency department, will continue for now follow on cultures and sensitivities. Clear liquid diet for now, multimodal pain control, will advance diet as tolerated according to clinical course    History of venous thromboembolism- (present on admission)  Assessment & Plan  On Coumadin, INR supratherapeutic, I will hold for now.  Consider switching to Lovenox as the patient may need surgical intervention based on CT abdomen finding    Controlled type 2 diabetes mellitus without complication, without long-term current use of insulin (HCC)- (present on admission)  Assessment & Plan  With hyperglycemia  Glycated hemoglobin 6.3%   Will start short acting insulin for now   Accu-Checks, hypoglycemia protocol     Gilbert syndrome- (present on admission)  Assessment & Plan  Monitor potassium and replace as needed    Essential hypertension- (present on admission)  Assessment & Plan  Resume home losartan and amlodipine with hold parameters

## 2020-09-03 NOTE — ED TRIAGE NOTES
Pt ambulates to triage with wife  Chief Complaint   Patient presents with   • Abdominal Pain     Diffuse abd pain started yesterday morning  Denies N/V/D  Reports distant hx of diverticulitis    Pt A & 0 x 4, speech clear, ambulates well  COVID-19 screening criteria completed, pt denies high risk travel and denies contact with COVID-19 positive pt    Pt updated on triage process and asked to inform RN of any changes while waiting in lobby.

## 2020-09-03 NOTE — ED NOTES
"Pt reports a hx significant for th following conditions:  Acute pulmonary embolism without acute cor pulmonale   Colon cancer, stage IV  Essential hypertension POA: Yes  Decreased hearing of both ears   Controlled type 2 diabetes mellitus without complication, without long-term current use of insulin   Deep vein thrombosis (DVT) of popliteal vein of both lower extremities       Chief Complaint   Patient presents with   • Abdominal Pain       /87   Pulse 82   Temp 36.7 °C (98 °F) (Temporal)   Resp 16   Ht 1.803 m (5' 11\")   Wt 90.8 kg (200 lb 2.8 oz)   SpO2 95%   BMI 27.92 kg/m²     "

## 2020-09-03 NOTE — ED PROVIDER NOTES
ED Provider Note  CHIEF COMPLAINT  Chief Complaint   Patient presents with   • Abdominal Pain       HPI  Magnus Claudio is a 75 y.o. male who presents with lower abdominal pain.  Pain started yesterday.  He has a history of diverticulitis and states this feels somewhat similar.  Pain is been constant.  He is tried Tylenol without any relief.  Patient denies anything making it better or worse.  He denies any fevers, vomiting, diarrhea, dysuria or blood in his stool.  Denies any back pain.  No chest pain or shortness of breath.  No known exposure to COVID-19.  Patient does take Coumadin.  Does have a history of colon cancer with resection many years ago and states he has had a cholecystectomy and an appendectomy.  No chest pain or shortness of breath.    REVIEW OF SYSTEMS  See HPI for further details. All other systems are negative.     PAST MEDICAL HISTORY  Past Medical History:   Diagnosis Date   • Cancer (HCC)     Colon   • Cataract    • Diabetes (HCC)    • Hypertension    • Vertigo, benign positional        FAMILY HISTORY  [unfilled]    SOCIAL HISTORY  Social History     Socioeconomic History   • Marital status:      Spouse name: Not on file   • Number of children: Not on file   • Years of education: Not on file   • Highest education level: Not on file   Occupational History   • Not on file   Social Needs   • Financial resource strain: Not on file   • Food insecurity     Worry: Not on file     Inability: Not on file   • Transportation needs     Medical: Not on file     Non-medical: Not on file   Tobacco Use   • Smoking status: Never Smoker   • Smokeless tobacco: Never Used   Substance and Sexual Activity   • Alcohol use: Never     Alcohol/week: 0.0 oz     Frequency: Never     Binge frequency: Never   • Drug use: No   • Sexual activity: Not Currently     Partners: Female   Lifestyle   • Physical activity     Days per week: Not on file     Minutes per session: Not on file   • Stress: Not on file  "  Relationships   • Social connections     Talks on phone: Not on file     Gets together: Not on file     Attends Bahai service: Not on file     Active member of club or organization: Not on file     Attends meetings of clubs or organizations: Not on file     Relationship status: Not on file   • Intimate partner violence     Fear of current or ex partner: Not on file     Emotionally abused: Not on file     Physically abused: Not on file     Forced sexual activity: Not on file   Other Topics Concern   • Not on file   Social History Narrative   • Not on file       SURGICAL HISTORY  Past Surgical History:   Procedure Laterality Date   • COLON RESECTION     • EYE SURGERY Bilateral     Cataract surgery   • LAMINOTOMY     • PB RESEC LIVER,PART LOBECTOMY         CURRENT MEDICATIONS   Home Medications    **Home medications have not yet been reviewed for this encounter**         ALLERGIES  Allergies   Allergen Reactions   • Morphine Unspecified     Hallucinations, 15+ years ago       PHYSICAL EXAM  VITAL SIGNS: /87   Pulse 82   Temp 36.7 °C (98 °F) (Temporal)   Resp 16   Ht 1.803 m (5' 11\")   Wt 90.8 kg (200 lb 2.8 oz)   SpO2 95%   BMI 27.92 kg/m²       Constitutional:  Well developed, No acute distress, Non-toxic appearance.   HENT: Normocephalic, Atraumatic, Bilateral external ears normal, Oropharynx moist  Eyes: PERRL, EOMI, Conjunctiva normal  Neck: Normal range of motion, No tenderness, Supple  Cardiovascular: Normal heart rate, Normal rhythm  Thorax & Lungs: Normal breath sounds, No respiratory distress  Abdomen: Diffuse lower abdominal tenderness to palpation, no pulsatile mass, no rebound or guarding, minimal distention, positive bowel sounds   skin: Warm, Dry, No erythema, No rash.   Back: No tenderness, No CVA tenderness.   Extremities: Intact distal pulses, No edema, No tenderness   Neurologic: Alert & oriented x 3, Normal motor function, Normal sensory function, No focal deficits noted. "   Psychiatric: appropriate    Labs  Results for orders placed or performed during the hospital encounter of 09/03/20   CBC WITH DIFFERENTIAL   Result Value Ref Range    WBC 7.5 4.8 - 10.8 K/uL    RBC 4.71 4.70 - 6.10 M/uL    Hemoglobin 13.8 (L) 14.0 - 18.0 g/dL    Hematocrit 40.5 (L) 42.0 - 52.0 %    MCV 86.0 81.4 - 97.8 fL    MCH 29.3 27.0 - 33.0 pg    MCHC 34.1 33.7 - 35.3 g/dL    RDW 43.6 35.9 - 50.0 fL    Platelet Count 123 (L) 164 - 446 K/uL    MPV 9.7 9.0 - 12.9 fL    Neutrophils-Polys 74.90 (H) 44.00 - 72.00 %    Lymphocytes 15.10 (L) 22.00 - 41.00 %    Monocytes 8.80 0.00 - 13.40 %    Eosinophils 0.50 0.00 - 6.90 %    Basophils 0.30 0.00 - 1.80 %    Immature Granulocytes 0.40 0.00 - 0.90 %    Nucleated RBC 0.00 /100 WBC    Neutrophils (Absolute) 5.62 1.82 - 7.42 K/uL    Lymphs (Absolute) 1.13 1.00 - 4.80 K/uL    Monos (Absolute) 0.66 0.00 - 0.85 K/uL    Eos (Absolute) 0.04 0.00 - 0.51 K/uL    Baso (Absolute) 0.02 0.00 - 0.12 K/uL    Immature Granulocytes (abs) 0.03 0.00 - 0.11 K/uL    NRBC (Absolute) 0.00 K/uL   COMP METABOLIC PANEL   Result Value Ref Range    Sodium 137 135 - 145 mmol/L    Potassium 3.4 (L) 3.6 - 5.5 mmol/L    Chloride 100 96 - 112 mmol/L    Co2 25 20 - 33 mmol/L    Anion Gap 12.0 7.0 - 16.0    Glucose 233 (H) 65 - 99 mg/dL    Bun 13 8 - 22 mg/dL    Creatinine 1.15 0.50 - 1.40 mg/dL    Calcium 8.7 8.4 - 10.2 mg/dL    AST(SGOT) 19 12 - 45 U/L    ALT(SGPT) 20 2 - 50 U/L    Alkaline Phosphatase 53 30 - 99 U/L    Total Bilirubin 3.4 (H) 0.1 - 1.5 mg/dL    Albumin 3.7 3.2 - 4.9 g/dL    Total Protein 6.7 6.0 - 8.2 g/dL    Globulin 3.0 1.9 - 3.5 g/dL    A-G Ratio 1.2 g/dL   LIPASE   Result Value Ref Range    Lipase 26 7 - 58 U/L   URINALYSIS,CULTURE IF INDICATED    Specimen: Urine   Result Value Ref Range    Color Yellow     Character Clear     Specific Gravity 1.025 <1.035    Ph 6.0 5.0 - 8.0    Glucose Negative Negative mg/dL    Ketones Negative Negative mg/dL    Protein Negative Negative mg/dL     Bilirubin Negative Negative    Nitrite Negative Negative    Leukocyte Esterase Negative Negative    Occult Blood Small (A) Negative    Micro Urine Req Microscopic    Prothrombin Time   Result Value Ref Range    PT 34.4 (H) 12.0 - 14.6 sec    INR 3.48 (H) 0.87 - 1.13   ESTIMATED GFR   Result Value Ref Range    GFR If African American >60 >60 mL/min/1.73 m 2    GFR If Non African American >60 >60 mL/min/1.73 m 2   URINE MICROSCOPIC (W/UA)   Result Value Ref Range    WBC 0-2 (A) /hpf    RBC 2-5 (A) /hpf    Epithelial Cells Rare Few /hpf    Mucous Threads Moderate /hpf    Urine Crystals Rare Amorphous /hpf    Hyaline Cast 0-2 /lpf    Granular Casts 0-2 /lpf       RADIOLOGY/PROCEDURES  CT-ABDOMEN-PELVIS WITH   Final Result      1.  Acute diverticulitis of the mid/proximal sigmoid colon which appears moderate in degree.      2.  Possible single bubble of extraluminal air, not an unexpected finding      3.  Negative for abscess      4.  Postoperative changes consistent with right hepatectomy and partial colectomy      5.  Hepatic steatosis      6.  Prior cholecystectomy      7.  Aortic atherosclerotic plaque          COURSE & MEDICAL DECISION MAKING  Pertinent Labs & Imaging studies reviewed. (See chart for details)  Patient presents with complaints of lower abdominal pain.  Patient denies any fever vomiting or diarrhea.  No history of blood in his stools.  Patient is on Coumadin.  No pain out of proportion to exam and with anticoagulation doubt ischemic bowel.  History of diverticulitis in the past with pain similar today.  Denies any dysuria or hematuria.  No diarrhea therefore I think colitis is less likely.  Minimal abdominal distention but no vomiting and therefore doubt bowel obstruction.    Patient has a normal white count without evidence of bandemia.  INR is at 3.48.  CT scan does show evidence of acute diverticulitis.  There is also a single bubble on CT that could indicate extraluminal air.  No evidence of  abscess.  Patient has been treated with Rocephin and Flagyl.  Patient is still having pain and I do not feel is a good candidate for outpatient management.  He will be admitted to the hospital for further monitoring.  I spoke with Dr. Boyd of surgery who will see him.  I discussed the case with the hospitalist.    FINAL IMPRESSION     1. Diverticulitis             Electronically signed by: Sheila Shaw M.D., 9/3/2020 10:16 AM

## 2020-09-03 NOTE — ED NOTES
Report to Tyson @ ext [phone].  Plan to transfer to [id number].   SW received MDO for Advanced Directives. Pt stated that he may have completed forms at [facility], but will confirm w/ his family. SW requested pt to bring in copy of current HCPOA if available. HCPOA forms explained and given if needed.     GIULIA Law

## 2020-09-04 LAB
ALBUMIN SERPL BCP-MCNC: 3.5 G/DL (ref 3.2–4.9)
ALBUMIN/GLOB SERPL: 1.1 G/DL
ALP SERPL-CCNC: 54 U/L (ref 30–99)
ALT SERPL-CCNC: 19 U/L (ref 2–50)
ANION GAP SERPL CALC-SCNC: 13 MMOL/L (ref 7–16)
AST SERPL-CCNC: 20 U/L (ref 12–45)
BASOPHILS # BLD AUTO: 0.3 % (ref 0–1.8)
BASOPHILS # BLD: 0.02 K/UL (ref 0–0.12)
BILIRUB SERPL-MCNC: 2.7 MG/DL (ref 0.1–1.5)
BUN SERPL-MCNC: 10 MG/DL (ref 8–22)
CALCIUM SERPL-MCNC: 8.6 MG/DL (ref 8.4–10.2)
CHLORIDE SERPL-SCNC: 103 MMOL/L (ref 96–112)
CO2 SERPL-SCNC: 24 MMOL/L (ref 20–33)
CREAT SERPL-MCNC: 0.97 MG/DL (ref 0.5–1.4)
EOSINOPHIL # BLD AUTO: 0.08 K/UL (ref 0–0.51)
EOSINOPHIL NFR BLD: 1.2 % (ref 0–6.9)
ERYTHROCYTE [DISTWIDTH] IN BLOOD BY AUTOMATED COUNT: 44.1 FL (ref 35.9–50)
GLOBULIN SER CALC-MCNC: 3.2 G/DL (ref 1.9–3.5)
GLUCOSE BLD-MCNC: 112 MG/DL (ref 65–99)
GLUCOSE BLD-MCNC: 139 MG/DL (ref 65–99)
GLUCOSE BLD-MCNC: 145 MG/DL (ref 65–99)
GLUCOSE BLD-MCNC: 87 MG/DL (ref 65–99)
GLUCOSE BLD-MCNC: 94 MG/DL (ref 65–99)
GLUCOSE SERPL-MCNC: 93 MG/DL (ref 65–99)
HCT VFR BLD AUTO: 41.6 % (ref 42–52)
HGB BLD-MCNC: 13.9 G/DL (ref 14–18)
IMM GRANULOCYTES # BLD AUTO: 0.02 K/UL (ref 0–0.11)
IMM GRANULOCYTES NFR BLD AUTO: 0.3 % (ref 0–0.9)
INR PPP: 2.9 (ref 0.87–1.13)
LYMPHOCYTES # BLD AUTO: 1.11 K/UL (ref 1–4.8)
LYMPHOCYTES NFR BLD: 17.1 % (ref 22–41)
MAGNESIUM SERPL-MCNC: 1.7 MG/DL (ref 1.5–2.5)
MCH RBC QN AUTO: 29.3 PG (ref 27–33)
MCHC RBC AUTO-ENTMCNC: 33.4 G/DL (ref 33.7–35.3)
MCV RBC AUTO: 87.6 FL (ref 81.4–97.8)
MONOCYTES # BLD AUTO: 0.49 K/UL (ref 0–0.85)
MONOCYTES NFR BLD AUTO: 7.5 % (ref 0–13.4)
NEUTROPHILS # BLD AUTO: 4.78 K/UL (ref 1.82–7.42)
NEUTROPHILS NFR BLD: 73.6 % (ref 44–72)
NRBC # BLD AUTO: 0 K/UL
NRBC BLD-RTO: 0 /100 WBC
PLATELET # BLD AUTO: 138 K/UL (ref 164–446)
PMV BLD AUTO: 10 FL (ref 9–12.9)
POTASSIUM SERPL-SCNC: 3 MMOL/L (ref 3.6–5.5)
PROT SERPL-MCNC: 6.7 G/DL (ref 6–8.2)
PROTHROMBIN TIME: 29.8 SEC (ref 12–14.6)
RBC # BLD AUTO: 4.75 M/UL (ref 4.7–6.1)
SODIUM SERPL-SCNC: 140 MMOL/L (ref 135–145)
WBC # BLD AUTO: 6.5 K/UL (ref 4.8–10.8)

## 2020-09-04 PROCEDURE — 99232 SBSQ HOSP IP/OBS MODERATE 35: CPT | Performed by: INTERNAL MEDICINE

## 2020-09-04 PROCEDURE — A9270 NON-COVERED ITEM OR SERVICE: HCPCS | Performed by: HOSPITALIST

## 2020-09-04 PROCEDURE — 770006 HCHG ROOM/CARE - MED/SURG/GYN SEMI*

## 2020-09-04 PROCEDURE — 85025 COMPLETE CBC W/AUTO DIFF WBC: CPT

## 2020-09-04 PROCEDURE — 83735 ASSAY OF MAGNESIUM: CPT

## 2020-09-04 PROCEDURE — 700102 HCHG RX REV CODE 250 W/ 637 OVERRIDE(OP): Performed by: HOSPITALIST

## 2020-09-04 PROCEDURE — 700101 HCHG RX REV CODE 250: Performed by: HOSPITALIST

## 2020-09-04 PROCEDURE — 36415 COLL VENOUS BLD VENIPUNCTURE: CPT

## 2020-09-04 PROCEDURE — 700102 HCHG RX REV CODE 250 W/ 637 OVERRIDE(OP): Performed by: INTERNAL MEDICINE

## 2020-09-04 PROCEDURE — A9270 NON-COVERED ITEM OR SERVICE: HCPCS | Performed by: INTERNAL MEDICINE

## 2020-09-04 PROCEDURE — 85610 PROTHROMBIN TIME: CPT

## 2020-09-04 PROCEDURE — 700111 HCHG RX REV CODE 636 W/ 250 OVERRIDE (IP): Performed by: HOSPITALIST

## 2020-09-04 PROCEDURE — 80053 COMPREHEN METABOLIC PANEL: CPT

## 2020-09-04 PROCEDURE — 82962 GLUCOSE BLOOD TEST: CPT | Mod: 91

## 2020-09-04 PROCEDURE — 700105 HCHG RX REV CODE 258: Performed by: HOSPITALIST

## 2020-09-04 RX ORDER — METRONIDAZOLE 500 MG/1
500 TABLET ORAL EVERY 8 HOURS
Status: DISCONTINUED | OUTPATIENT
Start: 2020-09-04 | End: 2020-09-06 | Stop reason: HOSPADM

## 2020-09-04 RX ORDER — POTASSIUM CHLORIDE 20 MEQ/1
40 TABLET, EXTENDED RELEASE ORAL DAILY
Status: DISCONTINUED | OUTPATIENT
Start: 2020-09-04 | End: 2020-09-06 | Stop reason: HOSPADM

## 2020-09-04 RX ADMIN — LOSARTAN POTASSIUM 100 MG: 25 TABLET, FILM COATED ORAL at 04:34

## 2020-09-04 RX ADMIN — SENNOSIDES-DOCUSATE SODIUM TAB 8.6-50 MG 2 TABLET: 8.6-5 TAB at 04:33

## 2020-09-04 RX ADMIN — CEFTRIAXONE SODIUM 2 G: 2 INJECTION, POWDER, FOR SOLUTION INTRAMUSCULAR; INTRAVENOUS at 04:27

## 2020-09-04 RX ADMIN — METRONIDAZOLE 500 MG: 500 INJECTION, SOLUTION INTRAVENOUS at 05:25

## 2020-09-04 RX ADMIN — POTASSIUM CHLORIDE 40 MEQ: 1500 TABLET, EXTENDED RELEASE ORAL at 12:40

## 2020-09-04 RX ADMIN — METRONIDAZOLE 500 MG: 500 TABLET ORAL at 17:30

## 2020-09-04 RX ADMIN — METRONIDAZOLE 500 MG: 500 TABLET ORAL at 21:30

## 2020-09-04 RX ADMIN — AMLODIPINE BESYLATE 5 MG: 5 TABLET ORAL at 04:33

## 2020-09-04 ASSESSMENT — ENCOUNTER SYMPTOMS
ABDOMINAL PAIN: 1
SORE THROAT: 0
NAUSEA: 0
FEVER: 0
WHEEZING: 0
CHILLS: 0
DIZZINESS: 0
COUGH: 0
DIARRHEA: 0
NECK PAIN: 0
BACK PAIN: 0
SEIZURES: 0
DIAPHORESIS: 0
MYALGIAS: 0
BLOOD IN STOOL: 0
SHORTNESS OF BREATH: 0
BRUISES/BLEEDS EASILY: 0
FOCAL WEAKNESS: 0
FLANK PAIN: 0
PALPITATIONS: 0
BLURRED VISION: 0
HEADACHES: 0
SPUTUM PRODUCTION: 0
VOMITING: 0

## 2020-09-04 NOTE — PROGRESS NOTES
Received report from day shift RN.  Pt A&O x 4  Pt NPO with sips with med and a 20 g. IV to L AC running LR at 83 mL/hr  Pt reports a 6/10 pain level in his abdomen but does not want any pain medication at the time.  POC discussed  All need met at this time.  Bed in lowest position with call light within reach.  Will continue to monitor.

## 2020-09-04 NOTE — CARE PLAN
Problem: Communication  Goal: The ability to communicate needs accurately and effectively will improve  Outcome: PROGRESSING AS EXPECTED     Problem: Safety  Goal: Will remain free from injury  Outcome: PROGRESSING AS EXPECTED     Problem: Pain Management  Goal: Pain level will decrease to patient's comfort goal  Outcome: PROGRESSING AS EXPECTED  Intervention: Educate and implement non-pharmacologic comfort measures. Examples: relaxation, distration, play therapy, activity therapy, massage, etc.  Flowsheets (Taken 9/3/2020 9583)  Intervention:   Rest   Repositioned   Distraction

## 2020-09-04 NOTE — CONSULTS
DATE OF SERVICE:  09/04/2020    HISTORY OF PRESENT ILLNESS:  Patient is an active, alert 75-year-old male who   began having mid-sided right and left abdominal pain, a little over 24 hours   ago.  He presented to the emergency room yesterday and was admitted late   yesterday after his CT scan showed noncomplicated sigmoid diverticulitis.    Other than having a questionable tiny air bubble adjacent to the area of   inflammation.  He has not had any emesis or hematochezia.    He did not have leukocytosis either yesterday or today and has been afebrile   since being admitted.    This is the second episode of diverticulitis.  His first episode was 15 years   ago and was managed medically with antibiotics during a 3-day hospital   admission.    Of note, he had a major abdominal surgery 20 years ago for colon cancer and   stated he had a half of his colon removed as well as having a partial   hepatectomy.    He also had a ventral hernia repair and has developed a recurrent subxiphoid   ventral hernia, which is not causing him any problems at this time.    PAST MEDICAL HISTORY:  1.  Colon cancer as above.  2.  Diabetes.  3.  Hypertension.    It is unknown to him what part of his colon was removed.    PAST SURGICAL HISTORY:  As above and laminectomy and eye surgery.    HOME MEDICATIONS:  1.  Amlodipine.  2.  Amaryl.  3.  HydroDIURIL.  4.  Cozaar.  5.  Metformin.  6.  Multivitamin.  7.  Actos.  8.  Coumadin.    ALLERGIES:  MORPHINE.    SOCIAL HISTORY:  Negative tobacco.  Negative ETOH.  He lives with his wife and   son in [place].    FAMILY HISTORY:  Noncontributory.    PHYSICAL EXAMINATION:  VITAL SIGNS:  Afebrile.  Vitals within normal limits.  No tachycardia   throughout his admission.  No fevers throughout his admission so far.  ABDOMEN:  Reveals a mildly distended, but very soft abdomen with good bowel   sounds.  There is a long upper midline incisional scar and a long right   subcostal transverse incisional scar.  There  is a subxiphoid ventral hernia,   which is reducible and nontender.    IMPRESSION AND PLAN:  Noncomplicated diverticulitis.  I recommend continued   care with intravenous antibiotics and he can have bowel rest with clear   liquids.  Only if he develops worsening abdominal pain or signs of   peritonitis, surgery would be considered at this time.  He is very stable.  I   discussed with the patient in detail and answered all his questions and   informed him of my recommendations and will also contact his hospitalist by   telephone who I am nearly certain will be in agreement.       ____________________________________     MD ERIC Trejo / NTS    DD:  09/04/2020 12:09:25  DT:  09/04/2020 13:21:27    D#:  [id number]  Job#:  [id number]

## 2020-09-04 NOTE — PROGRESS NOTES
Hospital Medicine Daily Progress Note    Date of Service  9/4/2020    Chief Complaint  75 y.o. male admitted 9/3/2020 with abdominal pain.  CT abdomen revealed diverticulitis and a questionable tiny air bubble adjacent to the area of inflammation.    Hospital Course          Interval Problem Update  Abd distention has improved. Continues to note LLQ pain. No N/V/D. Continue antibiotics.  Started on CLD, will advance to soft tomorrow    Consultants/Specialty  Surgery    Code Status  Full Code    Disposition  Continue medical management    Review of Systems  Review of Systems   Constitutional: Negative for chills, diaphoresis and fever.   HENT: Negative for hearing loss and sore throat.    Eyes: Negative for blurred vision.   Respiratory: Negative for cough, sputum production, shortness of breath and wheezing.    Cardiovascular: Negative for chest pain, palpitations and leg swelling.   Gastrointestinal: Positive for abdominal pain. Negative for blood in stool, diarrhea, nausea and vomiting.   Genitourinary: Negative for dysuria, flank pain and urgency.   Musculoskeletal: Negative for back pain, joint pain, myalgias and neck pain.   Skin: Negative for rash.   Neurological: Negative for dizziness, focal weakness, seizures and headaches.   Endo/Heme/Allergies: Does not bruise/bleed easily.   Psychiatric/Behavioral: Negative for suicidal ideas.   All other systems reviewed and are negative.       Physical Exam  Temp:  [36.3 °C (97.4 °F)-36.9 °C (98.4 °F)] 36.6 °C (97.9 °F)  Pulse:  [66-74] 74  Resp:  [18] 18  BP: (124-161)/(67-78) 158/71  SpO2:  [92 %-97 %] 95 %    Physical Exam  Vitals signs and nursing note reviewed.   Constitutional:       General: He is not in acute distress.     Appearance: Normal appearance.   HENT:      Head: Normocephalic and atraumatic.      Nose: Nose normal.      Mouth/Throat:      Mouth: Mucous membranes are moist.   Eyes:      Extraocular Movements: Extraocular movements intact.       Conjunctiva/sclera: Conjunctivae normal.      Pupils: Pupils are equal, round, and reactive to light.   Neck:      Musculoskeletal: Normal range of motion and neck supple.   Cardiovascular:      Rate and Rhythm: Normal rate and regular rhythm.      Pulses: Normal pulses.      Heart sounds: Normal heart sounds.   Pulmonary:      Effort: Pulmonary effort is normal. No respiratory distress.      Breath sounds: Normal breath sounds. No wheezing, rhonchi or rales.   Abdominal:      General: Bowel sounds are normal. There is no distension.      Palpations: Abdomen is soft.      Tenderness: There is abdominal tenderness.   Musculoskeletal: Normal range of motion.         General: No swelling or tenderness.   Lymphadenopathy:      Cervical: No cervical adenopathy.   Skin:     General: Skin is warm.      Coloration: Skin is not jaundiced.      Findings: No rash.   Neurological:      General: No focal deficit present.      Mental Status: He is alert and oriented to person, place, and time.      Cranial Nerves: No cranial nerve deficit.      Motor: No weakness.   Psychiatric:         Mood and Affect: Mood normal.         Behavior: Behavior normal.         Fluids    Intake/Output Summary (Last 24 hours) at 9/4/2020 1107  Last data filed at 9/4/2020 0000  Gross per 24 hour   Intake 932.18 ml   Output --   Net 932.18 ml       Laboratory  Recent Labs     09/03/20  1030 09/04/20  0449   WBC 7.5 6.5   RBC 4.71 4.75   HEMOGLOBIN 13.8* 13.9*   HEMATOCRIT 40.5* 41.6*   MCV 86.0 87.6   MCH 29.3 29.3   MCHC 34.1 33.4*   RDW 43.6 44.1   PLATELETCT 123* 138*   MPV 9.7 10.0     Recent Labs     09/03/20  1030 09/04/20  0449   SODIUM 137 140   POTASSIUM 3.4* 3.0*   CHLORIDE 100 103   CO2 25 24   GLUCOSE 233* 93   BUN 13 10   CREATININE 1.15 0.97   CALCIUM 8.7 8.6     Recent Labs     09/03/20  1030 09/04/20  0449   APTT 86.5*  --    INR 3.48* 2.90*               Imaging  CT-ABDOMEN-PELVIS WITH   Final Result      1.  Acute diverticulitis of  the mid/proximal sigmoid colon which appears moderate in degree.      2.  Possible single bubble of extraluminal air, not an unexpected finding      3.  Negative for abscess      4.  Postoperative changes consistent with right hepatectomy and partial colectomy      5.  Hepatic steatosis      6.  Prior cholecystectomy      7.  Aortic atherosclerotic plaque           Assessment/Plan  * Diverticulitis  Assessment & Plan  CT shows evidence for acute diverticulitis of the mid/proximal sigmoid colon which appears moderate in degree.  There is possible single bubble of extraluminal air, not an unexpected finding.   Surgery consulted no surgical indication at this time  Started on ceftriaxone and metronidazole in the emergency department, will continue for now follow on cultures and sensitivities. Clear liquid diet for now, multimodal pain control, will advance diet as tolerated according to clinical course    History of venous thromboembolism- (present on admission)  Assessment & Plan  On Coumadin, INR supratherapeutic, I will hold for now.  Consider switching to Lovenox as the patient may need surgical intervention based on CT abdomen finding    Controlled type 2 diabetes mellitus without complication, without long-term current use of insulin (HCC)- (present on admission)  Assessment & Plan  With hyperglycemia  Glycated hemoglobin 6.3%   Will start short acting insulin for now   Accu-Checks, hypoglycemia protocol     Deep vein thrombosis (DVT) of popliteal vein of both lower extremities (HCC)- (present on admission)  Assessment & Plan  Continue Coumadin    Gilbert syndrome- (present on admission)  Assessment & Plan  Monitor potassium and replace as needed    Essential hypertension- (present on admission)  Assessment & Plan  Resume home losartan and amlodipine with hold parameters       VTE prophylaxis: Coumadin

## 2020-09-05 LAB
ANION GAP SERPL CALC-SCNC: 12 MMOL/L (ref 7–16)
BASOPHILS # BLD AUTO: 0.4 % (ref 0–1.8)
BASOPHILS # BLD: 0.02 K/UL (ref 0–0.12)
BUN SERPL-MCNC: 8 MG/DL (ref 8–22)
CALCIUM SERPL-MCNC: 8.5 MG/DL (ref 8.4–10.2)
CHLORIDE SERPL-SCNC: 104 MMOL/L (ref 96–112)
CO2 SERPL-SCNC: 27 MMOL/L (ref 20–33)
CREAT SERPL-MCNC: 0.87 MG/DL (ref 0.5–1.4)
EOSINOPHIL # BLD AUTO: 0.11 K/UL (ref 0–0.51)
EOSINOPHIL NFR BLD: 2.4 % (ref 0–6.9)
ERYTHROCYTE [DISTWIDTH] IN BLOOD BY AUTOMATED COUNT: 42.3 FL (ref 35.9–50)
GLUCOSE BLD-MCNC: 143 MG/DL (ref 65–99)
GLUCOSE BLD-MCNC: 173 MG/DL (ref 65–99)
GLUCOSE BLD-MCNC: 191 MG/DL (ref 65–99)
GLUCOSE SERPL-MCNC: 144 MG/DL (ref 65–99)
HCT VFR BLD AUTO: 40.2 % (ref 42–52)
HGB BLD-MCNC: 13.5 G/DL (ref 14–18)
IMM GRANULOCYTES # BLD AUTO: 0.01 K/UL (ref 0–0.11)
IMM GRANULOCYTES NFR BLD AUTO: 0.2 % (ref 0–0.9)
INR PPP: 2.18 (ref 0.87–1.13)
LYMPHOCYTES # BLD AUTO: 0.92 K/UL (ref 1–4.8)
LYMPHOCYTES NFR BLD: 20 % (ref 22–41)
MCH RBC QN AUTO: 29 PG (ref 27–33)
MCHC RBC AUTO-ENTMCNC: 33.6 G/DL (ref 33.7–35.3)
MCV RBC AUTO: 86.3 FL (ref 81.4–97.8)
MONOCYTES # BLD AUTO: 0.41 K/UL (ref 0–0.85)
MONOCYTES NFR BLD AUTO: 8.9 % (ref 0–13.4)
NEUTROPHILS # BLD AUTO: 3.12 K/UL (ref 1.82–7.42)
NEUTROPHILS NFR BLD: 68.1 % (ref 44–72)
NRBC # BLD AUTO: 0 K/UL
NRBC BLD-RTO: 0 /100 WBC
PLATELET # BLD AUTO: 149 K/UL (ref 164–446)
PMV BLD AUTO: 10 FL (ref 9–12.9)
POTASSIUM SERPL-SCNC: 3.3 MMOL/L (ref 3.6–5.5)
PROTHROMBIN TIME: 23.8 SEC (ref 12–14.6)
RBC # BLD AUTO: 4.66 M/UL (ref 4.7–6.1)
SODIUM SERPL-SCNC: 143 MMOL/L (ref 135–145)
WBC # BLD AUTO: 4.6 K/UL (ref 4.8–10.8)

## 2020-09-05 PROCEDURE — 99232 SBSQ HOSP IP/OBS MODERATE 35: CPT | Performed by: INTERNAL MEDICINE

## 2020-09-05 PROCEDURE — A9270 NON-COVERED ITEM OR SERVICE: HCPCS | Performed by: HOSPITALIST

## 2020-09-05 PROCEDURE — 80048 BASIC METABOLIC PNL TOTAL CA: CPT

## 2020-09-05 PROCEDURE — 85610 PROTHROMBIN TIME: CPT

## 2020-09-05 PROCEDURE — 700105 HCHG RX REV CODE 258: Performed by: HOSPITALIST

## 2020-09-05 PROCEDURE — 700111 HCHG RX REV CODE 636 W/ 250 OVERRIDE (IP): Performed by: HOSPITALIST

## 2020-09-05 PROCEDURE — 85025 COMPLETE CBC W/AUTO DIFF WBC: CPT

## 2020-09-05 PROCEDURE — A9270 NON-COVERED ITEM OR SERVICE: HCPCS | Performed by: INTERNAL MEDICINE

## 2020-09-05 PROCEDURE — 36415 COLL VENOUS BLD VENIPUNCTURE: CPT

## 2020-09-05 PROCEDURE — 700102 HCHG RX REV CODE 250 W/ 637 OVERRIDE(OP): Performed by: HOSPITALIST

## 2020-09-05 PROCEDURE — 82962 GLUCOSE BLOOD TEST: CPT | Mod: 91

## 2020-09-05 PROCEDURE — 700102 HCHG RX REV CODE 250 W/ 637 OVERRIDE(OP): Performed by: INTERNAL MEDICINE

## 2020-09-05 PROCEDURE — 770006 HCHG ROOM/CARE - MED/SURG/GYN SEMI*

## 2020-09-05 RX ORDER — WARFARIN SODIUM 2.5 MG/1
2.5 TABLET ORAL
Status: COMPLETED | OUTPATIENT
Start: 2020-09-05 | End: 2020-09-05

## 2020-09-05 RX ADMIN — SODIUM CHLORIDE, POTASSIUM CHLORIDE, SODIUM LACTATE AND CALCIUM CHLORIDE: 600; 310; 30; 20 INJECTION, SOLUTION INTRAVENOUS at 19:21

## 2020-09-05 RX ADMIN — AMLODIPINE BESYLATE 5 MG: 5 TABLET ORAL at 05:43

## 2020-09-05 RX ADMIN — METRONIDAZOLE 500 MG: 500 TABLET ORAL at 14:19

## 2020-09-05 RX ADMIN — METRONIDAZOLE 500 MG: 500 TABLET ORAL at 05:44

## 2020-09-05 RX ADMIN — WARFARIN SODIUM 2.5 MG: 2.5 TABLET ORAL at 18:04

## 2020-09-05 RX ADMIN — CEFTRIAXONE SODIUM 2 G: 2 INJECTION, POWDER, FOR SOLUTION INTRAMUSCULAR; INTRAVENOUS at 05:44

## 2020-09-05 RX ADMIN — INSULIN HUMAN 1 UNITS: 100 INJECTION, SOLUTION PARENTERAL at 05:51

## 2020-09-05 RX ADMIN — LOSARTAN POTASSIUM 100 MG: 25 TABLET, FILM COATED ORAL at 06:00

## 2020-09-05 RX ADMIN — POTASSIUM CHLORIDE 40 MEQ: 1500 TABLET, EXTENDED RELEASE ORAL at 05:44

## 2020-09-05 RX ADMIN — METRONIDAZOLE 500 MG: 500 TABLET ORAL at 21:52

## 2020-09-05 ASSESSMENT — ENCOUNTER SYMPTOMS
BLOOD IN STOOL: 0
WHEEZING: 0
CHILLS: 0
MYALGIAS: 0
COUGH: 0
BLURRED VISION: 0
VOMITING: 0
NAUSEA: 0
FEVER: 0
DIARRHEA: 0
PALPITATIONS: 0
DIZZINESS: 0
BRUISES/BLEEDS EASILY: 0
FLANK PAIN: 0
NECK PAIN: 0
DIAPHORESIS: 0
SHORTNESS OF BREATH: 0
SEIZURES: 0
FOCAL WEAKNESS: 0
HEADACHES: 0
SORE THROAT: 0
SPUTUM PRODUCTION: 0
BACK PAIN: 0
ABDOMINAL PAIN: 1

## 2020-09-05 ASSESSMENT — PAIN DESCRIPTION - PAIN TYPE: TYPE: ACUTE PAIN

## 2020-09-05 NOTE — PROGRESS NOTES
[facility] Medicine Daily Progress Note    Date of Service  9/5/2020    Chief Complaint  75 y.o. male admitted 9/3/2020 with abdominal pain.  CT abdomen revealed diverticulitis and a questionable tiny air bubble adjacent to the area of inflammation.    Hospital Course          Interval Problem Update  Abdominal pain has improved.  Denies nausea, vomiting or diarrhea.  White count is improving.  Advance to full liquid diet.  DC once okay with surgery  Consultants/Specialty  Surgery    Code Status  Full Code    Disposition  Continue medical management    Review of Systems  Review of Systems   Constitutional: Negative for chills, diaphoresis and fever.   HENT: Negative for hearing loss and sore throat.    Eyes: Negative for blurred vision.   Respiratory: Negative for cough, sputum production, shortness of breath and wheezing.    Cardiovascular: Negative for chest pain, palpitations and leg swelling.   Gastrointestinal: Positive for abdominal pain. Negative for blood in stool, diarrhea, nausea and vomiting.   Genitourinary: Negative for dysuria, flank pain and urgency.   Musculoskeletal: Negative for back pain, joint pain, myalgias and neck pain.   Skin: Negative for rash.   Neurological: Negative for dizziness, focal weakness, seizures and headaches.   Endo/Heme/Allergies: Does not bruise/bleed easily.   Psychiatric/Behavioral: Negative for suicidal ideas.   All other systems reviewed and are negative.       Physical Exam  Temp:  [36.5 °C (97.7 °F)-36.7 °C (98 °F)] 36.7 °C (98 °F)  Pulse:  [66-69] 69  Resp:  [18-20] 18  BP: (131-162)/(67-77) 162/77  SpO2:  [94 %-98 %] 95 %    Physical Exam  Vitals signs and nursing note reviewed.   Constitutional:       General: He is not in acute distress.     Appearance: Normal appearance.   HENT:      Head: Normocephalic and atraumatic.      Nose: Nose normal.      Mouth/Throat:      Mouth: Mucous membranes are moist.   Eyes:      Extraocular Movements: Extraocular movements intact.       Conjunctiva/sclera: Conjunctivae normal.      Pupils: Pupils are equal, round, and reactive to light.   Neck:      Musculoskeletal: Normal range of motion and neck supple.   Cardiovascular:      Rate and Rhythm: Normal rate and regular rhythm.      Pulses: Normal pulses.      Heart sounds: Normal heart sounds.   Pulmonary:      Effort: Pulmonary effort is normal. No respiratory distress.      Breath sounds: Normal breath sounds. No wheezing, rhonchi or rales.   Abdominal:      General: Bowel sounds are normal. There is no distension.      Palpations: Abdomen is soft.      Tenderness: There is abdominal tenderness.   Musculoskeletal: Normal range of motion.         General: No swelling or tenderness.   Lymphadenopathy:      Cervical: No cervical adenopathy.   Skin:     General: Skin is warm.      Coloration: Skin is not jaundiced.      Findings: No rash.   Neurological:      General: No focal deficit present.      Mental Status: He is alert and oriented to person, place, and time.      Cranial Nerves: No cranial nerve deficit.      Motor: No weakness.   Psychiatric:         Mood and Affect: Mood normal.         Behavior: Behavior normal.         Fluids    Intake/Output Summary (Last 24 hours) at 9/5/2020 1216  Last data filed at 9/5/2020 0800  Gross per 24 hour   Intake 2045 ml   Output 600 ml   Net 1445 ml       Laboratory  Recent Labs     09/03/20  1030 09/04/20  0449 09/05/20  0306   WBC 7.5 6.5 4.6*   RBC 4.71 4.75 4.66*   HEMOGLOBIN 13.8* 13.9* 13.5*   HEMATOCRIT 40.5* 41.6* 40.2*   MCV 86.0 87.6 86.3   MCH 29.3 29.3 29.0   MCHC 34.1 33.4* 33.6*   RDW 43.6 44.1 42.3   PLATELETCT 123* 138* 149*   MPV 9.7 10.0 10.0     Recent Labs     09/03/20  1030 09/04/20  0449 09/05/20  0306   SODIUM 137 140 143   POTASSIUM 3.4* 3.0* 3.3*   CHLORIDE 100 103 104   CO2 25 24 27   GLUCOSE 233* 93 144*   BUN 13 10 8   CREATININE 1.15 0.97 0.87   CALCIUM 8.7 8.6 8.5     Recent Labs     09/03/20  1030 09/04/20 0449  09/05/20  1142   APTT 86.5*  --   --    INR 3.48* 2.90* 2.18*               Imaging  CT-ABDOMEN-PELVIS WITH   Final Result      1.  Acute diverticulitis of the mid/proximal sigmoid colon which appears moderate in degree.      2.  Possible single bubble of extraluminal air, not an unexpected finding      3.  Negative for abscess      4.  Postoperative changes consistent with right hepatectomy and partial colectomy      5.  Hepatic steatosis      6.  Prior cholecystectomy      7.  Aortic atherosclerotic plaque           Assessment/Plan  * Diverticulitis- (present on admission)  Assessment & Plan  CT shows evidence for acute diverticulitis of the mid/proximal sigmoid colon which appears moderate in degree.  There is possible single bubble of extraluminal air, not an unexpected finding.   Surgery consulted no surgical indication at this time  Started on ceftriaxone and metronidazole in the emergency department, will continue for now follow on cultures and sensitivities. Clear liquid diet for now, multimodal pain control, will advance diet as tolerated according to clinical course    History of venous thromboembolism- (present on admission)  Assessment & Plan  On Coumadin, INR supratherapeutic, I will hold for now.  Consider switching to Lovenox as the patient may need surgical intervention based on CT abdomen finding    Controlled type 2 diabetes mellitus without complication, without long-term current use of insulin (HCC)- (present on admission)  Assessment & Plan  With hyperglycemia  Glycated hemoglobin 6.3%   Will start short acting insulin for now   Accu-Checks, hypoglycemia protocol     Deep vein thrombosis (DVT) of popliteal vein of both lower extremities (HCC)- (present on admission)  Assessment & Plan  Continue Coumadin    Gilbert syndrome- (present on admission)  Assessment & Plan  Monitor potassium and replace as needed    Essential hypertension- (present on admission)  Assessment & Plan  Resume home losartan  and amlodipine with hold parameters       VTE prophylaxis: Coumadin

## 2020-09-05 NOTE — PROGRESS NOTES
Bedside report received from night RN. Assumed care of patient. Daily plan of care discussed. Pt awake and alert, aware of potential discharge today. Pt currently denies complaints or concerns beyond mild, tolerable abdominal pain rated 3/10. Hourly rounding in place.

## 2020-09-05 NOTE — CARE PLAN
Problem: Safety  Goal: Will remain free from falls  Outcome: PROGRESSING AS EXPECTED     Problem: Knowledge Deficit  Goal: Knowledge of the prescribed therapeutic regimen will improve  Outcome: PROGRESSING AS EXPECTED     Problem: Fluid Volume:  Goal: Will maintain balanced intake and output  Outcome: PROGRESSING AS EXPECTED

## 2020-09-05 NOTE — PROGRESS NOTES
Inpatient Anticoagulation Service Note    Date: 9/5/2020    Indication: DVT treatment        Hemoglobin Value: (!) 13.5  Hematocrit Value: (!) 40.2  Lab Platelet Value: (!) 149    INR from last 7 days     Date/Time INR Value    09/05/20 1142  (!) 2.18    09/04/20 0449  (!) 2.9    09/03/20 1030  (!) 3.48        Dose from last 7 days     None          Plan:  Restart home regimen warfarin 5mg daily except 2.5mg on Saturday, start 2.5mg PO x 1 today     Pharmacist suggested discharge dosing: Resume home regimen, pharmacy to monitor daily     Andrae Rivera, PharmD

## 2020-09-05 NOTE — PROGRESS NOTES
Pt resting in bed. Awakes to voice. VSS. Pt c/o 3/10 pain in abdomen. No n/v. Pt is requesting to rest at this time. Fall precautions in place.

## 2020-09-06 VITALS
OXYGEN SATURATION: 93 % | TEMPERATURE: 97.9 F | DIASTOLIC BLOOD PRESSURE: 74 MMHG | WEIGHT: 200.18 LBS | HEART RATE: 70 BPM | HEIGHT: 71 IN | RESPIRATION RATE: 17 BRPM | BODY MASS INDEX: 28.02 KG/M2 | SYSTOLIC BLOOD PRESSURE: 157 MMHG

## 2020-09-06 LAB
GLUCOSE BLD-MCNC: 147 MG/DL (ref 65–99)
GLUCOSE BLD-MCNC: 172 MG/DL (ref 65–99)
GLUCOSE BLD-MCNC: 186 MG/DL (ref 65–99)
INR PPP: 2.35 (ref 0.87–1.13)
PROTHROMBIN TIME: 25.3 SEC (ref 12–14.6)

## 2020-09-06 PROCEDURE — A9270 NON-COVERED ITEM OR SERVICE: HCPCS | Performed by: HOSPITALIST

## 2020-09-06 PROCEDURE — 36415 COLL VENOUS BLD VENIPUNCTURE: CPT

## 2020-09-06 PROCEDURE — 700102 HCHG RX REV CODE 250 W/ 637 OVERRIDE(OP): Performed by: INTERNAL MEDICINE

## 2020-09-06 PROCEDURE — 700102 HCHG RX REV CODE 250 W/ 637 OVERRIDE(OP): Performed by: HOSPITALIST

## 2020-09-06 PROCEDURE — 700111 HCHG RX REV CODE 636 W/ 250 OVERRIDE (IP): Performed by: HOSPITALIST

## 2020-09-06 PROCEDURE — 85610 PROTHROMBIN TIME: CPT

## 2020-09-06 PROCEDURE — 99239 HOSP IP/OBS DSCHRG MGMT >30: CPT | Performed by: INTERNAL MEDICINE

## 2020-09-06 PROCEDURE — 700105 HCHG RX REV CODE 258: Performed by: HOSPITALIST

## 2020-09-06 PROCEDURE — 82962 GLUCOSE BLOOD TEST: CPT

## 2020-09-06 PROCEDURE — A9270 NON-COVERED ITEM OR SERVICE: HCPCS | Performed by: INTERNAL MEDICINE

## 2020-09-06 RX ORDER — METRONIDAZOLE 500 MG/1
500 TABLET ORAL EVERY 8 HOURS
Qty: 18 TAB | Refills: 0 | Status: SHIPPED | OUTPATIENT
Start: 2020-09-06 | End: 2020-09-12

## 2020-09-06 RX ORDER — CEFDINIR 300 MG/1
300 CAPSULE ORAL 2 TIMES DAILY
Qty: 12 CAP | Refills: 0 | Status: SHIPPED | OUTPATIENT
Start: 2020-09-06 | End: 2020-09-12

## 2020-09-06 RX ORDER — WARFARIN SODIUM 2.5 MG/1
2.5 TABLET ORAL
Status: DISCONTINUED | OUTPATIENT
Start: 2020-09-06 | End: 2020-09-06 | Stop reason: HOSPADM

## 2020-09-06 RX ADMIN — POTASSIUM CHLORIDE 40 MEQ: 1500 TABLET, EXTENDED RELEASE ORAL at 05:55

## 2020-09-06 RX ADMIN — CEFTRIAXONE SODIUM 2 G: 2 INJECTION, POWDER, FOR SOLUTION INTRAMUSCULAR; INTRAVENOUS at 05:55

## 2020-09-06 RX ADMIN — LOSARTAN POTASSIUM 100 MG: 25 TABLET, FILM COATED ORAL at 05:59

## 2020-09-06 RX ADMIN — INSULIN HUMAN 1 UNITS: 100 INJECTION, SOLUTION PARENTERAL at 06:09

## 2020-09-06 RX ADMIN — METRONIDAZOLE 500 MG: 500 TABLET ORAL at 05:57

## 2020-09-06 RX ADMIN — AMLODIPINE BESYLATE 5 MG: 5 TABLET ORAL at 05:58

## 2020-09-06 ASSESSMENT — PAIN DESCRIPTION - PAIN TYPE: TYPE: ACUTE PAIN

## 2020-09-06 NOTE — PROGRESS NOTES
Report received from PORSCHE King.   Pt AAOx4. Denies any pin or discomfort, no nausea, vomiting, dizziness, SOB. POC discussed with pt including safety, pain control, routine labs, diet, medication, BG monitoring, oral care. Pt verbalized understanding. IVF infusing. Safety and comfort measures in place. No additional needs at this time.

## 2020-09-06 NOTE — CARE PLAN
Problem: Safety  Goal: Will remain free from falls  Outcome: PROGRESSING AS EXPECTED     Problem: Bowel/Gastric:  Goal: Normal bowel function is maintained or improved  Outcome: PROGRESSING AS EXPECTED     Problem: Knowledge Deficit  Goal: Knowledge of disease process/condition, treatment plan, diagnostic tests, and medications will improve  Outcome: PROGRESSING AS EXPECTED  Goal: Knowledge of the prescribed therapeutic regimen will improve  Outcome: PROGRESSING AS EXPECTED

## 2020-09-06 NOTE — PROGRESS NOTES
"Bedside report received from night RN. Assumed care of patient. Daily plan of care discussed. Pt awake and alert, denies complaints or concerns this AM beyond mild abdominal pain, described as \"sore,\" rated ~2/10 in intensity. Pt declines prn pain medication at this time. Hourly rounding in place.    "

## 2020-09-06 NOTE — DISCHARGE INSTRUCTIONS
Discharge Instructions    Discharged to home by car with relative. Discharged via wheelchair, hospital escort: Yes.  Special equipment needed: Not Applicable    Be sure to schedule a follow-up appointment with your primary care doctor or any specialists as instructed.     Discharge Plan:        I understand that a diet low in cholesterol, fat, and sodium is recommended for good health. Unless I have been given specific instructions below for another diet, I accept this instruction as my diet prescription.   Other diet: GI Soft diet for 2 weeks.      Low-Fiber Eating Plan  Fiber is found in fruits, vegetables, whole grains, and beans. Eating a diet low in fiber helps to reduce how often you have bowel movements and how much you produce during a bowel movement. A low-fiber eating plan may help your digestive system heal if:  · You have certain conditions, such as Crohn's disease or diverticulitis.  · You recently had radiation therapy on your pelvis or bowel.  · You recently had intestinal surgery.  · You have a new surgical opening in your abdomen (colostomy or ileostomy).  · Your intestine is narrowed (stricture).  Your health care provider will determine how long you need to stay on this diet. Your health care provider may recommend that you work with a diet and nutrition specialist (dietitian).  What are tips for following this plan?  General guidelines  · Follow recommendations from your dietitian about how much fiber you should have each day.  · Most people on this eating plan should try to eat less than 10 grams (g) of fiber each day. Your daily fiber goal is _________________ g.  · Take vitamin and mineral supplements as told by your health care provider or dietitian. Chewable or liquid forms are best when on this eating plan.  Reading food labels  · Check food labels for the amount of dietary fiber.  · Choose foods that have less than 2 grams of fiber in one serving.  Cooking  · Use white flour and other  allowed grains for baking and cooking.  · Cook meat using methods that keep it tender, such as braising or poaching.  · Cook eggs until the yolk is completely solid.  · Cook with healthy oils, such as olive oil or canola oil.  Meal planning    · Eat 5-6 small meals throughout the day instead of 3 large meals.  · If you are lactose intolerant:  ? Choose low-lactose dairy foods.  ? Do not eat dairy foods, if told by your dietitian.  · Limit fat and oils to less than 8 teaspoons a day.  · Eat small portions of desserts.  What foods are allowed?  The items listed below may not be a complete list. Talk with your dietitian about what dietary choices are best for you.  Grains  All bread and crackers made with white flour. Waffles, pancakes, and [place] toast. Bagels. Pretzels. [place] toast, zwieback, and matzoh. Cooked and dried cereals that do not contain whole grains, added fiber, seeds, or dried fruit. Cornmeal. [place]. Hot and cold cereals made with refined corn, wheat, rice, or oats. Plain pasta and noodles. White rice.  Vegetables  Well-cooked or canned vegetables without skin, seeds, or stems. Cooked potatoes without skins. Vegetable juice.  Fruits  Soft-cooked or canned fruits without skin and seeds. Peeled ripe banana. Applesauce. Fruit juice without pulp.  Meats and other protein foods  Ground meat. Tender cuts of meat or poultry. Eggs. Fish, seafood, and shellfish. Smooth nut butters. Tofu.  Dairy  All milk products and drinks. Lactose-free milks, including rice, soy, and almond milks. Yogurt without fruit, nuts, chocolate, or granola mix-ins. Sour cream. Cottage cheese. Cheese.  Beverages  Decaf coffee. Fruit and vegetable juices or smoothies (in small amounts, with no pulp or skins, and with fruits from allowed list). Sports drinks. Herbal tea.  Fats and oils  Olive oil, canola oil, sunflower oil, flaxseed oil, and grapeseed oil. Mayonnaise. Cream cheese. Margarine. Butter.  Sweets and desserts  Plain cakes and  cookies. Cream pies and pies made with allowed fruits. Pudding. Custard. Fruit gelatin. Sherbet. Popsicles. Ice cream without nuts. Plain hard candy. Honey. Jelly. Molasses. Syrups, including chocolate syrup. Chocolate. Marshmallows. Gumdrops.  Seasoning and other foods  Bouillon. Broth. Cream soups made from allowed foods. Strained soup. Casseroles made with allowed foods. Ketchup. Mild mustard. Mild salad dressings. Plain gravies. Vinegar. Spices in moderation. Salt. Sugar.  What foods are not allowed?  The items listed below may not be a complete list. Talk with your dietitian about what dietary choices are best for you.  Grains  Whole wheat and whole grain breads and crackers. Multigrain breads and crackers. Rye bread. Whole grain or multigrain cereals. Cereals with nuts, raisins, or coconut. Bran. Coarse wheat cereals. Granola. High-fiber cereals. Cornmeal or corn bread. Whole grain pasta. Wild or brown rice. Quinoa. Popcorn. Buckwheat. Wheat germ.  Vegetables  Potato skins. Raw or undercooked vegetables. All beans and bean sprouts. Cooked greens. Corn. Peas. Cabbage. Beets. Broccoli. [place] sprouts. Cauliflower. Mushrooms. Onions. Peppers. Parsnips. Okra. Sauerkraut.  Fruit  Raw or dried fruit. Berries. Fruit juice with pulp. Prune juice.  Meats and other protein foods  Tough, fibrous meats with gristle. Fatty meat. Poultry with skin. Fried meat, poultry, or fish. Deli or lunch meats. Sausage, [place], and hot dogs. Nuts and chunky nut butter. Dried peas, beans, and lentils.  Dairy  Yogurt with fruit, nuts, chocolate, or granola mix-ins.  Beverages  Caffeinated coffee and teas.  Fats and oils  Avocado. Coconut.  Sweets and desserts  Desserts, cookies, or candies that contain nuts or coconut. Dried fruit. Jams and preserves with seeds. Marmalade. Any dessert made with fruits or grains that are not allowed.  Seasoning and other foods  Corn tortilla chips. Soups made with vegetables or grains that are not  allowed. Relish. Horseradish. Pickles. Olives.  Summary  · Most people on a low-fiber eating plan should eat less than 10 grams of fiber a day. Follow recommendations from your dietitian about how much fiber you should have each day.  · Always check food labels to see the dietary fiber content of packaged foods. In general, a low-fiber food will have fewer than 2 grams of fiber per serving.  · In general, try to avoid whole grains, raw fruits and vegetables, dried fruit, tough cuts of meat, nuts, and seeds.  · Take a vitamin and mineral supplement as told by your health care provider or dietitian.  This information is not intended to replace advice given to you by your health care provider. Make sure you discuss any questions you have with your health care provider.  Document Released: 06/09/2003 Document Revised: 04/10/2020 Document Reviewed: 02/20/2018  [employer] Patient Education © 2020 [employer]      Special Instructions: Diverticulitis    Diverticulitis is infection or inflammation of small pouches (diverticula) in the colon that form due to a condition called diverticulosis. Diverticula can trap stool (feces) and bacteria, causing infection and inflammation.  Diverticulitis may cause severe stomach pain and diarrhea. It may lead to tissue damage in the colon that causes bleeding. The diverticula may also burst (rupture) and cause infected stool to enter other areas of the abdomen.  Complications of diverticulitis can include:  · Bleeding.  · Severe infection.  · Severe pain.  · Rupture (perforation) of the colon.  · Blockage (obstruction) of the colon.  What are the causes?  This condition is caused by stool becoming trapped in the diverticula, which allows bacteria to grow in the diverticula. This leads to inflammation and infection.  What increases the risk?  You are more likely to develop this condition if:  · You have diverticulosis. The risk for diverticulosis increases if:  ? You are overweight or  obese.  ? You use tobacco products.  ? You do not get enough exercise.  · You eat a diet that does not include enough fiber. High-fiber foods include fruits, vegetables, beans, nuts, and whole grains.  What are the signs or symptoms?  Symptoms of this condition may include:  · Pain and tenderness in the abdomen. The pain is normally located on the left side of the abdomen, but it may occur in other areas.  · Fever and chills.  · Bloating.  · Cramping.  · Nausea.  · Vomiting.  · Changes in bowel routines.  · Blood in your stool.  How is this diagnosed?  This condition is diagnosed based on:  · Your medical history.  · A physical exam.  · Tests to make sure there is nothing else causing your condition. These tests may include:  ? Blood tests.  ? Urine tests.  ? Imaging tests of the abdomen, including X-rays, ultrasounds, MRIs, or CT scans.  How is this treated?  Most cases of this condition are mild and can be treated at home. Treatment may include:  · Taking over-the-counter pain medicines.  · Following a clear liquid diet.  · Taking antibiotic medicines by mouth.  · Rest.  More severe cases may need to be treated at a hospital. Treatment may include:  · Not eating or drinking.  · Taking prescription pain medicine.  · Receiving antibiotic medicines through an IV tube.  · Receiving fluids and nutrition through an IV tube.  · Surgery.  When your condition is under control, your health care provider may recommend that you have a colonoscopy. This is an exam to look at the entire large intestine. During the exam, a lubricated, bendable tube is inserted into the anus and then passed into the rectum, colon, and other parts of the large intestine. A colonoscopy can show how severe your diverticula are and whether something else may be causing your symptoms.  Follow these instructions at home:  Medicines  · Take over-the-counter and prescription medicines only as told by your health care provider. These include fiber  supplements, probiotics, and stool softeners.  · If you were prescribed an antibiotic medicine, take it as told by your health care provider. Do not stop taking the antibiotic even if you start to feel better.  · Do not drive or use heavy machinery while taking prescription pain medicine.  General instructions    · Follow a full liquid diet or another diet as directed by your health care provider. After your symptoms improve, your health care provider may tell you to change your diet. He or she may recommend that you eat a diet that contains at least 25 g (25 grams) of fiber daily. Fiber makes it easier to pass stool. Healthy sources of fiber include:  ? Berries. One cup contains 4-8 grams of fiber.  ? Beans or lentils. One half cup contains 5-8 grams of fiber.  ? Green vegetables. One cup contains 4 grams of fiber.  · Exercise for at least 30 minutes, 3 times each week. You should exercise hard enough to raise your heart rate and break a sweat.  · Keep all follow-up visits as told by your health care provider. This is important. You may need a colonoscopy.  Contact a health care provider if:  · Your pain does not improve.  · You have a hard time drinking or eating food.  · Your bowel movements do not return to normal.  Get help right away if:  · Your pain gets worse.  · Your symptoms do not get better with treatment.  · Your symptoms suddenly get worse.  · You have a fever.  · You vomit more than one time.  · You have stools that are bloody, black, or tarry.  Summary  · Diverticulitis is infection or inflammation of small pouches (diverticula) in the colon that form due to a condition called diverticulosis. Diverticula can trap stool (feces) and bacteria, causing infection and inflammation.  · You are at higher risk for this condition if you have diverticulosis and you eat a diet that does not include enough fiber.  · Most cases of this condition are mild and can be treated at home. More severe cases may need to be  treated at a hospital.  · When your condition is under control, your health care provider may recommend that you have an exam called a colonoscopy. This exam can show how severe your diverticula are and whether something else may be causing your symptoms.  This information is not intended to replace advice given to you by your health care provider. Make sure you discuss any questions you have with your health care provider.  Document Released: 09/27/2006 Document Revised: 11/30/2018 Document Reviewed: 01/20/2018  [employer] Patient Education © 2020 [employer]    · Is patient discharged on Warfarin / Coumadin?   Yes    You are receiving the drug warfarin. Please understand the importance of monitoring warfarin with scheduled PT/INR blood draws.  Follow-up with a call to your personal Doctor's office in 3 days to schedule a PT/INR. .    IMPORTANT: HOW TO USE THIS INFORMATION:  This is a summary and does NOT have all possible information about this product. This information does not assure that this product is safe, effective, or appropriate for you. This information is not individual medical advice and does not substitute for the advice of your health care professional. Always ask your health care professional for complete information about this product and your specific health needs.      WARFARIN - ORAL (WARF-uh-rin)      COMMON BRAND NAME(S): Coumadin      WARNING:  Warfarin can cause very serious (possibly fatal) bleeding. This is more likely to occur when you first start taking this medication or if you take too much warfarin. To decrease your risk for bleeding, your doctor or other health care provider will monitor you closely and check your lab results (INR test) to make sure you are not taking too much warfarin. Keep all medical and laboratory appointments. Tell your doctor right away if you notice any signs of serious bleeding. See also Side Effects section.      USES:  This medication is used to treat blood  "clots (such as in deep vein thrombosis-DVT or pulmonary embolus-PE) and/or to prevent new clots from forming in your body. Preventing harmful blood clots helps to reduce the risk of a stroke or heart attack. Conditions that increase your risk of developing blood clots include a certain type of irregular heart rhythm (atrial fibrillation), heart valve replacement, recent heart attack, and certain surgeries (such as hip/knee replacement). Warfarin is commonly called a \"blood thinner,\" but the more correct term is \"anticoagulant.\" It helps to keep blood flowing smoothly in your body by decreasing the amount of certain substances (clotting proteins) in your blood.      HOW TO USE:  Read the Medication Guide provided by your pharmacist before you start taking warfarin and each time you get a refill. If you have any questions, ask your doctor or pharmacist. Take this medication by mouth with or without food as directed by your doctor or other health care professional, usually once a day. It is very important to take it exactly as directed. Do not increase the dose, take it more frequently, or stop using it unless directed by your doctor. Dosage is based on your medical condition, laboratory tests (such as INR), and response to treatment. Your doctor or other health care provider will monitor you closely while you are taking this medication to determine the right dose for you. Use this medication regularly to get the most benefit from it. To help you remember, take it at the same time each day. It is important to eat a balanced, consistent diet while taking warfarin. Some foods can affect how warfarin works in your body and may affect your treatment and dose. Avoid sudden large increases or decreases in your intake of foods high in vitamin K (such as broccoli, cauliflower, cabbage, brussels sprouts, kale, spinach, and other green leafy vegetables, liver, green tea, certain vitamin supplements). If you are trying to lose " weight, check with your doctor before you try to go on a diet. Cranberry products may also affect how your warfarin works. Limit the amount of cranberry juice (16 ounces/480 milliliters a day) or other cranberry products you may drink or eat.      SIDE EFFECTS:  Nausea, loss of appetite, or stomach/abdominal pain may occur. If any of these effects persist or worsen, tell your doctor or pharmacist promptly. Remember that your doctor has prescribed this medication because he or she has judged that the benefit to you is greater than the risk of side effects. Many people using this medication do not have serious side effects. This medication can cause serious bleeding if it affects your blood clotting proteins too much (shown by unusually high INR lab results). Even if your doctor stops your medication, this risk of bleeding can continue for up to a week. Tell your doctor right away if you have any signs of serious bleeding, including: unusual pain/swelling/discomfort, unusual/easy bruising, prolonged bleeding from cuts or gums, persistent/frequent nosebleeds, unusually heavy/prolonged menstrual flow, pink/dark urine, coughing up blood, vomit that is bloody or looks like coffee grounds, severe headache, dizziness/fainting, unusual or persistent tiredness/weakness, bloody/black/tarry stools, chest pain, shortness of breath, difficulty swallowing. Tell your doctor right away if any of these unlikely but serious side effects occur: persistent nausea/vomiting, severe stomach/abdominal pain, yellowing eyes/skin. This drug rarely has caused very serious (possibly fatal) problems if its effects lead to small blood clots (usually at the beginning of treatment). This can lead to severe skin/tissue damage that may require surgery or amputation if left untreated. Patients with certain blood conditions (protein C or S deficiency) may be at greater risk. Get medical help right away if any of these rare but serious side effects  occur: painful/red/purplish patches on the skin (such as on the toe, breast, abdomen), change in the amount of urine, vision changes, confusion, slurred speech, weakness on one side of the body. A very serious allergic reaction to this drug is rare. However, get medical help right away if you notice any symptoms of a serious allergic reaction, including: rash, itching/swelling (especially of the face/tongue/throat), severe dizziness, trouble breathing. This is not a complete list of possible side effects. If you notice other effects not listed above, contact your doctor or pharmacist. In the [place] - Call your doctor for medical advice about side effects. You may report side effects to FDA at 5-976-OPD-2791. In [place] - Call your doctor for medical advice about side effects. You may report side effects to [facility] [place] at [phone].      PRECAUTIONS:  Before taking warfarin, tell your doctor or pharmacist if you are allergic to it; or if you have any other allergies. This product may contain inactive ingredients, which can cause allergic reactions or other problems. Talk to your pharmacist for more details. Before using this medication, tell your doctor or pharmacist your medical history, especially of: blood disorders (such as anemia, hemophilia), bleeding problems (such as bleeding of the stomach/intestines, bleeding in the brain), blood vessel disorders (such as aneurysms), recent major injury/surgery, liver disease, alcohol use, mental/mood disorders (including memory problems), frequent falls/injuries. It is important that all your doctors and dentists know that you take warfarin. Before having surgery or any medical/dental procedures, tell your doctor or dentist that you are taking this medication and about all the products you use (including prescription drugs, nonprescription drugs, and herbal products). Avoid getting injections into the muscles. If you must have an injection into a muscle (for example, a  flu shot), it should be given in the arm. This way, it will be easier to check for bleeding and/or apply pressure bandages. This medication may cause stomach bleeding. Daily use of alcohol while using this medicine will increase your risk for stomach bleeding and may also affect how this medication works. Limit or avoid alcoholic beverages. If you have not been eating well, if you have an illness or infection that causes fever, vomiting, or diarrhea for more than 2 days, or if you start using any antibiotic medications, contact your doctor or pharmacist immediately because these conditions can affect how warfarin works. This medication can cause heavy bleeding. To lower the chance of getting cut, bruised, or injured, use great caution with sharp objects like safety razors and nail cutters. Use an electric razor when shaving and a soft toothbrush when brushing your teeth. Avoid activities such as contact sports. If you fall or injure yourself, especially if you hit your head, call your doctor immediately. Your doctor may need to check you. The Food & Drug Administration has stated that generic warfarin products are interchangeable. However, consult your doctor or pharmacist before switching warfarin products. Be careful not to take more than one medication that contains warfarin unless specifically directed by the doctor or health care provider who is monitoring your warfarin treatment. Older adults may be at greater risk for bleeding while using this drug. This medication is not recommended for use during pregnancy because of serious (possibly fatal) harm to an unborn baby. Discuss the use of reliable forms of birth control with your doctor. If you become pregnant or think you may be pregnant, tell your doctor immediately. If you are planning pregnancy, discuss a plan for managing your condition with your doctor before you become pregnant. Your doctor may switch the type of medication you use during pregnancy. Very  "small amounts of this medication may pass into breast milk but is unlikely to harm a nursing infant. Consult your doctor before breast-feeding.      DRUG INTERACTIONS:  Drug interactions may change how your medications work or increase your risk for serious side effects. This document does not contain all possible drug interactions. Keep a list of all the products you use (including prescription/nonprescription drugs and herbal products) and share it with your doctor and pharmacist. Do not start, stop, or change the dosage of any medicines without your doctor's approval. Warfarin interacts with many prescription, nonprescription, vitamin, and herbal products. This includes medications that are applied to the skin or inside the vagina or rectum. The interactions with warfarin usually result in an increase or decrease in the \"blood-thinning\" (anticoagulant) effect. Your doctor or other health care professional should closely monitor you to prevent serious bleeding or clotting problems. While taking warfarin, it is very important to tell your doctor or pharmacist of any changes in medications, vitamins, or herbal products that you are taking. Some products that may interact with this drug include: capecitabine, imatinib, mifepristone. Aspirin, aspirin-like drugs (salicylates), and nonsteroidal anti-inflammatory drugs (NSAIDs such as ibuprofen, naproxen, celecoxib) may have effects similar to warfarin. These drugs may increase the risk of bleeding problems if taken during treatment with warfarin. Carefully check all prescription/nonprescription product labels (including drugs applied to the skin such as pain-relieving creams) since the products may contain NSAIDs or salicylates. Talk to your doctor about using a different medication (such as acetaminophen) to treat pain/fever. Low-dose aspirin and related drugs (such as clopidogrel, ticlopidine) should be continued if prescribed by your doctor for specific medical " reasons such as heart attack or stroke prevention. Consult your doctor or pharmacist for more details. Many herbal products interact with warfarin. Tell your doctor before taking any herbal products, especially bromelains, coenzyme Q10, cranberry, danshen, dong quai, fenugreek, garlic, ginkgo biloba, ginseng, and Shivani's wort, among others. This medication may interfere with a certain laboratory test to measure theophylline levels, possibly causing false test results. Make sure laboratory personnel and all your doctors know you use this drug.      OVERDOSE:  If overdose is suspected, contact a poison control center or emergency room immediately. [place] residents can call the [place] National Poison Hotline at 1-575.654.6386. [place] residents can call a provincial poison control center. Symptoms of overdose may include: bloody/black/tarry stools, pink/dark urine, unusual/prolonged bleeding.      NOTES:  Do not share this medication with others. Laboratory and/or medical tests (such as INR, complete blood count) must be performed periodically to monitor your progress or check for side effects. Consult your doctor for more details.      MISSED DOSE:  For the best possible benefit, do not miss any doses. If you do miss a dose and remember on the same day, take it as soon as you remember. If you remember on the next day, skip the missed dose and resume your usual dosing schedule. Do not double the dose to catch up because this could increase your risk for bleeding. Keep a record of missed doses to give to your doctor or pharmacist. Contact your doctor or pharmacist if you miss 2 or more doses in a row.      STORAGE:  Store at room temperature away from light and moisture. Do not store in the bathroom. Keep all medications away from children and pets. Do not flush medications down the toilet or pour them into a drain unless instructed to do so. Properly discard this product when it is  or no longer needed. Consult your  pharmacist or local waste disposal company for more details about how to safely discard your product.      MEDICAL ALERT:  Your condition and medication can cause complications in a medical emergency. For information about enrolling in MedicAlert, call 1-754.362.5036 ([place]) or 1-147.131.9511 ([place]).      Information last revised October 2010 Copyright(c) 2010 [employer]             Depression / Suicide Risk    As you are discharged from this [facility] facility, it is important to learn how to keep safe from harming yourself.    Recognize the warning signs:  · Abrupt changes in personality, positive or negative- including increase in energy   · Giving away possessions  · Change in eating patterns- significant weight changes-  positive or negative  · Change in sleeping patterns- unable to sleep or sleeping all the time   · Unwillingness or inability to communicate  · Depression  · Unusual sadness, discouragement and loneliness  · Talk of wanting to die  · Neglect of personal appearance   · Rebelliousness- reckless behavior  · Withdrawal from people/activities they love  · Confusion- inability to concentrate     If you or a loved one observes any of these behaviors or has concerns about self-harm, here's what you can do:  · Talk about it- your feelings and reasons for harming yourself  · Remove any means that you might use to hurt yourself (examples: pills, rope, extension cords, firearm)  · Get professional help from the community (Mental Health, Substance Abuse, psychological counseling)  · Do not be alone:Call your Safe Contact- someone whom you trust who will be there for you.  · Call your local CRISIS HOTLINE 811-8587 or 356-088-7675  · Call your local Children's Mobile Crisis Response Team [place] (926) 877-3945 or www.Hands-On Mobile  · Call the toll free National Suicide Prevention Hotlines   · National Suicide Prevention Lifeline 968-015-WNDH (3162)  · National [employer] Line Network  800-SUICIDE (361-5645)

## 2020-09-06 NOTE — PROGRESS NOTES
Pt discharged to home via personal vehicle with spouse. Discharge paperwork reviewed and signed. Prescribed home medications reviewed with pt per discharge summary. VSS. Pt escorted off unit via wheelchair with hospital staff.

## 2020-09-06 NOTE — CARE PLAN
Problem: Infection  Goal: Will remain free from infection  Outcome: PROGRESSING AS EXPECTED   Pt afebrile. IV abx on board.    Problem: Bowel/Gastric:  Goal: Normal bowel function is maintained or improved  Outcome: PROGRESSING AS EXPECTED   Pt denies nausea, vomiting. Reports having loose BM today.    Problem: Pain Management  Goal: Pain level will decrease to patient's comfort goal  Outcome: PROGRESSING AS EXPECTED   Patient's pain well-controlled at the moment. ENcouraged to inform RN if pain is greater than comfort level.

## 2020-09-06 NOTE — PROGRESS NOTES
Inpatient Anticoagulation Service Note    Date: 9/6/2020    Reason for Anticoagulation: Deep Vein Thrombosis   Target INR: 2.0 to 3.0         Hemoglobin Value: (!) 13.5  Hematocrit Value: (!) 40.2  Lab Platelet Value: (!) 149    INR from last 7 days     Date/Time INR Value    09/06/20 0341  (!) 2.35    09/05/20 1142  (!) 2.18    09/04/20 0449  (!) 2.9    09/03/20 1030  (!) 3.48        Dose from last 7 days     Date/Time Dose (mg)    09/06/20 1100  2.5    09/05/20 1200  2.5        Average Dose (mg): (5mg daily except 2.5mg on Saturday)  Significant Interactions: Antibiotics, Flagyl  Bridge Therapy: No (If less than 5 days and overlap therapy discontinued -- document reason (i.e. Bleed Risk))    (If still on overlap therapy, if No -- document reason (i.e. Bleed Risk))    Reversal Agent Administered: Not Applicable    Plan:  Will give warfarin 2.5 mg po tonight for INR of 2.35     Pharmacist suggested discharge dosing: Yet to be determined.     Chinmay Eubanks, [facility]

## 2020-09-08 ENCOUNTER — ANTICOAGULATION VISIT (OUTPATIENT)
Dept: VASCULAR LAB | Facility: MEDICAL CENTER | Age: 75
End: 2020-09-08
Attending: INTERNAL MEDICINE
Payer: MEDICARE

## 2020-09-08 DIAGNOSIS — Z86.718 HISTORY OF VENOUS THROMBOEMBOLISM: ICD-10-CM

## 2020-09-08 LAB
INR BLD: 2.8 (ref 0.9–1.2)
INR PPP: 2.8 (ref 2–3.5)

## 2020-09-08 PROCEDURE — 85610 PROTHROMBIN TIME: CPT

## 2020-09-08 PROCEDURE — 99211 OFF/OP EST MAY X REQ PHY/QHP: CPT

## 2020-09-08 NOTE — PROGRESS NOTES
OP Anticoagulation Service Note    Date: 2020  There were no vitals filed for this visit.   pt declined vitals    Anticoagulation Summary  As of 2020    INR goal:  2.0-3.0   TTR:  68.2 % (1 y)   INR used for dosin.80 (2020)   Warfarin maintenance plan:  2.5 mg (2.5 mg x 1) every Sat; 5 mg (2.5 mg x 2) all other days   Weekly warfarin total:  32.5 mg   Plan last modified:  Chau Hawkins, PharmD (10/29/2019)   Next INR check:  9/15/2020   Target end date:  Indefinite    Indications    History of venous thromboembolism [Z86.718]  Deep vein thrombosis (DVT) of popliteal vein of both lower extremities (HCC) [I82.433]             Anticoagulation Episode Summary     INR check location:      Preferred lab:      Send INR reminders to:      Comments:        Anticoagulation Care Providers     Provider Role Specialty Phone number    Iveth Stone M.D. Referring Internal Medicine [phone]    [facility] Anticoagulation Services Responsible  [phone]        Anticoagulation Patient Findings      HPI:   Magnus Claudio seen in clinic today, on anticoagulation therapy with warfarin (a high risk medication) for hx of DVT       Pt is here today to evaluate anticoagulation therapy      Confirmed warfarin dosing regimen, denies missed or extra doses of coumadin.   Diet has been consistent with foods rich in vitamin K: No - pt is on a soft/liquid diet  Changes in ETOH:  No  Changes in smoking status: No  Changes in medication: Yes - Flagyl and cefdinir  Cost restriction: No  S/s of bleeding:  No  Signs/symptoms  thrombosis since the last appt: No    A/P   INR  therapeutic today  Lower dose d/t interaction with Flagly, pt will complete on 2020     Pt educated to contact our clinic with any changes in medications or s/s of bleeding or thrombosis    Follow up appointment in 1 week(s) to reduce risk of adverse events from warfarin     Brionna Langley, DeedeeD

## 2020-09-15 ENCOUNTER — ANTICOAGULATION VISIT (OUTPATIENT)
Dept: VASCULAR LAB | Facility: MEDICAL CENTER | Age: 75
End: 2020-09-15
Attending: INTERNAL MEDICINE
Payer: MEDICARE

## 2020-09-15 DIAGNOSIS — Z86.718 HISTORY OF VENOUS THROMBOEMBOLISM: ICD-10-CM

## 2020-09-15 LAB
INR BLD: 3.1 (ref 0.9–1.2)
INR PPP: 3.1 (ref 2–3.5)

## 2020-09-15 PROCEDURE — 99212 OFFICE O/P EST SF 10 MIN: CPT

## 2020-09-15 PROCEDURE — 85610 PROTHROMBIN TIME: CPT

## 2020-09-15 NOTE — PROGRESS NOTES
Anticoagulation Summary  As of 9/15/2020    INR goal:  2.0-3.0   TTR:  68.2 % (1 y)   INR used for dosing:  3.10 (9/15/2020)   Warfarin maintenance plan:  2.5 mg (2.5 mg x 1) every Sat; 5 mg (2.5 mg x 2) all other days   Weekly warfarin total:  32.5 mg   Plan last modified:  Deedee BanksD (10/29/2019)   Next INR check:  9/29/2020   Target end date:  Indefinite    Indications    History of venous thromboembolism [Z86.718]  Deep vein thrombosis (DVT) of popliteal vein of both lower extremities (HCC) [I82.433]             Anticoagulation Episode Summary     INR check location:      Preferred lab:      Send INR reminders to:      Comments:        Anticoagulation Care Providers     Provider Role Specialty Phone number    Iveth Stone M.D. Referring Internal Medicine [phone]    [facility] Anticoagulation Services Responsible  [phone]            Anticoagulation Patient Findings  Patient Findings     Positives:  Change in medications (finished abx)    Negatives:  Signs/symptoms of thrombosis, Signs/symptoms of bleeding, Laboratory test error suspected, Change in health, Change in alcohol use, Change in activity, Upcoming invasive procedure, Emergency department visit, Upcoming dental procedure, Missed doses, Extra doses, Change in diet/appetite, Hospital admission, Bruising, Other complaints          HPI:  Magnus Claudio seen in clinic today, on anticoagulation therapy with warfarin for hx of VTE  Changes to current medical/health status since last appt: none  Denies signs/symptoms of bleeding and/or thrombosis since the last appt.    Denies any interval changes to diet  Denies any complications or cost restrictions with current therapy.   Verified current warfarin dosing schedule.   BP declined    A/P   INR  slightly supra-therapeutic.   Reduce x 1 day, then continue regimen    Follow up appointment in 2 week(s).    Brooklyn Lewis, PharmD

## 2020-09-21 ENCOUNTER — OFFICE VISIT (OUTPATIENT)
Dept: URGENT CARE | Facility: CLINIC | Age: 75
End: 2020-09-21
Payer: MEDICARE

## 2020-09-21 VITALS
RESPIRATION RATE: 20 BRPM | WEIGHT: 195 LBS | SYSTOLIC BLOOD PRESSURE: 148 MMHG | DIASTOLIC BLOOD PRESSURE: 90 MMHG | TEMPERATURE: 97.4 F | HEART RATE: 72 BPM | BODY MASS INDEX: 27.3 KG/M2 | OXYGEN SATURATION: 96 % | HEIGHT: 71 IN

## 2020-09-21 DIAGNOSIS — H61.23 HEARING LOSS OF BOTH EARS DUE TO CERUMEN IMPACTION: ICD-10-CM

## 2020-09-21 PROCEDURE — 99213 OFFICE O/P EST LOW 20 MIN: CPT | Performed by: NURSE PRACTITIONER

## 2020-09-21 ASSESSMENT — ENCOUNTER SYMPTOMS
NAUSEA: 0
CHILLS: 0
FEVER: 0
VERTIGO: 0

## 2020-09-21 ASSESSMENT — FIBROSIS 4 INDEX: FIB4 SCORE: 2.34

## 2020-09-21 NOTE — PROGRESS NOTES
Subjective:      Magnus Claudio is a 75 y.o. male who presents with Ear Fullness (bilateral x 1 week)            Cerumen Impaction  This is a new problem. The current episode started in the past 7 days. The problem occurs constantly. The problem has been unchanged. Pertinent negatives include no chills, fever, nausea or vertigo. Nothing aggravates the symptoms.   Morphine  Current Outpatient Medications on File Prior to Visit   Medication Sig Dispense Refill   • warfarin (COUMADIN) 5 MG Tab Take 2.5-5 mg by mouth every day. Saturday= 2.5 mg  All other days is 5 mg     • glimepiride (AMARYL) 4 MG Tab TAKE 2 TABLETS BY MOUTH EVERY MORNING 180 Tab 4   • amLODIPine (NORVASC) 5 MG Tab Take 1 Tab by mouth every day. 90 Tab 4   • losartan (COZAAR) 100 MG Tab Take 1 Tab by mouth every day. 100 Tab 4   • hydroCHLOROthiazide (HYDRODIURIL) 12.5 MG tablet Take 1 Tab by mouth every day. 100 Tab 4   • metFORMIN ER (GLUCOPHAGE XR) 500 MG TABLET SR 24 HR TAKE 2 TABLETS BY MOUTH TWICE DAILY 360 Tab 4   • therapeutic multivitamin-minerals (THERAGRAN-M) Tab Take 1 Tab by mouth every day.     • pioglitazone (ACTOS) 15 MG Tab Take 1 Tab by mouth every day. (Patient not taking: Reported on 9/21/2020) 200 Tab 4     No current facility-administered medications on file prior to visit.      Social History     Socioeconomic History   • Marital status:      Spouse name: Not on file   • Number of children: Not on file   • Years of education: Not on file   • Highest education level: Not on file   Occupational History   • Not on file   Social Needs   • Financial resource strain: Not on file   • Food insecurity     Worry: Not on file     Inability: Not on file   • Transportation needs     Medical: Not on file     Non-medical: Not on file   Tobacco Use   • Smoking status: Never Smoker   • Smokeless tobacco: Never Used   Substance and Sexual Activity   • Alcohol use: Never     Alcohol/week: 0.0 oz     Frequency: Never     Binge  "frequency: Never   • Drug use: Never   • Sexual activity: Not Currently     Partners: Female   Lifestyle   • Physical activity     Days per week: Not on file     Minutes per session: Not on file   • Stress: Not on file   Relationships   • Social connections     Talks on phone: Not on file     Gets together: Not on file     Attends Christianity service: Not on file     Active member of club or organization: Not on file     Attends meetings of clubs or organizations: Not on file     Relationship status: Not on file   • Intimate partner violence     Fear of current or ex partner: Not on file     Emotionally abused: Not on file     Physically abused: Not on file     Forced sexual activity: Not on file   Other Topics Concern   • Not on file   Social History Narrative   • Not on file     Breast Cancer-related family history is not on file.      Review of Systems   Constitutional: Negative for chills and fever.   Gastrointestinal: Negative for nausea.   Neurological: Negative for vertigo.          Objective:     /90 (BP Location: Left arm, Patient Position: Sitting, BP Cuff Size: Adult)   Pulse 72   Temp 36.3 °C (97.4 °F) (Temporal)   Resp 20   Ht 1.803 m (5' 11\")   Wt 88.5 kg (195 lb)   SpO2 96%   BMI 27.20 kg/m²      Physical Exam  Vitals signs and nursing note reviewed.   Constitutional:       Appearance: Normal appearance.   HENT:      Right Ear: There is impacted cerumen.      Left Ear: There is impacted cerumen.   Musculoskeletal: Normal range of motion.   Skin:     General: Skin is warm and dry.   Neurological:      General: No focal deficit present.      Mental Status: He is alert.                 Assessment/Plan:        1. Hearing loss of both ears due to cerumen impaction  Ear Irrigation (MA Only)     Clear on re exam.  Follow up as needed.  "

## 2020-09-28 RX ORDER — METFORMIN HYDROCHLORIDE 500 MG/1
TABLET, EXTENDED RELEASE ORAL
Qty: 360 TAB | Refills: 4 | Status: SHIPPED | OUTPATIENT
Start: 2020-09-28 | End: 2021-12-14 | Stop reason: SDUPTHER

## 2020-09-29 ENCOUNTER — ANTICOAGULATION VISIT (OUTPATIENT)
Dept: VASCULAR LAB | Facility: MEDICAL CENTER | Age: 75
End: 2020-09-29
Attending: INTERNAL MEDICINE
Payer: MEDICARE

## 2020-09-29 ENCOUNTER — TELEMEDICINE (OUTPATIENT)
Dept: MEDICAL GROUP | Facility: MEDICAL CENTER | Age: 75
End: 2020-09-29
Payer: MEDICARE

## 2020-09-29 VITALS
HEART RATE: 67 BPM | BODY MASS INDEX: 27.02 KG/M2 | RESPIRATION RATE: 16 BRPM | DIASTOLIC BLOOD PRESSURE: 68 MMHG | WEIGHT: 193 LBS | TEMPERATURE: 97.4 F | HEIGHT: 71 IN | OXYGEN SATURATION: 96 % | SYSTOLIC BLOOD PRESSURE: 134 MMHG

## 2020-09-29 DIAGNOSIS — K57.92 DIVERTICULITIS: ICD-10-CM

## 2020-09-29 DIAGNOSIS — Z85.038 HISTORY OF COLON CANCER, STAGE IV: ICD-10-CM

## 2020-09-29 DIAGNOSIS — E11.9 CONTROLLED TYPE 2 DIABETES MELLITUS WITHOUT COMPLICATION, WITHOUT LONG-TERM CURRENT USE OF INSULIN (HCC): ICD-10-CM

## 2020-09-29 DIAGNOSIS — Z86.718 HISTORY OF VENOUS THROMBOEMBOLISM: ICD-10-CM

## 2020-09-29 LAB
HBA1C MFR BLD: 6.4 % (ref 0–5.6)
INR PPP: 2.7 (ref 2–3.5)
INT CON NEG: NEGATIVE
INT CON POS: POSITIVE

## 2020-09-29 PROCEDURE — 99211 OFF/OP EST MAY X REQ PHY/QHP: CPT

## 2020-09-29 PROCEDURE — 85610 PROTHROMBIN TIME: CPT

## 2020-09-29 PROCEDURE — 99214 OFFICE O/P EST MOD 30 MIN: CPT | Mod: 95,CR | Performed by: FAMILY MEDICINE

## 2020-09-29 PROCEDURE — 83036 HEMOGLOBIN GLYCOSYLATED A1C: CPT | Performed by: FAMILY MEDICINE

## 2020-09-29 RX ORDER — GLIMEPIRIDE 4 MG/1
4 TABLET ORAL EVERY MORNING
Qty: 180 TAB | Refills: 4
Start: 2020-09-29 | End: 2021-03-15 | Stop reason: SDUPTHER

## 2020-09-29 ASSESSMENT — FIBROSIS 4 INDEX: FIB4 SCORE: 2.34

## 2020-09-29 NOTE — PROGRESS NOTES
OP Anticoagulation Service Note    Date: 2020    Anticoagulation Summary  As of 2020    INR goal:  2.0-3.0   TTR:  68.4 % (1.1 y)   INR used for dosin.70 (2020)   Warfarin maintenance plan:  2.5 mg (2.5 mg x 1) every Sat; 5 mg (2.5 mg x 2) all other days   Weekly warfarin total:  32.5 mg   Plan last modified:  Chau Hawkins, PharmD (10/29/2019)   Next INR check:  10/20/2020   Target end date:  Indefinite    Indications    History of venous thromboembolism [Z86.718]  Deep vein thrombosis (DVT) of popliteal vein of both lower extremities (HCC) [I82.433]             Anticoagulation Episode Summary     INR check location:      Preferred lab:      Send INR reminders to:      Comments:        Anticoagulation Care Providers     Provider Role Specialty Phone number    Iveth Stone M.D. Referring Internal Medicine [phone]    [facility] Anticoagulation Services Responsible  [phone]        Anticoagulation Patient Findings      HPI:   Magnus Claudio is in the Anticoagulation Clinic today for a INR check on their anticoagulation therapy.     The reason for today's visit is to prevent morbidity and mortality from a blood clot and/or stroke and to reduce the risk of bleeding while on a anticoagulant.     PCP:  Too Brito M.D.  [address] [facility] [address] [place] [id number]  [phone]    3 vitals included with today's appt-unless patient declined:  (BP, HR, weight, ht, RR)   There were no vitals filed for this visit.    Assessment:   INR  therapeutic.   Confirmed warfarin dosing regimen: Yes  Interval Changes with foods rich in vitamin K: No  Interval Changes in ETOH or cranberries:   No  Interval Changes in smoking status:  No  Interval Changes in medication:  No   S/S of bleeding or bruising:  No  Signs/symptoms  thrombosis since the last appt:  No  Any upcoming procedures that require stopping warfarin and/or using bridge therapy: None    Plan:  Continue the same warfarin dose, as  noted above.       Follow up:  Follow up appointment in 3 week(s).       Other info:  Pt educated to contact our clinic with any changes in medications or s/s of bleeding or thrombosis.  Education was provided today regarding tips to reduce their bleed risk and dietary constraints while on a anticoagulant.    National Recommendations:  The CHEST guidelines recommends frequent INR monitoring at regular intervals (a few days up to a max of 12 weeks) to ensure patients are on the proper dose of warfarin, and patients are not having any complications from therapy.  INRs can dramatically change over a short time period due to diet, medications, and medical conditions.     Chau Hawkins, PharmD, MS, BCACP, LCC    [facility]  Phone [phone] fax [phone]    This note was created using voice recognition software (Dragon). The accuracy of the dictation is limited by the abilities of the software. I have reviewed the note prior to signing, however some errors in grammar and context are still possible. If you have any questions related to this note please do not hesitate to contact our office.

## 2020-09-29 NOTE — ASSESSMENT & PLAN NOTE
The patient was hospitalized on 9/3 for diverticulitis treated with abx. His symptoms resolved. He does have a h/o treated stage IV colon cancer. He hasn't followed up with GI yet.

## 2020-09-29 NOTE — PROGRESS NOTES
Telemedicine Visit: Established Patient     This encounter was conducted via Zoom.   Verbal consent was obtained. Patient's identity was verified.    Subjective:     Magnus Claudio is a 75 y.o. male presenting for evaluation and management of diverticulitis.    Controlled type 2 diabetes mellitus without complication, without long-term current use of insulin (HCC)  His A1c is 6.4 today. He reports morning lows about once per month. Today, he had an episode where he felt jittery and his 4 am glucose was 79. It improved with orange juice.     Diverticulitis  The patient was hospitalized on 9/3 for diverticulitis treated with abx. His symptoms resolved. He does have a h/o treated stage IV colon cancer. He hasn't followed up with GI yet.      ROS no fever or cough.    Allergies   Allergen Reactions   • Morphine Unspecified     Hallucinations, 15+ years ago       Current medicines (including changes today)  Current Outpatient Medications   Medication Sig Dispense Refill   • glimepiride (AMARYL) 4 MG Tab Take 1 Tab by mouth every morning. 180 Tab 4   • metFORMIN ER (GLUCOPHAGE XR) 500 MG TABLET SR 24 HR TAKE 2 TABLETS BY MOUTH TWICE DAILY 360 Tab 4   • warfarin (COUMADIN) 5 MG Tab Take 2.5-5 mg by mouth every day. Saturday= 2.5 mg  All other days is 5 mg     • amLODIPine (NORVASC) 5 MG Tab Take 1 Tab by mouth every day. 90 Tab 4   • losartan (COZAAR) 100 MG Tab Take 1 Tab by mouth every day. 100 Tab 4   • hydroCHLOROthiazide (HYDRODIURIL) 12.5 MG tablet Take 1 Tab by mouth every day. 100 Tab 4   • therapeutic multivitamin-minerals (THERAGRAN-M) Tab Take 1 Tab by mouth every day.     • pioglitazone (ACTOS) 15 MG Tab Take 1 Tab by mouth every day. (Patient not taking: Reported on 9/21/2020) 200 Tab 4     No current facility-administered medications for this visit.        Patient Active Problem List    Diagnosis Date Noted   • Diverticulitis 09/03/2020     Priority: High   • History of venous thromboembolism 08/17/2019  "    Priority: Medium   • Controlled type 2 diabetes mellitus without complication, without long-term current use of insulin (HCC) 02/06/2017     Priority: Medium   • Gilbert syndrome 02/26/2019     Priority: Low   • Essential hypertension 10/08/2015     Priority: Low   • Seborrheic keratoses 06/10/2020   • Headache 09/05/2019   • Pulmonary hypertension (HCC) 08/21/2019   • Deep vein thrombosis (DVT) of popliteal vein of both lower extremities (HCC) 08/19/2019   • Left shoulder pain 07/23/2019   • Cervical radiculopathy 02/26/2019   • Thrombocytopenia (HCC) 05/22/2018   • Healthcare maintenance 10/10/2017   • Decreased hearing of both ears 04/26/2016   • History of colon cancer, stage IV 10/08/2015   • Vertigo 10/08/2015       Family History   Problem Relation Age of Onset   • Hypertension Father    • Heart Attack Father    • Cancer Mother         Breast   • Diabetes Sister    • Hyperlipidemia Neg Hx         unknown       He  has a past medical history of Cancer (HCC), Cataract, Diabetes (HCC), Diverticulitis, Hypertension, and Vertigo, benign positional.  He  has a past surgical history that includes colon resection; pr resec liver,part lobectomy; laminotomy; and eye surgery (Bilateral).       Objective:   Vitals obtained by patient:  /68 (BP Location: Left arm, Patient Position: Sitting, BP Cuff Size: Adult long)   Pulse 67   Temp 36.3 °C (97.4 °F) (Temporal)   Resp 16   Ht 1.803 m (5' 11\")   Wt 87.5 kg (193 lb)   SpO2 96%   BMI 26.92 kg/m²       Physical Exam:  Constitutional: Alert, no distress, well-groomed.  Psych: normal affect.    POCT A1c: 6.4.     Assessment and Plan:     1. Controlled type 2 diabetes mellitus without complication, without long-term current use of insulin (HCC)  - continue current regimen but reduce glimepiride from 8 mg daily to 4 mg daily with 3 mo f/u.   - POCT  A1C    2. Diverticulitis  Symptoms improved.   - REFERRAL TO GASTROENTEROLOGY  - CBC WITH DIFFERENTIAL; Future  - " Comp Metabolic Panel; Future    3. History of colon cancer, stage IV  - REFERRAL TO GASTROENTEROLOGY          Follow-up: Return in about 3 months (around 12/29/2020).

## 2020-09-29 NOTE — ASSESSMENT & PLAN NOTE
His A1c is 6.4 today. He reports morning lows about once per month. Today, he had an episode where he felt jittery and his 4 am glucose was 79. It improved with orange juice.

## 2020-10-07 ENCOUNTER — NON-PROVIDER VISIT (OUTPATIENT)
Dept: MEDICAL GROUP | Facility: MEDICAL CENTER | Age: 75
End: 2020-10-07
Payer: MEDICARE

## 2020-10-07 DIAGNOSIS — Z23 NEED FOR VACCINATION: ICD-10-CM

## 2020-10-07 PROCEDURE — 90662 IIV NO PRSV INCREASED AG IM: CPT | Performed by: FAMILY MEDICINE

## 2020-10-07 PROCEDURE — G0008 ADMIN INFLUENZA VIRUS VAC: HCPCS | Performed by: FAMILY MEDICINE

## 2020-10-07 NOTE — PROGRESS NOTES
"Magnus Claudio is a 75 y.o. male here for a non-provider visit for:   FLU    Reason for immunization: Annual Flu Vaccine  Immunization records indicate need for vaccine: Yes, confirmed with Epic and confirmed with [place] [employer]  Minimum interval has been met for this vaccine: Yes  ABN completed: Not Indicated    Order and dose verified by: Jessica OLIVAERS Dated  Yes was given to patient: Yes  All IAC Questionnaire questions were answered \"No.\"    Patient tolerated injection and no adverse effects were observed or reported: Yes    Pt scheduled for next dose in series: Not Indicated  "

## 2020-10-13 ENCOUNTER — APPOINTMENT (OUTPATIENT)
Dept: RADIOLOGY | Facility: MEDICAL CENTER | Age: 75
End: 2020-10-13
Attending: INTERNAL MEDICINE
Payer: MEDICARE

## 2020-10-20 ENCOUNTER — ANTICOAGULATION VISIT (OUTPATIENT)
Dept: VASCULAR LAB | Facility: MEDICAL CENTER | Age: 75
End: 2020-10-20
Attending: INTERNAL MEDICINE
Payer: MEDICARE

## 2020-10-20 DIAGNOSIS — Z86.718 HISTORY OF VENOUS THROMBOEMBOLISM: ICD-10-CM

## 2020-10-20 LAB — INR PPP: 2.7 (ref 2–3.5)

## 2020-10-20 PROCEDURE — 85610 PROTHROMBIN TIME: CPT

## 2020-10-20 PROCEDURE — 99211 OFF/OP EST MAY X REQ PHY/QHP: CPT

## 2020-10-20 NOTE — PROGRESS NOTES
OP Anticoagulation Service Note    Date: 10/20/2020    Anticoagulation Summary  As of 10/20/2020    INR goal:  2.0-3.0   TTR:  70.0 % (1.1 y)   INR used for dosin.70 (10/20/2020)   Warfarin maintenance plan:  2.5 mg (2.5 mg x 1) every Sat; 5 mg (2.5 mg x 2) all other days   Weekly warfarin total:  32.5 mg   Plan last modified:  Chau Hawkins, PharmD (10/29/2019)   Next INR check:  2020   Target end date:  Indefinite    Indications    History of venous thromboembolism [Z86.718]  Deep vein thrombosis (DVT) of popliteal vein of both lower extremities (HCC) [I82.433]             Anticoagulation Episode Summary     INR check location:      Preferred lab:      Send INR reminders to:      Comments:        Anticoagulation Care Providers     Provider Role Specialty Phone number    [facility] Responsible  [phone]    Too Brito M.D.  [facility] [phone]        Anticoagulation Patient Findings      HPI:   Magnus Claudio is in the Anticoagulation Clinic today for a INR check on their anticoagulation therapy.     The reason for today's visit is to prevent morbidity and mortality from a blood clot and/or stroke and to reduce the risk of bleeding while on a anticoagulant.     PCP:  Too Brito M.D.  [address] [facility] [address] [place] [id number] [phone]    3 vitals included with today's appt-unless patient declined:  (BP, HR, weight, ht, RR)   There were no vitals filed for this visit.    Assessment:   INR  therapeutic.   Confirmed warfarin dosing regimen: Yes  Interval Changes with foods rich in vitamin K: No  Interval Changes in ETOH or cranberries:   No  Interval Changes in smoking status:  No  Interval Changes in medication:  No   S/S of bleeding or bruising:  No  Signs/symptoms  thrombosis since the last appt:  No  Any upcoming procedures that require stopping warfarin and/or using bridge therapy: None    Plan:  Continue the same warfarin dose, as  noted above.       Follow up:  Follow up appointment in 4 week(s).     Other info:  Pt educated to contact our clinic with any changes in medications or s/s of bleeding or thrombosis.  Education was provided today regarding tips to reduce their bleed risk and dietary constraints while on a anticoagulant.    National Recommendations:  The CHEST guidelines recommends frequent INR monitoring at regular intervals (a few days up to a max of 12 weeks) to ensure patients are on the proper dose of warfarin, and patients are not having any complications from therapy.  INRs can dramatically change over a short time period due to diet, medications, and medical conditions.     Chau Hawkins, PharmD, MS, BCACP, LCC    [facility]  Phone [phone] fax [phone]    This note was created using voice recognition software (Dragon). The accuracy of the dictation is limited by the abilities of the software. I have reviewed the note prior to signing, however some errors in grammar and context are still possible. If you have any questions related to this note please do not hesitate to contact our office.

## 2020-10-31 DIAGNOSIS — E11.9 CONTROLLED TYPE 2 DIABETES MELLITUS WITHOUT COMPLICATION, WITHOUT LONG-TERM CURRENT USE OF INSULIN (HCC): ICD-10-CM

## 2020-11-02 RX ORDER — PIOGLITAZONEHYDROCHLORIDE 15 MG/1
TABLET ORAL
Qty: 200 TAB | Refills: 4 | OUTPATIENT
Start: 2020-11-02

## 2020-11-17 ENCOUNTER — ANTICOAGULATION VISIT (OUTPATIENT)
Dept: VASCULAR LAB | Facility: MEDICAL CENTER | Age: 75
End: 2020-11-17
Attending: INTERNAL MEDICINE
Payer: MEDICARE

## 2020-11-17 DIAGNOSIS — Z86.718 HISTORY OF VENOUS THROMBOEMBOLISM: ICD-10-CM

## 2020-11-17 LAB — INR PPP: 2.3 (ref 2–3.5)

## 2020-11-17 PROCEDURE — 99211 OFF/OP EST MAY X REQ PHY/QHP: CPT

## 2020-11-17 PROCEDURE — 85610 PROTHROMBIN TIME: CPT

## 2020-11-17 NOTE — PROGRESS NOTES
OP Anticoagulation Service Note    Date: 2020    Anticoagulation Summary  As of 2020    INR goal:  2.0-3.0   TTR:  71.9 % (1.2 y)   INR used for dosin.30 (2020)   Warfarin maintenance plan:  2.5 mg (2.5 mg x 1) every Sat; 5 mg (2.5 mg x 2) all other days   Weekly warfarin total:  32.5 mg   Plan last modified:  Chau Hawkins, PharmD (10/29/2019)   Next INR check:  2020   Target end date:  Indefinite    Indications    History of venous thromboembolism [Z86.718]  Deep vein thrombosis (DVT) of popliteal vein of both lower extremities (HCC) [I82.433]             Anticoagulation Episode Summary     INR check location:      Preferred lab:      Send INR reminders to:      Comments:        Anticoagulation Care Providers     Provider Role Specialty Phone number    [facility] Responsible  [phone]    Too Brito M.D.  [facility] [phone]        Anticoagulation Patient Findings      HPI:   Magnus Claudio is in the Anticoagulation Clinic today for a INR check on their anticoagulation therapy.     The reason for today's visit is to prevent morbidity and mortality from a blood clot and/or stroke and to reduce the risk of bleeding while on a anticoagulant.     PCP:  Too Brito M.D.  [address] [facility] [address] [place] [id number] [phone]    3 vitals included with today's appt-unless patient declined:  (BP, HR, weight, ht, RR)   There were no vitals filed for this visit.    Assessment:   INR  -therapeutic.   Confirmed warfarin dosing regimen: Yes  Interval Changes with foods rich in vitamin K: No  Interval Changes in ETOH or cranberries:   No  Interval Changes in smoking status:  No  Interval Changes in medication:  No   S/S of bleeding or bruising:  No  Signs/symptoms  thrombosis since the last appt:  No  Any upcoming procedures that require stopping warfarin and/or using bridge therapy: None    Plan:  Continue the same warfarin dose, as  noted above.       Follow up:  Follow up appointment in 6 week(s).     Other info:  Pt educated to contact our clinic with any changes in medications or s/s of bleeding or thrombosis.  Education was provided today regarding tips to reduce their bleed risk and dietary constraints while on a anticoagulant.    National Recommendations:  The CHEST guidelines recommends frequent INR monitoring at regular intervals (a few days up to a max of 12 weeks) to ensure patients are on the proper dose of warfarin, and patients are not having any complications from therapy.  INRs can dramatically change over a short time period due to diet, medications, and medical conditions.     Chau Hawkins, PharmD, MS, BCACP, LCC    [facility]  Phone [phone] fax [phone]    This note was created using voice recognition software (Dragon). The accuracy of the dictation is limited by the abilities of the software. I have reviewed the note prior to signing, however some errors in grammar and context are still possible. If you have any questions related to this note please do not hesitate to contact our office.

## 2020-12-15 ENCOUNTER — OFFICE VISIT (OUTPATIENT)
Dept: MEDICAL GROUP | Facility: MEDICAL CENTER | Age: 75
End: 2020-12-15
Payer: MEDICARE

## 2020-12-15 VITALS
SYSTOLIC BLOOD PRESSURE: 130 MMHG | RESPIRATION RATE: 16 BRPM | OXYGEN SATURATION: 95 % | HEART RATE: 82 BPM | DIASTOLIC BLOOD PRESSURE: 74 MMHG | TEMPERATURE: 97.6 F | WEIGHT: 197.6 LBS | BODY MASS INDEX: 27.66 KG/M2 | HEIGHT: 71 IN

## 2020-12-15 DIAGNOSIS — E11.9 CONTROLLED TYPE 2 DIABETES MELLITUS WITHOUT COMPLICATION, WITHOUT LONG-TERM CURRENT USE OF INSULIN (HCC): ICD-10-CM

## 2020-12-15 DIAGNOSIS — I10 ESSENTIAL HYPERTENSION: ICD-10-CM

## 2020-12-15 DIAGNOSIS — Z12.11 SCREENING FOR COLORECTAL CANCER: ICD-10-CM

## 2020-12-15 DIAGNOSIS — Z12.12 SCREENING FOR COLORECTAL CANCER: ICD-10-CM

## 2020-12-15 DIAGNOSIS — D69.6 THROMBOCYTOPENIA (HCC): ICD-10-CM

## 2020-12-15 LAB
HBA1C MFR BLD: 6.7 % (ref 0–5.6)
INT CON NEG: NEGATIVE
INT CON POS: POSITIVE

## 2020-12-15 PROCEDURE — 99214 OFFICE O/P EST MOD 30 MIN: CPT | Performed by: FAMILY MEDICINE

## 2020-12-15 PROCEDURE — 83036 HEMOGLOBIN GLYCOSYLATED A1C: CPT | Performed by: FAMILY MEDICINE

## 2020-12-15 ASSESSMENT — FIBROSIS 4 INDEX: FIB4 SCORE: 2.34

## 2020-12-15 NOTE — ASSESSMENT & PLAN NOTE
Patient's A1c is at goal.  He is maintained on glimepiride 4 mg daily, metformin 2000 mg daily and Actos 15 mg daily.

## 2020-12-15 NOTE — PROGRESS NOTES
[facility]  Progress Note  Established Patient    Subjective:   Magnus Claudio is a 75 y.o. male here today with a chief complaint of diabetes. The patient is alone, both of us are masked.     Controlled type 2 diabetes mellitus without complication, without long-term current use of insulin (HCC)  Patient's A1c is at goal.  He is maintained on glimepiride 4 mg daily, metformin 2000 mg daily and Actos 15 mg daily.    Essential hypertension  Patient's blood pressure is at goal.    Thrombocytopenia (HCC)  Noted on labs.      Current Outpatient Medications on File Prior to Visit   Medication Sig Dispense Refill   • glimepiride (AMARYL) 4 MG Tab Take 1 Tab by mouth every morning. 180 Tab 4   • metFORMIN ER (GLUCOPHAGE XR) 500 MG TABLET SR 24 HR TAKE 2 TABLETS BY MOUTH TWICE DAILY 360 Tab 4   • warfarin (COUMADIN) 5 MG Tab Take 2.5-5 mg by mouth every day. Saturday= 2.5 mg  All other days is 5 mg     • pioglitazone (ACTOS) 15 MG Tab Take 1 Tab by mouth every day. 200 Tab 4   • amLODIPine (NORVASC) 5 MG Tab Take 1 Tab by mouth every day. 90 Tab 4   • losartan (COZAAR) 100 MG Tab Take 1 Tab by mouth every day. 100 Tab 4   • hydroCHLOROthiazide (HYDRODIURIL) 12.5 MG tablet Take 1 Tab by mouth every day. 100 Tab 4   • therapeutic multivitamin-minerals (THERAGRAN-M) Tab Take 1 Tab by mouth every day.       No current facility-administered medications on file prior to visit.        Past Medical History:   Diagnosis Date   • Cancer (HCC)     Colon   • Cataract    • Diabetes (HCC)    • Diverticulitis    • Hypertension    • Vertigo, benign positional        Allergies: Morphine    Surgical History:  has a past surgical history that includes colon resection; pr resec liver,part lobectomy; laminotomy; and eye surgery (Bilateral).    Family History: family history includes Cancer in his mother; Diabetes in his sister; Heart Attack in his father; Hypertension in his father.    Social History:  reports that he has never  "smoked. He has never used smokeless tobacco. He reports that he does not drink alcohol or use drugs.    ROS: No fever or cough.        Objective:     Vitals:    12/15/20 1027   BP: 130/74   BP Location: Left arm   Patient Position: Sitting   BP Cuff Size: Adult long   Pulse: 82   Resp: 16   Temp: 36.4 °C (97.6 °F)   TempSrc: Temporal   SpO2: 95%   Weight: 89.6 kg (197 lb 9.6 oz)   Height: 1.803 m (5' 11\")       Physical Exam:  General: alert in no apparent distress.         Assessment and Plan:     1. Screening for colorectal cancer  Colonoscopy scheduled in Feb.     2. Controlled type 2 diabetes mellitus without complication, without long-term current use of insulin (HCC)  - continue current regimen.   - Comp Metabolic Panel; Future  - Lipid Profile; Future  - POCT Hemoglobin A1C    3. Essential hypertension  - continue current regimen.   - Comp Metabolic Panel; Future    4. Thrombocytopenia (HCC)  - CBC WITH DIFFERENTIAL; Future        Followup: Return in about 4 months (around 4/15/2021), or if symptoms worsen or fail to improve, for DM.         "

## 2020-12-16 DIAGNOSIS — Z79.01 CHRONIC ANTICOAGULATION: ICD-10-CM

## 2020-12-16 RX ORDER — WARFARIN SODIUM 2.5 MG/1
TABLET ORAL
Qty: 180 TAB | Refills: 1 | Status: SHIPPED | OUTPATIENT
Start: 2020-12-16 | End: 2021-08-16

## 2020-12-29 ENCOUNTER — ANTICOAGULATION VISIT (OUTPATIENT)
Dept: VASCULAR LAB | Facility: MEDICAL CENTER | Age: 75
End: 2020-12-29
Attending: INTERNAL MEDICINE
Payer: MEDICARE

## 2020-12-29 DIAGNOSIS — Z86.718 HISTORY OF VENOUS THROMBOEMBOLISM: ICD-10-CM

## 2020-12-29 LAB — INR PPP: 2.7 (ref 2–3.5)

## 2020-12-29 PROCEDURE — 85610 PROTHROMBIN TIME: CPT

## 2020-12-29 PROCEDURE — 99211 OFF/OP EST MAY X REQ PHY/QHP: CPT

## 2020-12-29 NOTE — PROGRESS NOTES
OP Anticoagulation Service Note    Date: 2020    Anticoagulation Summary  As of 2020    INR goal:  2.0-3.0   TTR:  74.4 % (1.3 y)   INR used for dosin.70 (2020)   Warfarin maintenance plan:  2.5 mg (2.5 mg x 1) every Sat; 5 mg (2.5 mg x 2) all other days   Weekly warfarin total:  32.5 mg   Plan last modified:  Chau Hawkins, PharmD (10/29/2019)   Next INR check:  2021   Target end date:  Indefinite    Indications    History of venous thromboembolism [Z86.718]  Deep vein thrombosis (DVT) of popliteal vein of both lower extremities (HCC) [I82.433]             Anticoagulation Episode Summary     INR check location:      Preferred lab:      Send INR reminders to:      Comments:        Anticoagulation Care Providers     Provider Role Specialty Phone number    [facility] Responsible  [phone]    Too Brito M.D.  [facility] [phone]        Anticoagulation Patient Findings      HPI:     Magnus Claudio is in the Anticoagulation Clinic today for a INR check on their anticoagulation therapy.     The reason for today's visit is to prevent morbidity and mortality from a blood clot and/or stroke and to reduce the risk of bleeding while on a anticoagulant.     PCP:  Too Brito M.D.  [address] [facility] [address] [place] [id number] [phone]    3 vitals included with today's appt-unless patient declined:  (BP, HR, weight, ht, RR)   There were no vitals filed for this visit.    Assessment:     • INR  therapeutic.   • Confirmed warfarin dosing regimen: Yes  • Interval Changes with foods rich in vitamin K: No  • Interval Changes in ETOH or cranberries:   No  • Interval Changes in smoking status:  No  • S/S of bleeding or bruising:  No  • Signs/symptoms  thrombosis since the last appt:  No  • Any upcoming procedures that require stopping warfarin and/or using bridge therapy: None  • Interval Changes in medication:  No   • Is pt on antiplatelet  therapy: No      Current Outpatient Medications:   •  losartan, TAKE 1 TABLET BY MOUTH DAILY  •  hydroCHLOROthiazide, TAKE 1 TABLET BY MOUTH DAILY  •  warfarin, Take one to two tablets by mouth daily or as directed by anticoagulation clinic  •  glimepiride, 4 mg, Oral, QAM  •  metFORMIN ER, TAKE 2 TABLETS BY MOUTH TWICE DAILY  •  pioglitazone, 15 mg, Oral, DAILY  •  amLODIPine, 5 mg, Oral, QDAY  •  therapeutic multivitamin-minerals, 1 Tab, Oral, DAILY      Plan:     • Continue the same warfarin dose, as noted above.       Follow-up:     • Our protocol suggests we test in 6 weeks.      • Given the pt's risk factors and previous INR stability, it is reasonable to extend to 8 weeks to reduce Covid exposure.    • This decision was made using shared decision making with the pt and benefits vs risks were discussed.      Additional information discussed with patient:     • Pt educated to contact our clinic with any changes in medications or s/s of bleeding or thrombosis.  • Education was provided today regarding tips to reduce their bleed risk and dietary constraints while on a anticoagulant.    National recommendations regarding anticoagulation therapy:     The CHEST guidelines recommends frequent INR monitoring at regular intervals (a few days up to a max of 12 weeks) to ensure patients are on the proper dose of warfarin, and patients are not having any complications from therapy.  INRs can dramatically change over a short time period due to diet, medications, and medical conditions.       Chau Hawkins, PharmD, MS, BCACP, [facility]  Phone [phone] fax [phone]    This note was created using voice recognition software (Dragon). The accuracy of the dictation is limited by the abilities of the software. I have reviewed the note prior to signing, however some errors in grammar and context are still possible. If you have any questions related to this note please do not  hesitate to contact our office.

## 2021-01-11 DIAGNOSIS — Z23 NEED FOR VACCINATION: ICD-10-CM

## 2021-01-21 ENCOUNTER — IMMUNIZATION (OUTPATIENT)
Dept: FAMILY PLANNING/WOMEN'S HEALTH CLINIC | Facility: IMMUNIZATION CENTER | Age: 76
End: 2021-01-21
Attending: INTERNAL MEDICINE
Payer: MEDICARE

## 2021-01-21 DIAGNOSIS — Z23 NEED FOR VACCINATION: ICD-10-CM

## 2021-01-21 DIAGNOSIS — Z23 ENCOUNTER FOR VACCINATION: Primary | ICD-10-CM

## 2021-01-21 PROCEDURE — 0001A PFIZER SARS-COV-2 VACCINE: CPT | Performed by: NURSE PRACTITIONER

## 2021-01-21 PROCEDURE — 91300 PFIZER SARS-COV-2 VACCINE: CPT | Performed by: NURSE PRACTITIONER

## 2021-01-26 ENCOUNTER — ANTICOAGULATION VISIT (OUTPATIENT)
Dept: VASCULAR LAB | Facility: MEDICAL CENTER | Age: 76
End: 2021-01-26
Attending: INTERNAL MEDICINE
Payer: MEDICARE

## 2021-01-26 DIAGNOSIS — Z86.718 HISTORY OF VENOUS THROMBOEMBOLISM: ICD-10-CM

## 2021-01-26 LAB — INR PPP: 2.4 (ref 2–3.5)

## 2021-01-26 PROCEDURE — 99211 OFF/OP EST MAY X REQ PHY/QHP: CPT

## 2021-01-26 PROCEDURE — 85610 PROTHROMBIN TIME: CPT

## 2021-01-26 NOTE — PROGRESS NOTES
Anticoagulation Summary  As of 2021    INR goal:  2.0-3.0   TTR:  75.8 % (1.4 y)   INR used for dosin.40 (2021)   Warfarin maintenance plan:  2.5 mg (2.5 mg x 1) every Sat; 5 mg (2.5 mg x 2) all other days   Weekly warfarin total:  32.5 mg   Plan last modified:  Chau Hawkins, PharmD (10/29/2019)   Next INR check:  3/9/2021   Target end date:  Indefinite    Indications    History of venous thromboembolism [Z86.718]  Deep vein thrombosis (DVT) of popliteal vein of both lower extremities (HCC) [I82.433]             Anticoagulation Episode Summary     INR check location:      Preferred lab:      Send INR reminders to:      Comments:        Anticoagulation Care Providers     Provider Role Specialty Phone number    [facility] Responsible  [phone]    Too Brito M.D.  Family Medicine [phone]             Anticoagulation Patient Findings  Patient Findings     Negatives:  Signs/symptoms of thrombosis, Signs/symptoms of bleeding, Laboratory test error suspected, Change in health, Change in alcohol use, Change in activity, Upcoming invasive procedure, Emergency department visit, Upcoming dental procedure, Missed doses, Extra doses, Change in medications, Change in diet/appetite, Hospital admission, Bruising, Other complaints          HPI:  Magnus Gudino Shanon seen in clinic today, on anticoagulation therapy with warfarin for hx of DVT  Changes to current medical/health status since last appt: pt was previously scheduled for colonoscopy but this was cancelled d/t COVID-19  Denies signs/symptoms of bleeding and/or thrombosis since the last appt.    Denies any interval changes to diet  Denies any interval changes to medications since last appt.   Denies any complications or cost restrictions with current therapy.   Verified current warfarin dosing schedule.   BP declined d/t COVID-19 concerns.  Medications reconciled       A/P   INR  therapeutic.   Pt is to continue with  current warfarin dosing regimen.    Follow up appointment in 6 week(s).    Brooklyn Lewis, PharmD

## 2021-01-27 LAB — INR BLD: 2.4 (ref 0.9–1.2)

## 2021-02-11 ENCOUNTER — IMMUNIZATION (OUTPATIENT)
Dept: FAMILY PLANNING/WOMEN'S HEALTH CLINIC | Facility: IMMUNIZATION CENTER | Age: 76
End: 2021-02-11
Attending: INTERNAL MEDICINE
Payer: MEDICARE

## 2021-02-11 DIAGNOSIS — Z23 ENCOUNTER FOR VACCINATION: Primary | ICD-10-CM

## 2021-02-11 PROCEDURE — 91300 PFIZER SARS-COV-2 VACCINE: CPT | Performed by: NURSE PRACTITIONER

## 2021-02-11 PROCEDURE — 0002A PFIZER SARS-COV-2 VACCINE: CPT | Performed by: NURSE PRACTITIONER

## 2021-03-09 ENCOUNTER — ANTICOAGULATION VISIT (OUTPATIENT)
Dept: VASCULAR LAB | Facility: MEDICAL CENTER | Age: 76
End: 2021-03-09
Attending: INTERNAL MEDICINE
Payer: MEDICARE

## 2021-03-09 DIAGNOSIS — Z86.718 HISTORY OF VENOUS THROMBOEMBOLISM: ICD-10-CM

## 2021-03-09 LAB — INR PPP: 3.2 (ref 2–3.5)

## 2021-03-09 PROCEDURE — 85610 PROTHROMBIN TIME: CPT

## 2021-03-09 PROCEDURE — 99212 OFFICE O/P EST SF 10 MIN: CPT

## 2021-03-09 NOTE — PROGRESS NOTES
Anticoagulation Summary  As of 3/9/2021    INR goal:  2.0-3.0   TTR:  75.7 % (1.5 y)   INR used for dosing:  3.20 (3/9/2021)   Warfarin maintenance plan:  2.5 mg (2.5 mg x 1) every Sat; 5 mg (2.5 mg x 2) all other days   Weekly warfarin total:  32.5 mg   Plan last modified:  Deedee BanksD (10/29/2019)   Next INR check:  3/23/2021   Target end date:  Indefinite    Indications    History of venous thromboembolism [Z86.718]  Deep vein thrombosis (DVT) of popliteal vein of both lower extremities (HCC) [I82.433]             Anticoagulation Episode Summary     INR check location:      Preferred lab:      Send INR reminders to:      Comments:        Anticoagulation Care Providers     Provider Role Specialty Phone number    [facility] Responsible  [phone]    Too Brito M.D.  Family Medicine [phone]                Anticoagulation Patient Findings      HPI:  Magnus Claudio seen in clinic today, on anticoagulation therapy with warfarin for h/o DVT  Changes to current medical/health status since last appt: DENIES - pt has not had colonoscopy rescheduled yet  Denies signs/symptoms of bleeding and/or thrombosis since the last appt.    Denies any interval changes to diet  Denies any interval changes to medications since last appt.   Denies any complications or cost restrictions with current therapy.   Verified current warfarin dosing schedule.   Pt declines vitals due to Covid transmission concerns.   Medications reconciled   Pt NOT on antiplatelet therapy       A/P   INR  SUPRA-therapeutic at 3.2  Instructed pt to reduce dose to 2.5mg TONIGHT ONLY then continue with current warfarin dosing regimen.    Follow up appointment in 2 week(s) per pt preference.    Yoandy Avila, PharmD

## 2021-03-11 ENCOUNTER — OFFICE VISIT (OUTPATIENT)
Dept: URGENT CARE | Facility: CLINIC | Age: 76
End: 2021-03-11
Payer: MEDICARE

## 2021-03-11 VITALS
BODY MASS INDEX: 27.02 KG/M2 | SYSTOLIC BLOOD PRESSURE: 162 MMHG | WEIGHT: 193 LBS | RESPIRATION RATE: 16 BRPM | HEART RATE: 79 BPM | DIASTOLIC BLOOD PRESSURE: 84 MMHG | TEMPERATURE: 97.8 F | HEIGHT: 71 IN | OXYGEN SATURATION: 97 %

## 2021-03-11 DIAGNOSIS — H61.21 IMPACTED CERUMEN OF RIGHT EAR: ICD-10-CM

## 2021-03-11 PROCEDURE — 99213 OFFICE O/P EST LOW 20 MIN: CPT | Performed by: NURSE PRACTITIONER

## 2021-03-11 ASSESSMENT — ENCOUNTER SYMPTOMS
DIARRHEA: 0
FEVER: 0
MYALGIAS: 0
SORE THROAT: 0
ABDOMINAL PAIN: 0
COUGH: 0
CHILLS: 0
NAUSEA: 0
HEADACHES: 0
VOMITING: 0
SPUTUM PRODUCTION: 0

## 2021-03-11 ASSESSMENT — FIBROSIS 4 INDEX: FIB4 SCORE: 2.34

## 2021-03-11 NOTE — PROGRESS NOTES
Subjective:     Magnus Claudio is a 75 y.o. male who presents for Ear Fullness (both ears )      HPI  Pt presents for evaluation of a new problem.  Magnus is a 75-year-old male presents to urgent care today with ear congestion. He notes he has had decreased hearing in his right ear for approximately 3 weeks. He denies any ear pain or tinnitus. He has not used anything for the relief of his symptoms. Negative for N/V, diarrhea, fever/chills.      Review of Systems   Constitutional: Negative for chills, fever and malaise/fatigue.   HENT: Positive for hearing loss. Negative for congestion, ear discharge, ear pain, sore throat and tinnitus.    Respiratory: Negative for cough and sputum production.    Gastrointestinal: Negative for abdominal pain, diarrhea, nausea and vomiting.   Musculoskeletal: Negative for myalgias.   Neurological: Negative for headaches.       PMH:   Past Medical History:   Diagnosis Date   • Cancer (HCC)     Colon   • Cataract    • Diabetes (HCC)    • Diverticulitis    • Hypertension    • Vertigo, benign positional      ALLERGIES:   Allergies   Allergen Reactions   • Morphine Unspecified     Hallucinations, 15+ years ago     SURGHX:   Past Surgical History:   Procedure Laterality Date   • COLON RESECTION     • EYE SURGERY Bilateral     Cataract surgery   • LAMINOTOMY     • PB RESEC LIVER,PART LOBECTOMY       SOCHX:   Social History     Socioeconomic History   • Marital status:      Spouse name: Not on file   • Number of children: Not on file   • Years of education: Not on file   • Highest education level: Not on file   Occupational History   • Not on file   Tobacco Use   • Smoking status: Never Smoker   • Smokeless tobacco: Never Used   Substance and Sexual Activity   • Alcohol use: Never     Alcohol/week: 0.0 oz   • Drug use: Never   • Sexual activity: Not Currently     Partners: Female   Other Topics Concern   • Not on file   Social History Narrative   • Not on file     Social  "Determinants of Health     Financial Resource Strain:    • Difficulty of Paying Living Expenses:    Food Insecurity:    • Worried About Running Out of Food in the Last Year:    • Ran Out of Food in the Last Year:    Transportation Needs:    • Lack of Transportation (Medical):    • Lack of Transportation (Non-Medical):    Physical Activity:    • Days of Exercise per Week:    • Minutes of Exercise per Session:    Stress:    • Feeling of Stress :    Social Connections:    • Frequency of Communication with Friends and Family:    • Frequency of Social Gatherings with Friends and Family:    • Attends S[employer] Services:    • Active Member of Clubs or Organizations:    • Attends Club or Organization Meetings:    • Marital Status:    Intimate Partner Violence:    • Fear of Current or Ex-Partner:    • Emotionally Abused:    • Physically Abused:    • Sexually Abused:      FH:   Family History   Problem Relation Age of Onset   • Hypertension Father    • Heart Attack Father    • Cancer Mother         Breast   • Diabetes Sister    • Hyperlipidemia Neg Hx         unknown         Objective:   BP (!) 162/84   Pulse 79   Temp 36.6 °C (97.8 °F) (Temporal)   Resp 16   Ht 1.803 m (5' 11\")   Wt 87.5 kg (193 lb)   SpO2 97%   BMI 26.92 kg/m²     Physical Exam  Vitals and nursing note reviewed.   Constitutional:       General: He is not in acute distress.     Appearance: Normal appearance. He is not ill-appearing.   HENT:      Head: Normocephalic and atraumatic.      Right Ear: Tympanic membrane, ear canal and external ear normal. There is no impacted cerumen.      Left Ear: Tympanic membrane, ear canal and external ear normal. There is impacted cerumen.      Ears:      Comments: Impacted cerumen of right ear canal. Ear lavage performed by MA which he tolerated well. TM visualized following removal.      Nose: No congestion or rhinorrhea.      Mouth/Throat:      Mouth: Mucous membranes are moist.   Eyes:      Extraocular Movements: " Extraocular movements intact.      Pupils: Pupils are equal, round, and reactive to light.   Cardiovascular:      Rate and Rhythm: Normal rate and regular rhythm.      Pulses: Normal pulses.      Heart sounds: Normal heart sounds.   Pulmonary:      Effort: Pulmonary effort is normal.      Breath sounds: Normal breath sounds.   Abdominal:      General: Abdomen is flat. Bowel sounds are normal.      Palpations: Abdomen is soft.      Tenderness: There is no abdominal tenderness. There is no right CVA tenderness or left CVA tenderness.   Musculoskeletal:         General: Normal range of motion.      Cervical back: Normal range of motion and neck supple.   Skin:     General: Skin is warm and dry.      Capillary Refill: Capillary refill takes less than 2 seconds.   Neurological:      General: No focal deficit present.      Mental Status: He is alert and oriented to person, place, and time. Mental status is at baseline.   Psychiatric:         Mood and Affect: Mood normal.         Behavior: Behavior normal.         Thought Content: Thought content normal.         Judgment: Judgment normal.         Assessment/Plan:   Assessment    1. Impacted cerumen of right ear       Follow up for worsening or persistent symptoms.   AVS handout given and reviewed with patient. Pt educated on red flags and when to seek treatment back in ER or UC.

## 2021-03-13 ENCOUNTER — NURSE TRIAGE (OUTPATIENT)
Dept: HEALTH INFORMATION MANAGEMENT | Facility: OTHER | Age: 76
End: 2021-03-13

## 2021-03-13 NOTE — TELEPHONE ENCOUNTER
"INR 3.20 on 03/09/2021 and coumadin was decreased to 2.5 for that night. Last night pt had blood in the urine.  Today he has used the restroom multiple times with no blood. Reports no symptoms of UTI or increase in bruising or bleeding.    Wife has left message with anticoagulation clinic.  This RN will route a message to the coumadin clinic.      Reason for Disposition  • Blood in urine  (Exception: could be normal menstrual bleeding)    Additional Information  • Negative: Shock suspected (e.g., cold/pale/clammy skin, too weak to stand, low BP, rapid pulse)  • Negative: Sounds like a life-threatening emergency to the triager  • Negative: Urinary catheter, questions about  • Negative: Recent back or abdominal injury  • Negative: Recent genital injury  • Negative: [1] Unable to urinate (or only a few drops) > 4 hours AND [2] bladder feels very full (e.g., palpable bladder or strong urge to urinate)  • Negative: Passing pure blood or large blood clots (i.e., size > a dime) (Exception: abi or small strands)  • Negative: Fever > 100.5 F (38.1 C)  • Negative: Patient sounds very sick or weak to the triager  • Negative: Known sickle cell disease  • Negative: Taking Coumadin (warfarin) or other strong blood thinner, or known bleeding disorder (e.g., thrombocytopenia)  • Negative: Side (flank) or back pain present  • Negative: Pain or burning with passing urine  • Negative: [1] Pink or red-colored urine and likely from food (beets, rhubarb, red food dye) AND [2] lasts > 24 hours after stopping food    Answer Assessment - Initial Assessment Questions  1. COLOR of URINE: \"Describe the color of the urine.\"  (e.g., tea-colored, pink, red, blood clots, bloody)      Blood in urine one time last night  2. ONSET: \"When did the bleeding start?\"       One time last evening.  Today urine is clear and no bleeding  3. EPISODES: \"How many times has there been blood in the urine?\" or \"How many times today?\"    One time.  INR 3.2  4. " "PAIN with URINATION: \"Is there any pain with passing your urine?\" If so, ask: \"How bad is the pain?\"  (Scale 1-10; or mild, moderate, severe)     - MILD - complains slightly about urination hurting     - MODERATE - interferes with normal activities       - SEVERE - excruciating, unwilling or unable to urinate because of the pain       no  5. FEVER: \"Do you have a fever?\" If so, ask: \"What is your temperature, how was it measured, and when did it start?\"      no  6. ASSOCIATED SYMPTOMS: \"Are you passing urine more frequently than usual?\"      no  7. OTHER SYMPTOMS: \"Do you have any other symptoms?\" (e.g., back/flank pain, abdominal pain, vomiting)      no  8. PREGNANCY: \"Is there any chance you are pregnant?\" \"When was your last menstrual period?\"      no    Protocols used: URINE - BLOOD IN-A-      "

## 2021-03-14 ENCOUNTER — ANTICOAGULATION MONITORING (OUTPATIENT)
Dept: VASCULAR LAB | Facility: MEDICAL CENTER | Age: 76
End: 2021-03-14

## 2021-03-14 DIAGNOSIS — Z86.718 HISTORY OF VENOUS THROMBOEMBOLISM: ICD-10-CM

## 2021-03-14 NOTE — PROGRESS NOTES
See the prior note for details, but I was notified that the patient was bleeding last night with urination.  He has since voided multiple times with no blood.  I attempted to call the patient to get more information, however, patient did not answer.      I advised him via VM to hold 1 day of warfarin and get an INR tomorrow.  I also advised him to contact his primary care provider and/or seek out care in an urgent care to determine the source of bleeding.  I advised him to seek immediate medical attention if bleeding suddenly resumes or worsens.    Chau Hawkins, PharmD, MS, BCACP, [facility]  Phone: [phone], Fax: [phone]    This note was created using voice recognition software (Dragon). The accuracy of the dictation is limited by the abilities of the software. I have reviewed the note prior to signing, however some errors in grammar and context are still possible. If you have any questions related to this note please do not hesitate to contact our office.   .

## 2021-03-15 ENCOUNTER — HOSPITAL ENCOUNTER (EMERGENCY)
Facility: MEDICAL CENTER | Age: 76
End: 2021-03-15
Attending: EMERGENCY MEDICINE
Payer: MEDICARE

## 2021-03-15 ENCOUNTER — APPOINTMENT (OUTPATIENT)
Dept: RADIOLOGY | Facility: MEDICAL CENTER | Age: 76
End: 2021-03-15
Attending: EMERGENCY MEDICINE
Payer: MEDICARE

## 2021-03-15 VITALS
BODY MASS INDEX: 28.21 KG/M2 | DIASTOLIC BLOOD PRESSURE: 95 MMHG | OXYGEN SATURATION: 97 % | TEMPERATURE: 97.2 F | HEART RATE: 64 BPM | SYSTOLIC BLOOD PRESSURE: 182 MMHG | WEIGHT: 201.5 LBS | RESPIRATION RATE: 15 BRPM | HEIGHT: 71 IN

## 2021-03-15 DIAGNOSIS — N32.89 BLADDER MASS: ICD-10-CM

## 2021-03-15 DIAGNOSIS — R31.9 HEMATURIA, UNSPECIFIED TYPE: ICD-10-CM

## 2021-03-15 LAB
ALBUMIN SERPL BCP-MCNC: 4.5 G/DL (ref 3.2–4.9)
ALBUMIN/GLOB SERPL: 1.6 G/DL
ALP SERPL-CCNC: 57 U/L (ref 30–99)
ALT SERPL-CCNC: 21 U/L (ref 2–50)
ANION GAP SERPL CALC-SCNC: 12 MMOL/L (ref 7–16)
APPEARANCE UR: ABNORMAL
AST SERPL-CCNC: 25 U/L (ref 12–45)
BACTERIA #/AREA URNS HPF: NEGATIVE /HPF
BASOPHILS # BLD AUTO: 0.7 % (ref 0–1.8)
BASOPHILS # BLD: 0.04 K/UL (ref 0–0.12)
BILIRUB SERPL-MCNC: 2.3 MG/DL (ref 0.1–1.5)
BILIRUB UR QL STRIP.AUTO: NEGATIVE
BUN SERPL-MCNC: 18 MG/DL (ref 8–22)
CALCIUM SERPL-MCNC: 9.4 MG/DL (ref 8.4–10.2)
CHLORIDE SERPL-SCNC: 101 MMOL/L (ref 96–112)
CO2 SERPL-SCNC: 27 MMOL/L (ref 20–33)
COLOR UR: YELLOW
CREAT SERPL-MCNC: 1.12 MG/DL (ref 0.5–1.4)
EOSINOPHIL # BLD AUTO: 0.09 K/UL (ref 0–0.51)
EOSINOPHIL NFR BLD: 1.6 % (ref 0–6.9)
EPI CELLS #/AREA URNS HPF: NEGATIVE /HPF
ERYTHROCYTE [DISTWIDTH] IN BLOOD BY AUTOMATED COUNT: 43.3 FL (ref 35.9–50)
GLOBULIN SER CALC-MCNC: 2.8 G/DL (ref 1.9–3.5)
GLUCOSE SERPL-MCNC: 212 MG/DL (ref 65–99)
GLUCOSE UR STRIP.AUTO-MCNC: NEGATIVE MG/DL
HCT VFR BLD AUTO: 45 % (ref 42–52)
HGB BLD-MCNC: 15.2 G/DL (ref 14–18)
HYALINE CASTS #/AREA URNS LPF: ABNORMAL /LPF
IMM GRANULOCYTES # BLD AUTO: 0.03 K/UL (ref 0–0.11)
IMM GRANULOCYTES NFR BLD AUTO: 0.5 % (ref 0–0.9)
INR PPP: 2.1 (ref 0.87–1.13)
KETONES UR STRIP.AUTO-MCNC: NEGATIVE MG/DL
LEUKOCYTE ESTERASE UR QL STRIP.AUTO: NEGATIVE
LYMPHOCYTES # BLD AUTO: 1.52 K/UL (ref 1–4.8)
LYMPHOCYTES NFR BLD: 27.4 % (ref 22–41)
MCH RBC QN AUTO: 29.5 PG (ref 27–33)
MCHC RBC AUTO-ENTMCNC: 33.8 G/DL (ref 33.7–35.3)
MCV RBC AUTO: 87.4 FL (ref 81.4–97.8)
MICRO URNS: ABNORMAL
MONOCYTES # BLD AUTO: 0.35 K/UL (ref 0–0.85)
MONOCYTES NFR BLD AUTO: 6.3 % (ref 0–13.4)
NEUTROPHILS # BLD AUTO: 3.52 K/UL (ref 1.82–7.42)
NEUTROPHILS NFR BLD: 63.5 % (ref 44–72)
NITRITE UR QL STRIP.AUTO: NEGATIVE
NRBC # BLD AUTO: 0 K/UL
NRBC BLD-RTO: 0 /100 WBC
PH UR STRIP.AUTO: 5 [PH] (ref 5–8)
PLATELET # BLD AUTO: 153 K/UL (ref 164–446)
PMV BLD AUTO: 9.8 FL (ref 9–12.9)
POTASSIUM SERPL-SCNC: 3.8 MMOL/L (ref 3.6–5.5)
PROT SERPL-MCNC: 7.3 G/DL (ref 6–8.2)
PROT UR QL STRIP: NEGATIVE MG/DL
PROTHROMBIN TIME: 23.1 SEC (ref 12–14.6)
RBC # BLD AUTO: 5.15 M/UL (ref 4.7–6.1)
RBC # URNS HPF: ABNORMAL /HPF
RBC UR QL AUTO: ABNORMAL
SODIUM SERPL-SCNC: 140 MMOL/L (ref 135–145)
SP GR UR STRIP.AUTO: 1.02
WBC # BLD AUTO: 5.6 K/UL (ref 4.8–10.8)
WBC #/AREA URNS HPF: ABNORMAL /HPF

## 2021-03-15 PROCEDURE — 99284 EMERGENCY DEPT VISIT MOD MDM: CPT

## 2021-03-15 PROCEDURE — 700117 HCHG RX CONTRAST REV CODE 255: Performed by: EMERGENCY MEDICINE

## 2021-03-15 PROCEDURE — 74178 CT ABD&PLV WO CNTR FLWD CNTR: CPT | Mod: MG

## 2021-03-15 PROCEDURE — 85025 COMPLETE CBC W/AUTO DIFF WBC: CPT

## 2021-03-15 PROCEDURE — 81001 URINALYSIS AUTO W/SCOPE: CPT

## 2021-03-15 PROCEDURE — 80053 COMPREHEN METABOLIC PANEL: CPT

## 2021-03-15 PROCEDURE — 85610 PROTHROMBIN TIME: CPT

## 2021-03-15 RX ORDER — GLIMEPIRIDE 4 MG/1
4 TABLET ORAL EVERY MORNING
Qty: 100 TABLET | Refills: 4 | Status: SHIPPED | OUTPATIENT
Start: 2021-03-15 | End: 2022-06-06

## 2021-03-15 RX ADMIN — IOHEXOL 100 ML: 350 INJECTION, SOLUTION INTRAVENOUS at 13:00

## 2021-03-15 ASSESSMENT — FIBROSIS 4 INDEX: FIB4 SCORE: 2.34

## 2021-03-15 NOTE — ED NOTES
Back from imaging VSS POC reviewed Awaiting results Assisted with oral care but pt advised to remain NPO till results are back

## 2021-03-15 NOTE — ED TRIAGE NOTES
"Chief Complaint   Patient presents with   • Blood in Urine     Pt reports started Friday, denies dysuria     BP (!) 186/106   Pulse 82   Temp 36.3 °C (97.3 °F) (Temporal)   Resp 15   Ht 1.803 m (5' 11\")   Wt 91.4 kg (201 lb 8 oz)   SpO2 97%   BMI 28.10 kg/m²     Covid Screen Negative.  Pt states has received Covid Vaccine x 2.    Pt reports HX of HTN, states did take prescribed medications this am.    "

## 2021-03-15 NOTE — ED NOTES
Med Rec completed per patient and wife   Allergies reviewed  No ORAL antibiotics in last 14 days    Patient takes Warfarin   2.5 mg on Saturday   5 mg all other days   Patient was told to hold his Warfarin dose last night  (3/14/2021)

## 2021-03-15 NOTE — ED PROVIDER NOTES
ED Provider Note    CHIEF COMPLAINT  Chief Complaint   Patient presents with   • Blood in Urine     Pt reports started Friday, denies dysuria       HPI  Magnus Claudio is a 75 y.o. male who presents with blood in the urine he noted 2 days prior to presentation.  Turn the water in the bowl red.  He has never had before he comes in for evaluation.  He is on Coumadin is never had a problem with it.  He is on it for DVT PE 2 years ago.  He has no abdominal pain no flank pain no fever no chills no dysuria all other systems negative    REVIEW OF SYSTEMS  See HPI for further details    PAST MEDICAL HISTORY  Past Medical History:   Diagnosis Date   • Cancer (HCC)     Colon   • Cataract    • Diabetes (HCC)    • Diverticulitis    • Hypertension    • Vertigo, benign positional        FAMILY HISTORY  Family History   Problem Relation Age of Onset   • Hypertension Father    • Heart Attack Father    • Cancer Mother         Breast   • Diabetes Sister    • Hyperlipidemia Neg Hx         unknown       SOCIAL HISTORY  Social History     Socioeconomic History   • Marital status:      Spouse name: Not on file   • Number of children: Not on file   • Years of education: Not on file   • Highest education level: Not on file   Occupational History   • Not on file   Tobacco Use   • Smoking status: Never Smoker   • Smokeless tobacco: Never Used   Substance and Sexual Activity   • Alcohol use: Never     Alcohol/week: 0.0 oz   • Drug use: Never   • Sexual activity: Not Currently     Partners: Female   Other Topics Concern   • Not on file   Social History Narrative   • Not on file     Social Determinants of Health     Financial Resource Strain:    • Difficulty of Paying Living Expenses:    Food Insecurity:    • Worried About Running Out of Food in the Last Year:    • Ran Out of Food in the Last Year:    Transportation Needs:    • Lack of Transportation (Medical):    • Lack of Transportation (Non-Medical):    Physical Activity:    •  "Days of Exercise per Week:    • Minutes of Exercise per Session:    Stress:    • Feeling of Stress :    Social Connections:    • Frequency of Communication with Friends and Family:    • Frequency of Social Gatherings with Friends and Family:    • Attends Taoism Services:    • Active Member of Clubs or Organizations:    • Attends Club or Organization Meetings:    • Marital Status:    Intimate Partner Violence:    • Fear of Current or Ex-Partner:    • Emotionally Abused:    • Physically Abused:    • Sexually Abused:        SURGICAL HISTORY  Past Surgical History:   Procedure Laterality Date   • COLON RESECTION     • EYE SURGERY Bilateral     Cataract surgery   • LAMINOTOMY     • PB RESEC LIVER,PART LOBECTOMY         CURRENT MEDICATIONS  Home Medications     Reviewed by Bing Montalvo (Pharmacy Tech) on 03/15/21 at 1116  Med List Status: Complete   Medication Last Dose Status   amLODIPine (NORVASC) 5 MG Tab 3/15/2021 Active   glimepiride (AMARYL) 4 MG Tab 3/15/2021 Active   hydroCHLOROthiazide (HYDRODIURIL) 12.5 MG tablet 3/15/2021 Active   losartan (COZAAR) 100 MG Tab 3/15/2021 Active   metFORMIN ER (GLUCOPHAGE XR) 500 MG TABLET SR 24 HR 3/15/2021 Active   pioglitazone (ACTOS) 15 MG Tab NOT TAKING Active   therapeutic multivitamin-minerals (THERAGRAN-M) Tab 3/15/2021 Active   warfarin (COUMADIN) 2.5 MG Tab 3/13/2021 Active                ALLERGIES  Allergies   Allergen Reactions   • Morphine Unspecified     Hallucinations, 15+ years ago       PHYSICAL EXAM  VITAL SIGNS: BP (!) 186/106   Pulse 82   Temp 36.3 °C (97.3 °F) (Temporal)   Resp 15   Ht 1.803 m (5' 11\")   Wt 91.4 kg (201 lb 8 oz)   SpO2 97%   BMI 28.10 kg/m²     Constitutional: Patient is alert and oriented x3 in no distress   HENT: Mucous membranes  Eyes:   Conjunctivitis  Neck: Trach is midline  Cardiovascular: Normal heart rate    Thorax & Lungs: Clear to auscultation  Back: No CVA tenderness  Abdomen: Soft nontender  Neurologic: Normal motor " sensation  Psychiatric: Affect normal, Judgment normal, Mood normal.     Results for orders placed or performed during the hospital encounter of 03/15/21   URINALYSIS    Specimen: Urine, Clean Catch   Result Value Ref Range    Color Yellow     Character Hazy (A)     Specific Gravity 1.025 <1.035    Ph 5.0 5.0 - 8.0    Glucose Negative Negative mg/dL    Ketones Negative Negative mg/dL    Protein Negative Negative mg/dL    Bilirubin Negative Negative    Nitrite Negative Negative    Leukocyte Esterase Negative Negative    Occult Blood Large (A) Negative    Micro Urine Req Microscopic    URINE MICROSCOPIC (W/UA)   Result Value Ref Range    WBC 0-2 (A) /hpf    RBC 20-50 (A) /hpf    Bacteria Negative None /hpf    Epithelial Cells Negative Few /hpf    Hyaline Cast 0-2 /lpf   CBC WITH DIFFERENTIAL   Result Value Ref Range    WBC 5.6 4.8 - 10.8 K/uL    RBC 5.15 4.70 - 6.10 M/uL    Hemoglobin 15.2 14.0 - 18.0 g/dL    Hematocrit 45.0 42.0 - 52.0 %    MCV 87.4 81.4 - 97.8 fL    MCH 29.5 27.0 - 33.0 pg    MCHC 33.8 33.7 - 35.3 g/dL    RDW 43.3 35.9 - 50.0 fL    Platelet Count 153 (L) 164 - 446 K/uL    MPV 9.8 9.0 - 12.9 fL    Neutrophils-Polys 63.50 44.00 - 72.00 %    Lymphocytes 27.40 22.00 - 41.00 %    Monocytes 6.30 0.00 - 13.40 %    Eosinophils 1.60 0.00 - 6.90 %    Basophils 0.70 0.00 - 1.80 %    Immature Granulocytes 0.50 0.00 - 0.90 %    Nucleated RBC 0.00 /100 WBC    Neutrophils (Absolute) 3.52 1.82 - 7.42 K/uL    Lymphs (Absolute) 1.52 1.00 - 4.80 K/uL    Monos (Absolute) 0.35 0.00 - 0.85 K/uL    Eos (Absolute) 0.09 0.00 - 0.51 K/uL    Baso (Absolute) 0.04 0.00 - 0.12 K/uL    Immature Granulocytes (abs) 0.03 0.00 - 0.11 K/uL    NRBC (Absolute) 0.00 K/uL   COMP METABOLIC PANEL   Result Value Ref Range    Sodium 140 135 - 145 mmol/L    Potassium 3.8 3.6 - 5.5 mmol/L    Chloride 101 96 - 112 mmol/L    Co2 27 20 - 33 mmol/L    Anion Gap 12.0 7.0 - 16.0    Glucose 212 (H) 65 - 99 mg/dL    Bun 18 8 - 22 mg/dL    Creatinine  1.12 0.50 - 1.40 mg/dL    Calcium 9.4 8.4 - 10.2 mg/dL    AST(SGOT) 25 12 - 45 U/L    ALT(SGPT) 21 2 - 50 U/L    Alkaline Phosphatase 57 30 - 99 U/L    Total Bilirubin 2.3 (H) 0.1 - 1.5 mg/dL    Albumin 4.5 3.2 - 4.9 g/dL    Total Protein 7.3 6.0 - 8.2 g/dL    Globulin 2.8 1.9 - 3.5 g/dL    A-G Ratio 1.6 g/dL   PROTHROMBIN TIME (INR)   Result Value Ref Range    PT 23.1 (H) 12.0 - 14.6 sec    INR 2.10 (H) 0.87 - 1.13   ESTIMATED GFR   Result Value Ref Range    GFR If African American >60 >60 mL/min/1.73 m 2    GFR If Non African American >60 >60 mL/min/1.73 m 2      CT-ABDOMEN & PELVIS UROGRAM   Final Result      1.  13 mm soft tissue nodule within the urinary bladder posterolaterally to the left of midline suspicious for urinary bladder cancer.      2.  No evidence of hydronephrosis or renal or ureteral stone.      3.  Simple left upper pole renal cyst. No follow-up recommended.      4.  Prior right lobe hepatectomy and cholecystectomy.      5.  Fatty liver.      6.  Prior right-sided colonic surgical change.              COURSE & MEDICAL DECISION MAKING  Pertinent Labs & Imaging studies reviewed. (See chart for details)  Patient does have 20-50 RBCs on the urine.  He has normal white count hemoglobin.    I did discuss the patient's presentation with Dr. Fitzgerald of urology has been elected to get a CT scan that does confirm a 13 mm soft tissue nodule in the bladder.    I did inform the patient of the mass.  He has urinated a few times in the ER and is had no further gross blood.    I did discuss the patient's mass with Dr. Fitzgerald.  He plans on doing a cystoscopy so at this time the patient will be left on Coumadin.  He will be seen by urology and is given return    FINAL IMPRESSION  1.   2.   1. Hematuria, unspecified type     2. Bladder mass         3.         Electronically signed by: Luigi Coley M.D., 3/15/2021 11:29 AM

## 2021-03-15 NOTE — ED NOTES
D/C instn reviewed To call for urology antonio't Return for diff voiding, increased bleeding or dizziness VSS D/C ambul with  Wife Stable condn

## 2021-03-16 LAB — INR BLD: 3.2 (ref 0.9–1.2)

## 2021-03-23 ENCOUNTER — ANTICOAGULATION VISIT (OUTPATIENT)
Dept: VASCULAR LAB | Facility: MEDICAL CENTER | Age: 76
End: 2021-03-23
Attending: INTERNAL MEDICINE
Payer: MEDICARE

## 2021-03-23 DIAGNOSIS — Z86.718 HISTORY OF VENOUS THROMBOEMBOLISM: ICD-10-CM

## 2021-03-23 LAB — INR PPP: 2.3 (ref 2–3.5)

## 2021-03-23 PROCEDURE — 85610 PROTHROMBIN TIME: CPT

## 2021-03-23 PROCEDURE — 99211 OFF/OP EST MAY X REQ PHY/QHP: CPT

## 2021-03-23 NOTE — PROGRESS NOTES
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  Family Medicine [phone]                Anticoagulation Patient Findings      HPI:  Magnus Claudio seen in clinic today, on anticoagulation therapy with warfarin for h/o DVT  Changes to current medical/health status since last appt: pt reports he may have bladder cancer. He presented to ED 3/15/21 with hematuria. CT scan revealed bladder mass and is being evaluated by urologist Dr Fitzgerald. Pt will be followed up with urology and may have procedure in the near future.   Denies signs/symptoms of bleeding and/or thrombosis since the last appt.    Denies any interval changes to diet  Denies any interval changes to medications since last appt.   Denies any complications or cost restrictions with current therapy.   Verified current warfarin dosing schedule.   Pt declines vitals due to Covid transmission concerns.   Pt NOT on antiplatelet therapy     A/P   INR  therapeutic at 2.3  Pt is to continue with current warfarin dosing regimen.    Follow up appointment in 2 week(s).    Yoandy Avila, DeedeeD

## 2021-03-24 LAB — INR BLD: 2.3 (ref 0.9–1.2)

## 2021-03-29 ENCOUNTER — TELEPHONE (OUTPATIENT)
Dept: MEDICAL GROUP | Facility: MEDICAL CENTER | Age: 76
End: 2021-03-29

## 2021-03-29 ENCOUNTER — PATIENT MESSAGE (OUTPATIENT)
Dept: MEDICAL GROUP | Facility: MEDICAL CENTER | Age: 76
End: 2021-03-29

## 2021-03-29 NOTE — TELEPHONE ENCOUNTER
Taryn with [facility] scheduling returned call. She is currently working on getting the pt scheduled. Per Taryn the OR schedules only open up a week at a time. She states the average turn around time for scheduling surgery is about 4 weeks for cases like this. Taryn states she is hoping to get the pt in next week depending on the OR time. Advised her that the tumor that needs to be removed is suspicious for cancer and sooner would be better than later. Taryn verbalized understanding and states they are doing everything they can to get the pt scheduled.

## 2021-03-29 NOTE — PROGRESS NOTES
Please reach out to urology to get an ETA on the bladder surgery and see if can be expedited. Please get notes from urology. Please update pt on status.

## 2021-03-29 NOTE — TELEPHONE ENCOUNTER
Called and LVM for urology [facility] scheduler to get an update as to when pt will be scheduled for surgery. Requested a call back.

## 2021-03-29 NOTE — TELEPHONE ENCOUNTER
Called and updated pt and pt's wife that a VM was left for the surgery scheduler to get an update as to when the surgery will be scheduled. Understanding was verbalized and no needs at this time.

## 2021-04-06 ENCOUNTER — ANTICOAGULATION VISIT (OUTPATIENT)
Dept: VASCULAR LAB | Facility: MEDICAL CENTER | Age: 76
End: 2021-04-06
Attending: INTERNAL MEDICINE
Payer: MEDICARE

## 2021-04-06 DIAGNOSIS — Z86.718 HISTORY OF VENOUS THROMBOEMBOLISM: ICD-10-CM

## 2021-04-06 LAB
INR BLD: 3 (ref 0.9–1.2)
INR PPP: 3 (ref 2–3.5)

## 2021-04-06 PROCEDURE — 99211 OFF/OP EST MAY X REQ PHY/QHP: CPT

## 2021-04-06 PROCEDURE — 85610 PROTHROMBIN TIME: CPT

## 2021-04-06 NOTE — PROGRESS NOTES
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  Family Medicine [phone]                Anticoagulation Patient Findings  Patient Findings     Negatives:  Signs/symptoms of thrombosis, Signs/symptoms of bleeding, Laboratory test error suspected, Change in health, Change in alcohol use, Change in activity, Upcoming invasive procedure, Emergency department visit, Upcoming dental procedure, Missed doses, Extra doses, Change in medications, Change in diet/appetite, Hospital admission, Bruising, Other complaints          HPI:  Magnus Terese Claudio seen in clinic today, on anticoagulation therapy with warfarin for hx of DVT  Changes to current medical/health status since last appt: none  Denies signs/symptoms of bleeding and/or thrombosis since the last appt.    Denies any interval changes to diet  Denies any interval changes to medications since last appt.   Denies any complications or cost restrictions with current therapy.   Verified current warfarin dosing schedule.   BP declined d/t COVID-19 concerns.  Medications reconciled   Pt has an upcoming procedure on 5/5      A/P   INR  therapeutic.   Pt is to continue with current warfarin dosing  regimen.    Follow up appointment in 2 week(s).    Brooklyn Lewis, PharmD

## 2021-04-13 ENCOUNTER — OFFICE VISIT (OUTPATIENT)
Dept: MEDICAL GROUP | Facility: MEDICAL CENTER | Age: 76
End: 2021-04-13
Payer: MEDICARE

## 2021-04-13 ENCOUNTER — HOSPITAL ENCOUNTER (OUTPATIENT)
Facility: MEDICAL CENTER | Age: 76
End: 2021-04-13
Attending: FAMILY MEDICINE
Payer: MEDICARE

## 2021-04-13 VITALS
SYSTOLIC BLOOD PRESSURE: 136 MMHG | HEIGHT: 71 IN | TEMPERATURE: 98.3 F | OXYGEN SATURATION: 95 % | HEART RATE: 79 BPM | WEIGHT: 198.6 LBS | DIASTOLIC BLOOD PRESSURE: 70 MMHG | RESPIRATION RATE: 20 BRPM | BODY MASS INDEX: 27.8 KG/M2

## 2021-04-13 DIAGNOSIS — R35.1 NOCTURIA: ICD-10-CM

## 2021-04-13 DIAGNOSIS — E11.9 CONTROLLED TYPE 2 DIABETES MELLITUS WITHOUT COMPLICATION, WITHOUT LONG-TERM CURRENT USE OF INSULIN (HCC): ICD-10-CM

## 2021-04-13 DIAGNOSIS — C67.9 MALIGNANT NEOPLASM OF URINARY BLADDER, UNSPECIFIED SITE (HCC): ICD-10-CM

## 2021-04-13 DIAGNOSIS — Z12.5 SCREENING FOR PROSTATE CANCER: ICD-10-CM

## 2021-04-13 DIAGNOSIS — R53.83 OTHER FATIGUE: ICD-10-CM

## 2021-04-13 DIAGNOSIS — D69.6 THROMBOCYTOPENIA (HCC): ICD-10-CM

## 2021-04-13 LAB
APPEARANCE UR: NORMAL
BILIRUB UR STRIP-MCNC: NORMAL MG/DL
COLOR UR AUTO: NORMAL
GLUCOSE UR STRIP.AUTO-MCNC: NEGATIVE MG/DL
HBA1C MFR BLD: 7.4 % (ref 0–5.6)
INT CON NEG: NEGATIVE
INT CON POS: POSITIVE
KETONES UR STRIP.AUTO-MCNC: NEGATIVE MG/DL
LEUKOCYTE ESTERASE UR QL STRIP.AUTO: NEGATIVE
NITRITE UR QL STRIP.AUTO: NEGATIVE
PH UR STRIP.AUTO: 5 [PH] (ref 5–8)
PROT UR QL STRIP: NORMAL MG/DL
RBC UR QL AUTO: NORMAL
SP GR UR STRIP.AUTO: 1.03
UROBILINOGEN UR STRIP-MCNC: NORMAL MG/DL

## 2021-04-13 PROCEDURE — 81001 URINALYSIS AUTO W/SCOPE: CPT

## 2021-04-13 PROCEDURE — 83036 HEMOGLOBIN GLYCOSYLATED A1C: CPT | Performed by: FAMILY MEDICINE

## 2021-04-13 PROCEDURE — 99214 OFFICE O/P EST MOD 30 MIN: CPT | Performed by: FAMILY MEDICINE

## 2021-04-13 PROCEDURE — 99000 SPECIMEN HANDLING OFFICE-LAB: CPT | Performed by: FAMILY MEDICINE

## 2021-04-13 PROCEDURE — 87086 URINE CULTURE/COLONY COUNT: CPT

## 2021-04-13 PROCEDURE — 81002 URINALYSIS NONAUTO W/O SCOPE: CPT | Performed by: FAMILY MEDICINE

## 2021-04-13 RX ORDER — TAMSULOSIN HYDROCHLORIDE 0.4 MG/1
0.4 CAPSULE ORAL DAILY
Qty: 90 CAPSULE | Refills: 0 | Status: SHIPPED | OUTPATIENT
Start: 2021-04-13 | End: 2021-06-28

## 2021-04-13 ASSESSMENT — FIBROSIS 4 INDEX: FIB4 SCORE: 2.71

## 2021-04-13 ASSESSMENT — PATIENT HEALTH QUESTIONNAIRE - PHQ9: CLINICAL INTERPRETATION OF PHQ2 SCORE: 0

## 2021-04-13 NOTE — ASSESSMENT & PLAN NOTE
Patient's A1c is a little bit elevated today.  He has been under quite a bit of stress lately because of her recent cancer diagnosis.  He is maintained on Metformin and glimepiride.

## 2021-04-13 NOTE — PROGRESS NOTES
[facility]  Progress Note  Established Patient    Subjective:   Magnus Claudio is a 75 y.o. male here today with a chief complaint of diabetes. The patient is alone.     Bladder cancer (HCC)  Patient was recently diagnosed with bladder cancer.  He will be having surgery in about 2 weeks.    Controlled type 2 diabetes mellitus without complication, without long-term current use of insulin (HCC)  Patient's A1c is a little bit elevated today.  He has been under quite a bit of stress lately because of her recent cancer diagnosis.  He is maintained on Metformin and glimepiride.    Thrombocytopenia (HCC)  A chronic and stable problem.    Fatigue  Patient was diagnosed with bladder cancer a few weeks ago.  Ever since then he describes some generalized fatigue.    Nocturia  Patient describes nocturia along with urinary frequency.  He denies dysuria.  He did have a recent bladder cancer diagnosis and will be undergoing surgery in a few weeks.  Point-of-care urinalysis today reveals blood and protein but is negative for leuk esterase and nitrite.      Current Outpatient Medications on File Prior to Visit   Medication Sig Dispense Refill   • glimepiride (AMARYL) 4 MG Tab Take 1 tablet by mouth every morning. 100 tablet 4   • amLODIPine (NORVASC) 5 MG Tab TAKE 1 TABLET BY MOUTH EVERY DAY (Patient taking differently: Take 5 mg by mouth every morning.) 100 tablet 4   • losartan (COZAAR) 100 MG Tab TAKE 1 TABLET BY MOUTH DAILY (Patient taking differently: Take 100 mg by mouth every morning.) 100 Tab 4   • hydroCHLOROthiazide (HYDRODIURIL) 12.5 MG tablet TAKE 1 TABLET BY MOUTH DAILY (Patient taking differently: Take 12.5 mg by mouth every morning.) 100 Tab 4   • warfarin (COUMADIN) 2.5 MG Tab Take one to two tablets by mouth daily or as directed by anticoagulation clinic (Patient taking differently: Take 2.5-5 mg by mouth every day. 2.5 mg on Saturday   5 mg all other days) 180 Tab 1   • metFORMIN ER (GLUCOPHAGE XR)  "500 MG TABLET SR 24 HR TAKE 2 TABLETS BY MOUTH TWICE DAILY (Patient taking differently: Take 1,000 mg by mouth 2 times a day. TAKE 2 TABLETS BY MOUTH TWICE DAILY) 360 Tab 4   • therapeutic multivitamin-minerals (THERAGRAN-M) Tab Take 1 Tab by mouth every day.       No current facility-administered medications on file prior to visit.          Objective:     Vitals:    04/13/21 1030   BP: 136/70   BP Location: Left arm   Patient Position: Sitting   BP Cuff Size: Adult long   Pulse: 79   Resp: 20   Temp: 36.8 °C (98.3 °F)   TempSrc: Temporal   SpO2: 95%   Weight: 90.1 kg (198 lb 9.6 oz)   Height: 1.803 m (5' 11\")       Physical Exam:  General: alert in no apparent distress.   Neuro: Cranial nerves II through XII intact, finger-nose normal, gait normal.  Strength 5 to 5 x 4.  : Homogenously enlarged prostate without nodularity.        Assessment and Plan:     1. Controlled type 2 diabetes mellitus without complication, without long-term current use of insulin (HCC)  -A1c is 7.4.  The patient will continue his current regimen and work on diet and exercise.  - POCT A1C    2. Other fatigue  May be due to the stress from the recent bladder cancer diagnosis.  Will rule out metabolic cause.  - Comp Metabolic Panel; Future  - TSH WITH REFLEX TO FT4; Future  - CBC WITH DIFFERENTIAL; Future    3. Nocturia  Likely due to BPH.   - URINALYSIS,CULTURE IF INDICATED; Future  - PROSTATE SPECIFIC AG SCREENING; Future  - tamsulosin (FLOMAX) 0.4 MG capsule; Take 1 capsule by mouth every day.  Dispense: 90 capsule; Refill: 0    4. Screening for prostate cancer  - PROSTATE SPECIFIC AG SCREENING; Future    5. Malignant neoplasm of urinary bladder, unspecified site (HCC)  - f/u urology.     6. Thrombocytopenia (HCC)  - CBC.        Followup: Return in about 4 months (around 8/13/2021), or if symptoms worsen or fail to improve.         "

## 2021-04-13 NOTE — ASSESSMENT & PLAN NOTE
Patient was diagnosed with bladder cancer a few weeks ago.  Ever since then he describes some generalized fatigue.

## 2021-04-13 NOTE — ASSESSMENT & PLAN NOTE
Patient describes nocturia along with urinary frequency.  He denies dysuria.  He did have a recent bladder cancer diagnosis and will be undergoing surgery in a few weeks.  Point-of-care urinalysis today reveals blood and protein but is negative for leuk esterase and nitrite.

## 2021-04-14 LAB
APPEARANCE UR: ABNORMAL
BACTERIA #/AREA URNS HPF: NEGATIVE /HPF
BILIRUB UR QL STRIP.AUTO: NEGATIVE
COLOR UR: ABNORMAL
EPI CELLS #/AREA URNS HPF: NEGATIVE /HPF
GLUCOSE UR STRIP.AUTO-MCNC: NEGATIVE MG/DL
HYALINE CASTS #/AREA URNS LPF: ABNORMAL /LPF
KETONES UR STRIP.AUTO-MCNC: NEGATIVE MG/DL
LEUKOCYTE ESTERASE UR QL STRIP.AUTO: ABNORMAL
MICRO URNS: ABNORMAL
MUCOUS THREADS #/AREA URNS HPF: ABNORMAL /HPF
NITRITE UR QL STRIP.AUTO: NEGATIVE
PH UR STRIP.AUTO: 5 [PH] (ref 5–8)
PROT UR QL STRIP: 100 MG/DL
RBC # URNS HPF: ABNORMAL /HPF
RBC UR QL AUTO: ABNORMAL
SP GR UR STRIP.AUTO: 1.03
UROBILINOGEN UR STRIP.AUTO-MCNC: 0.2 MG/DL
WBC #/AREA URNS HPF: ABNORMAL /HPF

## 2021-04-16 LAB
BACTERIA UR CULT: NORMAL
SIGNIFICANT IND 70042: NORMAL
SITE SITE: NORMAL
SOURCE SOURCE: NORMAL

## 2021-04-20 ENCOUNTER — ANTICOAGULATION VISIT (OUTPATIENT)
Dept: VASCULAR LAB | Facility: MEDICAL CENTER | Age: 76
End: 2021-04-20
Attending: INTERNAL MEDICINE
Payer: MEDICARE

## 2021-04-20 DIAGNOSIS — Z86.718 HISTORY OF VENOUS THROMBOEMBOLISM: ICD-10-CM

## 2021-04-20 LAB — INR PPP: 2.8 (ref 2–3.5)

## 2021-04-20 PROCEDURE — 99213 OFFICE O/P EST LOW 20 MIN: CPT

## 2021-04-20 PROCEDURE — 85610 PROTHROMBIN TIME: CPT

## 2021-04-20 NOTE — PROGRESS NOTES
Anticoagulation Summary  As of 2021    INR goal:  2.0-3.0   TTR:  77.3 % (1.6 y)   INR used for dosin.80 (2021)   Warfarin maintenance plan:  2.5 mg (2.5 mg x 1) every Sat; 5 mg (2.5 mg x 2) all other days   Weekly warfarin total:  32.5 mg   Plan last modified:  Chau Hawkins, PharmD (10/29/2019)   Next INR check:  5/10/2021   Target end date:  Indefinite    Indications    History of venous thromboembolism [Z86.718]  Deep vein thrombosis (DVT) of popliteal vein of both lower extremities (HCC) [I82.433]             Anticoagulation Episode Summary     INR check location:      Preferred lab:      Send INR reminders to:      Comments:        Anticoagulation Care Providers     Provider Role Specialty Phone number    [facility] Responsible  [phone]    Too Brito M.D.  Family Medicine [phone]                Anticoagulation Patient Findings  Patient Findings     Positives:  Upcoming invasive procedure (urologic procedure on )    Negatives:  Signs/symptoms of thrombosis, Signs/symptoms of bleeding, Laboratory test error suspected, Change in health, Change in alcohol use, Change in activity, Emergency department visit, Upcoming dental procedure, Missed doses, Extra doses, Change in medications, Change in diet/appetite, Hospital admission, Bruising, Other complaints          HPI:  Magnus Claudio seen in clinic today, on anticoagulation therapy with warfarin for hx of DVT  Changes to current medical/health status since last appt: see above  Denies signs/symptoms of bleeding and/or thrombosis since the last appt.    Denies any interval changes to diet  Denies any interval changes to medications since last appt.   Denies any complications or cost restrictions with current therapy.   Verified current warfarin dosing schedule.   BP declined d/t COVID-19 concerns.  Medications reconciled       A/P   INR  -therapeutic.   Pt is to continue with current warfarin dosing  regimen.    Pt to have procedure on 5/5. Due to pt history lovenox bridging is indicated.   Pt to follow instructions as below, after procedure to ask operating MD when safe to resume anticoagulation, if no instructions given, pt will follow as below.     Lab Results   Component Value Date/Time    BUN 18 03/15/2021 10:41 AM    CREATININE 1.12 03/15/2021 10:41 AM            Date  Warfarin dosing PM Lovenox   5 Days before procedure Friday 4/30/21 0mg NONE   4 Days before procedure Saturday 5/1/21 0mg Lovenox injection   3 Days before procedure Sunday 5/2/21 0mg Lovenox injection   2 Days before procedure Monday  5/3/21 0mg Lovenox injection   1 Days before procedure Tuesday 5/4/21 0mg NONE   PROCEDURE Wednesday 5/5/21 7.5 mg NONE   1 Day after procedure Thursday 5/6/21 7.5 mg Restart Lovenox injections      2 Days after procedure Friday 5/7/21 7.5 mg Lovenox injection   3 Days after procedure Saturday 5/8/21 5 mg Lovenox injection   4 Days after procedure Sunday 5/9/21 5 mg Lovenox injection   5 Days after procedure Monday   5/10/21  GET INR checked     Faxed clearance to [facility] (filed in the back office).    Brooklyn Lewis, DeedeeD

## 2021-04-21 ENCOUNTER — HOSPITAL ENCOUNTER (OUTPATIENT)
Dept: LAB | Facility: MEDICAL CENTER | Age: 76
End: 2021-04-21
Attending: UROLOGY
Payer: MEDICARE

## 2021-04-21 ENCOUNTER — HOSPITAL ENCOUNTER (OUTPATIENT)
Dept: LAB | Facility: MEDICAL CENTER | Age: 76
End: 2021-04-21
Attending: FAMILY MEDICINE
Payer: MEDICARE

## 2021-04-21 DIAGNOSIS — Z12.5 SCREENING FOR PROSTATE CANCER: ICD-10-CM

## 2021-04-21 DIAGNOSIS — R53.83 OTHER FATIGUE: ICD-10-CM

## 2021-04-21 DIAGNOSIS — R35.1 NOCTURIA: ICD-10-CM

## 2021-04-21 LAB
ALBUMIN SERPL BCP-MCNC: 4.1 G/DL (ref 3.2–4.9)
ALBUMIN/GLOB SERPL: 1.5 G/DL
ALP SERPL-CCNC: 51 U/L (ref 30–99)
ALT SERPL-CCNC: 20 U/L (ref 2–50)
ANION GAP SERPL CALC-SCNC: 10 MMOL/L (ref 7–16)
ANION GAP SERPL CALC-SCNC: 11 MMOL/L (ref 7–16)
APTT PPP: 43.1 SEC (ref 24.7–36)
AST SERPL-CCNC: 28 U/L (ref 12–45)
BASOPHILS # BLD AUTO: 0.5 % (ref 0–1.8)
BASOPHILS # BLD AUTO: 0.5 % (ref 0–1.8)
BASOPHILS # BLD: 0.03 K/UL (ref 0–0.12)
BASOPHILS # BLD: 0.03 K/UL (ref 0–0.12)
BILIRUB SERPL-MCNC: 1.7 MG/DL (ref 0.1–1.5)
BUN SERPL-MCNC: 19 MG/DL (ref 8–22)
BUN SERPL-MCNC: 20 MG/DL (ref 8–22)
CALCIUM SERPL-MCNC: 8.5 MG/DL (ref 8.4–10.2)
CALCIUM SERPL-MCNC: 8.9 MG/DL (ref 8.4–10.2)
CHLORIDE SERPL-SCNC: 103 MMOL/L (ref 96–112)
CHLORIDE SERPL-SCNC: 103 MMOL/L (ref 96–112)
CO2 SERPL-SCNC: 24 MMOL/L (ref 20–33)
CO2 SERPL-SCNC: 24 MMOL/L (ref 20–33)
CREAT SERPL-MCNC: 1.13 MG/DL (ref 0.5–1.4)
CREAT SERPL-MCNC: 1.21 MG/DL (ref 0.5–1.4)
EOSINOPHIL # BLD AUTO: 0.04 K/UL (ref 0–0.51)
EOSINOPHIL # BLD AUTO: 0.05 K/UL (ref 0–0.51)
EOSINOPHIL NFR BLD: 0.7 % (ref 0–6.9)
EOSINOPHIL NFR BLD: 0.8 % (ref 0–6.9)
ERYTHROCYTE [DISTWIDTH] IN BLOOD BY AUTOMATED COUNT: 43.4 FL (ref 35.9–50)
ERYTHROCYTE [DISTWIDTH] IN BLOOD BY AUTOMATED COUNT: 43.5 FL (ref 35.9–50)
GLOBULIN SER CALC-MCNC: 2.7 G/DL (ref 1.9–3.5)
GLUCOSE SERPL-MCNC: 175 MG/DL (ref 65–99)
GLUCOSE SERPL-MCNC: 176 MG/DL (ref 65–99)
HCT VFR BLD AUTO: 40.7 % (ref 42–52)
HCT VFR BLD AUTO: 40.8 % (ref 42–52)
HGB BLD-MCNC: 13.8 G/DL (ref 14–18)
HGB BLD-MCNC: 13.9 G/DL (ref 14–18)
IMM GRANULOCYTES # BLD AUTO: 0.01 K/UL (ref 0–0.11)
IMM GRANULOCYTES # BLD AUTO: 0.01 K/UL (ref 0–0.11)
IMM GRANULOCYTES NFR BLD AUTO: 0.2 % (ref 0–0.9)
IMM GRANULOCYTES NFR BLD AUTO: 0.2 % (ref 0–0.9)
INR BLD: 2.8 (ref 0.9–1.2)
INR PPP: 2.66 (ref 0.87–1.13)
LYMPHOCYTES # BLD AUTO: 1.53 K/UL (ref 1–4.8)
LYMPHOCYTES # BLD AUTO: 1.57 K/UL (ref 1–4.8)
LYMPHOCYTES NFR BLD: 25.6 % (ref 22–41)
LYMPHOCYTES NFR BLD: 26.9 % (ref 22–41)
MCH RBC QN AUTO: 29.5 PG (ref 27–33)
MCH RBC QN AUTO: 29.9 PG (ref 27–33)
MCHC RBC AUTO-ENTMCNC: 33.8 G/DL (ref 33.7–35.3)
MCHC RBC AUTO-ENTMCNC: 34.2 G/DL (ref 33.7–35.3)
MCV RBC AUTO: 87.2 FL (ref 81.4–97.8)
MCV RBC AUTO: 87.5 FL (ref 81.4–97.8)
MONOCYTES # BLD AUTO: 0.37 K/UL (ref 0–0.85)
MONOCYTES # BLD AUTO: 0.38 K/UL (ref 0–0.85)
MONOCYTES NFR BLD AUTO: 6.3 % (ref 0–13.4)
MONOCYTES NFR BLD AUTO: 6.4 % (ref 0–13.4)
NEUTROPHILS # BLD AUTO: 3.82 K/UL (ref 1.82–7.42)
NEUTROPHILS # BLD AUTO: 3.98 K/UL (ref 1.82–7.42)
NEUTROPHILS NFR BLD: 65.4 % (ref 44–72)
NEUTROPHILS NFR BLD: 66.5 % (ref 44–72)
NRBC # BLD AUTO: 0 K/UL
NRBC # BLD AUTO: 0 K/UL
NRBC BLD-RTO: 0 /100 WBC
NRBC BLD-RTO: 0 /100 WBC
PLATELET # BLD AUTO: 154 K/UL (ref 164–446)
PLATELET # BLD AUTO: 163 K/UL (ref 164–446)
PMV BLD AUTO: 9.7 FL (ref 9–12.9)
PMV BLD AUTO: 9.8 FL (ref 9–12.9)
POTASSIUM SERPL-SCNC: 3.9 MMOL/L (ref 3.6–5.5)
POTASSIUM SERPL-SCNC: 4 MMOL/L (ref 3.6–5.5)
PROT SERPL-MCNC: 6.8 G/DL (ref 6–8.2)
PROTHROMBIN TIME: 27.9 SEC (ref 12–14.6)
RBC # BLD AUTO: 4.65 M/UL (ref 4.7–6.1)
RBC # BLD AUTO: 4.68 M/UL (ref 4.7–6.1)
SODIUM SERPL-SCNC: 137 MMOL/L (ref 135–145)
SODIUM SERPL-SCNC: 138 MMOL/L (ref 135–145)
TSH SERPL DL<=0.005 MIU/L-ACNC: 2.12 UIU/ML (ref 0.38–5.33)
WBC # BLD AUTO: 5.8 K/UL (ref 4.8–10.8)
WBC # BLD AUTO: 6 K/UL (ref 4.8–10.8)

## 2021-04-21 PROCEDURE — 85025 COMPLETE CBC W/AUTO DIFF WBC: CPT | Mod: 91

## 2021-04-21 PROCEDURE — 87086 URINE CULTURE/COLONY COUNT: CPT | Mod: GA

## 2021-04-21 PROCEDURE — 85610 PROTHROMBIN TIME: CPT

## 2021-04-21 PROCEDURE — 84443 ASSAY THYROID STIM HORMONE: CPT

## 2021-04-21 PROCEDURE — 85025 COMPLETE CBC W/AUTO DIFF WBC: CPT

## 2021-04-21 PROCEDURE — 80048 BASIC METABOLIC PNL TOTAL CA: CPT

## 2021-04-21 PROCEDURE — 85730 THROMBOPLASTIN TIME PARTIAL: CPT

## 2021-04-21 PROCEDURE — 80053 COMPREHEN METABOLIC PANEL: CPT

## 2021-04-21 PROCEDURE — 36415 COLL VENOUS BLD VENIPUNCTURE: CPT

## 2021-04-21 PROCEDURE — 84153 ASSAY OF PSA TOTAL: CPT | Mod: GA

## 2021-04-22 LAB — PSA SERPL-MCNC: 1 NG/ML (ref 0–4)

## 2021-04-24 LAB
BACTERIA UR CULT: NORMAL
SIGNIFICANT IND 70042: NORMAL
SITE SITE: NORMAL
SOURCE SOURCE: NORMAL

## 2021-04-29 ENCOUNTER — PATIENT MESSAGE (OUTPATIENT)
Dept: MEDICAL GROUP | Facility: MEDICAL CENTER | Age: 76
End: 2021-04-29

## 2021-04-29 ENCOUNTER — TELEPHONE (OUTPATIENT)
Dept: VASCULAR LAB | Facility: MEDICAL CENTER | Age: 76
End: 2021-04-29

## 2021-04-29 DIAGNOSIS — E11.9 TYPE 2 DIABETES MELLITUS WITHOUT COMPLICATION, WITHOUT LONG-TERM CURRENT USE OF INSULIN (HCC): ICD-10-CM

## 2021-04-29 RX ORDER — LANCETS 30 GAUGE
EACH MISCELLANEOUS
Qty: 300 EACH | Refills: 12 | Status: SHIPPED | OUTPATIENT
Start: 2021-04-29 | End: 2023-08-10

## 2021-04-29 NOTE — TELEPHONE ENCOUNTER
----- Message from Rachel Bernardo sent at 4/29/2021  3:27 PM PDT -----  Regarding: Lovenox Questions  Pt is supposed to start Lovenox injections on Saturday and had a questions about time of day when to give injections.

## 2021-04-29 NOTE — TELEPHONE ENCOUNTER
S/w pt's wife.  Advised her that pt can take his Lovenox injections in the evening, in place of his warfarin administration.  Pt's wife verbalized understanding.    Brooklyn Lewis, DeedeeD

## 2021-05-06 ENCOUNTER — TELEPHONE (OUTPATIENT)
Dept: VASCULAR LAB | Facility: MEDICAL CENTER | Age: 76
End: 2021-05-06

## 2021-05-06 NOTE — TELEPHONE ENCOUNTER
Called and spoke to pt's wife about concurrent use of warfarin and hydrocodone s/p surgery. Pt's wife states she spoke with the provider that performed the procedure and it was explained that this is safe to take together. Discussed taking fall precautions when taking opioids and to call clinic with any other additional concerns or questions.    Yoandy Avila, DeedeeD, [facility]        ----- Message from Rachel Bernardo sent at 5/6/2021 10:17 AM hospitals -----  Regarding: Recently has surgery  Pt just had surgery today and he's supposed to go back on his warfarin tonight but he also is taking  pain meds, Hydrocodone. Pt wants to know if he can take both of these together.

## 2021-05-10 ENCOUNTER — ANTICOAGULATION VISIT (OUTPATIENT)
Dept: VASCULAR LAB | Facility: MEDICAL CENTER | Age: 76
End: 2021-05-10
Attending: INTERNAL MEDICINE
Payer: MEDICARE

## 2021-05-10 DIAGNOSIS — Z86.718 HISTORY OF VENOUS THROMBOEMBOLISM: ICD-10-CM

## 2021-05-10 DIAGNOSIS — I82.433 DEEP VEIN THROMBOSIS (DVT) OF POPLITEAL VEIN OF BOTH LOWER EXTREMITIES, UNSPECIFIED CHRONICITY (HCC): ICD-10-CM

## 2021-05-10 LAB — INR PPP: 2 (ref 2–3.5)

## 2021-05-10 PROCEDURE — 99211 OFF/OP EST MAY X REQ PHY/QHP: CPT | Performed by: NURSE PRACTITIONER

## 2021-05-10 PROCEDURE — 85610 PROTHROMBIN TIME: CPT

## 2021-05-10 NOTE — PROGRESS NOTES
Anticoagulation Summary  As of 5/10/2021    INR goal:  2.0-3.0   TTR:  78.0 % (1.7 y)   INR used for dosin.00 (5/10/2021)   Warfarin maintenance plan:  2.5 mg (2.5 mg x 1) every Sat; 5 mg (2.5 mg x 2) all other days   Weekly warfarin total:  32.5 mg   Plan last modified:  Chau Hawkins, PharmD (10/29/2019)   Next INR check:  2021   Target end date:  Indefinite    Indications    History of venous thromboembolism [Z86.718]  Deep vein thrombosis (DVT) of popliteal vein of both lower extremities (HCC) [I82.433]             Anticoagulation Episode Summary     INR check location:      Preferred lab:      Send INR reminders to:      Comments:        Anticoagulation Care Providers     Provider Role Specialty Phone number    [facility] Responsible  [phone]    Too Brito M.D.  [facility] [phone]        Anticoagulation Patient Findings      HPI:  Magnus Claudio seen in clinic today for follow up on anticoagulation therapy in the presence of DVT hx.   Had a bladder procedure on 21. He is bridging with Lovenox as instructed.  Denies any medication or diet changes.   Reports having a faint amount of blood with urine. Notices after taking Lovenox. He spoke with his urologist. No blood with urine yesterday or today.  No other symptoms of bleeding or thrombosis reported.    Pt is not on antiplatelet therapy.    A/P:   INR therapeutic. STOP Lovenox. Resume usual dose of warfarin. Continue to monitor urine closely for blood. Pt to notify his urologist and our clinic if this occurs.    Pt educated to contact our clinic with any changes in medications or s/s of bleeding or thrombosis. Pt is aware to seek immediate medical attention for falls, head injury or deep cuts    Follow up appointment in 2 week(s).    Next Appointment: Tuesday, May 25, 2021 at 3:00 pm.    Radha VILLEDA

## 2021-05-11 LAB — INR BLD: 2 (ref 0.9–1.2)

## 2021-05-14 ENCOUNTER — HOSPITAL ENCOUNTER (OUTPATIENT)
Dept: LAB | Facility: MEDICAL CENTER | Age: 76
End: 2021-05-14
Attending: UROLOGY
Payer: MEDICARE

## 2021-05-14 PROCEDURE — 87086 URINE CULTURE/COLONY COUNT: CPT

## 2021-05-16 LAB
BACTERIA UR CULT: NORMAL
SIGNIFICANT IND 70042: NORMAL
SITE SITE: NORMAL
SOURCE SOURCE: NORMAL

## 2021-05-24 ENCOUNTER — ANTICOAGULATION VISIT (OUTPATIENT)
Dept: VASCULAR LAB | Facility: MEDICAL CENTER | Age: 76
End: 2021-05-24
Attending: INTERNAL MEDICINE
Payer: MEDICARE

## 2021-05-24 DIAGNOSIS — Z86.718 HISTORY OF VENOUS THROMBOEMBOLISM: ICD-10-CM

## 2021-05-24 LAB
INR BLD: 2.4 (ref 0.9–1.2)
INR PPP: 2.4 (ref 2–3.5)

## 2021-05-24 PROCEDURE — 85610 PROTHROMBIN TIME: CPT

## 2021-05-24 PROCEDURE — 99211 OFF/OP EST MAY X REQ PHY/QHP: CPT

## 2021-05-24 NOTE — PROGRESS NOTES
Anticoagulation Summary  As of 2021    INR goal:  2.0-3.0   TTR:  78.5 % (1.7 y)   INR used for dosin.40 (2021)   Warfarin maintenance plan:  2.5 mg (2.5 mg x 1) every Sat; 5 mg (2.5 mg x 2) all other days   Weekly warfarin total:  32.5 mg   Plan last modified:  Chau Hawkins, PharmD (10/29/2019)   Next INR check:  2021   Target end date:  Indefinite    Indications    History of venous thromboembolism [Z86.718]  Deep vein thrombosis (DVT) of popliteal vein of both lower extremities (HCC) [I82.433]             Anticoagulation Episode Summary     INR check location:      Preferred lab:      Send INR reminders to:      Comments:        Anticoagulation Care Providers     Provider Role Specialty Phone number    [facility] Responsible  [phone]    Too Brito M.D.  Family Medicine [phone]        Anticoagulation Patient Findings      HPI:  Magnus Claudio seen in clinic today, on anticoagulation therapy with warfarin for hx of DVT  Pt NOT on antiplatelet therapy   Changes to current medical/health status since last appt: none  Denies signs/symptoms of bleeding and/or thrombosis since the last appt.    Denies any interval changes to diet  Denies any interval changes to medications since last appt.   Denies any complications or cost restrictions with current therapy.   BP declined    A/P   INR therapeutic.   Instructed patient to continue current warfarin dosing regimen.    Follow up appointment in 4 week(s).    Andi Dillon, PharmD

## 2021-06-11 ENCOUNTER — TELEPHONE (OUTPATIENT)
Dept: VASCULAR LAB | Facility: MEDICAL CENTER | Age: 76
End: 2021-06-11

## 2021-06-11 NOTE — TELEPHONE ENCOUNTER
Received VM message that ezequiel missed his dose of warfarin last night.  No bolus required. Continue current dosing regimen.  Follow up in 2 weeks, to reduce the risk of adverse events related to this high risk medication, warfarin.    Natalie Martins, Clinical Pharmacist

## 2021-06-21 ENCOUNTER — ANTICOAGULATION VISIT (OUTPATIENT)
Dept: VASCULAR LAB | Facility: MEDICAL CENTER | Age: 76
End: 2021-06-21
Attending: INTERNAL MEDICINE
Payer: MEDICARE

## 2021-06-21 DIAGNOSIS — Z86.718 HISTORY OF VENOUS THROMBOEMBOLISM: ICD-10-CM

## 2021-06-21 LAB
INR BLD: 2 (ref 0.9–1.2)
INR PPP: 2 (ref 2–3.5)

## 2021-06-21 PROCEDURE — 85610 PROTHROMBIN TIME: CPT

## 2021-06-21 PROCEDURE — 99211 OFF/OP EST MAY X REQ PHY/QHP: CPT

## 2021-06-21 NOTE — PROGRESS NOTES
Anticoagulation Summary  As of 2021    INR goal:  2.0-3.0   TTR:  79.4 % (1.8 y)   INR used for dosin.00 (2021)   Warfarin maintenance plan:  2.5 mg (2.5 mg x 1) every Sat; 5 mg (2.5 mg x 2) all other days   Weekly warfarin total:  32.5 mg   Plan last modified:  Chau Hawkins, PharmD (10/29/2019)   Next INR check:  2021   Target end date:  Indefinite    Indications    History of venous thromboembolism [Z86.718]  Deep vein thrombosis (DVT) of popliteal vein of both lower extremities (HCC) [I82.433]             Anticoagulation Episode Summary     INR check location:      Preferred lab:      Send INR reminders to:      Comments:        Anticoagulation Care Providers     Provider Role Specialty Phone number    [facility] Responsible  [phone]    Too rBito M.D.  Family Medicine [phone]        Anticoagulation Patient Findings      HPI:  Magnus Gudino Shanon seen in clinic today, on anticoagulation therapy with warfarin for hx of VTE  Pt NOT on antiplatelet therapy   Changes to current medical/health status since last appt: Patient has been diagnosed with bladder cancer, has already received 2 treatments and will receive another treatment today. Plan is for a total of 5 treatments (to end 21)  Denies signs/symptoms of bleeding and/or thrombosis since the last appt.    Denies any interval changes to diet  Denies any interval changes to medications since last appt.   Denies any complications or cost restrictions with current therapy.   BP declined    A/P   INR therapeutic.   Instructed patient to continue current warfarin dosing regimen as patient has already received 2 treatments for bladder cancer and INR does not appear to be affected from treatment medications.    Follow up appointment in 4 week(s).    Andi Dillon, DeedeeD

## 2021-06-29 ENCOUNTER — HOSPITAL ENCOUNTER (OUTPATIENT)
Facility: MEDICAL CENTER | Age: 76
End: 2021-06-29
Attending: UROLOGY
Payer: MEDICARE

## 2021-06-29 PROCEDURE — 87086 URINE CULTURE/COLONY COUNT: CPT

## 2021-07-02 LAB
BACTERIA UR CULT: NORMAL
SIGNIFICANT IND 70042: NORMAL
SITE SITE: NORMAL
SOURCE SOURCE: NORMAL

## 2021-07-19 ENCOUNTER — ANTICOAGULATION VISIT (OUTPATIENT)
Dept: VASCULAR LAB | Facility: MEDICAL CENTER | Age: 76
End: 2021-07-19
Attending: INTERNAL MEDICINE
Payer: MEDICARE

## 2021-07-19 DIAGNOSIS — Z86.718 HISTORY OF VENOUS THROMBOEMBOLISM: ICD-10-CM

## 2021-07-19 DIAGNOSIS — I82.433 ACUTE DEEP VEIN THROMBOSIS (DVT) OF POPLITEAL VEIN OF BOTH LOWER EXTREMITIES (HCC): ICD-10-CM

## 2021-07-19 LAB
INR BLD: 3.2 (ref 0.9–1.2)
INR PPP: 3.2 (ref 2–3.5)

## 2021-07-19 PROCEDURE — 99211 OFF/OP EST MAY X REQ PHY/QHP: CPT | Performed by: NURSE PRACTITIONER

## 2021-07-19 PROCEDURE — 85610 PROTHROMBIN TIME: CPT

## 2021-07-19 NOTE — PROGRESS NOTES
Anticoagulation Summary  As of 7/19/2021    INR goal:  2.0-3.0   TTR:  79.6 % (1.9 y)   INR used for dosing:  3.20 (7/19/2021)   Warfarin maintenance plan:  2.5 mg (2.5 mg x 1) every Sat; 5 mg (2.5 mg x 2) all other days   Weekly warfarin total:  32.5 mg   Plan last modified:  Chau Hawkins, PharmD (10/29/2019)   Next INR check:     Target end date:  Indefinite    Indications    History of venous thromboembolism [Z86.718]  Deep vein thrombosis (DVT) of popliteal vein of both lower extremities (HCC) [I82.433]             Anticoagulation Episode Summary     INR check location:      Preferred lab:      Send INR reminders to:      Comments:        Anticoagulation Care Providers     Provider Role Specialty Phone number    [facility] Responsible  [phone]    Too Brito M.D.  Family Medicine [phone]        Anticoagulation Patient Findings      HPI:  Magnus Claudio seen in clinic today for follow up on anticoagulation therapy in the presence of DVT hx.   Denies any changes to current medical/health status since last appointment.   Denies any medication or diet changes.   No current symptoms of bleeding or thrombosis reported.  Verified dose with patient.    Pt is not on antiplatelet therapy.    A/P:   INR supratherapeutic. Will decrease tonight then continue current regimen.     Pt educated to contact our clinic with any changes in medications or s/s of bleeding or thrombosis. Pt is aware to seek immediate medical attention for falls, head injury or deep cuts    Follow up appointment in 3 week(s) per pt's preference.    Next Appointment: Tuesday, August 10, 2021 at 1:30 pm.    Radha VILLEDA

## 2021-08-03 DIAGNOSIS — Z79.01 CHRONIC ANTICOAGULATION: ICD-10-CM

## 2021-08-10 ENCOUNTER — ANTICOAGULATION VISIT (OUTPATIENT)
Dept: VASCULAR LAB | Facility: MEDICAL CENTER | Age: 76
End: 2021-08-10
Attending: INTERNAL MEDICINE
Payer: MEDICARE

## 2021-08-10 VITALS — DIASTOLIC BLOOD PRESSURE: 84 MMHG | SYSTOLIC BLOOD PRESSURE: 141 MMHG | HEART RATE: 83 BPM

## 2021-08-10 DIAGNOSIS — Z86.718 HISTORY OF VENOUS THROMBOEMBOLISM: ICD-10-CM

## 2021-08-10 LAB
INR BLD: 3.1 (ref 0.9–1.2)
INR PPP: 3.1 (ref 2–3.5)

## 2021-08-10 PROCEDURE — 99212 OFFICE O/P EST SF 10 MIN: CPT

## 2021-08-10 PROCEDURE — 85610 PROTHROMBIN TIME: CPT

## 2021-08-10 NOTE — PROGRESS NOTES
Anticoagulation Summary  As of 8/10/2021    INR goal:  2.0-3.0   TTR:  77.1 % (1.9 y)   INR used for dosing:  3.10 (8/10/2021)   Warfarin maintenance plan:  2.5 mg (2.5 mg x 1) every Thu, Sat; 5 mg (2.5 mg x 2) all other days   Weekly warfarin total:  30 mg   Plan last modified:  Jose De Jesus Becker PharmD (8/10/2021)   Next INR check:  8/24/2021   Target end date:  Indefinite    Indications    History of venous thromboembolism [Z86.718]  Deep vein thrombosis (DVT) of popliteal vein of both lower extremities (HCC) [I82.433]             Anticoagulation Episode Summary     INR check location:      Preferred lab:      Send INR reminders to:      Comments:        Anticoagulation Care Providers     Provider Role Specialty Phone number    [facility] Responsible  [phone]    Too Brito M.D.  Family Medicine [phone]                Refer to Patient Findings for HPI:  Patient Findings     Positives:  Change in medications (Finished 7 weeks of chemotherapy for bladder cancer)    Negatives:  Signs/symptoms of thrombosis, Signs/symptoms of bleeding, Laboratory test error suspected, Change in health, Change in alcohol use, Change in activity, Upcoming invasive procedure, Emergency department visit, Upcoming dental procedure, Missed doses, Extra doses, Change in diet/appetite, Hospital admission, Bruising, Other complaints            Vitals:    08/10/21 1337   BP: 141/84   Pulse: 83       Verified current warfarin dosing schedule.    Medications reconciled Yes  Pt is NOT on antiplatelet therapy     A/P   INR  is supra-therapeutic.     Warfarin dosing recommendation: Will decrease weekly regimen by ~8%    Pt educated to contact our clinic with any changes in medications or s/s of bleeding or thrombosis. Pt is aware to seek immediate medical attention for falls, head injury or deep cuts.    Follow up appointment in 2 week(s).    Jose De Jesus Becker PharmD   Seen with Brooklyn Lewis PharmD

## 2021-08-15 DIAGNOSIS — Z79.01 CHRONIC ANTICOAGULATION: ICD-10-CM

## 2021-08-16 ENCOUNTER — OFFICE VISIT (OUTPATIENT)
Dept: MEDICAL GROUP | Facility: MEDICAL CENTER | Age: 76
End: 2021-08-16
Payer: MEDICARE

## 2021-08-16 VITALS
BODY MASS INDEX: 27.27 KG/M2 | HEART RATE: 82 BPM | HEIGHT: 71 IN | DIASTOLIC BLOOD PRESSURE: 74 MMHG | OXYGEN SATURATION: 95 % | TEMPERATURE: 98.6 F | SYSTOLIC BLOOD PRESSURE: 136 MMHG | WEIGHT: 194.8 LBS | RESPIRATION RATE: 20 BRPM

## 2021-08-16 DIAGNOSIS — I10 ESSENTIAL HYPERTENSION: ICD-10-CM

## 2021-08-16 DIAGNOSIS — D64.9 ANEMIA, UNSPECIFIED TYPE: ICD-10-CM

## 2021-08-16 DIAGNOSIS — K40.90 LEFT INGUINAL HERNIA: ICD-10-CM

## 2021-08-16 DIAGNOSIS — E11.9 CONTROLLED TYPE 2 DIABETES MELLITUS WITHOUT COMPLICATION, WITHOUT LONG-TERM CURRENT USE OF INSULIN (HCC): ICD-10-CM

## 2021-08-16 LAB
HBA1C MFR BLD: 7.2 % (ref 0–5.6)
INT CON NEG: NEGATIVE
INT CON POS: POSITIVE

## 2021-08-16 PROCEDURE — 99214 OFFICE O/P EST MOD 30 MIN: CPT | Performed by: FAMILY MEDICINE

## 2021-08-16 PROCEDURE — 83036 HEMOGLOBIN GLYCOSYLATED A1C: CPT | Performed by: FAMILY MEDICINE

## 2021-08-16 RX ORDER — WARFARIN SODIUM 2.5 MG/1
TABLET ORAL
Qty: 180 TABLET | Refills: 1 | Status: SHIPPED | OUTPATIENT
Start: 2021-08-16 | End: 2021-12-08

## 2021-08-16 ASSESSMENT — FIBROSIS 4 INDEX: FIB4 SCORE: 2.96

## 2021-08-16 NOTE — ASSESSMENT & PLAN NOTE
Describes several months of L inguinal bulging. Not particularly bothersome but he does describe some bladder discomfort when he coughs that started shortly after his TURBT for bladder CA. Will be seeing urology soon.

## 2021-08-16 NOTE — PROGRESS NOTES
[facility]  Progress Note  Established Patient    Subjective:   Magnus Claudio is a 76 y.o. male here today with a chief complaint of DM. The patient is alone.     Left inguinal hernia  Describes several months of L inguinal bulging. Not particularly bothersome but he does describe some bladder discomfort when he coughs that started shortly after his TURBT for bladder CA. Will be seeing urology soon.    Essential hypertension  At goal.     Controlled type 2 diabetes mellitus without complication, without long-term current use of insulin ([facility])  A1c at a reasonable goal considering his age and cormorbidities. Also on losartan and Coumadin.    Anemia  Noted on recent labs. Has h/o colon CA and states had colonoscopy in 2020.       Current Outpatient Medications on File Prior to Visit   Medication Sig Dispense Refill   • tamsulosin (FLOMAX) 0.4 MG capsule TAKE 1 CAPSULE BY MOUTH EVERY  capsule 4   • Lancets Lancets for glucometer covered by patient's insurance Sig: use daily and prn ssx high or low sugar. 300 Each 12   • glimepiride (AMARYL) 4 MG Tab Take 1 tablet by mouth every morning. 100 tablet 4   • amLODIPine (NORVASC) 5 MG Tab TAKE 1 TABLET BY MOUTH EVERY DAY (Patient taking differently: Take 5 mg by mouth every morning.) 100 tablet 4   • losartan (COZAAR) 100 MG Tab TAKE 1 TABLET BY MOUTH DAILY (Patient taking differently: Take 100 mg by mouth every morning.) 100 Tab 4   • hydroCHLOROthiazide (HYDRODIURIL) 12.5 MG tablet TAKE 1 TABLET BY MOUTH DAILY (Patient taking differently: Take 12.5 mg by mouth every morning.) 100 Tab 4   • warfarin (COUMADIN) 2.5 MG Tab Take one to two tablets by mouth daily or as directed by anticoagulation clinic (Patient taking differently: Take 2.5-5 mg by mouth every day. 2.5 mg on Saturday   5 mg all other days) 180 Tab 1   • metFORMIN ER (GLUCOPHAGE XR) 500 MG TABLET SR 24 HR TAKE 2 TABLETS BY MOUTH TWICE DAILY (Patient taking differently: Take 1,000 mg by  "mouth 2 times a day. TAKE 2 TABLETS BY MOUTH TWICE DAILY) 360 Tab 4   • therapeutic multivitamin-minerals (THERAGRAN-M) Tab Take 1 Tab by mouth every day.       No current facility-administered medications on file prior to visit.          Objective:     Vitals:    08/16/21 1102   BP: 136/74   BP Location: Left arm   Patient Position: Sitting   BP Cuff Size: Adult long   Pulse: 82   Resp: 20   Temp: 37 °C (98.6 °F)   TempSrc: Axillary   SpO2: 95%   Weight: 88.4 kg (194 lb 12.8 oz)   Height: 1.803 m (5' 11\")       Physical Exam:  General: alert in no apparent distress.   : reducible L inguinal hernia.     POCT A1c: 7.2.     Monofilament testing with a 10 gram force: sensation intact: intact bilaterally  Visual Inspection: Feet without maceration, ulcers, fissures.  Pedal pulses: intact bilaterally      Assessment and Plan:     1. Controlled type 2 diabetes mellitus without complication, without long-term current use of insulin (HCC)  - continue current regimen.   - Diabetic Monofilament LE Exam  - Comp Metabolic Panel; Future  - Lipid Profile; Future  - MICROALBUMIN CREAT RATIO URINE; Future    2. Essential hypertension  - continue amlodipine, HCTZ and losartan.     3. Left inguinal hernia  Discussed incarceration precautions, recommended truss.   - REFERRAL TO GENERAL SURGERY if interested in surgical correction.   - patient to f/u urology for postop bladder discomfort.     4. Anemia, unspecified type  - CBC WITH DIFFERENTIAL; Future  - patient states recent colonoscopy done, will request records.         Followup: Return in about 4 months (around 12/16/2021), or if symptoms worsen or fail to improve, for DM.         "

## 2021-08-24 ENCOUNTER — ANTICOAGULATION VISIT (OUTPATIENT)
Dept: VASCULAR LAB | Facility: MEDICAL CENTER | Age: 76
End: 2021-08-24
Attending: INTERNAL MEDICINE
Payer: MEDICARE

## 2021-08-24 DIAGNOSIS — Z86.718 HISTORY OF VENOUS THROMBOEMBOLISM: ICD-10-CM

## 2021-08-24 LAB
INR BLD: 2.4 (ref 0.9–1.2)
INR PPP: 2.4 (ref 2–3.5)

## 2021-08-24 PROCEDURE — 85610 PROTHROMBIN TIME: CPT

## 2021-08-24 PROCEDURE — 99211 OFF/OP EST MAY X REQ PHY/QHP: CPT

## 2021-08-24 NOTE — PROGRESS NOTES
Anticoagulation Summary  As of 2021    INR goal:  2.0-3.0   TTR:  77.3 % (2 y)   INR used for dosin.40 (2021)   Warfarin maintenance plan:  2.5 mg (2.5 mg x 1) every Thu, Sat; 5 mg (2.5 mg x 2) all other days   Weekly warfarin total:  30 mg   Plan last modified:  Jose De Jesus Becker, PharmD (8/10/2021)   Next INR check:  2021   Target end date:  Indefinite    Indications    History of venous thromboembolism [Z86.718]  Deep vein thrombosis (DVT) of popliteal vein of both lower extremities (HCC) [I82.433]             Anticoagulation Episode Summary     INR check location:      Preferred lab:      Send INR reminders to:      Comments:        Anticoagulation Care Providers     Provider Role Specialty Phone number    [facility] Responsible  [phone]    Too Brito M.D.  [facility] [phone]                    Refer to Patient Findings for HPI:  Patient Findings     Negatives:  Signs/symptoms of thrombosis, Signs/symptoms of bleeding, Laboratory test error suspected, Change in health, Change in alcohol use, Change in activity, Upcoming invasive procedure, Emergency department visit, Upcoming dental procedure, Missed doses, Extra doses, Change in medications, Change in diet/appetite, Hospital admission, Bruising, Other complaints            There were no vitals filed for this visit.   pt declined vitals    Verified current warfarin dosing schedule.      Medications reconciled   Pt is not on antiplatelet therapy      A/P   INR  therapeutic.     Warfarin dosing recommendation: Pt is to continue with current warfarin dosing regimen.      Pt educated to contact our clinic with any changes in medications or s/s of bleeding or thrombosis. Pt is aware to seek immediate medical attention for falls, head injury or deep cuts.    Follow up appointment in 4 week(s) per preference    Yoandy Avila, DeedeeD

## 2021-09-02 ENCOUNTER — HOSPITAL ENCOUNTER (OUTPATIENT)
Dept: LAB | Facility: MEDICAL CENTER | Age: 76
End: 2021-09-02
Attending: FAMILY MEDICINE
Payer: MEDICARE

## 2021-09-02 DIAGNOSIS — K57.92 DIVERTICULITIS: ICD-10-CM

## 2021-09-02 DIAGNOSIS — E11.9 CONTROLLED TYPE 2 DIABETES MELLITUS WITHOUT COMPLICATION, WITHOUT LONG-TERM CURRENT USE OF INSULIN (HCC): ICD-10-CM

## 2021-09-02 LAB
ALBUMIN SERPL BCP-MCNC: 4 G/DL (ref 3.2–4.9)
ALBUMIN/GLOB SERPL: 1.4 G/DL
ALP SERPL-CCNC: 69 U/L (ref 30–99)
ALT SERPL-CCNC: 24 U/L (ref 2–50)
ANION GAP SERPL CALC-SCNC: 14 MMOL/L (ref 7–16)
AST SERPL-CCNC: 28 U/L (ref 12–45)
BASOPHILS # BLD AUTO: 0.6 % (ref 0–1.8)
BASOPHILS # BLD: 0.05 K/UL (ref 0–0.12)
BILIRUB SERPL-MCNC: 1.6 MG/DL (ref 0.1–1.5)
BUN SERPL-MCNC: 19 MG/DL (ref 8–22)
CALCIUM SERPL-MCNC: 9.1 MG/DL (ref 8.4–10.2)
CHLORIDE SERPL-SCNC: 103 MMOL/L (ref 96–112)
CHOLEST SERPL-MCNC: 145 MG/DL (ref 100–199)
CO2 SERPL-SCNC: 23 MMOL/L (ref 20–33)
CREAT SERPL-MCNC: 1.16 MG/DL (ref 0.5–1.4)
CREAT UR-MCNC: 142.99 MG/DL
EOSINOPHIL # BLD AUTO: 0.07 K/UL (ref 0–0.51)
EOSINOPHIL NFR BLD: 0.9 % (ref 0–6.9)
ERYTHROCYTE [DISTWIDTH] IN BLOOD BY AUTOMATED COUNT: 42 FL (ref 35.9–50)
FASTING STATUS PATIENT QL REPORTED: NORMAL
GLOBULIN SER CALC-MCNC: 2.8 G/DL (ref 1.9–3.5)
GLUCOSE SERPL-MCNC: 217 MG/DL (ref 65–99)
HCT VFR BLD AUTO: 39.7 % (ref 42–52)
HDLC SERPL-MCNC: 29 MG/DL
HGB BLD-MCNC: 13.4 G/DL (ref 14–18)
IMM GRANULOCYTES # BLD AUTO: 0.03 K/UL (ref 0–0.11)
IMM GRANULOCYTES NFR BLD AUTO: 0.4 % (ref 0–0.9)
LDLC SERPL CALC-MCNC: 57 MG/DL
LYMPHOCYTES # BLD AUTO: 1.1 K/UL (ref 1–4.8)
LYMPHOCYTES NFR BLD: 13.8 % (ref 22–41)
MCH RBC QN AUTO: 29.2 PG (ref 27–33)
MCHC RBC AUTO-ENTMCNC: 33.8 G/DL (ref 33.7–35.3)
MCV RBC AUTO: 86.5 FL (ref 81.4–97.8)
MICROALBUMIN UR-MCNC: 1.7 MG/DL
MICROALBUMIN/CREAT UR: 12 MG/G (ref 0–30)
MONOCYTES # BLD AUTO: 0.49 K/UL (ref 0–0.85)
MONOCYTES NFR BLD AUTO: 6.1 % (ref 0–13.4)
NEUTROPHILS # BLD AUTO: 6.23 K/UL (ref 1.82–7.42)
NEUTROPHILS NFR BLD: 78.2 % (ref 44–72)
NRBC # BLD AUTO: 0 K/UL
NRBC BLD-RTO: 0 /100 WBC
PLATELET # BLD AUTO: 163 K/UL (ref 164–446)
PMV BLD AUTO: 9.6 FL (ref 9–12.9)
POTASSIUM SERPL-SCNC: 4 MMOL/L (ref 3.6–5.5)
PROT SERPL-MCNC: 6.8 G/DL (ref 6–8.2)
RBC # BLD AUTO: 4.59 M/UL (ref 4.7–6.1)
SODIUM SERPL-SCNC: 140 MMOL/L (ref 135–145)
TRIGL SERPL-MCNC: 297 MG/DL (ref 0–149)
WBC # BLD AUTO: 8 K/UL (ref 4.8–10.8)

## 2021-09-02 PROCEDURE — 80053 COMPREHEN METABOLIC PANEL: CPT

## 2021-09-02 PROCEDURE — 82570 ASSAY OF URINE CREATININE: CPT

## 2021-09-02 PROCEDURE — 82043 UR ALBUMIN QUANTITATIVE: CPT

## 2021-09-02 PROCEDURE — 36415 COLL VENOUS BLD VENIPUNCTURE: CPT

## 2021-09-02 PROCEDURE — 80061 LIPID PANEL: CPT

## 2021-09-02 PROCEDURE — 85025 COMPLETE CBC W/AUTO DIFF WBC: CPT

## 2021-09-08 DIAGNOSIS — D64.9 ANEMIA, UNSPECIFIED TYPE: ICD-10-CM

## 2021-09-21 ENCOUNTER — ANTICOAGULATION VISIT (OUTPATIENT)
Dept: VASCULAR LAB | Facility: MEDICAL CENTER | Age: 76
End: 2021-09-21
Attending: INTERNAL MEDICINE
Payer: MEDICARE

## 2021-09-21 DIAGNOSIS — Z86.718 HISTORY OF VENOUS THROMBOEMBOLISM: ICD-10-CM

## 2021-09-21 LAB
INR BLD: 2.4 (ref 0.9–1.2)
INR PPP: 2.4 (ref 2–3.5)

## 2021-09-21 PROCEDURE — 99211 OFF/OP EST MAY X REQ PHY/QHP: CPT

## 2021-09-21 PROCEDURE — 85610 PROTHROMBIN TIME: CPT

## 2021-09-21 NOTE — PROGRESS NOTES
Anticoagulation Summary  As of 2021    INR goal:  2.0-3.0   TTR:  78.1 % (2 y)   INR used for dosin.40 (2021)   Warfarin maintenance plan:  2.5 mg (2.5 mg x 1) every Thu, Sat; 5 mg (2.5 mg x 2) all other days   Weekly warfarin total:  30 mg   Plan last modified:  Deedee PinzonD (8/10/2021)   Next INR check:  10/26/2021   Target end date:  Indefinite    Indications    History of venous thromboembolism [Z86.718]  Deep vein thrombosis (DVT) of popliteal vein of both lower extremities (HCC) [I82.433]             Anticoagulation Episode Summary     INR check location:      Preferred lab:      Send INR reminders to:      Comments:        Anticoagulation Care Providers     Provider Role Specialty Phone number    [facility] Responsible  [phone]    Too Brito M.D.  [facility] [phone]             Refer to Patient Findings for HPI:  Patient Findings     Negatives:  Signs/symptoms of thrombosis, Signs/symptoms of bleeding, Laboratory test error suspected, Change in health, Change in alcohol use, Change in activity, Upcoming invasive procedure, Emergency department visit, Upcoming dental procedure, Missed doses, Extra doses, Change in medications, Change in diet/appetite, Hospital admission, Bruising, Other complaints          There were no vitals filed for this visit.   pt declined vitals    Verified current warfarin dosing schedule.    Medications reconciled   Pt is not on antiplatelet therapy      A/P   INR  therapeutic.     Warfarin dosing recommendation: Pt is to continue with current warfarin dosing regimen.    Pt educated to contact our clinic with any changes in medications or s/s of bleeding or thrombosis. Pt is aware to seek immediate medical attention for falls, head injury or deep cuts.    Follow up appointment in 5 week(s).    Brooklyn Lewis, PharmD

## 2021-09-27 ENCOUNTER — APPOINTMENT (OUTPATIENT)
Dept: LAB | Facility: MEDICAL CENTER | Age: 76
End: 2021-09-27
Attending: FAMILY MEDICINE
Payer: MEDICARE

## 2021-10-04 ENCOUNTER — HOSPITAL ENCOUNTER (OUTPATIENT)
Dept: LAB | Facility: MEDICAL CENTER | Age: 76
End: 2021-10-04
Attending: FAMILY MEDICINE
Payer: MEDICARE

## 2021-10-04 DIAGNOSIS — D64.9 ANEMIA, UNSPECIFIED TYPE: ICD-10-CM

## 2021-10-04 LAB
BASOPHILS # BLD AUTO: 0.6 % (ref 0–1.8)
BASOPHILS # BLD: 0.04 K/UL (ref 0–0.12)
EOSINOPHIL # BLD AUTO: 0.07 K/UL (ref 0–0.51)
EOSINOPHIL NFR BLD: 1.1 % (ref 0–6.9)
ERYTHROCYTE [DISTWIDTH] IN BLOOD BY AUTOMATED COUNT: 42.8 FL (ref 35.9–50)
FERRITIN SERPL-MCNC: 52.7 NG/ML (ref 22–322)
FOLATE SERPL-MCNC: 28.1 NG/ML
HCT VFR BLD AUTO: 39.8 % (ref 42–52)
HGB BLD-MCNC: 13.8 G/DL (ref 14–18)
HGB RETIC QN AUTO: 33.2 PG/CELL (ref 29–35)
IMM GRANULOCYTES # BLD AUTO: 0.02 K/UL (ref 0–0.11)
IMM GRANULOCYTES NFR BLD AUTO: 0.3 % (ref 0–0.9)
IMM RETICS NFR: 10.4 % (ref 9.3–17.4)
IRON SATN MFR SERPL: 18 % (ref 15–55)
IRON SERPL-MCNC: 67 UG/DL (ref 50–180)
LYMPHOCYTES # BLD AUTO: 1.31 K/UL (ref 1–4.8)
LYMPHOCYTES NFR BLD: 20.7 % (ref 22–41)
MCH RBC QN AUTO: 29.1 PG (ref 27–33)
MCHC RBC AUTO-ENTMCNC: 34.7 G/DL (ref 33.7–35.3)
MCV RBC AUTO: 84 FL (ref 81.4–97.8)
MONOCYTES # BLD AUTO: 0.43 K/UL (ref 0–0.85)
MONOCYTES NFR BLD AUTO: 6.8 % (ref 0–13.4)
NEUTROPHILS # BLD AUTO: 4.47 K/UL (ref 1.82–7.42)
NEUTROPHILS NFR BLD: 70.5 % (ref 44–72)
NRBC # BLD AUTO: 0 K/UL
NRBC BLD-RTO: 0 /100 WBC
PLATELET # BLD AUTO: 174 K/UL (ref 164–446)
PMV BLD AUTO: 9.6 FL (ref 9–12.9)
RBC # BLD AUTO: 4.74 M/UL (ref 4.7–6.1)
RETICS # AUTO: 0.06 M/UL (ref 0.04–0.06)
RETICS/RBC NFR: 1.3 % (ref 0.8–2.1)
TIBC SERPL-MCNC: 364 UG/DL (ref 250–450)
UIBC SERPL-MCNC: 297 UG/DL (ref 110–370)
VIT B12 SERPL-MCNC: 316 PG/ML (ref 211–911)
WBC # BLD AUTO: 6.3 K/UL (ref 4.8–10.8)

## 2021-10-04 PROCEDURE — 82607 VITAMIN B-12: CPT

## 2021-10-04 PROCEDURE — 85025 COMPLETE CBC W/AUTO DIFF WBC: CPT

## 2021-10-04 PROCEDURE — 83550 IRON BINDING TEST: CPT

## 2021-10-04 PROCEDURE — 36415 COLL VENOUS BLD VENIPUNCTURE: CPT

## 2021-10-04 PROCEDURE — 85046 RETICYTE/HGB CONCENTRATE: CPT

## 2021-10-04 PROCEDURE — 83540 ASSAY OF IRON: CPT

## 2021-10-04 PROCEDURE — 82728 ASSAY OF FERRITIN: CPT

## 2021-10-04 PROCEDURE — 82746 ASSAY OF FOLIC ACID SERUM: CPT

## 2021-10-05 ENCOUNTER — TELEPHONE (OUTPATIENT)
Dept: MEDICAL GROUP | Facility: MEDICAL CENTER | Age: 76
End: 2021-10-05

## 2021-10-06 ENCOUNTER — OFFICE VISIT (OUTPATIENT)
Dept: URGENT CARE | Facility: CLINIC | Age: 76
End: 2021-10-06
Payer: MEDICARE

## 2021-10-06 VITALS
RESPIRATION RATE: 16 BRPM | BODY MASS INDEX: 27.3 KG/M2 | OXYGEN SATURATION: 97 % | HEIGHT: 71 IN | WEIGHT: 195 LBS | HEART RATE: 78 BPM | SYSTOLIC BLOOD PRESSURE: 130 MMHG | TEMPERATURE: 97.9 F | DIASTOLIC BLOOD PRESSURE: 72 MMHG

## 2021-10-06 DIAGNOSIS — H61.23 BILATERAL IMPACTED CERUMEN: ICD-10-CM

## 2021-10-06 PROCEDURE — 99213 OFFICE O/P EST LOW 20 MIN: CPT | Performed by: PHYSICIAN ASSISTANT

## 2021-10-06 ASSESSMENT — ENCOUNTER SYMPTOMS
PALPITATIONS: 0
FEVER: 0
HEADACHES: 0
DIZZINESS: 0
DIAPHORESIS: 0
MYALGIAS: 0
CHILLS: 0

## 2021-10-06 ASSESSMENT — FIBROSIS 4 INDEX: FIB4 SCORE: 2.53

## 2021-10-06 NOTE — PROGRESS NOTES
Subjective:   Magnus Claudio is a 76 y.o. male who presents for Cerumen Impaction      HPI:  This is a very pleasant 76-year-old male presenting to the clinic with cerumen impaction.  Patient wears hearing aids.  He went to the clinic yesterday to have his hearing aids adjusted and was informed he needed to have his ears cleaned.  Currently not having any pain.  Does have slightly muffled hearing.  Right ear is worse than his left ear.  No fevers, chills or myalgias.  Does not use Q-tips.    Review of Systems   Constitutional: Negative for chills, diaphoresis, fever and malaise/fatigue.   HENT: Negative for ear discharge, ear pain, hearing loss (Wears hearing aids) and tinnitus.         Cerumen impaction.   Cardiovascular: Negative for chest pain and palpitations.   Musculoskeletal: Negative for myalgias.   Skin: Negative for rash.   Neurological: Negative for dizziness and headaches.       Medications:    • amLODIPine Tabs  • glimepiride Tabs  • hydroCHLOROthiazide  • Lancets  • losartan Tabs  • metFORMIN ER Tb24  • tamsulosin  • therapeutic multivitamin-minerals Tabs  • warfarin Tabs    Allergies: Morphine    Problem List: aMgnus Claudio does not have any pertinent problems on file.    Surgical History:  Past Surgical History:   Procedure Laterality Date   • COLON RESECTION     • EYE SURGERY Bilateral     Cataract surgery   • LAMINOTOMY     • PB RESEC LIVER,PART LOBECTOMY         Past Social Hx: Magnus Claudio  reports that he has never smoked. He has never used smokeless tobacco. He reports that he does not drink alcohol and does not use drugs.     Past Family Hx:  Magnus Claudio family history includes Cancer in his mother; Diabetes in his sister; Heart Attack in his father; Hypertension in his father.     Problem list, medications, and allergies reviewed by myself today in Epic.     Objective:     /72 (BP Location: Right arm, Patient Position: Sitting, BP Cuff Size: Adult)   Pulse  "78   Temp 36.6 °C (97.9 °F) (Temporal)   Resp 16   Ht 1.803 m (5' 11\")   Wt 88.5 kg (195 lb)   SpO2 97%   BMI 27.20 kg/m²     Physical Exam  Constitutional:       General: He is not in acute distress.     Appearance: Normal appearance. He is not ill-appearing, toxic-appearing or diaphoretic.   HENT:      Head: Normocephalic and atraumatic.      Right Ear: There is impacted cerumen.      Left Ear: There is impacted cerumen.      Nose: Nose normal. No congestion or rhinorrhea.      Mouth/Throat:      Mouth: Mucous membranes are moist.      Pharynx: No oropharyngeal exudate or posterior oropharyngeal erythema.   Cardiovascular:      Rate and Rhythm: Normal rate and regular rhythm.      Pulses: Normal pulses.      Heart sounds: Normal heart sounds.   Pulmonary:      Effort: Pulmonary effort is normal.      Breath sounds: Normal breath sounds.   Skin:     General: Skin is warm.   Neurological:      General: No focal deficit present.      Mental Status: He is alert and oriented to person, place, and time. Mental status is at baseline.           Assessment/Plan:     Comments/MDM:     • Ear lavage was performed in clinic by the medical assistant.  I visualized the TMs after the procedure.  TMs nonerythematous nonbulging.  No TM perforation.  Patient tolerated this procedure well.     Diagnosis and associated orders:     1. Bilateral impacted cerumen                Differential diagnosis, natural history, supportive care, and indications for immediate follow-up discussed.    Advised the patient to follow-up with the primary care physician for recheck, reevaluation, and consideration of further management.    Please note that this dictation was created using voice recognition software. I have made reasonable attempt to correct obvious errors, but I expect that there are errors of grammar and possibly content that I did not discover before finalizing the note.    This note was electronically signed by KENNEDY Pop PA-C  "

## 2021-10-25 ENCOUNTER — TELEPHONE (OUTPATIENT)
Dept: MEDICAL GROUP | Facility: MEDICAL CENTER | Age: 76
End: 2021-10-25

## 2021-10-25 ENCOUNTER — ANTICOAGULATION VISIT (OUTPATIENT)
Dept: VASCULAR LAB | Facility: MEDICAL CENTER | Age: 76
End: 2021-10-25
Attending: INTERNAL MEDICINE
Payer: MEDICARE

## 2021-10-25 DIAGNOSIS — Z86.718 HISTORY OF VENOUS THROMBOEMBOLISM: ICD-10-CM

## 2021-10-25 DIAGNOSIS — I82.433 ACUTE DEEP VEIN THROMBOSIS (DVT) OF POPLITEAL VEIN OF BOTH LOWER EXTREMITIES (HCC): ICD-10-CM

## 2021-10-25 LAB — INR PPP: 3 (ref 2–3.5)

## 2021-10-25 PROCEDURE — 99211 OFF/OP EST MAY X REQ PHY/QHP: CPT | Performed by: NURSE PRACTITIONER

## 2021-10-25 PROCEDURE — 85610 PROTHROMBIN TIME: CPT

## 2021-10-25 NOTE — TELEPHONE ENCOUNTER
Patient states had colonoscopy in 2020. Got message from [facility] noting that last colonoscopy with them was in 2017. Please see if pt went to [facility] in the past year.

## 2021-10-25 NOTE — PROGRESS NOTES
Anticoagulation Summary  As of 10/25/2021    INR goal:  2.0-3.0   TTR:  79.1 % (2.1 y)   INR used for dosing:  3.00 (10/25/2021)   Warfarin maintenance plan:  2.5 mg (2.5 mg x 1) every Thu, Sat; 5 mg (2.5 mg x 2) all other days   Weekly warfarin total:  30 mg   Plan last modified:  Jose De Jesus Becker, PharmD (8/10/2021)   Next INR check:  12/6/2021   Target end date:  Indefinite    Indications    History of venous thromboembolism [Z86.718]  Deep vein thrombosis (DVT) of popliteal vein of both lower extremities (HCC) [I82.433]             Anticoagulation Episode Summary     INR check location:      Preferred lab:      Send INR reminders to:      Comments:        Anticoagulation Care Providers     Provider Role Specialty Phone number    [facility] Responsible  [phone]    Too Brito M.D.  [facility] [phone]                Refer to Patient Findings for HPI:  Patient Findings     Negatives:  Signs/symptoms of thrombosis, Signs/symptoms of bleeding, Laboratory test error suspected, Change in health, Change in alcohol use, Change in activity, Upcoming invasive procedure, Emergency department visit, Upcoming dental procedure, Missed doses, Extra doses, Change in medications, Change in diet/appetite, Hospital admission, Bruising, Other complaints          There were no vitals filed for this visit.   pt declined vitals    Verified current warfarin dosing schedule.    Medications reconciled   Pt is not on antiplatelet therapy      A/P   INR  -therapeutic. Encouraged to eat an additional serving of greens since INR upper therapeutic.    Warfarin dosing recommendation: Continue current regimen.    Pt educated to contact our clinic with any changes in medications or s/s of bleeding or thrombosis. Pt is aware to seek immediate medical attention for falls, head injury or deep cuts.    Follow up appointment in 6 week(s).    CHRISTINA Martinez.

## 2021-11-11 ENCOUNTER — HOSPITAL ENCOUNTER (OUTPATIENT)
Dept: LAB | Facility: MEDICAL CENTER | Age: 76
End: 2021-11-11
Attending: FAMILY MEDICINE
Payer: MEDICARE

## 2021-11-11 DIAGNOSIS — D64.9 ANEMIA, UNSPECIFIED TYPE: ICD-10-CM

## 2021-11-11 DIAGNOSIS — E11.9 CONTROLLED TYPE 2 DIABETES MELLITUS WITHOUT COMPLICATION, WITHOUT LONG-TERM CURRENT USE OF INSULIN (HCC): ICD-10-CM

## 2021-11-11 LAB
ALBUMIN SERPL BCP-MCNC: 4.2 G/DL (ref 3.2–4.9)
ALBUMIN/GLOB SERPL: 1.4 G/DL
ALP SERPL-CCNC: 59 U/L (ref 30–99)
ALT SERPL-CCNC: 26 U/L (ref 2–50)
ANION GAP SERPL CALC-SCNC: 11 MMOL/L (ref 7–16)
AST SERPL-CCNC: 31 U/L (ref 12–45)
BASOPHILS # BLD AUTO: 0.5 % (ref 0–1.8)
BASOPHILS # BLD: 0.03 K/UL (ref 0–0.12)
BILIRUB SERPL-MCNC: 1.2 MG/DL (ref 0.1–1.5)
BUN SERPL-MCNC: 16 MG/DL (ref 8–22)
CALCIUM SERPL-MCNC: 9 MG/DL (ref 8.4–10.2)
CEA SERPL-MCNC: 5.9 NG/ML (ref 0–3)
CHLORIDE SERPL-SCNC: 103 MMOL/L (ref 96–112)
CO2 SERPL-SCNC: 25 MMOL/L (ref 20–33)
CREAT SERPL-MCNC: 1.14 MG/DL (ref 0.5–1.4)
EOSINOPHIL # BLD AUTO: 0.09 K/UL (ref 0–0.51)
EOSINOPHIL NFR BLD: 1.6 % (ref 0–6.9)
ERYTHROCYTE [DISTWIDTH] IN BLOOD BY AUTOMATED COUNT: 42.1 FL (ref 35.9–50)
GLOBULIN SER CALC-MCNC: 2.9 G/DL (ref 1.9–3.5)
GLUCOSE SERPL-MCNC: 192 MG/DL (ref 65–99)
HCT VFR BLD AUTO: 39.5 % (ref 42–52)
HGB BLD-MCNC: 13.1 G/DL (ref 14–18)
IMM GRANULOCYTES # BLD AUTO: 0.01 K/UL (ref 0–0.11)
IMM GRANULOCYTES NFR BLD AUTO: 0.2 % (ref 0–0.9)
LYMPHOCYTES # BLD AUTO: 1.03 K/UL (ref 1–4.8)
LYMPHOCYTES NFR BLD: 17.8 % (ref 22–41)
MCH RBC QN AUTO: 28.1 PG (ref 27–33)
MCHC RBC AUTO-ENTMCNC: 33.2 G/DL (ref 33.7–35.3)
MCV RBC AUTO: 84.6 FL (ref 81.4–97.8)
MONOCYTES # BLD AUTO: 0.4 K/UL (ref 0–0.85)
MONOCYTES NFR BLD AUTO: 6.9 % (ref 0–13.4)
NEUTROPHILS # BLD AUTO: 4.23 K/UL (ref 1.82–7.42)
NEUTROPHILS NFR BLD: 73 % (ref 44–72)
NRBC # BLD AUTO: 0 K/UL
NRBC BLD-RTO: 0 /100 WBC
PLATELET # BLD AUTO: 166 K/UL (ref 164–446)
PMV BLD AUTO: 9.9 FL (ref 9–12.9)
POTASSIUM SERPL-SCNC: 3.6 MMOL/L (ref 3.6–5.5)
PROT SERPL-MCNC: 7.1 G/DL (ref 6–8.2)
RBC # BLD AUTO: 4.67 M/UL (ref 4.7–6.1)
SODIUM SERPL-SCNC: 139 MMOL/L (ref 135–145)
WBC # BLD AUTO: 5.8 K/UL (ref 4.8–10.8)

## 2021-11-11 PROCEDURE — 80053 COMPREHEN METABOLIC PANEL: CPT

## 2021-11-11 PROCEDURE — 36415 COLL VENOUS BLD VENIPUNCTURE: CPT

## 2021-11-11 PROCEDURE — 85025 COMPLETE CBC W/AUTO DIFF WBC: CPT

## 2021-11-11 PROCEDURE — 82378 CARCINOEMBRYONIC ANTIGEN: CPT

## 2021-11-15 ENCOUNTER — HOSPITAL ENCOUNTER (OUTPATIENT)
Dept: RADIOLOGY | Facility: MEDICAL CENTER | Age: 76
End: 2021-11-15
Attending: INTERNAL MEDICINE
Payer: MEDICARE

## 2021-11-15 DIAGNOSIS — C18.8 MALIGNANT NEOPLASM OF OVERLAPPING SITES OF COLON (HCC): ICD-10-CM

## 2021-11-15 PROCEDURE — 71260 CT THORAX DX C+: CPT | Mod: MH

## 2021-11-15 PROCEDURE — 700117 HCHG RX CONTRAST REV CODE 255: Performed by: INTERNAL MEDICINE

## 2021-11-15 RX ADMIN — IOHEXOL 25 ML: 240 INJECTION, SOLUTION INTRATHECAL; INTRAVASCULAR; INTRAVENOUS; ORAL at 12:02

## 2021-11-15 RX ADMIN — IOHEXOL 100 ML: 350 INJECTION, SOLUTION INTRAVENOUS at 12:00

## 2021-11-22 ENCOUNTER — NON-PROVIDER VISIT (OUTPATIENT)
Dept: MEDICAL GROUP | Facility: MEDICAL CENTER | Age: 76
End: 2021-11-22
Payer: MEDICARE

## 2021-11-22 DIAGNOSIS — Z23 NEED FOR VACCINATION: ICD-10-CM

## 2021-11-22 PROCEDURE — 90662 IIV NO PRSV INCREASED AG IM: CPT | Performed by: FAMILY MEDICINE

## 2021-11-22 PROCEDURE — G0008 ADMIN INFLUENZA VIRUS VAC: HCPCS | Performed by: FAMILY MEDICINE

## 2021-11-22 NOTE — PROGRESS NOTES
"Magnus Claudio is a 76 y.o. male here for a non-provider visit for:   FLU    Reason for immunization: Annual Flu Vaccine  Immunization records indicate need for vaccine: Yes, confirmed with Epic  Minimum interval has been met for this vaccine: Yes  ABN completed: Not Indicated    VIS Dated  8/6/2021 was given to patient: Yes  All IAC Questionnaire questions were answered \"No.\"    Patient tolerated injection and no adverse effects were observed or reported: Yes    Pt scheduled for next dose in series: Not Indicated    "

## 2021-12-06 ENCOUNTER — ANTICOAGULATION VISIT (OUTPATIENT)
Dept: VASCULAR LAB | Facility: MEDICAL CENTER | Age: 76
End: 2021-12-06
Attending: INTERNAL MEDICINE
Payer: MEDICARE

## 2021-12-06 DIAGNOSIS — Z86.718 HISTORY OF VENOUS THROMBOEMBOLISM: ICD-10-CM

## 2021-12-06 DIAGNOSIS — I82.433 DEEP VEIN THROMBOSIS (DVT) OF POPLITEAL VEIN OF BOTH LOWER EXTREMITIES, UNSPECIFIED CHRONICITY (HCC): ICD-10-CM

## 2021-12-06 LAB — INR PPP: 2.4 (ref 2–3.5)

## 2021-12-06 PROCEDURE — 85610 PROTHROMBIN TIME: CPT

## 2021-12-06 PROCEDURE — 99211 OFF/OP EST MAY X REQ PHY/QHP: CPT

## 2021-12-06 NOTE — PROGRESS NOTES
Anticoagulation Summary  As of 2021    INR goal:  2.0-3.0   TTR:  80.1 % (2.3 y)   INR used for dosin.40 (2021)   Warfarin maintenance plan:  2.5 mg (2.5 mg x 1) every Thu, Sat; 5 mg (2.5 mg x 2) all other days   Weekly warfarin total:  30 mg   Plan last modified:  Jose De Jesus Becker PharmD (8/10/2021)   Next INR check:  2022   Target end date:  Indefinite    Indications    History of venous thromboembolism [Z86.718]  Deep vein thrombosis (DVT) of popliteal vein of both lower extremities (HCC) [I82.433]             Anticoagulation Episode Summary     INR check location:      Preferred lab:      Send INR reminders to:      Comments:        Anticoagulation Care Providers     Provider Role Specialty Phone number    [facility] Responsible  [phone]    Too Brito M.D.  [facility] [phone]              Refer to Patient Findings for HPI:    There were no vitals filed for this visit.  The pt declined vitals today due to big coat    Patient seen in clinic today for follow up on anticoagulation therapy with warfarin (a high risk medication) for hx of DVT  Verified current warfarin dosing schedule.  Patient denies any missed doses of warfarin.    Medications reconciled   Pt is not on antiplatelet therapy      A/P   INR is therapeutic today at 2.4.     Warfarin dosing recommendation: Continue with the current dosing regimen.   Patient will follow up again in 6 weeks.     Pt educated to contact our clinic with any changes in medications or s/s of bleeding or thrombosis. Pt is aware to seek immediate medical attention for falls, head injury or deep cuts.    Follow up appointment in 6 week(s).    Next appt:  @ 1:30pm     Ian Deng PharmD

## 2021-12-08 DIAGNOSIS — Z79.01 CHRONIC ANTICOAGULATION: ICD-10-CM

## 2021-12-08 LAB — INR BLD: 2.4 (ref 0.9–1.2)

## 2021-12-08 RX ORDER — WARFARIN SODIUM 2.5 MG/1
TABLET ORAL
Qty: 180 TABLET | Refills: 1 | Status: SHIPPED | OUTPATIENT
Start: 2021-12-08 | End: 2022-04-27

## 2021-12-14 RX ORDER — METFORMIN HYDROCHLORIDE 500 MG/1
TABLET, EXTENDED RELEASE ORAL
Qty: 360 TABLET | Refills: 4 | Status: SHIPPED | OUTPATIENT
Start: 2021-12-14 | End: 2023-03-03

## 2021-12-15 ENCOUNTER — OFFICE VISIT (OUTPATIENT)
Dept: MEDICAL GROUP | Facility: MEDICAL CENTER | Age: 76
End: 2021-12-15
Payer: MEDICARE

## 2021-12-15 VITALS
TEMPERATURE: 97.7 F | RESPIRATION RATE: 18 BRPM | BODY MASS INDEX: 26.67 KG/M2 | HEIGHT: 71 IN | WEIGHT: 190.48 LBS | DIASTOLIC BLOOD PRESSURE: 78 MMHG | OXYGEN SATURATION: 97 % | HEART RATE: 72 BPM | SYSTOLIC BLOOD PRESSURE: 132 MMHG

## 2021-12-15 DIAGNOSIS — D64.9 ANEMIA, UNSPECIFIED TYPE: ICD-10-CM

## 2021-12-15 DIAGNOSIS — E11.9 CONTROLLED TYPE 2 DIABETES MELLITUS WITHOUT COMPLICATION, WITHOUT LONG-TERM CURRENT USE OF INSULIN (HCC): ICD-10-CM

## 2021-12-15 DIAGNOSIS — R53.83 OTHER FATIGUE: ICD-10-CM

## 2021-12-15 DIAGNOSIS — Z85.038 HISTORY OF COLON CANCER, STAGE IV: ICD-10-CM

## 2021-12-15 PROBLEM — L82.1 SEBORRHEIC KERATOSES: Status: RESOLVED | Noted: 2020-06-10 | Resolved: 2021-12-15

## 2021-12-15 PROBLEM — R51.9 HEADACHE: Status: RESOLVED | Noted: 2019-09-05 | Resolved: 2021-12-15

## 2021-12-15 PROBLEM — I82.433 DEEP VEIN THROMBOSIS (DVT) OF POPLITEAL VEIN OF BOTH LOWER EXTREMITIES (HCC): Status: RESOLVED | Noted: 2019-08-19 | Resolved: 2021-12-15

## 2021-12-15 PROBLEM — D69.6 THROMBOCYTOPENIA (HCC): Status: RESOLVED | Noted: 2018-05-22 | Resolved: 2021-12-15

## 2021-12-15 PROBLEM — M25.512 LEFT SHOULDER PAIN: Status: RESOLVED | Noted: 2019-07-23 | Resolved: 2021-12-15

## 2021-12-15 PROBLEM — R35.1 NOCTURIA: Status: RESOLVED | Noted: 2021-04-13 | Resolved: 2021-12-15

## 2021-12-15 PROBLEM — K40.90 LEFT INGUINAL HERNIA: Status: RESOLVED | Noted: 2021-08-16 | Resolved: 2021-12-15

## 2021-12-15 LAB
HBA1C MFR BLD: 7.3 % (ref 0–5.6)
INT CON NEG: NEGATIVE
INT CON POS: POSITIVE

## 2021-12-15 PROCEDURE — 83036 HEMOGLOBIN GLYCOSYLATED A1C: CPT | Performed by: FAMILY MEDICINE

## 2021-12-15 PROCEDURE — 99214 OFFICE O/P EST MOD 30 MIN: CPT | Performed by: FAMILY MEDICINE

## 2021-12-15 ASSESSMENT — FIBROSIS 4 INDEX: FIB4 SCORE: 2.82

## 2021-12-15 NOTE — ASSESSMENT & PLAN NOTE
Patient's A1c is 7.3.  He is also maintained on losartan, in addition to glimepiride and Metformin.

## 2021-12-15 NOTE — ASSESSMENT & PLAN NOTE
Currently being monitored by oncology and gastroenterology.  Oncology suggested a colonoscopy to ensure no local recurrence.

## 2021-12-15 NOTE — ASSESSMENT & PLAN NOTE
Ever since being treated for bladder cancer the patient describes a sensation of feeling cold as well as fatigue.  He does have an anemia.  He denies chest pain and shortness of breath.

## 2021-12-15 NOTE — PROGRESS NOTES
[facility]  Progress Note  Established Patient    Subjective:   Magnus Claudio is a 76 y.o. male here today with a chief complaint of diabetes. The patient is alone.     History of colon cancer, stage IV  Currently being monitored by oncology and gastroenterology.  Oncology suggested a colonoscopy to ensure no local recurrence.    Fatigue  Ever since being treated for bladder cancer the patient describes a sensation of feeling cold as well as fatigue.  He does have an anemia.  He denies chest pain and shortness of breath.    Controlled type 2 diabetes mellitus without complication, without long-term current use of insulin (HCC)  Patient's A1c is 7.3.  He is also maintained on losartan, in addition to glimepiride and Metformin.    Anemia  Patient has anemia in the setting of a history of colon cancer.  Iron testing is normal.      Current Outpatient Medications on File Prior to Visit   Medication Sig Dispense Refill   • metFORMIN ER (GLUCOPHAGE XR) 500 MG TABLET SR 24 HR TAKE 2 TABLETS BY MOUTH TWICE DAILY 360 Tablet 4   • warfarin (COUMADIN) 2.5 MG Tab TAKE 1 TO 2 TABLETS BY MOUTH DAILY OR AS DIRECTED BY ANTICOAGULATION CLINIC 180 Tablet 1   • tamsulosin (FLOMAX) 0.4 MG capsule TAKE 1 CAPSULE BY MOUTH EVERY  capsule 4   • Lancets Lancets for glucometer covered by patient's insurance Sig: use daily and prn ssx high or low sugar. 300 Each 12   • glimepiride (AMARYL) 4 MG Tab Take 1 tablet by mouth every morning. 100 tablet 4   • amLODIPine (NORVASC) 5 MG Tab TAKE 1 TABLET BY MOUTH EVERY DAY (Patient taking differently: Take 5 mg by mouth every morning.) 100 tablet 4   • losartan (COZAAR) 100 MG Tab TAKE 1 TABLET BY MOUTH DAILY (Patient taking differently: Take 100 mg by mouth every morning.) 100 Tab 4   • hydroCHLOROthiazide (HYDRODIURIL) 12.5 MG tablet TAKE 1 TABLET BY MOUTH DAILY (Patient taking differently: Take 12.5 mg by mouth every morning.) 100 Tab 4   • therapeutic multivitamin-minerals  "(THERAGRAN-M) Tab Take 1 Tab by mouth every day.       No current facility-administered medications on file prior to visit.          Objective:     Vitals:    12/15/21 1036   BP: 132/78   BP Location: Left arm   Patient Position: Sitting   BP Cuff Size: Adult long   Pulse: 72   Resp: 18   Temp: 36.5 °C (97.7 °F)   TempSrc: Temporal   SpO2: 97%   Weight: 86.4 kg (190 lb 7.6 oz)   Height: 1.803 m (5' 11\")       Physical Exam:  General: alert in no apparent distress.         Assessment and Plan:     1. Controlled type 2 diabetes mellitus without complication, without long-term current use of insulin (HCC)  Considering the patient's age and comorbidities, I think an A1c goal of less than 7.5 is reasonable.  We will continue the current regimen.  The patient is over the age of 74 so there is no indication for statin therapy for primary prevention.  - POCT A1C    2. Anemia, unspecified type  Likely anemia of chronic inflammation.  - Referral to Gastroenterology  - CBC WITH DIFFERENTIAL; Future    3. Other fatigue  - TSH WITH REFLEX TO FT4; Future  - CBC WITH DIFFERENTIAL; Future    4. History of colon cancer, stage IV  Oncology suggesting repeat colonoscopy to ensure no local recurrence.  - Referral to Gastroenterology        Followup: Return in about 4 months (around 4/15/2022), or if symptoms worsen or fail to improve.         "

## 2022-01-04 ENCOUNTER — HOSPITAL ENCOUNTER (OUTPATIENT)
Dept: LAB | Facility: MEDICAL CENTER | Age: 77
End: 2022-01-04
Attending: FAMILY MEDICINE
Payer: MEDICARE

## 2022-01-04 DIAGNOSIS — R53.83 OTHER FATIGUE: ICD-10-CM

## 2022-01-04 DIAGNOSIS — D64.9 ANEMIA, UNSPECIFIED TYPE: ICD-10-CM

## 2022-01-04 LAB
BASOPHILS # BLD AUTO: 0.5 % (ref 0–1.8)
BASOPHILS # BLD: 0.04 K/UL (ref 0–0.12)
EOSINOPHIL # BLD AUTO: 0.05 K/UL (ref 0–0.51)
EOSINOPHIL NFR BLD: 0.7 % (ref 0–6.9)
ERYTHROCYTE [DISTWIDTH] IN BLOOD BY AUTOMATED COUNT: 43.4 FL (ref 35.9–50)
HCT VFR BLD AUTO: 40.6 % (ref 42–52)
HGB BLD-MCNC: 13.8 G/DL (ref 14–18)
IMM GRANULOCYTES # BLD AUTO: 0.02 K/UL (ref 0–0.11)
IMM GRANULOCYTES NFR BLD AUTO: 0.3 % (ref 0–0.9)
LYMPHOCYTES # BLD AUTO: 1.5 K/UL (ref 1–4.8)
LYMPHOCYTES NFR BLD: 20.3 % (ref 22–41)
MCH RBC QN AUTO: 28.4 PG (ref 27–33)
MCHC RBC AUTO-ENTMCNC: 34 G/DL (ref 33.7–35.3)
MCV RBC AUTO: 83.5 FL (ref 81.4–97.8)
MONOCYTES # BLD AUTO: 0.51 K/UL (ref 0–0.85)
MONOCYTES NFR BLD AUTO: 6.9 % (ref 0–13.4)
NEUTROPHILS # BLD AUTO: 5.28 K/UL (ref 1.82–7.42)
NEUTROPHILS NFR BLD: 71.3 % (ref 44–72)
NRBC # BLD AUTO: 0 K/UL
NRBC BLD-RTO: 0 /100 WBC
PLATELET # BLD AUTO: 161 K/UL (ref 164–446)
PMV BLD AUTO: 10 FL (ref 9–12.9)
RBC # BLD AUTO: 4.86 M/UL (ref 4.7–6.1)
TSH SERPL DL<=0.005 MIU/L-ACNC: 2.1 UIU/ML (ref 0.38–5.33)
WBC # BLD AUTO: 7.4 K/UL (ref 4.8–10.8)

## 2022-01-04 PROCEDURE — 85025 COMPLETE CBC W/AUTO DIFF WBC: CPT

## 2022-01-04 PROCEDURE — 84443 ASSAY THYROID STIM HORMONE: CPT

## 2022-01-04 PROCEDURE — 36415 COLL VENOUS BLD VENIPUNCTURE: CPT

## 2022-01-10 ENCOUNTER — APPOINTMENT (OUTPATIENT)
Dept: LAB | Facility: MEDICAL CENTER | Age: 77
End: 2022-01-10
Attending: INTERNAL MEDICINE
Payer: MEDICARE

## 2022-01-17 ENCOUNTER — ANTICOAGULATION VISIT (OUTPATIENT)
Dept: VASCULAR LAB | Facility: MEDICAL CENTER | Age: 77
End: 2022-01-17
Attending: INTERNAL MEDICINE
Payer: MEDICARE

## 2022-01-17 DIAGNOSIS — Z86.718 HISTORY OF VENOUS THROMBOEMBOLISM: ICD-10-CM

## 2022-01-17 LAB
INR BLD: 2.5 (ref 0.9–1.2)
INR PPP: 2.5 (ref 2–3.5)

## 2022-01-17 PROCEDURE — 85610 PROTHROMBIN TIME: CPT

## 2022-01-17 PROCEDURE — 99212 OFFICE O/P EST SF 10 MIN: CPT

## 2022-01-17 NOTE — PATIENT INSTRUCTIONS
Date  Warfarin dosing PM Lovenox   5 Days before procedure 2/6 0mg NONE   4 Days before procedure 2/7 0mg Lovenox injection   3 Days before procedure 2/8 0mg Lovenox injection   2 Days before procedure 2/9 0mg Lovenox injection   1 Days before procedure 2/10 0mg NONE   PROCEDURE 2/11 5mg NONE   1 Day after procedure 2/12 2.5mg Restart Lovenox injections      2 Days after procedure 2/13 5mg Lovenox injection   3 Days after procedure 2/14 5mg Lovenox injection   4 Days after procedure 2/15 5mg Lovenox injection   5 Days after procedure 2/16 5mg GET INR checked

## 2022-01-17 NOTE — PROGRESS NOTES
Anticoagulation Summary  As of 2022    INR goal:  2.0-3.0   TTR:  81.1 % (2.4 y)   INR used for dosin.50 (2022)   Warfarin maintenance plan:  2.5 mg (2.5 mg x 1) every Thu, Sat; 5 mg (2.5 mg x 2) all other days   Weekly warfarin total:  30 mg   Plan last modified:  Jose De Jesus Becker, PharmD (8/10/2021)   Next INR check:  2022   Target end date:  Indefinite    Indications    History of venous thromboembolism [Z86.718]  Deep vein thrombosis (DVT) of popliteal vein of both lower extremities (HCC) (Resolved) [I82.433]             Anticoagulation Episode Summary     INR check location:      Preferred lab:      Send INR reminders to:      Comments:        Anticoagulation Care Providers     Provider Role Specialty Phone number    [facility] Responsible  [phone]    Too Brito M.D.  Family Medicine [phone]        Refer to Patient Findings for HPI:  Patient Findings     Positives:  Upcoming invasive procedure (hernia repair scheduled for )    Negatives:  Signs/symptoms of thrombosis, Signs/symptoms of bleeding, Laboratory test error suspected, Change in health, Change in alcohol use, Change in activity, Emergency department visit, Upcoming dental procedure, Missed doses, Extra doses, Change in medications, Change in diet/appetite, Hospital admission, Bruising, Other complaints          There were no vitals filed for this visit.    Verified current warfarin dosing schedule. Currently taking 2.5mg on Thursday and Saturday, and 5mg all other days.    Medications reconciled.  Pt is not on antiplatelet therapy    Patient has upcoming surgery on  and will need to bridge with Lovenox therapy prior to the procedure.     A/P   INR therapeutic at 2.5.     Warfarin dosing recommendation: continue current regimen of 2.5mg on Thursday and Saturday, and 5mg all other days. Follow the chart as listed below for information regarding Lovenox bridging:    Pt to have procedure  on 2/11/2022. Due to pt history lovenox bridging is indicated.   Pt to follow instructions as below, after procedure to ask operating MD when safe to resume anticoagulation, if no instructions given, pt will follow as below.     Clot history: history of thrombosis in both legs and in the arm    Current weight: 195 lbs --> 88kg  CrCl = 62mL/min  Lab Results   Component Value Date/Time    BUN 16 11/11/2021 10:51 AM    CREATININE 1.14 11/11/2021 10:51 AM     Used Lovenox before - yes        Date  Warfarin dosing PM Lovenox   5 Days before procedure 2/6 0mg NONE   4 Days before procedure 2/7 0mg Lovenox injection   3 Days before procedure 2/8 0mg Lovenox injection   2 Days before procedure 2/9 0mg Lovenox injection   1 Days before procedure 2/10 0mg NONE   PROCEDURE 2/11 5mg NONE   1 Day after procedure 2/12 2.5mg Restart Lovenox injections      2 Days after procedure 2/13 5mg Lovenox injection   3 Days after procedure 2/14 5mg Lovenox injection   4 Days after procedure 2/15 5mg Lovenox injection   5 Days after procedure 2/16 5mg GET INR checked     Pt educated to contact our clinic with any changes in medications or s/s of bleeding or thrombosis. Pt is aware to seek immediate medical attention for falls, head injury or deep cuts.    Follow up appointment in 4 week(s).    Josefina Reynolds PharmD  PGY1 Pharmacy Practice Resident    CC:  Deo Gil PharmD

## 2022-01-17 NOTE — Clinical Note
Lovenox bridging; patient's INR was therapeutic today (2.5) and he has an upcoming procedure on 2/11. The patient was very familiar with the bridging process. Plan discussed with [place] as well. Let me know if you have any questions. Thanks!    Josefina Reynolds, PharmD  PGY1 Pharmacy Practice Resident

## 2022-01-24 ENCOUNTER — PRE-ADMISSION TESTING (OUTPATIENT)
Dept: ADMISSIONS | Facility: MEDICAL CENTER | Age: 77
End: 2022-01-24
Attending: FAMILY MEDICINE
Payer: MEDICARE

## 2022-01-24 DIAGNOSIS — Z01.810 PRE-OPERATIVE CARDIOVASCULAR EXAMINATION: ICD-10-CM

## 2022-01-24 DIAGNOSIS — Z01.812 PRE-OPERATIVE LABORATORY EXAMINATION: ICD-10-CM

## 2022-01-24 LAB
ANION GAP SERPL CALC-SCNC: 14 MMOL/L (ref 7–16)
BUN SERPL-MCNC: 12 MG/DL (ref 8–22)
CALCIUM SERPL-MCNC: 9 MG/DL (ref 8.5–10.5)
CHLORIDE SERPL-SCNC: 104 MMOL/L (ref 96–112)
CO2 SERPL-SCNC: 21 MMOL/L (ref 20–33)
CREAT SERPL-MCNC: 1.03 MG/DL (ref 0.5–1.4)
EKG IMPRESSION: NORMAL
ERYTHROCYTE [DISTWIDTH] IN BLOOD BY AUTOMATED COUNT: 42.7 FL (ref 35.9–50)
GLUCOSE SERPL-MCNC: 210 MG/DL (ref 65–99)
HCT VFR BLD AUTO: 41 % (ref 42–52)
HGB BLD-MCNC: 14.4 G/DL (ref 14–18)
MCH RBC QN AUTO: 28.9 PG (ref 27–33)
MCHC RBC AUTO-ENTMCNC: 35.1 G/DL (ref 33.7–35.3)
MCV RBC AUTO: 82.3 FL (ref 81.4–97.8)
PLATELET # BLD AUTO: 184 K/UL (ref 164–446)
PMV BLD AUTO: 10.1 FL (ref 9–12.9)
POTASSIUM SERPL-SCNC: 3.9 MMOL/L (ref 3.6–5.5)
RBC # BLD AUTO: 4.98 M/UL (ref 4.7–6.1)
SODIUM SERPL-SCNC: 139 MMOL/L (ref 135–145)
WBC # BLD AUTO: 7.6 K/UL (ref 4.8–10.8)

## 2022-01-24 PROCEDURE — 85027 COMPLETE CBC AUTOMATED: CPT

## 2022-01-24 PROCEDURE — 93010 ELECTROCARDIOGRAM REPORT: CPT | Performed by: INTERNAL MEDICINE

## 2022-01-24 PROCEDURE — 93005 ELECTROCARDIOGRAM TRACING: CPT

## 2022-01-24 PROCEDURE — 36415 COLL VENOUS BLD VENIPUNCTURE: CPT

## 2022-01-24 PROCEDURE — 80048 BASIC METABOLIC PNL TOTAL CA: CPT

## 2022-01-24 ASSESSMENT — FIBROSIS 4 INDEX: FIB4 SCORE: 2.91

## 2022-02-07 ENCOUNTER — HOSPITAL ENCOUNTER (OUTPATIENT)
Dept: LAB | Facility: MEDICAL CENTER | Age: 77
End: 2022-02-07
Attending: INTERNAL MEDICINE
Payer: MEDICARE

## 2022-02-07 LAB
ALBUMIN SERPL BCP-MCNC: 4.3 G/DL (ref 3.2–4.9)
ALBUMIN/GLOB SERPL: 1.6 G/DL
ALP SERPL-CCNC: 56 U/L (ref 30–99)
ALT SERPL-CCNC: 21 U/L (ref 2–50)
ANION GAP SERPL CALC-SCNC: 9 MMOL/L (ref 7–16)
AST SERPL-CCNC: 25 U/L (ref 12–45)
BASOPHILS # BLD AUTO: 0.8 % (ref 0–1.8)
BASOPHILS # BLD: 0.05 K/UL (ref 0–0.12)
BILIRUB SERPL-MCNC: 2.1 MG/DL (ref 0.1–1.5)
BUN SERPL-MCNC: 18 MG/DL (ref 8–22)
CALCIUM SERPL-MCNC: 8.8 MG/DL (ref 8.4–10.2)
CEA SERPL-MCNC: 3 NG/ML (ref 0–3)
CHLORIDE SERPL-SCNC: 105 MMOL/L (ref 96–112)
CO2 SERPL-SCNC: 26 MMOL/L (ref 20–33)
CREAT SERPL-MCNC: 1.13 MG/DL (ref 0.5–1.4)
EOSINOPHIL # BLD AUTO: 0.08 K/UL (ref 0–0.51)
EOSINOPHIL NFR BLD: 1.3 % (ref 0–6.9)
ERYTHROCYTE [DISTWIDTH] IN BLOOD BY AUTOMATED COUNT: 42.6 FL (ref 35.9–50)
GLOBULIN SER CALC-MCNC: 2.7 G/DL (ref 1.9–3.5)
GLUCOSE SERPL-MCNC: 160 MG/DL (ref 65–99)
HCT VFR BLD AUTO: 41.6 % (ref 42–52)
HGB BLD-MCNC: 14.4 G/DL (ref 14–18)
IMM GRANULOCYTES # BLD AUTO: 0.03 K/UL (ref 0–0.11)
IMM GRANULOCYTES NFR BLD AUTO: 0.5 % (ref 0–0.9)
LYMPHOCYTES # BLD AUTO: 1.44 K/UL (ref 1–4.8)
LYMPHOCYTES NFR BLD: 22.9 % (ref 22–41)
MCH RBC QN AUTO: 28.9 PG (ref 27–33)
MCHC RBC AUTO-ENTMCNC: 34.6 G/DL (ref 33.7–35.3)
MCV RBC AUTO: 83.5 FL (ref 81.4–97.8)
MONOCYTES # BLD AUTO: 0.44 K/UL (ref 0–0.85)
MONOCYTES NFR BLD AUTO: 7 % (ref 0–13.4)
NEUTROPHILS # BLD AUTO: 4.25 K/UL (ref 1.82–7.42)
NEUTROPHILS NFR BLD: 67.5 % (ref 44–72)
NRBC # BLD AUTO: 0 K/UL
NRBC BLD-RTO: 0 /100 WBC
PLATELET # BLD AUTO: 146 K/UL (ref 164–446)
PMV BLD AUTO: 9.5 FL (ref 9–12.9)
POTASSIUM SERPL-SCNC: 3.6 MMOL/L (ref 3.6–5.5)
PROT SERPL-MCNC: 7 G/DL (ref 6–8.2)
RBC # BLD AUTO: 4.98 M/UL (ref 4.7–6.1)
SODIUM SERPL-SCNC: 140 MMOL/L (ref 135–145)
WBC # BLD AUTO: 6.3 K/UL (ref 4.8–10.8)

## 2022-02-07 PROCEDURE — 85025 COMPLETE CBC W/AUTO DIFF WBC: CPT

## 2022-02-07 PROCEDURE — 36415 COLL VENOUS BLD VENIPUNCTURE: CPT

## 2022-02-07 PROCEDURE — 82378 CARCINOEMBRYONIC ANTIGEN: CPT

## 2022-02-07 PROCEDURE — 80053 COMPREHEN METABOLIC PANEL: CPT

## 2022-02-10 ENCOUNTER — ANESTHESIA EVENT (OUTPATIENT)
Dept: SURGERY | Facility: MEDICAL CENTER | Age: 77
End: 2022-02-10
Payer: MEDICARE

## 2022-02-11 ENCOUNTER — HOSPITAL ENCOUNTER (OUTPATIENT)
Facility: MEDICAL CENTER | Age: 77
End: 2022-02-11
Attending: SURGERY | Admitting: SURGERY
Payer: MEDICARE

## 2022-02-11 ENCOUNTER — ANESTHESIA (OUTPATIENT)
Dept: SURGERY | Facility: MEDICAL CENTER | Age: 77
End: 2022-02-11
Payer: MEDICARE

## 2022-02-11 VITALS
RESPIRATION RATE: 16 BRPM | OXYGEN SATURATION: 93 % | HEIGHT: 71 IN | SYSTOLIC BLOOD PRESSURE: 145 MMHG | DIASTOLIC BLOOD PRESSURE: 67 MMHG | BODY MASS INDEX: 26.85 KG/M2 | TEMPERATURE: 98.6 F | WEIGHT: 191.8 LBS | HEART RATE: 77 BPM

## 2022-02-11 DIAGNOSIS — G89.18 ACUTE POSTOPERATIVE PAIN: ICD-10-CM

## 2022-02-11 PROBLEM — K40.90 INDIRECT LEFT INGUINAL HERNIA: Status: RESOLVED | Noted: 2021-08-16 | Resolved: 2022-02-11

## 2022-02-11 LAB
EXTERNAL QUALITY CONTROL: NORMAL
GLUCOSE BLD-MCNC: 178 MG/DL (ref 65–99)
INR PPP: 1.1 (ref 0.87–1.13)
PROTHROMBIN TIME: 13.8 SEC (ref 12–14.6)
SARS-COV+SARS-COV-2 AG RESP QL IA.RAPID: NEGATIVE

## 2022-02-11 PROCEDURE — 500868 HCHG NEEDLE, SURGI(VARES): Performed by: SURGERY

## 2022-02-11 PROCEDURE — 160025 RECOVERY II MINUTES (STATS): Performed by: SURGERY

## 2022-02-11 PROCEDURE — 160009 HCHG ANES TIME/MIN: Performed by: SURGERY

## 2022-02-11 PROCEDURE — 160041 HCHG SURGERY MINUTES - EA ADDL 1 MIN LEVEL 4: Performed by: SURGERY

## 2022-02-11 PROCEDURE — 82962 GLUCOSE BLOOD TEST: CPT

## 2022-02-11 PROCEDURE — 700101 HCHG RX REV CODE 250: Performed by: SURGERY

## 2022-02-11 PROCEDURE — 87426 SARSCOV CORONAVIRUS AG IA: CPT | Performed by: SURGERY

## 2022-02-11 PROCEDURE — 700105 HCHG RX REV CODE 258: Performed by: SURGERY

## 2022-02-11 PROCEDURE — 700102 HCHG RX REV CODE 250 W/ 637 OVERRIDE(OP): Performed by: ANESTHESIOLOGY

## 2022-02-11 PROCEDURE — 160029 HCHG SURGERY MINUTES - 1ST 30 MINS LEVEL 4: Performed by: SURGERY

## 2022-02-11 PROCEDURE — 501838 HCHG SUTURE GENERAL: Performed by: SURGERY

## 2022-02-11 PROCEDURE — 160048 HCHG OR STATISTICAL LEVEL 1-5: Performed by: SURGERY

## 2022-02-11 PROCEDURE — 160002 HCHG RECOVERY MINUTES (STAT): Performed by: SURGERY

## 2022-02-11 PROCEDURE — 160035 HCHG PACU - 1ST 60 MINS PHASE I: Performed by: SURGERY

## 2022-02-11 PROCEDURE — 160046 HCHG PACU - 1ST 60 MINS PHASE II: Performed by: SURGERY

## 2022-02-11 PROCEDURE — 700111 HCHG RX REV CODE 636 W/ 250 OVERRIDE (IP): Performed by: ANESTHESIOLOGY

## 2022-02-11 PROCEDURE — 85610 PROTHROMBIN TIME: CPT

## 2022-02-11 PROCEDURE — 502714 HCHG ROBOTIC SURGERY SERVICES: Performed by: SURGERY

## 2022-02-11 PROCEDURE — C1781 MESH (IMPLANTABLE): HCPCS | Performed by: SURGERY

## 2022-02-11 PROCEDURE — 700101 HCHG RX REV CODE 250: Performed by: ANESTHESIOLOGY

## 2022-02-11 PROCEDURE — A9270 NON-COVERED ITEM OR SERVICE: HCPCS | Performed by: ANESTHESIOLOGY

## 2022-02-11 DEVICE — MESH LAP PROGRIP FLAT SHEET 16 X 12: Type: IMPLANTABLE DEVICE | Site: INGUINAL | Status: FUNCTIONAL

## 2022-02-11 RX ORDER — SODIUM CHLORIDE, SODIUM LACTATE, POTASSIUM CHLORIDE, CALCIUM CHLORIDE 600; 310; 30; 20 MG/100ML; MG/100ML; MG/100ML; MG/100ML
INJECTION, SOLUTION INTRAVENOUS CONTINUOUS
Status: DISCONTINUED | OUTPATIENT
Start: 2022-02-11 | End: 2022-02-11 | Stop reason: HOSPADM

## 2022-02-11 RX ORDER — MEPERIDINE HYDROCHLORIDE 25 MG/ML
5 INJECTION INTRAMUSCULAR; INTRAVENOUS; SUBCUTANEOUS
Status: DISCONTINUED | OUTPATIENT
Start: 2022-02-11 | End: 2022-02-11 | Stop reason: HOSPADM

## 2022-02-11 RX ORDER — OXYCODONE HCL 5 MG/5 ML
10 SOLUTION, ORAL ORAL
Status: DISCONTINUED | OUTPATIENT
Start: 2022-02-11 | End: 2022-02-11 | Stop reason: HOSPADM

## 2022-02-11 RX ORDER — ACETAMINOPHEN 500 MG
1000 TABLET ORAL ONCE
Status: COMPLETED | OUTPATIENT
Start: 2022-02-11 | End: 2022-02-11

## 2022-02-11 RX ORDER — DIPHENHYDRAMINE HYDROCHLORIDE 50 MG/ML
12.5 INJECTION INTRAMUSCULAR; INTRAVENOUS
Status: DISCONTINUED | OUTPATIENT
Start: 2022-02-11 | End: 2022-02-11 | Stop reason: HOSPADM

## 2022-02-11 RX ORDER — ACETAMINOPHEN 500 MG
1000 TABLET ORAL EVERY 6 HOURS
Qty: 56 TABLET | Refills: 0 | Status: SHIPPED | OUTPATIENT
Start: 2022-02-11 | End: 2022-02-18

## 2022-02-11 RX ORDER — LABETALOL HYDROCHLORIDE 5 MG/ML
5 INJECTION, SOLUTION INTRAVENOUS
Status: DISCONTINUED | OUTPATIENT
Start: 2022-02-11 | End: 2022-02-11 | Stop reason: HOSPADM

## 2022-02-11 RX ORDER — HALOPERIDOL 5 MG/ML
1 INJECTION INTRAMUSCULAR
Status: DISCONTINUED | OUTPATIENT
Start: 2022-02-11 | End: 2022-02-11 | Stop reason: HOSPADM

## 2022-02-11 RX ORDER — SUCCINYLCHOLINE CHLORIDE 20 MG/ML
INJECTION INTRAMUSCULAR; INTRAVENOUS PRN
Status: DISCONTINUED | OUTPATIENT
Start: 2022-02-11 | End: 2022-02-11 | Stop reason: SURG

## 2022-02-11 RX ORDER — OXYCODONE HYDROCHLORIDE 5 MG/1
5 TABLET ORAL EVERY 6 HOURS PRN
Qty: 12 TABLET | Refills: 0 | Status: SHIPPED | OUTPATIENT
Start: 2022-02-11 | End: 2022-02-14

## 2022-02-11 RX ORDER — BUPIVACAINE HYDROCHLORIDE AND EPINEPHRINE 5; 5 MG/ML; UG/ML
INJECTION, SOLUTION EPIDURAL; INTRACAUDAL; PERINEURAL
Status: DISCONTINUED | OUTPATIENT
Start: 2022-02-11 | End: 2022-02-11 | Stop reason: HOSPADM

## 2022-02-11 RX ORDER — OXYCODONE HCL 5 MG/5 ML
5 SOLUTION, ORAL ORAL
Status: DISCONTINUED | OUTPATIENT
Start: 2022-02-11 | End: 2022-02-11 | Stop reason: HOSPADM

## 2022-02-11 RX ORDER — ONDANSETRON 2 MG/ML
INJECTION INTRAMUSCULAR; INTRAVENOUS PRN
Status: DISCONTINUED | OUTPATIENT
Start: 2022-02-11 | End: 2022-02-11 | Stop reason: SURG

## 2022-02-11 RX ORDER — IBUPROFEN 800 MG/1
800 TABLET ORAL
Qty: 21 TABLET | Refills: 0 | Status: SHIPPED | OUTPATIENT
Start: 2022-02-11 | End: 2022-02-18

## 2022-02-11 RX ORDER — HYDRALAZINE HYDROCHLORIDE 20 MG/ML
5 INJECTION INTRAMUSCULAR; INTRAVENOUS
Status: DISCONTINUED | OUTPATIENT
Start: 2022-02-11 | End: 2022-02-11 | Stop reason: HOSPADM

## 2022-02-11 RX ORDER — HYDROMORPHONE HYDROCHLORIDE 1 MG/ML
0.2 INJECTION, SOLUTION INTRAMUSCULAR; INTRAVENOUS; SUBCUTANEOUS
Status: DISCONTINUED | OUTPATIENT
Start: 2022-02-11 | End: 2022-02-11 | Stop reason: HOSPADM

## 2022-02-11 RX ORDER — HYDROMORPHONE HYDROCHLORIDE 1 MG/ML
0.1 INJECTION, SOLUTION INTRAMUSCULAR; INTRAVENOUS; SUBCUTANEOUS
Status: DISCONTINUED | OUTPATIENT
Start: 2022-02-11 | End: 2022-02-11 | Stop reason: HOSPADM

## 2022-02-11 RX ORDER — ONDANSETRON 2 MG/ML
4 INJECTION INTRAMUSCULAR; INTRAVENOUS
Status: DISCONTINUED | OUTPATIENT
Start: 2022-02-11 | End: 2022-02-11 | Stop reason: HOSPADM

## 2022-02-11 RX ORDER — CEFAZOLIN SODIUM 1 G/3ML
INJECTION, POWDER, FOR SOLUTION INTRAMUSCULAR; INTRAVENOUS PRN
Status: DISCONTINUED | OUTPATIENT
Start: 2022-02-11 | End: 2022-02-11 | Stop reason: SURG

## 2022-02-11 RX ORDER — HYDROMORPHONE HYDROCHLORIDE 1 MG/ML
0.4 INJECTION, SOLUTION INTRAMUSCULAR; INTRAVENOUS; SUBCUTANEOUS
Status: DISCONTINUED | OUTPATIENT
Start: 2022-02-11 | End: 2022-02-11 | Stop reason: HOSPADM

## 2022-02-11 RX ORDER — ROCURONIUM BROMIDE 10 MG/ML
INJECTION, SOLUTION INTRAVENOUS PRN
Status: DISCONTINUED | OUTPATIENT
Start: 2022-02-11 | End: 2022-02-11 | Stop reason: SURG

## 2022-02-11 RX ORDER — DEXTROSE MONOHYDRATE 25 G/50ML
50 INJECTION, SOLUTION INTRAVENOUS
Status: DISCONTINUED | OUTPATIENT
Start: 2022-02-11 | End: 2022-02-11 | Stop reason: HOSPADM

## 2022-02-11 RX ADMIN — PROPOFOL 150 MG: 10 INJECTION, EMULSION INTRAVENOUS at 09:37

## 2022-02-11 RX ADMIN — SUGAMMADEX 200 MG: 100 INJECTION, SOLUTION INTRAVENOUS at 10:38

## 2022-02-11 RX ADMIN — FENTANYL CITRATE 50 MCG: 50 INJECTION, SOLUTION INTRAMUSCULAR; INTRAVENOUS at 09:37

## 2022-02-11 RX ADMIN — ONDANSETRON 4 MG: 2 INJECTION INTRAMUSCULAR; INTRAVENOUS at 09:50

## 2022-02-11 RX ADMIN — ROCURONIUM BROMIDE 10 MG: 10 INJECTION, SOLUTION INTRAVENOUS at 09:45

## 2022-02-11 RX ADMIN — ROCURONIUM BROMIDE 10 MG: 10 INJECTION, SOLUTION INTRAVENOUS at 09:51

## 2022-02-11 RX ADMIN — ACETAMINOPHEN 1000 MG: 500 TABLET ORAL at 08:26

## 2022-02-11 RX ADMIN — PROPOFOL 50 MG: 10 INJECTION, EMULSION INTRAVENOUS at 09:51

## 2022-02-11 RX ADMIN — FENTANYL CITRATE 100 MCG: 50 INJECTION, SOLUTION INTRAMUSCULAR; INTRAVENOUS at 09:55

## 2022-02-11 RX ADMIN — FENTANYL CITRATE 50 MCG: 50 INJECTION, SOLUTION INTRAMUSCULAR; INTRAVENOUS at 09:45

## 2022-02-11 RX ADMIN — SODIUM CHLORIDE, POTASSIUM CHLORIDE, SODIUM LACTATE AND CALCIUM CHLORIDE: 600; 310; 30; 20 INJECTION, SOLUTION INTRAVENOUS at 09:34

## 2022-02-11 RX ADMIN — SUCCINYLCHOLINE CHLORIDE 100 MG: 20 INJECTION, SOLUTION INTRAMUSCULAR; INTRAVENOUS; PARENTERAL at 09:38

## 2022-02-11 RX ADMIN — CEFAZOLIN 2 G: 330 INJECTION, POWDER, FOR SOLUTION INTRAMUSCULAR; INTRAVENOUS at 09:39

## 2022-02-11 ASSESSMENT — PAIN SCALES - GENERAL: PAIN_LEVEL: 0

## 2022-02-11 ASSESSMENT — PAIN DESCRIPTION - PAIN TYPE
TYPE: SURGICAL PAIN

## 2022-02-11 ASSESSMENT — FIBROSIS 4 INDEX: FIB4 SCORE: 2.88

## 2022-02-11 NOTE — OP REPORT
Operative Report    Date: 2/11/2022    PreOp Diagnosis:   1.  Left inguinal hernia    PostOp Diagnosis: Same    Procedure(s):  1.  REPAIR, HERNIA, INGUINAL, ROBOT-ASSISTED, USING DA NIKITA XI - WITH MESH PLACEMENT - Wound Class: Clean    Surgeon(s):  Nelson Denney M.D.    Assistant:  Jose De Jesus Steel PA-C  (a surgical first assistant was required for the entire procedure for help with patient positioning, prep, incision placement, management of the laparoscopic/robotic camera and equipment, retraction of critical structures, closure of incisions)    Anesthesiologist/Type of Anesthesia:  Anesthesiologist: Adithya Rucker M.D./General    Surgical Staff:  Circulator: Queta Rivas R.N.; Isabella Michael R.N.  Relief Circulator: Bhavna Kunz  Scrub Person: Prince Castillo    Specimens removed if any:  * No specimens in log *    Estimated Blood Loss: 5 mL    Findings: Very large indirect left inguinal hernia    Complications: None noted    Outcome: Transferred to PACU in stable condition    Indications:  76-year-old male with an enlarging and increasingly symptomatic left inguinal hernia for which the procedure above is recommended.  This was discussed with him in detail including the risk, benefits, alternatives, and he wished to proceed.    Procedure in detail:   The patient was brought to the operating room and was placed in the supine position where general endotracheal anesthesia was induced. SCDs were in place and functioning.  Preoperative antibiotics of ancef were given before incision time. The patient's abdomen was prepped and draped in the usual sterile fashion.    After infiltration of local anesthetic, a skin incision was made to accommodate an 8 mm robotic port in midline, approximately 2 cm above the umbilicus.  A Veress needle was used to access the peritoneum, and the abdomen was insufflated to 15 mmHg, which the patient tolerated well.  The laparoscopic camera was then introduced and the  abdomen inspected for evidence of trauma secondary to Veress needle or port placement, and found none.  The peritoneal space was examined, and there were no adhesions.  Two more 8 mm ports were placed, one in the right upper quadrant one in the left upper quadrant.      The patient was then placed in approximately 12 degrees of Trendelenburg, and robot brought in and docked.  The robotic endoscope was introduced and targeting completed, then the remaining robotic arms were then attached, and the working instruments were brought in under direct vision.    A transverse peritoneal flap was then created several centimeters above the visible left lower quadrant hernia, starting at thel eft medial umbilical fold extending to the left lateral abdominal wall.  Care was taken to ensure that the peritoneal flap was dissected down low enough for the placement of a 12 x 16 cm Progrip mesh.  The dissection was carried medial to expose the pubic bone, Anand's ligament medially.  The cord structures and cord vessels were then  from the very large hernia sac using careful combination of blunt and electrocautery dissection.  The peritoneal sac was carefully removed from the hernia defect.  Once the appropriate dissection size was created, the appropriate mesh was inserted.  Care was taken to ensure that it was flat across the pelvic floor, covering the hernia defect, and the posterior edge was not curled up, but was behind the inferior peritoneal edge.  Hemostasis was ensured.  The peritoneum was then closed using 2-0 stratafix suture in a running fashion.  Care was taken to close all the peritoneal defects.    The trochars were then removed under direct vision, and all incisions closed with interrupted 4-0 Vicryl and Dermabond.    All sponge, needle, and instrument counts were reported as correct at the end of the procedure. The patient tolerated the procedure well and left the operating room for the recovery room in  stable and satisfactory condition.    Nelson Denney M.D.  [facility]  [phone]      2/11/2022 10:34 AM Nelson Denney M.D.

## 2022-02-11 NOTE — ANESTHESIA PROCEDURE NOTES
Airway    Date/Time: 2/11/2022 9:39 AM  Performed by: Adithya Rucker M.D.  Authorized by: Adithya Rucker M.D.     Location:  OR  Urgency:  Elective  Difficult Airway: No    Indications for Airway Management:  Anesthesia      Spontaneous Ventilation: absent    Sedation Level:  Deep  Preoxygenated: Yes    Patient Position:  Sniffing  Mask Difficulty Assessment:  1 - vent by mask  Final Airway Type:  Endotracheal airway  Final Endotracheal Airway:  ETT  Cuffed: Yes    Technique Used for Successful ETT Placement:  Direct laryngoscopy  Devices/Methods Used in Placement:  Intubating stylet    Insertion Site:  Oral  Blade Type:  Fay  Laryngoscope Blade/Videolaryngoscope Blade Size:  2  ETT Size (mm):  7.5  Measured from:  Teeth  ETT to Teeth (cm):  23  Placement Verified by: auscultation and capnometry    Cormack-Lehane Classification:  Grade I - full view of glottis  Number of Attempts at Approach:  1  Ventilation Between Attempts:  None  Number of Other Approaches Attempted:  0

## 2022-02-11 NOTE — ANESTHESIA PREPROCEDURE EVALUATION
Case: [id number] Date/Time: 02/11/22 0915    Procedure: REPAIR, HERNIA, INGUINAL, ROBOT-ASSISTED, USING DA NIKITA XI - WITH MESH PLACEMENT (Left )    Pre-op diagnosis: INGUINAL HERNIA    Location: [facility] OR 11 / SURGERY [facility]    Surgeons: Nelson Denney M.D.          Relevant Problems   NEURO   (positive) History of colon cancer, stage IV   (positive) History of venous thromboembolism      CARDIAC   (positive) Essential hypertension   (positive) Pulmonary hypertension (HCC)      ENDO   (positive) Controlled type 2 diabetes mellitus without complication, without long-term current use of insulin (HCC)       Physical Exam    Airway   Mallampati: II  TM distance: >3 FB  Neck ROM: full       Cardiovascular - normal exam  Rhythm: regular  Rate: normal  (-) murmur     Dental - normal exam           Pulmonary - normal exam  Breath sounds clear to auscultation     Abdominal    Neurological - normal exam                 Anesthesia Plan    ASA 3   ASA physical status 3 criteria: other (comment)    Plan - general       Airway plan will be ETT          Induction: intravenous    Postoperative Plan: Postoperative administration of opioids is intended.    Pertinent diagnostic labs and testing reviewed    Informed Consent:    Anesthetic plan and risks discussed with patient.    Use of blood products discussed with: patient whom consented to blood products.

## 2022-02-11 NOTE — ANESTHESIA POSTPROCEDURE EVALUATION
Patient: Magnus Claudio    Procedure Summary     Date: 02/11/22 Room / Location: [facility] 11 / SURGERY [facility]    Anesthesia Start: 0934 Anesthesia Stop: 1045    Procedure: REPAIR, HERNIA, INGUINAL, ROBOT-ASSISTED, USING DA NIKITA XI - WITH MESH PLACEMENT (Left ) Diagnosis: (INGUINAL HERNIA)    Surgeons: Nelson Denney M.D. Responsible Provider: Adithya Rucker M.D.    Anesthesia Type: general ASA Status: 3          Final Anesthesia Type: general  Last vitals  BP   Blood Pressure : (!) 167/83    Temp   36.1 °C (97 °F)    Pulse   82   Resp   20    SpO2   96 %      Anesthesia Post Evaluation    Patient location during evaluation: PACU  Patient participation: complete - patient participated  Level of consciousness: lethargic  Pain score: 0    Airway patency: patent  Anesthetic complications: no  Cardiovascular status: hemodynamically stable  Respiratory status: acceptable and face mask  Hydration status: euvolemic    PONV: none          No complications documented.     Nurse Pain Score: 0 (NPRS)

## 2022-02-11 NOTE — OR NURSING
Patient received from OR at 1046, bedside report received from anesthesia/OR RN, 2 patient identifiers confirmed . Patient connected to monitors, assessed for general head to toe and pain/nausea. Ordered reviewed and checked. Wife updated via telephone on patient status.  At this time patient meets criteria for D/C to phase II/room. VSS, awake and appropriate, pain minimal or at a tolerable level per patient. Report called to Selina MORRELL and patient taken to phase II.

## 2022-02-11 NOTE — OR NURSING
Assume care for pt in pre-op. Patient allergies and NPO status verified. Belongings secured. Patient verbalizes understanding of pain scale, expected course of stay and plan of care. Surgical site verified with patient. IV access established. Blood samples sent to lab for pt/inr, fbs= 178, rapid covid test negative this am. Call light within reach. No further needs at this time. Hourly rounding in place.

## 2022-02-11 NOTE — OR NURSING
Pt's VSS; denies N/V; states no pain. Dressing CDI to abd. D/c orders received. IV dc'd. Pt changed into clothing with assistance. Pt up and ambulated, steady gait. Discharge instructions given as well as pain management handout; pt and family verbalized understanding and questions answered. Patient states ready to d/c home. Prescriptions e-sent to preferred pharmacy. Pt dc'd in w/c with RN in stable condition.

## 2022-02-11 NOTE — ANESTHESIA TIME REPORT
Anesthesia Start and Stop Event Times     Date Time Event    2/11/2022 0909 Ready for Procedure     0934 Anesthesia Start     1045 Anesthesia Stop        Responsible Staff  02/11/22    Name Role Begin End    August W JO Rucker. Anesth 0934 1045        Preop Diagnosis (Free Text):  Pre-op Diagnosis     INGUINAL HERNIA        Preop Diagnosis (Codes):    Premium Reason  Non-Premium    Comments:

## 2022-02-11 NOTE — DISCHARGE INSTRUCTIONS
ACTIVITY: Rest and take it easy for the first 24 hours.  A responsible adult is recommended to remain with you during that time.  It is normal to feel sleepy.  We encourage you to not do anything that requires balance, judgment or coordination.    MILD FLU-LIKE SYMPTOMS ARE NORMAL. YOU MAY EXPERIENCE GENERALIZED MUSCLE ACHES, THROAT IRRITATION, HEADACHE AND/OR SOME NAUSEA.    FOR 24 HOURS DO NOT:  Drive, operate machinery or run household appliances.  Drink beer or alcoholic beverages.   Make important decisions or sign legal documents.    SPECIAL INSTRUCTIONS:   Hernia Repair Discharge Instructions:    1.  ACTIVITIES: Upon discharge from the hospital, the day of surgery it is requested that you do no significant physical activity and limit mental activities, as you have had sedation. The day after surgery, you may resume activities of daily living, but for four weeks, it is recommended that you do no strenuous activities or heavy lifting (greater than 15 pounds).     2.  DRIVING: You may drive whenever you are off pain medications and are able to perform the activities needed to drive, i.e. turning, bending, twisting, etc.     3.  WOUND: It is not unusual for patients to experience swelling and even bruising at the hernia repair site. With inguinal hernias, sometimes the bruising and swelling may occur at incisions sites, or extend on to the penis or into the scrotum of male patients. This will resolve over the next few days.     4.  ICE: please use ice on the wound to decrease the swelling for the first 24 hours and then discontinue.     5.  BATHING: The dressing can be removed two days after surgery and the wound can then be wetted in a shower as normal, but avoid submersion in water (tub bath) for at least a week.    6.  PAIN MEDICATION: You will be given a prescription for pain medication at discharge. Please take these as directed. It is important to remember not to take medications on an empty stomach as this  may cause nausea.     7.  BOWEL FUNCTION: After hernia repair, it is not uncommon for patients to experience constipation. This is due to decreasing activity levels as well as pain medications. You may wish to use a stool softener beginning immediately after surgery, and you may or may not need to use a laxative (Milk of Magnesia, Ex-lax; Senokot, etc.) as well.     8.  CALL IF YOU HAVE: (1) Fevers to more than 1010 F, (2) Unusual chest or leg pain, (3) Drainage or fluid from incision that may be foul smelling, increased tenderness or soreness at the wound or the wound edges are no longer together,redness or swelling at the incision site. Please do not hesitate to call with any other questions.     9. APPOINTMENT: Contact our office at [phone] for a follow-up appointment in 1 to 2 weeks following your procedure.     If you have any additional questions, please do not hesitate to call the office and speak to either myself or the physician on call.     Office address:  [address] [place] [id number]    Nelson Denney M.D.  [facility]  [phone]    DIET: To avoid nausea, slowly advance diet as tolerated, avoiding spicy or greasy foods for the first day.  Add more substantial food to your diet according to your physician's instructions.  Babies can be fed formula or breast milk as soon as they are hungry.  INCREASE FLUIDS AND FIBER TO AVOID CONSTIPATION.    FOLLOW-UP APPOINTMENT:  A follow-up appointment should be arranged with your doctor in 1-2 Weeks; call to schedule.    You should CALL YOUR PHYSICIAN if you develop:  Fever greater than 101 degrees F.  Pain not relieved by medication, or persistent nausea or vomiting.  Excessive bleeding (blood soaking through dressing) or unexpected drainage from the wound.  Extreme redness or swelling around the incision site, drainage of pus or foul smelling drainage.  Inability to urinate or empty your bladder within 8 hours.  Problems with breathing  or chest pain.    You should call 911 if you develop problems with breathing or chest pain.  If you are unable to contact your doctor or surgical center, you should go to the nearest emergency room or urgent care center.      Physician's telephone #: [phone] Dr. Denney    If any questions arise, call your doctor.  If your doctor is not available, please feel free to call the Surgical Center at [phone].     A registered nurse may call you a few days after your surgery to see how you are doing after your procedure.    MEDICATIONS: Resume taking daily medication.  Take prescribed pain medication with food.  If no medication is prescribed, you may take non-aspirin pain medication if needed.  PAIN MEDICATION CAN BE VERY CONSTIPATING.  Take a stool softener or laxative such as senokot, pericolace, or milk of magnesia if needed.    Prescription given for Tylenol, Motrin, Roxicodone.  Pain medication may be taken anytime.    If your physician has prescribed pain medication that includes Acetaminophen (Tylenol), do not take additional Acetaminophen (Tylenol) while taking the prescribed medication.    Depression / Suicide Risk    As you are discharged from this Critical access hospital facility, it is important to learn how to keep safe from harming yourself.    Recognize the warning signs:  · Abrupt changes in personality, positive or negative- including increase in energy   · Giving away possessions  · Change in eating patterns- significant weight changes-  positive or negative  · Change in sleeping patterns- unable to sleep or sleeping all the time   · Unwillingness or inability to communicate  · Depression  · Unusual sadness, discouragement and loneliness  · Talk of wanting to die  · Neglect of personal appearance   · Rebelliousness- reckless behavior  · Withdrawal from people/activities they love  · Confusion- inability to concentrate     If you or a loved one observes any of these behaviors or has concerns about  self-harm, here's what you can do:  · Talk about it- your feelings and reasons for harming yourself  · Remove any means that you might use to hurt yourself (examples: pills, rope, extension cords, firearm)  · Get professional help from the community (Mental Health, Substance Abuse, psychological counseling)  · Do not be alone:Call your Safe Contact- someone whom you trust who will be there for you.  · Call your local CRISIS HOTLINE 777-6412 or 546-006-3533  · Call your local Children's Mobile Crisis Response Team [place] (365) 045-9438 or www.[employer]  · Call the toll free National Suicide Prevention Hotlines   · National Suicide Prevention Lifeline 534-967-LVUS (6478)  · National Hope Line Network 800-SUICIDE (397-8372)

## 2022-02-16 ENCOUNTER — ANTICOAGULATION VISIT (OUTPATIENT)
Dept: VASCULAR LAB | Facility: MEDICAL CENTER | Age: 77
End: 2022-02-16
Attending: INTERNAL MEDICINE
Payer: MEDICARE

## 2022-02-16 DIAGNOSIS — Z86.718 HISTORY OF VENOUS THROMBOEMBOLISM: ICD-10-CM

## 2022-02-16 LAB
INR BLD: 1.3 (ref 0.9–1.2)
INR PPP: 1.3 (ref 2–3.5)

## 2022-02-16 PROCEDURE — 99212 OFFICE O/P EST SF 10 MIN: CPT

## 2022-02-16 PROCEDURE — 85610 PROTHROMBIN TIME: CPT

## 2022-02-16 NOTE — PROGRESS NOTES
Anticoagulation Summary  As of 2022    INR goal:  2.0-3.0   TTR:  79.4 % (2.5 y)   INR used for dosin.30 (2022)   Warfarin maintenance plan:  2.5 mg (2.5 mg x 1) every Thu, Sat; 5 mg (2.5 mg x 2) all other days   Weekly warfarin total:  30 mg   Plan last modified:  Jose De Jesus Becker, PharmD (8/10/2021)   Next INR check:  2022   Target end date:  Indefinite    Indications    History of venous thromboembolism [Z86.718]  Deep vein thrombosis (DVT) of popliteal vein of both lower extremities (HCC) (Resolved) [I82.433]             Anticoagulation Episode Summary     INR check location:      Preferred lab:      Send INR reminders to:      Comments:        Anticoagulation Care Providers     Provider Role Specialty Phone number    [facility] Responsible  [phone]    Too Brito M.D.  Family Medicine [phone]                Refer to Patient Findings for HPI:  Patient Findings     Positives:  Missed doses (d/t holding for procedure.)    Negatives:  Signs/symptoms of thrombosis, Signs/symptoms of bleeding, Laboratory test error suspected, Change in health, Change in alcohol use, Change in activity, Upcoming invasive procedure, Emergency department visit, Upcoming dental procedure, Extra doses, Change in medications, Change in diet/appetite, Hospital admission, Bruising, Other complaints          There were no vitals filed for this visit.  pt declined vitals    Verified current warfarin dosing schedule.    Medications reconciled   Pt is not on antiplatelet therapy      A/P   INR  SUB-therapeutic.     Warfarin dosing recommendation: Instructed pt to bolus x 2 doses w/ 7.5 mg today and 5 mg tomorrow.    Pt to continue Lovenox until INR > 2.    Pt educated to contact our clinic with any changes in medications or s/s of bleeding or thrombosis. Pt is aware to seek immediate medical attention for falls, head injury or deep cuts.    Follow up appointment in 2 day(s).    Mitch  Jaja, PharmD, BCACP

## 2022-02-18 ENCOUNTER — ANTICOAGULATION VISIT (OUTPATIENT)
Dept: VASCULAR LAB | Facility: MEDICAL CENTER | Age: 77
End: 2022-02-18
Attending: INTERNAL MEDICINE
Payer: MEDICARE

## 2022-02-18 DIAGNOSIS — Z86.718 HISTORY OF VENOUS THROMBOEMBOLISM: ICD-10-CM

## 2022-02-18 LAB
INR BLD: 1.5 (ref 0.9–1.2)
INR PPP: 1.5 (ref 2–3.5)

## 2022-02-18 PROCEDURE — 85610 PROTHROMBIN TIME: CPT

## 2022-02-18 PROCEDURE — 99212 OFFICE O/P EST SF 10 MIN: CPT

## 2022-02-18 NOTE — PROGRESS NOTES
Anticoagulation Summary  As of 2022    INR goal:  2.0-3.0   TTR:  79.2 % (2.5 y)   INR used for dosin.50 (2022)   Warfarin maintenance plan:  2.5 mg (2.5 mg x 1) every Thu, Sat; 5 mg (2.5 mg x 2) all other days   Weekly warfarin total:  30 mg   Plan last modified:  Jose De Jesus Becker, PharmD (8/10/2021)   Next INR check:  2022   Target end date:  Indefinite    Indications    History of venous thromboembolism [Z86.718]  Deep vein thrombosis (DVT) of popliteal vein of both lower extremities (HCC) (Resolved) [I82.433]             Anticoagulation Episode Summary     INR check location:      Preferred lab:      Send INR reminders to:      Comments:        Anticoagulation Care Providers     Provider Role Specialty Phone number    [facility] Responsible  [phone]    Too Brito M.D.  Family Medicine [phone]        Refer to Patient Findings for HPI:  Patient Findings     Negatives:  Signs/symptoms of thrombosis, Signs/symptoms of bleeding, Laboratory test error suspected, Change in health, Change in alcohol use, Change in activity, Upcoming invasive procedure, Emergency department visit, Upcoming dental procedure, Missed doses, Extra doses, Change in medications, Change in diet/appetite, Hospital admission, Bruising, Other complaints          There were no vitals filed for this visit.  Pt declined vitals    Verified current warfarin dosing schedule.    Medications reconciled.  Pt is not on antiplatelet therapy      A/P   INR SUB-therapeutic at 1.5.     Warfarin dosing recommendation: Continue Lovenox bridge until INR > 2. Bolus with 7.5 mg tonight, then resume normal dosing. Bolusing cautiously to avoid supratherapeutic INR.    Refill of Lovenox sent to patient's pharmacy.     Pt educated to contact our clinic with any changes in medications or s/s of bleeding or thrombosis. Pt is aware to seek immediate medical attention for falls, head injury or deep cuts.    Follow  up appointment Tuesday 2/22/2022.    Kathy Hanson, PharmD  PGY1 Pharmacy Practice Resident    Addendum:    Pt's INR is 1.5 and continues on enoxaparin.  Instructed pt to bolus 10 mg warfarin today and retest INR on 2/19.  Continue enoxaparin.  Lab is closed on 2/20 and 2/21.    Pt will have INR taken in the lab tomorrow and on call pharmacist will follow up with the result.      Deo Gil, DeedeeD

## 2022-02-19 ENCOUNTER — ANTICOAGULATION MONITORING (OUTPATIENT)
Dept: MEDICAL GROUP | Facility: PHYSICIAN GROUP | Age: 77
End: 2022-02-19

## 2022-02-19 ENCOUNTER — HOSPITAL ENCOUNTER (OUTPATIENT)
Dept: LAB | Facility: MEDICAL CENTER | Age: 77
End: 2022-02-19
Attending: NURSE PRACTITIONER
Payer: MEDICARE

## 2022-02-19 DIAGNOSIS — Z86.718 HISTORY OF VENOUS THROMBOEMBOLISM: ICD-10-CM

## 2022-02-19 LAB
INR PPP: 1.94 (ref 0.87–1.13)
PROTHROMBIN TIME: 21.6 SEC (ref 12–14.6)

## 2022-02-19 PROCEDURE — 36415 COLL VENOUS BLD VENIPUNCTURE: CPT

## 2022-02-19 PROCEDURE — 85610 PROTHROMBIN TIME: CPT

## 2022-02-20 NOTE — PROGRESS NOTES
OP Anticoagulation Service Note    Date: 2022    Anticoagulation Summary  As of 2022    INR goal:  2.0-3.0   TTR:  79.1 % (2.5 y)   INR used for dosin.94 (2022)   Warfarin maintenance plan:  7.5 mg (2.5 mg x 3) every Mon, Sat; 5 mg (2.5 mg x 2) all other days   Weekly warfarin total:  40 mg   Plan last modified:  Chau Hawkins, PharmD (2022)   Next INR check:  2022   Target end date:  Indefinite    Indications    History of venous thromboembolism [Z86.718]  Deep vein thrombosis (DVT) of popliteal vein of both lower extremities (HCC) (Resolved) [I82.433]             Anticoagulation Episode Summary     INR check location:      Preferred lab:      Send INR reminders to:      Comments:        Anticoagulation Care Providers     Provider Role Specialty Phone number    [facility] Responsible  [phone]    Too Brito M.D.  [facility] [phone]        Anticoagulation Patient Findings      HPI:   The reason for today's call is to prevent morbidity and mortality from a blood clot and/or stroke and to reduce the risk of bleeding while on a anticoagulant.     PCP:  Too Brito M.D.  [facility] [address] [place] [phone]    Assessment:     • INR  sub-therapeutic.  • On lovenox. They would like to stop it today        Current Outpatient Medications:   •  hydroCHLOROthiazide, TAKE 1 TABLET BY MOUTH DAILY (Patient taking differently: 12.5 mg, Oral, DAILY)  •  losartan, TAKE 1 TABLET BY MOUTH DAILY  •  metFORMIN ER, TAKE 2 TABLETS BY MOUTH TWICE DAILY  •  warfarin, TAKE 1 TO 2 TABLETS BY MOUTH DAILY OR AS DIRECTED BY ANTICOAGULATION CLINIC  •  tamsulosin, 0.4 mg, Oral, DAILY  •  Lancets, Lancets for glucometer covered by patient's insurance Sig: use daily and prn ssx high or low sugar.  •  glimepiride, 4 mg, Oral, QAM  •  amLODIPine, TAKE 1 TABLET BY MOUTH EVERY DAY (Patient taking differently: 5 mg, Oral, EVERY MORNING)  •  therapeutic  multivitamin-minerals, 1 Tablet, Oral, DAILY      Plan:     • Stop lovenox   • Increase weekly warfarin dose as noted      Follow-up:     • Our protocol suggests we test in 3 days         Additional information discussed with patient:     • Asked patient to please call the anticoagulation clinic if they have any signs/symptoms of bleeding and/or thrombosis or any changes to diet or medications.      National recommendations regarding anticoagulation therapy:     The CHEST guidelines recommends frequent INR monitoring at regular intervals (a few days up to a max of 12 weeks) to ensure patients are on the proper dose of warfarin, and patients are not having any complications from therapy.  INRs can dramatically change over a short time period due to diet, medications, and medical conditions.     [facility]  Phone [phone] fax [phone]    This note was created using voice recognition software (Dragon). The accuracy of the dictation is limited by the abilities of the software. I have reviewed the note prior to signing, however some errors in grammar and context are still possible. If you have any questions related to this note please do not hesitate to contact our office.

## 2022-02-22 ENCOUNTER — ANTICOAGULATION MONITORING (OUTPATIENT)
Dept: VASCULAR LAB | Facility: MEDICAL CENTER | Age: 77
End: 2022-02-22
Payer: MEDICARE

## 2022-02-22 ENCOUNTER — ANTICOAGULATION VISIT (OUTPATIENT)
Dept: VASCULAR LAB | Facility: MEDICAL CENTER | Age: 77
End: 2022-02-22
Attending: INTERNAL MEDICINE
Payer: MEDICARE

## 2022-02-22 DIAGNOSIS — Z86.718 HISTORY OF VENOUS THROMBOEMBOLISM: ICD-10-CM

## 2022-02-22 LAB
INR BLD: 3.3 (ref 0.9–1.2)
INR PPP: 3.3 (ref 2–3.5)

## 2022-02-22 PROCEDURE — 99212 OFFICE O/P EST SF 10 MIN: CPT

## 2022-02-22 PROCEDURE — 85610 PROTHROMBIN TIME: CPT

## 2022-02-22 NOTE — PROGRESS NOTES
Anticoagulation Summary  As of 2/22/2022    INR goal:  2.0-3.0   TTR:  79.1 % (2.5 y)   INR used for dosing:     Warfarin maintenance plan:  7.5 mg (2.5 mg x 3) every Mon, Sat; 5 mg (2.5 mg x 2) all other days   Weekly warfarin total:  40 mg   Plan last modified:  Chau Hawkins, PharmD (2/19/2022)   Next INR check:     Target end date:  Indefinite    Indications    History of venous thromboembolism [Z86.718]  Deep vein thrombosis (DVT) of popliteal vein of both lower extremities (HCC) (Resolved) [I82.433]             Anticoagulation Episode Summary     INR check location:      Preferred lab:      Send INR reminders to:      Comments:        Anticoagulation Care Providers     Provider Role Specialty Phone number    [facility] Responsible  [phone]    Too Brito M.D.  Family Medicine [phone]                Refer to Patient Findings for HPI:  Patient Findings     Negatives:  Signs/symptoms of thrombosis, Signs/symptoms of bleeding, Laboratory test error suspected, Change in health, Change in alcohol use, Change in activity, Upcoming invasive procedure, Emergency department visit, Upcoming dental procedure, Missed doses, Extra doses, Change in medications, Change in diet/appetite, Hospital admission, Bruising, Other complaints          There were no vitals filed for this visit.      Verified current warfarin dosing schedule.    Medications reconciled   Pt is not on antiplatelet therapy      A/P   INR  SUPRA-therapeutic at 3.3.     Warfarin dosing recommendation: Decreased regimen to 7.5mg every Monday; 5mg all other days.    Pt educated to contact our clinic with any changes in medications or s/s of bleeding or thrombosis. Pt is aware to seek immediate medical attention for falls, head injury or deep cuts.    Follow up appointment in 1 week(s).    Jus Hicks (student intern)    Brooklyn Subramanian, PharmD

## 2022-02-28 ENCOUNTER — ANTICOAGULATION VISIT (OUTPATIENT)
Dept: VASCULAR LAB | Facility: MEDICAL CENTER | Age: 77
End: 2022-02-28
Attending: INTERNAL MEDICINE
Payer: MEDICARE

## 2022-02-28 DIAGNOSIS — Z86.718 HISTORY OF VENOUS THROMBOEMBOLISM: ICD-10-CM

## 2022-02-28 LAB
INR BLD: 3.8 (ref 0.9–1.2)
INR PPP: 3.8 (ref 2–3.5)

## 2022-02-28 PROCEDURE — 85610 PROTHROMBIN TIME: CPT

## 2022-02-28 PROCEDURE — 99212 OFFICE O/P EST SF 10 MIN: CPT

## 2022-02-28 NOTE — PROGRESS NOTES
Anticoagulation Summary  As of 2/28/2022    INR goal:  2.0-3.0   TTR:  78.5 % (2.5 y)   INR used for dosing:  3.80 (2/28/2022)   Warfarin maintenance plan:  7.5 mg (2.5 mg x 3) every Mon; 5 mg (2.5 mg x 2) all other days   Weekly warfarin total:  37.5 mg   Plan last modified:  Brooklyn Surbamanian, PharmD (2/22/2022)   Next INR check:  3/2/2022   Target end date:  Indefinite    Indications    History of venous thromboembolism [Z86.718]  Deep vein thrombosis (DVT) of popliteal vein of both lower extremities (HCC) (Resolved) [I82.433]             Anticoagulation Episode Summary     INR check location:      Preferred lab:      Send INR reminders to:      Comments:        Anticoagulation Care Providers     Provider Role Specialty Phone number    [facility] Responsible  [phone]    Too Brito M.D.  Family Medicine [phone]        Refer to Patient Findings for HPI:  Patient Findings     Negatives:  Signs/symptoms of thrombosis, Signs/symptoms of bleeding, Laboratory test error suspected, Change in health, Change in alcohol use, Change in activity, Upcoming invasive procedure, Emergency department visit, Upcoming dental procedure, Missed doses, Extra doses, Change in medications, Change in diet/appetite, Hospital admission, Bruising, Other complaints          There were no vitals filed for this visit.    Verified current warfarin dosing schedule. Currently warfarin 7.5mg on Mondays and 5mg all other days.     Medications reconciled.  Pt is not on antiplatelet therapy      A/P   INR SUPRA-therapeutic at 3.8.     Warfarin dosing recommendation: HOLD warfarin dose tonight and resume current regimen tomorrow. Return for INR check on Wednesday, 3/2/2022.    Pt educated to contact our clinic with any changes in medications or s/s of bleeding or thrombosis. Pt is aware to seek immediate medical attention for falls, head injury or deep cuts.    Follow up appointment in 2 days.    Josefina Reynolds  PharmD  PGY1 Pharmacy Practice Resident    CC:  Deo Gil, DeedeeD

## 2022-02-28 NOTE — Clinical Note
Out of range INR (3.8; goal INR 2-3). This is patient's second supratherapeutic INR in a row, with the most recent INR at 3.3 last week. I had the patient hold his dose for tonight and he will return for monitoring on Wednesday. I anticipate his needed weekly warfarin dose for a therapeutic INR is about 30mg/weekly, which he was on prior to his surgery.   Please let me know if you have any questions. Thanks! Josefina Reynolds, PharmD PGY1 Pharmacy Practice Resident

## 2022-03-02 ENCOUNTER — ANTICOAGULATION VISIT (OUTPATIENT)
Dept: VASCULAR LAB | Facility: MEDICAL CENTER | Age: 77
End: 2022-03-02
Attending: INTERNAL MEDICINE
Payer: MEDICARE

## 2022-03-02 DIAGNOSIS — Z86.718 HISTORY OF VENOUS THROMBOEMBOLISM: ICD-10-CM

## 2022-03-02 LAB — INR PPP: 2.4 (ref 2–3.5)

## 2022-03-02 PROCEDURE — 99212 OFFICE O/P EST SF 10 MIN: CPT | Performed by: NURSE PRACTITIONER

## 2022-03-02 PROCEDURE — 85610 PROTHROMBIN TIME: CPT

## 2022-03-02 NOTE — PROGRESS NOTES
Anticoagulation Summary  As of 3/2/2022    INR goal:  2.0-3.0   TTR:  78.5 % (2.5 y)   INR used for dosin.40 (3/2/2022)   Warfarin maintenance plan:  2.5 mg (2.5 mg x 1) every Thu, Sat; 5 mg (2.5 mg x 2) all other days   Weekly warfarin total:  30 mg   Plan last modified:  Brooklyn Subramanian, PharmD (3/7/2022)   Next INR check:  3/10/2022   Target end date:  Indefinite    Indications    History of venous thromboembolism [Z86.718]  Deep vein thrombosis (DVT) of popliteal vein of both lower extremities (HCC) (Resolved) [I82.433]             Anticoagulation Episode Summary     INR check location:      Preferred lab:      Send INR reminders to:      Comments:        Anticoagulation Care Providers     Provider Role Specialty Phone number    [facility] Responsible  [phone]    Too Brito M.D.  Family Medicine [phone]                Refer to Patient Findings for HPI:  Patient Findings     Negatives:  Signs/symptoms of thrombosis, Signs/symptoms of bleeding, Laboratory test error suspected, Change in health, Change in alcohol use, Change in activity, Upcoming invasive procedure, Emergency department visit, Upcoming dental procedure, Missed doses, Extra doses, Change in medications, Change in diet/appetite, Hospital admission, Bruising, Other complaints        Verified current warfarin dosing schedule.    Medications reconciled   Pt is not on antiplatelet therapy      A/P   INR  -therapeutic. INR down from 3.8 on Monday. Pt requests to resume the warfarin regimen he had been on prior to his hernia repair last month.     Warfarin dosing recommendation: take 2.5 mg on Thur/Sat, 5 mg all other days.    Pt educated to contact our clinic with any changes in medications or s/s of bleeding or thrombosis. Pt is aware to seek immediate medical attention for falls, head injury or deep cuts.    Follow up appointment in 1 week(s).    CHRISTINA Martinez.

## 2022-03-03 LAB — INR BLD: 2.4 (ref 0.9–1.2)

## 2022-03-10 ENCOUNTER — ANTICOAGULATION VISIT (OUTPATIENT)
Dept: VASCULAR LAB | Facility: MEDICAL CENTER | Age: 77
End: 2022-03-10
Attending: INTERNAL MEDICINE
Payer: MEDICARE

## 2022-03-10 DIAGNOSIS — Z86.718 HISTORY OF VENOUS THROMBOEMBOLISM: ICD-10-CM

## 2022-03-10 LAB
INR BLD: 2.7 (ref 0.9–1.2)
INR PPP: 2.7 (ref 2–3.5)

## 2022-03-10 PROCEDURE — 99211 OFF/OP EST MAY X REQ PHY/QHP: CPT | Performed by: NURSE PRACTITIONER

## 2022-03-10 PROCEDURE — 85610 PROTHROMBIN TIME: CPT

## 2022-03-10 NOTE — PROGRESS NOTES
Anticoagulation Summary  As of 3/10/2022    INR goal:  2.0-3.0   TTR:  78.6 % (2.5 y)   INR used for dosin.70 (3/10/2022)   Warfarin maintenance plan:  2.5 mg (2.5 mg x 1) every Thu, Sat; 5 mg (2.5 mg x 2) all other days   Weekly warfarin total:  30 mg   Plan last modified:  Brooklyn Subramanian, PharmD (3/7/2022)   Next INR check:  3/24/2022   Target end date:  Indefinite    Indications    History of venous thromboembolism [Z86.718]  Deep vein thrombosis (DVT) of popliteal vein of both lower extremities (HCC) (Resolved) [I82.433]             Anticoagulation Episode Summary     INR check location:      Preferred lab:      Send INR reminders to:      Comments:        Anticoagulation Care Providers     Provider Role Specialty Phone number    [facility] Responsible  [phone]    Too Brito M.D.  Family Medicine [phone]                Refer to Patient Findings for HPI:  Patient Findings     Negatives:  Signs/symptoms of thrombosis, Signs/symptoms of bleeding, Laboratory test error suspected, Change in health, Change in alcohol use, Change in activity, Upcoming invasive procedure, Emergency department visit, Upcoming dental procedure, Missed doses, Extra doses, Change in medications, Change in diet/appetite, Hospital admission, Bruising, Other complaints          Verified current warfarin dosing schedule.    Medications reconciled   Pt is not on antiplatelet therapy      A/P   INR  -therapeutic.     Warfarin dosing recommendation: Continue current regimen.    Pt educated to contact our clinic with any changes in medications or s/s of bleeding or thrombosis. Pt is aware to seek immediate medical attention for falls, head injury or deep cuts.    Follow up appointment in 2 week(s).    ROSI Martinez

## 2022-03-15 ENCOUNTER — HOSPITAL ENCOUNTER (OUTPATIENT)
Facility: MEDICAL CENTER | Age: 77
End: 2022-03-15
Attending: UROLOGY
Payer: MEDICARE

## 2022-03-15 PROCEDURE — 87086 URINE CULTURE/COLONY COUNT: CPT

## 2022-03-18 LAB
BACTERIA UR CULT: NORMAL
SIGNIFICANT IND 70042: NORMAL
SITE SITE: NORMAL
SOURCE SOURCE: NORMAL

## 2022-03-24 ENCOUNTER — ANTICOAGULATION VISIT (OUTPATIENT)
Dept: VASCULAR LAB | Facility: MEDICAL CENTER | Age: 77
End: 2022-03-24
Attending: INTERNAL MEDICINE
Payer: MEDICARE

## 2022-03-24 DIAGNOSIS — Z86.718 HISTORY OF VENOUS THROMBOEMBOLISM: ICD-10-CM

## 2022-03-24 LAB
INR BLD: 2.9 (ref 0.9–1.2)
INR PPP: 2.9 (ref 2–3.5)

## 2022-03-24 PROCEDURE — 99211 OFF/OP EST MAY X REQ PHY/QHP: CPT | Performed by: NURSE PRACTITIONER

## 2022-03-24 PROCEDURE — 85610 PROTHROMBIN TIME: CPT

## 2022-03-24 NOTE — PROGRESS NOTES
Anticoagulation Summary  As of 3/24/2022    INR goal:  2.0-3.0   TTR:  79.0 % (2.6 y)   INR used for dosin.90 (3/24/2022)   Warfarin maintenance plan:  2.5 mg (2.5 mg x 1) every Thu, Sat; 5 mg (2.5 mg x 2) all other days   Weekly warfarin total:  30 mg   Plan last modified:  Brooklyn Subramanian, PharmD (3/7/2022)   Next INR check:  2022   Target end date:  Indefinite    Indications    History of venous thromboembolism [Z86.718]  Deep vein thrombosis (DVT) of popliteal vein of both lower extremities (HCC) (Resolved) [I82.433]             Anticoagulation Episode Summary     INR check location:      Preferred lab:      Send INR reminders to:      Comments:        Anticoagulation Care Providers     Provider Role Specialty Phone number    [facility] Responsible  [phone]    Too Brito M.D.  Family Medicine [phone]                Refer to Patient Findings for HPI:  Patient Findings     Positives:  Change in medications, Other complaints    Negatives:  Signs/symptoms of thrombosis, Signs/symptoms of bleeding, Laboratory test error suspected, Change in health, Change in alcohol use, Change in activity, Upcoming invasive procedure, Emergency department visit, Upcoming dental procedure, Missed doses, Extra doses, Change in diet/appetite, Hospital admission, Bruising    Comments:  Recently on cephalexin.          There were no vitals filed for this visit.   pt declined vitals    Verified current warfarin dosing schedule.    Medications reconciled   Pt is not on antiplatelet therapy      A/P   INR  -therapeutic.     Warfarin dosing recommendation: Continue current regimen.    Pt educated to contact our clinic with any changes in medications or s/s of bleeding or thrombosis. Pt is aware to seek immediate medical attention for falls, head injury or deep cuts.    Follow up appointment in 2 week(s).    ROSI Martinez

## 2022-04-03 ENCOUNTER — OFFICE VISIT (OUTPATIENT)
Dept: URGENT CARE | Facility: CLINIC | Age: 77
End: 2022-04-03
Payer: MEDICARE

## 2022-04-03 VITALS
BODY MASS INDEX: 25.62 KG/M2 | TEMPERATURE: 98.7 F | WEIGHT: 183 LBS | HEIGHT: 71 IN | OXYGEN SATURATION: 98 % | SYSTOLIC BLOOD PRESSURE: 124 MMHG | RESPIRATION RATE: 14 BRPM | DIASTOLIC BLOOD PRESSURE: 66 MMHG | HEART RATE: 80 BPM

## 2022-04-03 DIAGNOSIS — L03.119 CELLULITIS OF FOOT: ICD-10-CM

## 2022-04-03 DIAGNOSIS — Z86.39 HISTORY OF DIABETES MELLITUS: ICD-10-CM

## 2022-04-03 PROCEDURE — 99213 OFFICE O/P EST LOW 20 MIN: CPT | Performed by: PHYSICIAN ASSISTANT

## 2022-04-03 RX ORDER — CEPHALEXIN 500 MG/1
CAPSULE ORAL
COMMUNITY
Start: 2022-03-15 | End: 2022-04-18

## 2022-04-03 RX ORDER — CEPHALEXIN 500 MG/1
500 CAPSULE ORAL 4 TIMES DAILY
Qty: 28 CAPSULE | Refills: 0 | Status: SHIPPED | OUTPATIENT
Start: 2022-04-03 | End: 2022-04-10

## 2022-04-03 RX ORDER — IBUPROFEN 800 MG/1
TABLET ORAL
COMMUNITY
End: 2022-04-18

## 2022-04-03 RX ORDER — ACETAMINOPHEN 500 MG
TABLET ORAL
COMMUNITY
End: 2022-04-18

## 2022-04-03 RX ORDER — OXYCODONE HYDROCHLORIDE 5 MG/1
TABLET ORAL
COMMUNITY
End: 2022-04-18

## 2022-04-03 ASSESSMENT — FIBROSIS 4 INDEX: FIB4 SCORE: 2.88

## 2022-04-03 NOTE — PROGRESS NOTES
Subjective:   Magnus Claudio is a 76 y.o. male who presents for Toe Pain (X1 day, Right foot 3rd toe painful, sore ) and Foot Pain (X3 days, right foot )  Patient presents to complaint of right third toe redness and swelling x1 day.  Foot became mildly painful 2 days ago.  Does have a history of diabetes.  Denies peripheral neuropathy, numbness or tingling.  States has fairly well-controlled diabetes and very close follow-up.  Has never had any foot ulcerations or diabetic foot complications.  No fever chills or constitutional symptoms.  No streaking.        Medications:  acetaminophen Tabs  amLODIPine Tabs  B COMPLEX-VITAMIN B12 PO  cephALEXin Caps  glimepiride Tabs  hydroCHLOROthiazide  ibuprofen Tabs  Lancets  losartan Tabs  metFORMIN ER Tb24  oxyCODONE immediate-release Tabs  tamsulosin  therapeutic multivitamin-minerals Tabs  warfarin Tabs    Allergies:             Morphine    Surgical History:         Past Surgical History:   Procedure Laterality Date   • INGUINAL HERNIA REPAIR ROBOTIC XI Left 2/11/2022    Procedure: REPAIR, HERNIA, INGUINAL, ROBOT-ASSISTED, USING Rootdown XI - WITH MESH PLACEMENT;  Surgeon: Nelson Denney M.D.;  Location: SURGERY [facility];  Service: Gen Robotic   • OTHER  05/2021    bladder cancer surgery   • CATARACT EXTRACTION WITH IOL     • COLON RESECTION     • EYE SURGERY Bilateral     Cataract surgery   • LAMINOTOMY     • MS RESEC LIVER,PART LOBECTOMY         Past Social Hx:  Magnus Claudio  reports that he has never smoked. He has never used smokeless tobacco. He reports previous alcohol use. He reports that he does not use drugs.     Past Family Hx:   Magnus Claudio family history includes Cancer in his mother; Diabetes in his sister; Heart Attack in his father; Hypertension in his father.       Problem list, medications, and allergies reviewed by myself today in Epic.     Objective:     /66   Pulse 80   Temp 37.1 °C (98.7 °F) (Temporal)   Resp 14   Ht  "1.803 m (5' 11\")   Wt 83 kg (183 lb)   SpO2 98%   BMI 25.52 kg/m²     Physical Exam  Vitals and nursing note reviewed.   Constitutional:       General: He is not in acute distress.     Appearance: Normal appearance. He is not ill-appearing.   HENT:      Head: Normocephalic.   Eyes:      Extraocular Movements: Extraocular movements intact.      Pupils: Pupils are equal, round, and reactive to light.   Cardiovascular:      Rate and Rhythm: Normal rate.   Pulmonary:      Effort: Pulmonary effort is normal.   Musculoskeletal:        Feet:    Feet:      Comments: Erythema and local swelling to the right third toe.  Distal pulses are intact.  There is no purulence.  No exudate.  Good capillary refill is noted.  Feet are warm to touch.  Skin:     General: Skin is warm.      Findings: No rash.   Neurological:      Mental Status: He is alert and oriented to person, place, and time.   Psychiatric:         Thought Content: Thought content normal.         Judgment: Judgment normal.         Assessment/Plan:     Diagnosis and Associated Orders:     1. Cellulitis of foot  - cephALEXin (KEFLEX) 500 MG Cap; Take 1 Capsule by mouth 4 times a day for 7 days.  Dispense: 28 Capsule; Refill: 0  - Referral to Podiatry    2. History of diabetes mellitus            Comments/MDM:  Patient presents with third toe cellulitis.  No oral injury seen.  No purulence.  No exudate.  No systemic symptoms.  No constitutional symptoms.  No streaking.  Localized to toe.  No diabetic ulcer appreciated.  Antibiotics as above.  Referral to podiatry.  Warm soaks.  Tylenol and ibuprofen for pain as needed.  Patient has normal renal function.  Follow-up with Coumadin clinic.  Follow-up with primary care physician.  Return precautions discussed.      I personally reviewed prior external notes and test results pertinent to today's visit.  Red flags discussed as well as indications to present to the Emergency Department.  Supportive care, natural history, " differential diagnoses, and indications for immediate follow-up discussed.  Patient expresses understanding and agrees to plan.  Patient denies any other questions or concerns.    Follow-up with the primary care physician for recheck, reevaluation, and consideration of further management.      Please note that this dictation was created using voice recognition software. I have made a reasonable attempt to correct obvious errors, but I expect that there are errors of grammar and possibly content that I did not discover before finalizing the note.    This note was electronically signed by Fannie Alvarenga PA-C

## 2022-04-07 ENCOUNTER — ANTICOAGULATION VISIT (OUTPATIENT)
Dept: VASCULAR LAB | Facility: MEDICAL CENTER | Age: 77
End: 2022-04-07
Attending: INTERNAL MEDICINE
Payer: MEDICARE

## 2022-04-07 VITALS — DIASTOLIC BLOOD PRESSURE: 98 MMHG | HEART RATE: 86 BPM | SYSTOLIC BLOOD PRESSURE: 166 MMHG

## 2022-04-07 DIAGNOSIS — Z86.718 HISTORY OF VENOUS THROMBOEMBOLISM: ICD-10-CM

## 2022-04-07 LAB — INR PPP: 3.1 (ref 2–3.5)

## 2022-04-07 PROCEDURE — 99212 OFFICE O/P EST SF 10 MIN: CPT

## 2022-04-07 PROCEDURE — 85610 PROTHROMBIN TIME: CPT

## 2022-04-07 NOTE — PROGRESS NOTES
Anticoagulation Summary  As of 4/7/2022    INR goal:  2.0-3.0   TTR:  78.5 % (2.6 y)   INR used for dosing:  3.10 (4/7/2022)   Warfarin maintenance plan:  2.5 mg (2.5 mg x 1) every Thu, Sat; 5 mg (2.5 mg x 2) all other days   Weekly warfarin total:  30 mg   Plan last modified:  Brooklyn Subramanian, DeedeeD (3/7/2022)   Next INR check:  4/21/2022   Target end date:  Indefinite    Indications    History of venous thromboembolism [Z86.718]  Deep vein thrombosis (DVT) of popliteal vein of both lower extremities (HCC) (Resolved) [I82.433]             Anticoagulation Episode Summary     INR check location:      Preferred lab:      Send INR reminders to:      Comments:        Anticoagulation Care Providers     Provider Role Specialty Phone number    [facility] Responsible  [phone]    Too Brito M.D.  Family Medicine [phone]          Refer to Patient Findings for HPI:  Patient Findings     Negatives:  Signs/symptoms of thrombosis, Signs/symptoms of bleeding, Laboratory test error suspected, Change in health, Change in alcohol use, Change in activity, Upcoming invasive procedure, Emergency department visit, Upcoming dental procedure, Missed doses, Extra doses, Change in medications, Change in diet/appetite, Hospital admission, Bruising, Other complaints          Vitals:    04/07/22 1115   BP: (!) 166/98   BP Location: Right arm   Patient Position: Sitting   BP Cuff Size: Large adult   Pulse: 86     Verified current warfarin dosing schedule.    Medications reconciled  Pt is not on antiplatelet therapy    A/P   INR  supra-therapeutic @ 3.1.     Warfarin dosing recommendation: Continue current warfarin regimen     Pt educated to contact our clinic with any changes in medications or s/s of bleeding or thrombosis. Pt is aware to seek immediate medical attention for falls, head injury or deep cuts.    Follow up appointment in 2 week(s).    Costa Thorpe, Pharmacy Intern  Brionna Langley, DeedeeD

## 2022-04-18 ENCOUNTER — OFFICE VISIT (OUTPATIENT)
Dept: MEDICAL GROUP | Facility: MEDICAL CENTER | Age: 77
End: 2022-04-18
Payer: MEDICARE

## 2022-04-18 ENCOUNTER — TELEPHONE (OUTPATIENT)
Dept: VASCULAR LAB | Facility: MEDICAL CENTER | Age: 77
End: 2022-04-18

## 2022-04-18 VITALS
OXYGEN SATURATION: 95 % | SYSTOLIC BLOOD PRESSURE: 138 MMHG | DIASTOLIC BLOOD PRESSURE: 78 MMHG | BODY MASS INDEX: 26.4 KG/M2 | WEIGHT: 188.6 LBS | TEMPERATURE: 97.6 F | HEART RATE: 74 BPM | RESPIRATION RATE: 18 BRPM | HEIGHT: 71 IN

## 2022-04-18 DIAGNOSIS — E11.621 DIABETIC ULCER OF TOE OF RIGHT FOOT ASSOCIATED WITH TYPE 2 DIABETES MELLITUS, WITH FAT LAYER EXPOSED (HCC): ICD-10-CM

## 2022-04-18 DIAGNOSIS — Z86.718 HISTORY OF VENOUS THROMBOEMBOLISM: ICD-10-CM

## 2022-04-18 DIAGNOSIS — Z12.5 SCREENING FOR PROSTATE CANCER: ICD-10-CM

## 2022-04-18 DIAGNOSIS — E11.9 CONTROLLED TYPE 2 DIABETES MELLITUS WITHOUT COMPLICATION, WITHOUT LONG-TERM CURRENT USE OF INSULIN (HCC): ICD-10-CM

## 2022-04-18 DIAGNOSIS — I10 ESSENTIAL HYPERTENSION: ICD-10-CM

## 2022-04-18 DIAGNOSIS — N52.8 OTHER MALE ERECTILE DYSFUNCTION: ICD-10-CM

## 2022-04-18 DIAGNOSIS — D64.9 ANEMIA, UNSPECIFIED TYPE: ICD-10-CM

## 2022-04-18 DIAGNOSIS — L97.512 DIABETIC ULCER OF TOE OF RIGHT FOOT ASSOCIATED WITH TYPE 2 DIABETES MELLITUS, WITH FAT LAYER EXPOSED (HCC): ICD-10-CM

## 2022-04-18 LAB
HBA1C MFR BLD: 6.8 % (ref 0–5.6)
INT CON NEG: NEGATIVE
INT CON POS: POSITIVE

## 2022-04-18 PROCEDURE — 99214 OFFICE O/P EST MOD 30 MIN: CPT | Performed by: FAMILY MEDICINE

## 2022-04-18 PROCEDURE — 83036 HEMOGLOBIN GLYCOSYLATED A1C: CPT | Performed by: FAMILY MEDICINE

## 2022-04-18 RX ORDER — KETOCONAZOLE 20 MG/G
CREAM TOPICAL
Qty: 60 G | Refills: 0 | Status: SHIPPED | OUTPATIENT
Start: 2022-04-18 | End: 2022-08-23

## 2022-04-18 RX ORDER — AMOXICILLIN AND CLAVULANATE POTASSIUM 875; 125 MG/1; MG/1
1 TABLET, FILM COATED ORAL 2 TIMES DAILY
Qty: 28 TABLET | Refills: 0 | Status: SHIPPED | OUTPATIENT
Start: 2022-04-18 | End: 2022-05-02

## 2022-04-18 RX ORDER — DOXYCYCLINE HYCLATE 100 MG
100 TABLET ORAL 2 TIMES DAILY
Qty: 28 TABLET | Refills: 0 | Status: SHIPPED | OUTPATIENT
Start: 2022-04-18 | End: 2022-05-02

## 2022-04-18 ASSESSMENT — PATIENT HEALTH QUESTIONNAIRE - PHQ9: CLINICAL INTERPRETATION OF PHQ2 SCORE: 0

## 2022-04-18 ASSESSMENT — FIBROSIS 4 INDEX: FIB4 SCORE: 2.88

## 2022-04-18 NOTE — ASSESSMENT & PLAN NOTE
Patient reports erectile dysfunction after his bladder cancer surgery.  He will be seeing his urologist soon.

## 2022-04-18 NOTE — ASSESSMENT & PLAN NOTE
Patient was seen in the urgent care about 2 weeks ago for a right toe cellulitis.  This was treated with antibiotics.  The patient reports continued dependent redness to the right third toe and intermittent pain.

## 2022-04-18 NOTE — TELEPHONE ENCOUNTER
[facility]    Pt was in grocery store when I called. He couldn't write anything down, so I suggested to reduce warfarin by 50% today only for DDI with doxycycline.  Test INR in 72 hours.    Deo Gil, DeedeeD

## 2022-04-18 NOTE — PROGRESS NOTES
[facility]  Progress Note  Established Patient    Subjective:   Magnus Claudio is a 76 y.o. male here today with a chief complaint of diabetes. The patient is alone.     Anemia  Resolved.     Controlled type 2 diabetes mellitus without complication, without long-term current use of insulin ([facility])  A1c at goal at 6.9. Also on ARB, no indication for statin.     Diabetic ulcer of toe of right foot associated with type 2 diabetes mellitus, with fat layer exposed (HCC)  Patient was seen in the urgent care about 2 weeks ago for a right toe cellulitis.  This was treated with antibiotics.  The patient reports continued dependent redness to the right third toe and intermittent pain.    Essential hypertension  At goal.    History of venous thromboembolism  Patient is maintained on Coumadin.  He sees the vascular medicine clinic.    Other male erectile dysfunction  Patient reports erectile dysfunction after his bladder cancer surgery.  He will be seeing his urologist soon.      Current Outpatient Medications on File Prior to Visit   Medication Sig Dispense Refill   • B Complex-Folic Acid (B COMPLEX-VITAMIN B12 PO)      • amLODIPine (NORVASC) 5 MG Tab TAKE 1 TABLET BY MOUTH EVERY  Tablet 4   • hydroCHLOROthiazide (HYDRODIURIL) 12.5 MG tablet TAKE 1 TABLET BY MOUTH DAILY (Patient taking differently: Take 12.5 mg by mouth every day.) 100 Tablet 4   • losartan (COZAAR) 100 MG Tab TAKE 1 TABLET BY MOUTH DAILY 100 Tablet 4   • metFORMIN ER (GLUCOPHAGE XR) 500 MG TABLET SR 24 HR TAKE 2 TABLETS BY MOUTH TWICE DAILY 360 Tablet 4   • warfarin (COUMADIN) 2.5 MG Tab TAKE 1 TO 2 TABLETS BY MOUTH DAILY OR AS DIRECTED BY ANTICOAGULATION CLINIC 180 Tablet 1   • tamsulosin (FLOMAX) 0.4 MG capsule TAKE 1 CAPSULE BY MOUTH EVERY  capsule 4   • Lancets Lancets for glucometer covered by patient's insurance Sig: use daily and prn ssx high or low sugar. 300 Each 12   • glimepiride (AMARYL) 4 MG Tab Take 1 tablet by mouth  "every morning. 100 tablet 4   • therapeutic multivitamin-minerals (THERAGRAN-M) Tab Take 1 Tab by mouth every day.       No current facility-administered medications on file prior to visit.          Objective:     Vitals:    04/18/22 1106   BP: 138/78   BP Location: Left arm   Patient Position: Sitting   BP Cuff Size: Adult long   Pulse: 74   Resp: 18   Temp: 36.4 °C (97.6 °F)   TempSrc: Temporal   SpO2: 95%   Weight: 85.5 kg (188 lb 9.6 oz)   Height: 1.803 m (5' 11\")       Physical Exam:  General: alert in no apparent distress.   Ext: 2+ DP pulse on the right foot.  On the dorsal aspect of the right third toe there is an ulceration with eschar and purulent drainage.  The toe is red but not particularly warm or tender.  Tinea pedis noted.        Assessment and Plan:     1. Anemia, unspecified type  - resolved.     2. Controlled type 2 diabetes mellitus without complication, without long-term current use of insulin (HCC)  -Continue current regimen.  - POCT A1C    3. Essential hypertension  -Continue current regimen.    4. Other male erectile dysfunction  -I advised the patient to discuss this with his urologist.    5. Diabetic ulcer of toe of right foot associated with type 2 diabetes mellitus, with fat layer exposed (HCC)  Patient has diabetic foot ulcer in the setting of tinea pedis and some possible residual cellulitis.  He was referred to podiatry by the urgent care provider but has not called to schedule his appointment yet.  I advised him to call the podiatrist to schedule an appointment and will refer him to wound care.  Offloading was discussed.  I will start him on aggressive antibiotic therapy along with ketoconazole for the tinea pedis.  I advised him to use probiotics while taking the antibiotics.  Considering the presentation, the patient's history and the duration, this does raise concern for osteomyelitis and additional evaluation may need to be considered.   - ketoconazole (NIZORAL) 2 % Cream; Apply a " thin layer to feet daily x 6 weeks  Dispense: 60 g; Refill: 0  - amoxicillin-clavulanate (AUGMENTIN) 875-125 MG Tab; Take 1 Tablet by mouth 2 times a day for 14 days.  Dispense: 28 Tablet; Refill: 0  - doxycycline (VIBRAMYCIN) 100 MG Tab; Take 1 Tablet by mouth 2 times a day for 14 days.  Dispense: 28 Tablet; Refill: 0  - Referral to Wound Clinic    6. Screening for prostate cancer  - PROSTATE SPECIFIC AG SCREENING; Future    7. History of venous thromboembolism  -The patient would like to switch to Eliquis.  I sent a message out to the clinical pharmacist handling his anticoagulation to see if this is an option for him.  He will continue warfarin in the meantime.        Followup: Return in about 4 months (around 8/18/2022).

## 2022-04-21 ENCOUNTER — ANTICOAGULATION VISIT (OUTPATIENT)
Dept: VASCULAR LAB | Facility: MEDICAL CENTER | Age: 77
End: 2022-04-21
Attending: INTERNAL MEDICINE
Payer: MEDICARE

## 2022-04-21 DIAGNOSIS — Z86.718 HISTORY OF VENOUS THROMBOEMBOLISM: ICD-10-CM

## 2022-04-21 LAB — INR PPP: 2.4 (ref 2–3.5)

## 2022-04-21 PROCEDURE — 99211 OFF/OP EST MAY X REQ PHY/QHP: CPT

## 2022-04-21 PROCEDURE — 85610 PROTHROMBIN TIME: CPT

## 2022-04-21 NOTE — PROGRESS NOTES
OP Anticoagulation Service Note    Date: 2022  There were no vitals filed for this visit.   pt declined vitals    Anticoagulation Summary  As of 2022    INR goal:  2.0-3.0   TTR:  78.6 % (2.6 y)   INR used for dosin.40 (2022)   Warfarin maintenance plan:  2.5 mg (2.5 mg x 1) every Thu, Sat; 5 mg (2.5 mg x 2) all other days   Weekly warfarin total:  30 mg   Plan last modified:  Brooklyn Subramanian, PharmD (3/7/2022)   Next INR check:  2022   Target end date:  Indefinite    Indications    History of venous thromboembolism [Z86.718]  Deep vein thrombosis (DVT) of popliteal vein of both lower extremities (HCC) (Resolved) [I82.433]             Anticoagulation Episode Summary     INR check location:      Preferred lab:      Send INR reminders to:      Comments:        Anticoagulation Care Providers     Provider Role Specialty Phone number    [facility] Responsible  [phone]    Too Brito M.D.  Family Medicine [phone]            HPI:   Magnus Gudino Shanon seen in clinic today, on anticoagulation therapy with warfarin (a high risk medication) for hx of DVT     Pt is here today to evaluate anticoagulation therapy    Previous INR was  3.1 on 22    Pt was instructed to continue current regimen, upon starting doxycycline take 2.5 mg then resume usual regimen    Anticoagulation Patient Findings  Patient Findings     Positives:  Change in medications (on doxycycline x 14 days)    Negatives:  Signs/symptoms of thrombosis, Signs/symptoms of bleeding, Laboratory test error suspected, Change in health, Change in alcohol use, Change in activity, Upcoming invasive procedure, Emergency department visit, Upcoming dental procedure, Missed doses, Extra doses, Change in diet/appetite, Hospital admission, Bruising, Other complaints          Confirmed warfarin dosing regimen    Pt is not on antiplatelet/NSAID therapy    Falls or accidents since last visit No        A/P   INR   -therapeutic today,   Lower warfarin dose while on doxycyline then resume usual regimen.     Pt would like to switch to DOAC, discussed benefits/risks. Insurance prefers Xarelto.      Xarelto is preferred, CrCl 65 ml/min. Sent rx for Xaretlo 20 mg once daily. Pt knows not to start until next INR      Pt educated to contact our clinic with any changes in medications or s/s of bleeding or thrombosis. Pt is aware to seek immediate medical attention for falls, head injury or deep cuts    Follow up appointment in 2 week(s) to reduce risk of adverse events from warfarin  Brionna Langley, PharmD

## 2022-04-22 ENCOUNTER — HOSPITAL ENCOUNTER (OUTPATIENT)
Dept: LAB | Facility: MEDICAL CENTER | Age: 77
End: 2022-04-22
Attending: FAMILY MEDICINE
Payer: MEDICARE

## 2022-04-22 DIAGNOSIS — Z12.5 SCREENING FOR PROSTATE CANCER: ICD-10-CM

## 2022-04-22 LAB — PSA SERPL-MCNC: 3.17 NG/ML (ref 0–4)

## 2022-04-22 PROCEDURE — 36415 COLL VENOUS BLD VENIPUNCTURE: CPT | Mod: GA

## 2022-04-22 PROCEDURE — 84153 ASSAY OF PSA TOTAL: CPT | Mod: GA

## 2022-04-26 ENCOUNTER — TELEPHONE (OUTPATIENT)
Dept: MEDICAL GROUP | Facility: MEDICAL CENTER | Age: 77
End: 2022-04-26
Payer: MEDICARE

## 2022-04-26 NOTE — TELEPHONE ENCOUNTER
Patient was referred to wound care 8 days ago but it does not look like it has been processed.  Can we reach out to referrals and figure out what's going on?

## 2022-04-27 ENCOUNTER — TELEPHONE (OUTPATIENT)
Dept: VASCULAR LAB | Facility: MEDICAL CENTER | Age: 77
End: 2022-04-27
Payer: MEDICARE

## 2022-04-27 DIAGNOSIS — Z86.718 HISTORY OF VENOUS THROMBOEMBOLISM: ICD-10-CM

## 2022-04-27 NOTE — TELEPHONE ENCOUNTER
----- Message from Cierra Dennis sent at 4/27/2022  2:21 PM PDT -----  Hello!!    I had a patient change their mind about filling with [facility].  Anyway you can send over a new Xarelto 20mg prescription so I can forward it to [facility] pharmacy?    Thank you!    Cierra Dennis  Rx Coordinator   [phone]

## 2022-04-28 ENCOUNTER — TELEPHONE (OUTPATIENT)
Dept: MEDICAL GROUP | Facility: MEDICAL CENTER | Age: 77
End: 2022-04-28
Payer: MEDICARE

## 2022-05-03 ENCOUNTER — HOSPITAL ENCOUNTER (OUTPATIENT)
Dept: LAB | Facility: MEDICAL CENTER | Age: 77
End: 2022-05-03
Attending: UROLOGY
Payer: MEDICARE

## 2022-05-03 LAB
BUN SERPL-MCNC: 22 MG/DL (ref 8–22)
CREAT SERPL-MCNC: 1.21 MG/DL (ref 0.5–1.4)
GFR SERPLBLD CREATININE-BSD FMLA CKD-EPI: 61 ML/MIN/1.73 M 2

## 2022-05-03 PROCEDURE — 36415 COLL VENOUS BLD VENIPUNCTURE: CPT

## 2022-05-03 PROCEDURE — 82565 ASSAY OF CREATININE: CPT

## 2022-05-03 PROCEDURE — 84520 ASSAY OF UREA NITROGEN: CPT

## 2022-05-05 ENCOUNTER — ANTICOAGULATION VISIT (OUTPATIENT)
Dept: VASCULAR LAB | Facility: MEDICAL CENTER | Age: 77
End: 2022-05-05
Attending: INTERNAL MEDICINE
Payer: MEDICARE

## 2022-05-05 DIAGNOSIS — Z86.718 HISTORY OF VENOUS THROMBOEMBOLISM: ICD-10-CM

## 2022-05-05 LAB
INR BLD: 2.2 (ref 0.9–1.2)
INR PPP: 2.2 (ref 2–3.5)

## 2022-05-05 PROCEDURE — 85610 PROTHROMBIN TIME: CPT

## 2022-05-05 PROCEDURE — 99211 OFF/OP EST MAY X REQ PHY/QHP: CPT

## 2022-05-05 NOTE — PROGRESS NOTES
OP Anticoagulation Service Note    Date: 2022  There were no vitals filed for this visit.   pt declined vitals    Anticoagulation Summary  As of 2022    INR goal:  2.0-3.0   TTR:  79.0 % (2.7 y)   INR used for dosin.20 (2022)   Warfarin maintenance plan:  2.5 mg (2.5 mg x 1) every Thu, Sat; 5 mg (2.5 mg x 2) all other days   Weekly warfarin total:  30 mg   Plan last modified:  Brooklyn Subramanian, PharmD (3/7/2022)   Next INR check:  2022   Target end date:  Indefinite    Indications    History of venous thromboembolism [Z86.718]  Deep vein thrombosis (DVT) of popliteal vein of both lower extremities (HCC) (Resolved) [I82.433]             Anticoagulation Episode Summary     INR check location:      Preferred lab:      Send INR reminders to:      Comments:        Anticoagulation Care Providers     Provider Role Specialty Phone number    [facility] Responsible  [phone]    Too Brito M.D.  Family Medicine [phone]            HPI:   Magnus Gudino Shanon seen in clinic today, on anticoagulation therapy with warfarin (a high risk medication) for hx of DVT      Pt is here today to evaluate anticoagulation therapy    Previous INR was  2.4 on 22    Pt was instructed to continue current regimen    Anticoagulation Patient Findings  Patient Findings     Positives:  Change in medications (on doxycycline x 10 more days)    Negatives:  Signs/symptoms of thrombosis, Signs/symptoms of bleeding, Laboratory test error suspected, Change in health, Change in alcohol use, Change in activity, Upcoming invasive procedure, Emergency department visit, Upcoming dental procedure, Missed doses, Extra doses, Change in diet/appetite, Hospital admission, Bruising, Other complaints          Confirmed warfarin dosing regimen    Pt is not on antiplatelet/NSAID therapy    Falls or accidents since last visit No        A/P   INR  therapeutic today,  will require close follow up as pt is still  on doxycycline  Continue current warfarin regimen     Unfortunately a DOAC is too expensive for pt, he is concerned about being put in the doughnut hole         Pt educated to contact our clinic with any changes in medications or s/s of bleeding or thrombosis. Pt is aware to seek immediate medical attention for falls, head injury or deep cuts    Follow up appointment in 2 week(s) to reduce risk of adverse events from warfarin  Brionna Langley, PharmD

## 2022-05-09 ENCOUNTER — HOSPITAL ENCOUNTER (OUTPATIENT)
Dept: RADIOLOGY | Facility: MEDICAL CENTER | Age: 77
End: 2022-05-09
Attending: UROLOGY
Payer: MEDICARE

## 2022-05-09 DIAGNOSIS — C67.9 MALIGNANT NEOPLASM OF URINARY BLADDER, UNSPECIFIED SITE (HCC): ICD-10-CM

## 2022-05-09 PROCEDURE — 700117 HCHG RX CONTRAST REV CODE 255: Performed by: UROLOGY

## 2022-05-09 PROCEDURE — 74178 CT ABD&PLV WO CNTR FLWD CNTR: CPT | Mod: MH

## 2022-05-09 RX ADMIN — IOHEXOL 100 ML: 350 INJECTION, SOLUTION INTRAVENOUS at 13:26

## 2022-05-19 ENCOUNTER — ANTICOAGULATION VISIT (OUTPATIENT)
Dept: VASCULAR LAB | Facility: MEDICAL CENTER | Age: 77
End: 2022-05-19
Attending: INTERNAL MEDICINE
Payer: MEDICARE

## 2022-05-19 DIAGNOSIS — Z86.718 HISTORY OF VENOUS THROMBOEMBOLISM: ICD-10-CM

## 2022-05-19 LAB
INR BLD: 2.3 (ref 0.9–1.2)
INR PPP: 2.3 (ref 2–3.5)

## 2022-05-19 PROCEDURE — 99211 OFF/OP EST MAY X REQ PHY/QHP: CPT

## 2022-05-19 PROCEDURE — 85610 PROTHROMBIN TIME: CPT

## 2022-05-19 NOTE — PROGRESS NOTES
OP Anticoagulation Service Note    Date: 2022  There were no vitals filed for this visit.   pt declined vitals    Anticoagulation Summary  As of 2022    INR goal:  2.0-3.0   TTR:  79.1 % (2.7 y)   INR used for dosin.30 (2022)   Warfarin maintenance plan:  2.5 mg (2.5 mg x 1) every Thu, Sat; 5 mg (2.5 mg x 2) all other days   Weekly warfarin total:  30 mg   Plan last modified:  Brooklyn Subramanian, PharmD (3/7/2022)   Next INR check:  2022   Target end date:  Indefinite    Indications    History of venous thromboembolism [Z86.718]  Deep vein thrombosis (DVT) of popliteal vein of both lower extremities (HCC) (Resolved) [I82.433]             Anticoagulation Episode Summary     INR check location:      Preferred lab:      Send INR reminders to:      Comments:        Anticoagulation Care Providers     Provider Role Specialty Phone number    [facility] Responsible  [phone]    Too Brito M.D.  Family Medicine [phone]        Anticoagulation Patient Findings  Patient Findings     Negatives:  Signs/symptoms of thrombosis, Signs/symptoms of bleeding, Laboratory test error suspected, Change in health, Change in alcohol use, Change in activity, Upcoming invasive procedure, Emergency department visit, Upcoming dental procedure, Missed doses, Extra doses, Change in medications, Change in diet/appetite, Hospital admission, Bruising, Other complaints          HPI:   Magnus Claudio seen in clinic today, on anticoagulation therapy with warfarin (a high risk medication) for hx of DVT       Pt is here today to evaluate anticoagulation therapy      Confirmed warfarin dosing regimen, denies missed or extra doses of coumadin.   Diet has been consistent with foods rich in vitamin K: Yes  Changes in ETOH:  No  Changes in smoking status: No  Changes in medication: No   .  Cost restriction: No  S/s of bleeding:  No  Signs/symptoms  thrombosis since the last appt: No    A/P   INR   -therapeutic today  Continue current warfarin regimen        Pt educated to contact our clinic with any changes in medications or s/s of bleeding or thrombosis    Follow up appointment in 4 week(s) to reduce risk of adverse events from warfarin   Brionna Langley, DeedeeD

## 2022-06-16 ENCOUNTER — ANTICOAGULATION VISIT (OUTPATIENT)
Dept: VASCULAR LAB | Facility: MEDICAL CENTER | Age: 77
End: 2022-06-16
Attending: INTERNAL MEDICINE
Payer: MEDICARE

## 2022-06-16 DIAGNOSIS — Z86.718 HISTORY OF VENOUS THROMBOEMBOLISM: ICD-10-CM

## 2022-06-16 LAB
INR BLD: 1.7 (ref 0.9–1.2)
INR PPP: 1.7 (ref 2–3.5)

## 2022-06-16 PROCEDURE — 85610 PROTHROMBIN TIME: CPT

## 2022-06-16 PROCEDURE — 99212 OFFICE O/P EST SF 10 MIN: CPT

## 2022-06-16 NOTE — PROGRESS NOTES
"Anticoagulation Summary  As of 2022    INR goal:  2.0-3.0   TTR:  78.3 % (2.8 y)   INR used for dosin.70 (2022)   Warfarin maintenance plan:  2.5 mg (2.5 mg x 1) every Thu, Sat; 5 mg (2.5 mg x 2) all other days   Weekly warfarin total:  30 mg   Plan last modified:  Brooklyn Subramanian, PharmD (3/7/2022)   Next INR check:  2022   Target end date:  Indefinite    Indications    History of venous thromboembolism [Z86.718]  Deep vein thrombosis (DVT) of popliteal vein of both lower extremities (HCC) (Resolved) [I82.433]             Anticoagulation Episode Summary     INR check location:      Preferred lab:      Send INR reminders to:      Comments:        Anticoagulation Care Providers     Provider Role Specialty Phone number    [facility] Responsible  [phone]    Too Brito M.D.  Family Medicine [phone]                Refer to Patient Findings for HPI:  Patient Findings     Positives:  Change in diet/appetite    Negatives:  Signs/symptoms of thrombosis, Signs/symptoms of bleeding, Laboratory test error suspected, Change in health, Change in alcohol use, Change in activity, Upcoming invasive procedure, Emergency department visit, Upcoming dental procedure, Missed doses, Extra doses, Change in medications, Hospital admission, Bruising, Other complaints    Comments:  \"May just a little\" increase in greens  Had a tall salad last night          There were no vitals filed for this visit.   pt declined vitals    Verified current warfarin dosing schedule.    Medications reconciled   Pt is not on antiplatelet therapy      A/P   INR  SUB-therapeutic.     Warfarin dosing recommendation: Instructed pt to BOLUS 5 mg x 1 today, then resume current warfarin regimen as detailed above    Pt educated to contact our clinic with any changes in medications or s/s of bleeding or thrombosis. Pt is aware to seek immediate medical attention for falls, head injury or deep cuts.    Follow up " appointment in 2 week(s).    Pancho Silvestre, Student Pharmacist    Brionna Langley, PharmD

## 2022-06-30 ENCOUNTER — ANTICOAGULATION VISIT (OUTPATIENT)
Dept: VASCULAR LAB | Facility: MEDICAL CENTER | Age: 77
End: 2022-06-30
Attending: INTERNAL MEDICINE
Payer: MEDICARE

## 2022-06-30 DIAGNOSIS — Z86.718 HISTORY OF VENOUS THROMBOEMBOLISM: ICD-10-CM

## 2022-06-30 LAB
INR BLD: 1.7 (ref 0.9–1.2)
INR PPP: 1.7 (ref 2–3.5)

## 2022-06-30 PROCEDURE — 85610 PROTHROMBIN TIME: CPT

## 2022-06-30 PROCEDURE — 99212 OFFICE O/P EST SF 10 MIN: CPT

## 2022-06-30 NOTE — PROGRESS NOTES
OP Anticoagulation Service Note    Date: 2022  There were no vitals filed for this visit.   pt declined vitals    Anticoagulation Summary  As of 2022    INR goal:  2.0-3.0   TTR:  77.2 % (2.8 y)   INR used for dosin.70 (2022)   Warfarin maintenance plan:  2.5 mg (2.5 mg x 1) every Sat; 5 mg (2.5 mg x 2) all other days   Weekly warfarin total:  32.5 mg   Plan last modified:  Brionna Langley, PharmD (2022)   Next INR check:  2022   Target end date:  Indefinite    Indications    History of venous thromboembolism [Z86.718]  Deep vein thrombosis (DVT) of popliteal vein of both lower extremities (HCC) (Resolved) [I82.433]             Anticoagulation Episode Summary     INR check location:      Preferred lab:      Send INR reminders to:      Comments:        Anticoagulation Care Providers     Provider Role Specialty Phone number    [facility] Responsible  [phone]    Too Brito M.D.  Family Medicine [phone]            HPI:   Magnus Gudino Shanon seen in clinic today, on anticoagulation therapy with warfarin (a high risk medication) for hx of DVT     Pt is here today to evaluate anticoagulation therapy    Previous INR was  1.7 on 22    Pt was instructed to take 5 mg then continue current regimen    Anticoagulation Patient Findings  Patient Findings     Positives:  Change in medications (stopped doxycycline - last dose 22)    Negatives:  Signs/symptoms of thrombosis, Signs/symptoms of bleeding, Laboratory test error suspected, Change in health, Change in alcohol use, Change in activity, Upcoming invasive procedure, Emergency department visit, Upcoming dental procedure, Missed doses, Extra doses, Change in diet/appetite, Hospital admission, Bruising, Other complaints          Confirmed warfarin dosing regimen    Pt is not on antiplatelet/NSAID therapy          A/P   INR  sub-therapeutic today, will require dose adjust ment today to prevent VTE and  closer follow up.    Increase weekly regimen         Pt educated to contact our clinic with any changes in medications or s/s of bleeding or thrombosis. Pt is aware to seek immediate medical attention for falls, head injury or deep cuts    Follow up appointment in 2 week(s) to reduce risk of adverse events from warfarin  Brionna Langley, PharmD

## 2022-07-14 ENCOUNTER — ANTICOAGULATION VISIT (OUTPATIENT)
Dept: VASCULAR LAB | Facility: MEDICAL CENTER | Age: 77
End: 2022-07-14
Attending: INTERNAL MEDICINE
Payer: MEDICARE

## 2022-07-14 DIAGNOSIS — Z86.718 HISTORY OF VENOUS THROMBOEMBOLISM: ICD-10-CM

## 2022-07-14 LAB
INR BLD: 2 (ref 0.9–1.2)
INR PPP: 2 (ref 2–3.5)

## 2022-07-14 PROCEDURE — 85610 PROTHROMBIN TIME: CPT

## 2022-07-14 PROCEDURE — 99211 OFF/OP EST MAY X REQ PHY/QHP: CPT

## 2022-07-14 NOTE — PROGRESS NOTES
OP Anticoagulation Service Note    Date: 2022    Anticoagulation Summary  As of 2022    INR goal:  2.0-3.0   TTR:  76.2 % (2.9 y)   INR used for dosin.00 (2022)   Warfarin maintenance plan:  2.5 mg (2.5 mg x 1) every Sat; 5 mg (2.5 mg x 2) all other days   Weekly warfarin total:  32.5 mg   Plan last modified:  Brionna Langley, PharmD (2022)   Next INR check:     Target end date:  Indefinite    Indications    History of venous thromboembolism [Z86.718]  Deep vein thrombosis (DVT) of popliteal vein of both lower extremities (HCC) (Resolved) [I82.433]             Anticoagulation Episode Summary     INR check location:      Preferred lab:      Send INR reminders to:      Comments:        Anticoagulation Care Providers     Provider Role Specialty Phone number    [facility] Responsible  [phone]    Too Brito M.D.  Family Medicine [phone]            HPI:   Magnus Claudio seen in clinic today, on anticoagulation therapy with warfarin (a high risk medication) for DVT, CHADS-VASC = NA      Pt is here today to evaluate anticoagulation therapy    Previous INR was  1.7 on 22    Pt was instructed to increase weekly dose to 2.5mg Sat and 5 mg ROW    Anticoagulation Patient Findings  Patient Findings     Negatives:  Signs/symptoms of thrombosis, Signs/symptoms of bleeding, Laboratory test error suspected, Change in health, Change in alcohol use, Change in activity, Upcoming invasive procedure, Emergency department visit, Upcoming dental procedure, Missed doses, Extra doses, Change in medications, Change in diet/appetite, Hospital admission, Bruising, Other complaints          Confirmed warfarin dosing regimen    Pt is not on antiplatelet/NSAID therapy  Pt is not on antiplatelet therapy.  Falls or accidents since last visit No        A/P   INR  therapeutic today,  will require close follow up as previous INR was sub-therapeutic   Continue current warfarin  regimen          Pt educated to contact our clinic with any changes in medications or s/s of bleeding or thrombosis. Pt is aware to seek immediate medical attention for falls, head injury or deep cuts    Follow up appointment in 3 week(s) to reduce risk of adverse events from warfarin  Brionna Langley, DeedeeD

## 2022-07-25 DIAGNOSIS — Z79.01 CHRONIC ANTICOAGULATION: Primary | ICD-10-CM

## 2022-07-25 DIAGNOSIS — Z79.01 CHRONIC ANTICOAGULATION: ICD-10-CM

## 2022-07-25 RX ORDER — WARFARIN SODIUM 2.5 MG/1
2.5-5 TABLET ORAL DAILY
Qty: 180 TABLET | Refills: 1 | Status: SHIPPED | OUTPATIENT
Start: 2022-07-25 | End: 2023-01-23 | Stop reason: SDUPTHER

## 2022-07-28 ENCOUNTER — TELEPHONE (OUTPATIENT)
Dept: VASCULAR LAB | Facility: MEDICAL CENTER | Age: 77
End: 2022-07-28
Payer: MEDICARE

## 2022-08-04 ENCOUNTER — HOSPITAL ENCOUNTER (OUTPATIENT)
Dept: LAB | Facility: MEDICAL CENTER | Age: 77
End: 2022-08-04
Attending: NURSE PRACTITIONER
Payer: MEDICARE

## 2022-08-04 LAB
ALBUMIN SERPL BCP-MCNC: 4 G/DL (ref 3.2–4.9)
ALBUMIN/GLOB SERPL: 1.4 G/DL
ALP SERPL-CCNC: 57 U/L (ref 30–99)
ALT SERPL-CCNC: 25 U/L (ref 2–50)
ANION GAP SERPL CALC-SCNC: 12 MMOL/L (ref 7–16)
AST SERPL-CCNC: 30 U/L (ref 12–45)
BASOPHILS # BLD AUTO: 0.5 % (ref 0–1.8)
BASOPHILS # BLD: 0.03 K/UL (ref 0–0.12)
BILIRUB SERPL-MCNC: 1.8 MG/DL (ref 0.1–1.5)
BUN SERPL-MCNC: 18 MG/DL (ref 8–22)
CALCIUM SERPL-MCNC: 8.6 MG/DL (ref 8.4–10.2)
CEA SERPL-MCNC: 3 NG/ML (ref 0–3)
CHLORIDE SERPL-SCNC: 105 MMOL/L (ref 96–112)
CO2 SERPL-SCNC: 23 MMOL/L (ref 20–33)
CREAT SERPL-MCNC: 1.12 MG/DL (ref 0.5–1.4)
EOSINOPHIL # BLD AUTO: 0.11 K/UL (ref 0–0.51)
EOSINOPHIL NFR BLD: 1.8 % (ref 0–6.9)
ERYTHROCYTE [DISTWIDTH] IN BLOOD BY AUTOMATED COUNT: 41.3 FL (ref 35.9–50)
GFR SERPLBLD CREATININE-BSD FMLA CKD-EPI: 67 ML/MIN/1.73 M 2
GLOBULIN SER CALC-MCNC: 2.9 G/DL (ref 1.9–3.5)
GLUCOSE SERPL-MCNC: 164 MG/DL (ref 65–99)
HCT VFR BLD AUTO: 40.7 % (ref 42–52)
HGB BLD-MCNC: 13.8 G/DL (ref 14–18)
IMM GRANULOCYTES # BLD AUTO: 0.02 K/UL (ref 0–0.11)
IMM GRANULOCYTES NFR BLD AUTO: 0.3 % (ref 0–0.9)
LYMPHOCYTES # BLD AUTO: 1.19 K/UL (ref 1–4.8)
LYMPHOCYTES NFR BLD: 19.7 % (ref 22–41)
MCH RBC QN AUTO: 29.3 PG (ref 27–33)
MCHC RBC AUTO-ENTMCNC: 33.9 G/DL (ref 33.7–35.3)
MCV RBC AUTO: 86.4 FL (ref 81.4–97.8)
MONOCYTES # BLD AUTO: 0.4 K/UL (ref 0–0.85)
MONOCYTES NFR BLD AUTO: 6.6 % (ref 0–13.4)
NEUTROPHILS # BLD AUTO: 4.29 K/UL (ref 1.82–7.42)
NEUTROPHILS NFR BLD: 71.1 % (ref 44–72)
NRBC # BLD AUTO: 0 K/UL
NRBC BLD-RTO: 0 /100 WBC
PLATELET # BLD AUTO: 144 K/UL (ref 164–446)
PMV BLD AUTO: 10 FL (ref 9–12.9)
POTASSIUM SERPL-SCNC: 3.8 MMOL/L (ref 3.6–5.5)
PROT SERPL-MCNC: 6.9 G/DL (ref 6–8.2)
RBC # BLD AUTO: 4.71 M/UL (ref 4.7–6.1)
SODIUM SERPL-SCNC: 140 MMOL/L (ref 135–145)
WBC # BLD AUTO: 6 K/UL (ref 4.8–10.8)

## 2022-08-04 PROCEDURE — 36415 COLL VENOUS BLD VENIPUNCTURE: CPT

## 2022-08-04 PROCEDURE — 80053 COMPREHEN METABOLIC PANEL: CPT

## 2022-08-04 PROCEDURE — 85025 COMPLETE CBC W/AUTO DIFF WBC: CPT

## 2022-08-04 PROCEDURE — 82378 CARCINOEMBRYONIC ANTIGEN: CPT

## 2022-08-05 ENCOUNTER — ANTICOAGULATION VISIT (OUTPATIENT)
Dept: VASCULAR LAB | Facility: MEDICAL CENTER | Age: 77
End: 2022-08-05
Attending: INTERNAL MEDICINE
Payer: MEDICARE

## 2022-08-05 VITALS — HEART RATE: 75 BPM | SYSTOLIC BLOOD PRESSURE: 155 MMHG | DIASTOLIC BLOOD PRESSURE: 97 MMHG

## 2022-08-05 DIAGNOSIS — Z86.718 HISTORY OF VENOUS THROMBOEMBOLISM: ICD-10-CM

## 2022-08-05 LAB
INR BLD: 2.5 (ref 0.9–1.2)
INR PPP: 2.5 (ref 2–3.5)

## 2022-08-05 PROCEDURE — 85610 PROTHROMBIN TIME: CPT

## 2022-08-05 PROCEDURE — 99211 OFF/OP EST MAY X REQ PHY/QHP: CPT

## 2022-08-05 NOTE — PROGRESS NOTES
Anticoagulation Summary  As of 2022      INR goal:  2.0-3.0   TTR:  76.7 % (2.9 y)   INR used for dosin.50 (2022)   Warfarin maintenance plan:  2.5 mg (2.5 mg x 1) every Sat; 5 mg (2.5 mg x 2) all other days   Weekly warfarin total:  32.5 mg   Plan last modified:  Deedee SantamariaD (2022)   Next INR check:  2022   Target end date:  Indefinite    Indications    History of venous thromboembolism [Z86.718]  Deep vein thrombosis (DVT) of popliteal vein of both lower extremities (HCC) (Resolved) [I82.433]                 Anticoagulation Episode Summary       INR check location:      Preferred lab:      Send INR reminders to:      Comments:            Anticoagulation Care Providers       Provider Role Specialty Phone number    [facility] Responsible  [phone]    Too Brito M.D.  Family Medicine [phone]          Refer to Patient Findings for HPI:    Vitals:    22 1147   BP: (!) 155/97   Pulse: 75     Patient seen in clinic today for follow up on anticoagulation therapy with warfarin (a high risk medication) for hx of DVT  Verified current warfarin dosing schedule.  Patient denies any missed doses of warfarin.    Medications reconciled   Pt is not on antiplatelet therapy      A/P   INR is therapeutic today at 2.5.     Warfarin dosing recommendation: Continue with the current     Pt educated to contact our clinic with any changes in medications or s/s of bleeding or thrombosis. Pt is aware to seek immediate medical attention for falls, head injury or deep cuts.    Follow up appointment in 4 week(s).    Next appt:  2 @ 10am     ADDENDUM:  S/w patient's wife today as they called to say Magnus mixed up his timing of dosing.  Does not sound like he took extra tablets, just took in AM instead of PM.  Instructed him to resume current regimen and can dose in PM going forward.  Sg Sanchez, PharmD, BCACP      Ian MarkD

## 2022-08-08 ENCOUNTER — HOSPITAL ENCOUNTER (OUTPATIENT)
Dept: RADIOLOGY | Facility: MEDICAL CENTER | Age: 77
End: 2022-08-08
Attending: INTERNAL MEDICINE
Payer: MEDICARE

## 2022-08-08 DIAGNOSIS — T81.718A IATROGENIC PULMONARY EMBOLISM AND INFARCTION, INITIAL ENCOUNTER (HCC): ICD-10-CM

## 2022-08-08 DIAGNOSIS — I26.99 IATROGENIC PULMONARY EMBOLISM AND INFARCTION, INITIAL ENCOUNTER (HCC): ICD-10-CM

## 2022-08-08 DIAGNOSIS — C18.8 MALIGNANT NEOPLASM OF OVERLAPPING SITES OF COLON (HCC): ICD-10-CM

## 2022-08-08 PROCEDURE — 71260 CT THORAX DX C+: CPT | Mod: MH

## 2022-08-08 PROCEDURE — 700117 HCHG RX CONTRAST REV CODE 255: Performed by: INTERNAL MEDICINE

## 2022-08-08 RX ADMIN — IOHEXOL 25 ML: 240 INJECTION, SOLUTION INTRATHECAL; INTRAVASCULAR; INTRAVENOUS; ORAL at 15:05

## 2022-08-08 RX ADMIN — IOHEXOL 100 ML: 350 INJECTION, SOLUTION INTRAVENOUS at 15:05

## 2022-08-18 ENCOUNTER — TELEPHONE (OUTPATIENT)
Dept: MEDICAL GROUP | Facility: LAB | Age: 77
End: 2022-08-18
Payer: MEDICARE

## 2022-08-18 RX ORDER — DOXYCYCLINE HYCLATE 100 MG
TABLET ORAL
COMMUNITY
End: 2022-08-18

## 2022-08-18 RX ORDER — DOXYCYCLINE HYCLATE 100 MG/1
100 CAPSULE ORAL 2 TIMES DAILY
COMMUNITY
Start: 2022-05-25 | End: 2022-08-18

## 2022-08-18 RX ORDER — AMOXICILLIN AND CLAVULANATE POTASSIUM 875; 125 MG/1; MG/1
TABLET, FILM COATED ORAL
COMMUNITY
End: 2022-08-18

## 2022-08-18 RX ORDER — ENOXAPARIN SODIUM 150 MG/ML
INJECTION SUBCUTANEOUS
COMMUNITY
End: 2022-08-18

## 2022-08-18 NOTE — TELEPHONE ENCOUNTER
NEW PATIENT VISIT PRE-VISIT PLANNING    1.  [employer] Patient is checked in Patient Demographics?Yes    2.  Immunizations were updated in Epic using Reconcile Outside Information activity? Yes         3.  Is this appointment scheduled as a Hospital Follow-Up? No    4.  Patient is due for the following Health Maintenance Topics:   Health Maintenance Due   Topic Date Due    IMM HEP B VACCINE (1 of 3 - 3-dose series) Never done    IMM DTaP/Tdap/Td Vaccine (1 - Tdap) Never done    IMM ZOSTER VACCINES (1 of 2) Never done    COLORECTAL CANCER SCREENING  02/16/2020    RETINAL SCREENING  10/15/2020    Annual Wellness Visit  06/11/2021    DIABETES MONOFILAMENT / LE EXAM  08/16/2022    FASTING LIPID PROFILE  09/02/2022    URINE ACR / MICROALBUMIN  09/02/2022     5.  Reviewed/Updated the following with patient:          Preferred Pharmacy? Yes          Preferred Lab? Yes          Preferred Communication? Yes          Allergies? Yes          Medications? YES. Was Abstract Encounter opened and chart updated? YES  6.  Updated Care Team?          DME Company (gait device, O2, CPAP, etc.) N\A          Other Specialists (eye doctor, derm, GYN, cardiology, endo, etc): YES    7.  AHA (Puls8) form printed for Provider? N/A

## 2022-08-23 ENCOUNTER — OFFICE VISIT (OUTPATIENT)
Dept: MEDICAL GROUP | Facility: LAB | Age: 77
End: 2022-08-23
Payer: MEDICARE

## 2022-08-23 VITALS
DIASTOLIC BLOOD PRESSURE: 62 MMHG | SYSTOLIC BLOOD PRESSURE: 118 MMHG | WEIGHT: 187.2 LBS | TEMPERATURE: 97 F | OXYGEN SATURATION: 97 % | BODY MASS INDEX: 26.21 KG/M2 | RESPIRATION RATE: 14 BRPM | HEART RATE: 76 BPM | HEIGHT: 71 IN

## 2022-08-23 DIAGNOSIS — I27.20 PULMONARY HYPERTENSION (HCC): ICD-10-CM

## 2022-08-23 DIAGNOSIS — E11.9 CONTROLLED TYPE 2 DIABETES MELLITUS WITHOUT COMPLICATION, WITHOUT LONG-TERM CURRENT USE OF INSULIN (HCC): ICD-10-CM

## 2022-08-23 PROBLEM — E11.621 DIABETIC ULCER OF TOE OF RIGHT FOOT ASSOCIATED WITH TYPE 2 DIABETES MELLITUS, WITH FAT LAYER EXPOSED (HCC): Status: RESOLVED | Noted: 2022-04-18 | Resolved: 2022-08-23

## 2022-08-23 PROBLEM — L97.512 DIABETIC ULCER OF TOE OF RIGHT FOOT ASSOCIATED WITH TYPE 2 DIABETES MELLITUS, WITH FAT LAYER EXPOSED (HCC): Status: RESOLVED | Noted: 2022-04-18 | Resolved: 2022-08-23

## 2022-08-23 PROBLEM — Z85.51 HISTORY OF BLADDER CANCER: Status: ACTIVE | Noted: 2021-04-13

## 2022-08-23 LAB
HBA1C MFR BLD: 6.4 % (ref 0–5.6)
INT CON NEG: ABNORMAL
INT CON POS: ABNORMAL

## 2022-08-23 PROCEDURE — 83036 HEMOGLOBIN GLYCOSYLATED A1C: CPT | Performed by: FAMILY MEDICINE

## 2022-08-23 PROCEDURE — 99214 OFFICE O/P EST MOD 30 MIN: CPT | Performed by: FAMILY MEDICINE

## 2022-08-23 ASSESSMENT — FIBROSIS 4 INDEX: FIB4 SCORE: 3.25

## 2022-08-23 NOTE — PROGRESS NOTES
Magnus Claudio is a 77 y.o. male here to establish care and discuss chronic medical conditions.  No acute concerns today.    Diabetes managed with metformin and glimepiride, A1c today 6.4%.  History of pulmonary embolism and has been maintained on warfarin indefinitely.  Follows with anticoagulation clinic.    Current medicines (including changes today)  Current Outpatient Medications   Medication Sig Dispense Refill    warfarin (COUMADIN) 2.5 MG Tab Take 1-2 Tablets by mouth every day. As directed by [facility] 180 Tablet 1    glimepiride (AMARYL) 4 MG Tab TAKE 1 TABLET BY MOUTH EVERY MORNING 100 Tablet 4    ketoconazole (NIZORAL) 2 % Cream Apply a thin layer to feet daily x 6 weeks 60 g 0    B Complex-Folic Acid (B COMPLEX-VITAMIN B12 PO)       amLODIPine (NORVASC) 5 MG Tab TAKE 1 TABLET BY MOUTH EVERY  Tablet 4    hydroCHLOROthiazide (HYDRODIURIL) 12.5 MG tablet TAKE 1 TABLET BY MOUTH DAILY (Patient taking differently: Take 12.5 mg by mouth every day.) 100 Tablet 4    losartan (COZAAR) 100 MG Tab TAKE 1 TABLET BY MOUTH DAILY 100 Tablet 4    metFORMIN ER (GLUCOPHAGE XR) 500 MG TABLET SR 24 HR TAKE 2 TABLETS BY MOUTH TWICE DAILY 360 Tablet 4    tamsulosin (FLOMAX) 0.4 MG capsule TAKE 1 CAPSULE BY MOUTH EVERY  capsule 4    Lancets Lancets for glucometer covered by patient's insurance Sig: use daily and prn ssx high or low sugar. 300 Each 12    therapeutic multivitamin-minerals (THERAGRAN-M) Tab Take 1 Tab by mouth every day.       No current facility-administered medications for this visit.     He  has a past medical history of Anemia, Blood clotting disorder (HCC), Cancer (HCC) (2021), Cataract, Diabetes (HCC), Diverticulitis, Hypertension, and Vertigo, benign positional.  He  has a past surgical history that includes colon resection; pr resec liver,part lobectomy; laminotomy; eye surgery (Bilateral); cataract extraction with iol; other (05/2021); and inguinal hernia repair  "robotic xi (Left, 2022).  Social History     Tobacco Use    Smoking status: Never    Smokeless tobacco: Never   Vaping Use    Vaping Use: Never used   Substance Use Topics    Alcohol use: Not Currently     Alcohol/week: 0.0 oz    Drug use: Never     Social History     Social History Narrative    Not on file     Family History   Problem Relation Age of Onset    Hypertension Father     Heart Attack Father     Cancer Mother         Breast    Diabetes Sister     Hyperlipidemia Neg Hx         unknown     Family Status   Relation Name Status    Fa          MI    Mo      Sis  (Not Specified)    Neg Hx  (Not Specified)       ROS  See HPI     Objective:     Physical Exam:  /62 (BP Location: Left arm, Patient Position: Sitting, BP Cuff Size: Adult)   Pulse 76   Temp 36.1 °C (97 °F)   Resp 14   Ht 1.803 m (5' 11\")   Wt 84.9 kg (187 lb 3.2 oz)   SpO2 97%  Body mass index is 26.11 kg/m².  Constitutional: Alert. Well appearing. No distress.  Skin: Warm, dry, good turgor, no visible rashes.  Eye: Equal, round and reactive to light, conjunctiva clear, lids normal.  Neck: Trachea midline, no masses, no thyromegaly.   Respiratory: Normal effort. Lungs are clear to auscultation bilaterally.  Cardiovascular: Regular rate and rhythm. Normal S1/S2. No murmurs, rubs or gallops.   Monofilament testing with a 10 gram force: sensation intact: intact bilaterally  Visual Inspection: Feet with maceration, ulcers, fissures.  Pedal pulses: decreased bilaterally   Neuro: Moves all four extremities. No facial droop.  Psych: Answers questions appropriately. Normal affect and mood.    Assessment and Plan:     1. Controlled type 2 diabetes mellitus without complication, without long-term current use of insulin ([facility])  A1c 6.4%, continue metformin and glimepiride.  Labs and screenings ordered as below.  Follow-up 3 months.  - POCT Hemoglobin A1C  - Lipid Profile; Future  - MICROALBUMIN CREAT RATIO URINE; Future  - " Diabetic Monofilament LE Exam    2. Pulmonary hypertension (HCC)  Noted on echo and diagnosed with PE in 2019.  No current shortness of breath.    Follow up: No follow-ups on file.         PLEASE NOTE: This note was created using voice recognition software.

## 2022-09-02 ENCOUNTER — ANTICOAGULATION VISIT (OUTPATIENT)
Dept: VASCULAR LAB | Facility: MEDICAL CENTER | Age: 77
End: 2022-09-02
Attending: INTERNAL MEDICINE
Payer: MEDICARE

## 2022-09-02 DIAGNOSIS — Z86.718 HISTORY OF VENOUS THROMBOEMBOLISM: ICD-10-CM

## 2022-09-02 LAB
INR BLD: 2.7 (ref 0.9–1.2)
INR PPP: 2.7 (ref 2–3.5)

## 2022-09-02 PROCEDURE — 99211 OFF/OP EST MAY X REQ PHY/QHP: CPT

## 2022-09-02 PROCEDURE — 85610 PROTHROMBIN TIME: CPT

## 2022-09-02 NOTE — PROGRESS NOTES
Anticoagulation Summary  As of 2022      INR goal:  2.0-3.0   TTR:  77.3 % (3 y)   INR used for dosin.70 (2022)   Warfarin maintenance plan:  2.5 mg (2.5 mg x 1) every Sat; 5 mg (2.5 mg x 2) all other days   Weekly warfarin total:  32.5 mg   Plan last modified:  Deedee SantamariaD (2022)   Next INR check:  10/7/2022   Target end date:  Indefinite    Indications    History of venous thromboembolism [Z86.718]  Deep vein thrombosis (DVT) of popliteal vein of both lower extremities (HCC) (Resolved) [I82.433]                 Anticoagulation Episode Summary       INR check location:      Preferred lab:      Send INR reminders to:      Comments:            Anticoagulation Care Providers       Provider Role Specialty Phone number    [facility] Responsible  [phone]    Too Brito M.D.  [facility] [phone]             Refer to Patient Findings for HPI:  Patient Findings       Negatives:  Signs/symptoms of thrombosis, Signs/symptoms of bleeding, Laboratory test error suspected, Change in health, Change in alcohol use, Change in activity, Upcoming invasive procedure, Emergency department visit, Upcoming dental procedure, Missed doses, Extra doses, Change in medications, Change in diet/appetite, Hospital admission, Bruising, Other complaints            There were no vitals filed for this visit.  pt declined vitals    Verified current warfarin dosing schedule.    Medications reconciled   Pt is not on antiplatelet therapy      A/P   INR is therapeutic.     Warfarin dosing recommendation: Pt is to continue with current warfarin dosing regimen.      Pt educated to contact our clinic with any changes in medications or s/s of bleeding or thrombosis. Pt is aware to seek immediate medical attention for falls, head injury or deep cuts.    Follow up appointment in 5 week(s).    Bonny Leyva, DeedeeD

## 2022-09-04 DIAGNOSIS — R35.1 NOCTURIA: ICD-10-CM

## 2022-09-06 RX ORDER — TAMSULOSIN HYDROCHLORIDE 0.4 MG/1
0.4 CAPSULE ORAL DAILY
Qty: 100 CAPSULE | Refills: 4 | Status: SHIPPED | OUTPATIENT
Start: 2022-09-06 | End: 2023-11-28

## 2022-09-23 ENCOUNTER — HOSPITAL ENCOUNTER (EMERGENCY)
Facility: MEDICAL CENTER | Age: 77
End: 2022-09-23
Attending: EMERGENCY MEDICINE
Payer: MEDICARE

## 2022-09-23 ENCOUNTER — APPOINTMENT (OUTPATIENT)
Dept: RADIOLOGY | Facility: MEDICAL CENTER | Age: 77
End: 2022-09-23
Attending: EMERGENCY MEDICINE
Payer: MEDICARE

## 2022-09-23 VITALS
OXYGEN SATURATION: 96 % | HEIGHT: 71 IN | RESPIRATION RATE: 17 BRPM | SYSTOLIC BLOOD PRESSURE: 119 MMHG | WEIGHT: 189.6 LBS | DIASTOLIC BLOOD PRESSURE: 63 MMHG | HEART RATE: 73 BPM | TEMPERATURE: 98.9 F | BODY MASS INDEX: 26.54 KG/M2
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
## 2022-09-23 DIAGNOSIS — S09.8XXA BLUNT HEAD TRAUMA, INITIAL ENCOUNTER: ICD-10-CM

## 2022-09-23 DIAGNOSIS — Z79.01 CHRONIC ANTICOAGULATION: ICD-10-CM

## 2022-09-23 PROCEDURE — 70450 CT HEAD/BRAIN W/O DYE: CPT

## 2022-09-23 PROCEDURE — 99284 EMERGENCY DEPT VISIT MOD MDM: CPT | Mod: 25

## 2022-09-23 ASSESSMENT — FIBROSIS 4 INDEX: FIB4 SCORE: 3.25

## 2022-09-23 NOTE — ED NOTES
Pt denies any needs at this time, no distress noted;    Pt aware that he is waiting for imaging results;

## 2022-09-23 NOTE — ED TRIAGE NOTES
Pt presents with wife from home for head injury that occurred approx 1 hour ago. Pt fell forward when getting off of the commode. Denies LOC. Takes coumadin for bilat DVT. Pt A&Ox4 and ambulatory. Abrasions and swelling noted to top of head.   Also pain to right knee. Cms intact.

## 2022-09-23 NOTE — ED PROVIDER NOTES
ED Provider Note    Scribed for Joce Jordan M.D. by Selvin Roman. 9/23/2022  10:19 AM    Primary care provider: Leobardo Wright M.D.  Means of arrival: Walk-ini  History obtained from: Patent  History limited by: None     CHIEF COMPLAINT  Chief Complaint   Patient presents with    Closed Head Injury       HPI  Magnus Claudio is a 77 y.o. male who presents to the Emergency Department for evaluation after a ground level fall, onset prior to arrival. He states that he fell forward while getting off his commode, hitting his head. He has associated symptoms of an abrasion on his head, mild right knee pain, and a small abrasion to the right knee. He denies any loss of consciousness, or other pain or injuries. He has a history of blood clots, both PE and DVT, and is currently taking warfarin. He last had his INR checked two weeks ago and it was within normal limits. There are no known alleviating or exacerbating factors.       REVIEW OF SYSTEMS  Pertinent positives include head injury, abrasion on head, mild right knee pain, and a small abrasion to the right knee.   Pertinent negatives include no loss of consciousness or other pain/injury.    All other systems reviewed and negative. See HPI for further details.       PAST MEDICAL HISTORY   has a past medical history of Anemia, Blood clotting disorder (HCC), Cancer (HCC) (2021), Cataract, Diabetes (HCC), Diverticulitis, Hypertension, and Vertigo, benign positional.    SURGICAL HISTORY   has a past surgical history that includes colon resection; resec liver,part lobectomy; laminotomy; eye surgery (Bilateral); cataract extraction with iol; other (05/2021); and inguinal hernia repair robotic xi (Left, 2/11/2022).    SOCIAL HISTORY  Social History     Tobacco Use    Smoking status: Never    Smokeless tobacco: Never   Vaping Use    Vaping Use: Never used   Substance Use Topics    Alcohol use: Not Currently     Alcohol/week: 0.0 oz    Drug use: Never     "  Social History     Substance and Sexual Activity   Drug Use Never       FAMILY HISTORY  Family History   Problem Relation Age of Onset    Hypertension Father     Heart Attack Father     Cancer Mother         Breast    Diabetes Sister     Hyperlipidemia Neg Hx         unknown       CURRENT MEDICATIONS  Current Outpatient Medications   Medication Instructions    amLODIPine (NORVASC) 5 MG Tab TAKE 1 TABLET BY MOUTH EVERY DAY    B Complex-Folic Acid (B COMPLEX-VITAMIN B12 PO) No dose, route, or frequency recorded.    glimepiride (AMARYL) 4 mg, Oral, EVERY MORNING    hydroCHLOROthiazide (HYDRODIURIL) 12.5 MG tablet TAKE 1 TABLET BY MOUTH DAILY    Lancets Lancets for glucometer covered by patient's insurance Sig: use daily and prn ssx high or low sugar.    losartan (COZAAR) 100 MG Tab TAKE 1 TABLET BY MOUTH DAILY    metFORMIN ER (GLUCOPHAGE XR) 500 MG TABLET SR 24 HR TAKE 2 TABLETS BY MOUTH TWICE DAILY    tamsulosin (FLOMAX) 0.4 mg, Oral, DAILY    therapeutic multivitamin-minerals (THERAGRAN-M) Tab 1 Tablet, Oral, DAILY    warfarin (COUMADIN) 2.5-5 mg, Oral, DAILY, As directed by [facility]           ALLERGIES  Allergies   Allergen Reactions    Morphine Unspecified     Hallucinations, 15+ years ago       PHYSICAL EXAM  VITAL SIGNS: BP (!) 155/79   Pulse 87   Temp 37.2 °C (99 °F) (Temporal)   Resp 18   Ht 1.803 m (5' 11\")   Wt 86 kg (189 lb 9.5 oz)   SpO2 96%   BMI 26.44 kg/m²     Nursing note and vitals reviewed.  Constitutional: Well-developed and well-nourished. No distress.   HENT: Abrasion to the apex of the scalp. No cephalohematoma. Head is normocephalic. Oropharynx is clear and moist without exudate or erythema.   Eyes: Pupils are equal, round, and reactive to light. Conjunctiva are normal.   Cardiovascular: Normal rate and regular rhythm. No murmur heard. Normal radial pulses.  Pulmonary/Chest: Breath sounds normal. No wheezes or rales.   Abdominal: Soft and non-tender. No distention  "   Musculoskeletal: Extremities exhibit normal range of motion without edema or tenderness.   Neurological: Awake, alert and oriented to person, place, and time. No focal deficits noted.  Skin: Small abrasion to the right knee. Skin is warm and dry. No rash.   Psychiatric: Normal mood and affect. Appropriate for clinical situation.      DIAGNOSTIC STUDIES / PROCEDURES    RADIOLOGY  CT-HEAD W/O   Final Result      1.  Cerebral atrophy and chronic microvascular ischemic type changes.   2.  No acute intracranial abnormality.           The radiologist's interpretation of all radiological studies have been reviewed by me.    COURSE & MEDICAL DECISION MAKING  Nursing notes, VS, PMSFHx reviewed in chart.     Review of past medical records shows the patient is currently on blood thinners.     10:19 AM - Patient seen and examined at bedside.  Ordered CT-Head without to evaluate his symptoms. Patient will undergo trauma evaluation.    12:17 PM - I reevaluated the patient at bedside. I discussed the patient's diagnostic study results which were reassuring.  No evidence of skull fracture or intracranial hemorrhage.  I discussed plan for discharge and follow up as outlined below. The patient is stable for discharge at this time and will return for any new or worsening symptoms. Patient verbalizes understanding and support with my plan for discharge.       The patient will return for new or worsening symptoms and is stable at the time of discharge.    DISPOSITION:  Patient will be discharged home in stable condition.    FOLLOW UP:  Leobardo Wright M.D.  [address] [address]  [place] [id number] [phone]    Schedule an appointment as soon as possible for a visit       [facility], Emergency Dept  [address]  [place] [id number] [phone]    If symptoms worsen      FINAL IMPRESSION  1. Fall, initial encounter    2. Blunt head trauma, initial encounter    3. Chronic  anticoagulation          Selvin FAM (Scribe), am scribing for, and in the presence of, Joce Jordan M.D..    Electronically signed by: Selvin Roman (Bethanyibarleen), 9/23/2022    Joce FAM M.D. personally performed the services described in this documentation, as scribed by Selvin Roman in my presence, and it is both accurate and complete.    The note accurately reflects work and decisions made by me.  Joce Jordan M.D.  9/23/2022  4:10 PM

## 2022-10-04 ENCOUNTER — HOSPITAL ENCOUNTER (OUTPATIENT)
Facility: MEDICAL CENTER | Age: 77
End: 2022-10-04
Attending: PHYSICIAN ASSISTANT
Payer: MEDICARE

## 2022-10-04 PROCEDURE — 87186 SC STD MICRODIL/AGAR DIL: CPT

## 2022-10-04 PROCEDURE — 87077 CULTURE AEROBIC IDENTIFY: CPT

## 2022-10-04 PROCEDURE — 87086 URINE CULTURE/COLONY COUNT: CPT

## 2022-10-07 ENCOUNTER — ANTICOAGULATION VISIT (OUTPATIENT)
Dept: VASCULAR LAB | Facility: MEDICAL CENTER | Age: 77
End: 2022-10-07
Attending: INTERNAL MEDICINE
Payer: MEDICARE

## 2022-10-07 DIAGNOSIS — Z86.718 HISTORY OF VENOUS THROMBOEMBOLISM: ICD-10-CM

## 2022-10-07 LAB
BACTERIA UR CULT: ABNORMAL
BACTERIA UR CULT: ABNORMAL
INR BLD: 5.5 (ref 0.9–1.2)
INR PPP: 5.5 (ref 2–3.5)
SIGNIFICANT IND 70042: ABNORMAL
SITE SITE: ABNORMAL
SOURCE SOURCE: ABNORMAL

## 2022-10-07 PROCEDURE — 85610 PROTHROMBIN TIME: CPT

## 2022-10-07 PROCEDURE — 99212 OFFICE O/P EST SF 10 MIN: CPT

## 2022-10-07 NOTE — PROGRESS NOTES
Anticoagulation Summary  As of 10/7/2022      INR goal:  2.0-3.0   TTR:  75.3 % (3.1 y)   INR used for dosin.50 (10/7/2022)   Warfarin maintenance plan:  2.5 mg (2.5 mg x 1) every Sat; 5 mg (2.5 mg x 2) all other days   Weekly warfarin total:  32.5 mg   Plan last modified:  Brionna Langley, PharmD (2022)   Next INR check:  10/10/2022   Target end date:  Indefinite    Indications    History of venous thromboembolism [Z86.718]  Deep vein thrombosis (DVT) of popliteal vein of both lower extremities (HCC) (Resolved) [I82.433]                 Anticoagulation Episode Summary       INR check location:      Preferred lab:      Send INR reminders to:      Comments:            Anticoagulation Care Providers       Provider Role Specialty Phone number    [facility] Responsible  [phone]    Too Brito M.D.  Family Medicine [phone]                  Refer to Patient Findings for HPI:  Patient Findings       Positives:  Emergency department visit (D/t GLF and hitting his head. Workup unremarkable.), Change in medications (Pt started a 7 day course of cephalexin. Will complete course on 10/11.)    Negatives:  Signs/symptoms of thrombosis, Signs/symptoms of bleeding, Laboratory test error suspected, Change in health, Change in alcohol use, Change in activity, Upcoming invasive procedure, Upcoming dental procedure, Missed doses, Extra doses, Change in diet/appetite, Hospital admission, Bruising, Other complaints            There were no vitals filed for this visit.    Verified current warfarin dosing schedule.    Medications reconciled   Pt is not on antiplatelet therapy      A/P   INR  SUPRA-therapeutic.     Warfarin dosing recommendation: Instructed pt to HOLD x 2 doses and to then continue on w/ his current regimen.    Pt educated to contact our clinic with any changes in medications or s/s of bleeding or thrombosis. Pt is aware to seek immediate medical attention for falls, head  injury or deep cuts.    Follow up appointment in 2 days.    Mitch Wilkinson, DeedeeD, BCACP

## 2022-10-10 ENCOUNTER — ANTICOAGULATION VISIT (OUTPATIENT)
Dept: VASCULAR LAB | Facility: MEDICAL CENTER | Age: 77
End: 2022-10-10
Attending: INTERNAL MEDICINE
Payer: MEDICARE

## 2022-10-10 DIAGNOSIS — Z86.718 HISTORY OF VENOUS THROMBOEMBOLISM: ICD-10-CM

## 2022-10-10 LAB — INR PPP: 2.7 (ref 2–3.5)

## 2022-10-10 PROCEDURE — 85610 PROTHROMBIN TIME: CPT

## 2022-10-10 PROCEDURE — 99212 OFFICE O/P EST SF 10 MIN: CPT

## 2022-10-10 NOTE — PROGRESS NOTES
Anticoagulation Summary  As of 10/10/2022      INR goal:  2.0-3.0   TTR:  75.2 % (3.1 y)   INR used for dosin.70 (10/10/2022)   Warfarin maintenance plan:  2.5 mg (2.5 mg x 1) every Mon, Fri, Sat; 5 mg (2.5 mg x 2) all other days   Weekly warfarin total:  27.5 mg   Plan last modified:  Edward Glover, PharmD (10/10/2022)   Next INR check:  10/17/2022   Target end date:  Indefinite    Indications    History of venous thromboembolism [Z86.718]  Deep vein thrombosis (DVT) of popliteal vein of both lower extremities (HCC) (Resolved) [I82.433]                 Anticoagulation Episode Summary       INR check location:      Preferred lab:      Send INR reminders to:      Comments:            Anticoagulation Care Providers       Provider Role Specialty Phone number    [facility] Responsible  [phone]    Too Brito M.D.  Family Medicine [phone]                  Refer to Patient Findings for HPI:  Patient Findings       Negatives:  Signs/symptoms of thrombosis, Signs/symptoms of bleeding, Laboratory test error suspected, Change in health, Change in alcohol use, Change in activity, Upcoming invasive procedure, Emergency department visit, Upcoming dental procedure, Missed doses, Extra doses, Change in medications, Change in diet/appetite, Hospital admission, Bruising, Other complaints            There were no vitals filed for this visit.   pt declined vitals    Verified current warfarin dosing schedule.    Medications reconciled   Pt is not on antiplatelet therapy      A/P   INR  is therapeutic.     Warfarin dosing recommendation: Reduce current regimen by 15.4% to 2.5 mg MFS, 5 mg all other days while on antibiotics.   I understand this is a significant drop, but current antibiotics on previous regimen increased INR to 5.5. Would consider returning regimen to previous dosing after antibiotic completion on 10/18/2022.    Pt educated to contact our clinic with any changes in  medications or s/s of bleeding or thrombosis. Pt is aware to seek immediate medical attention for falls, head injury or deep cuts.    Follow up appointment in 1 week(s).    Edward Glover, DeedeeD  CC: Deedee AlvaradoD

## 2022-10-12 LAB — INR BLD: 2.7 (ref 0.9–1.2)

## 2022-10-17 ENCOUNTER — ANTICOAGULATION VISIT (OUTPATIENT)
Dept: VASCULAR LAB | Facility: MEDICAL CENTER | Age: 77
End: 2022-10-17
Attending: INTERNAL MEDICINE
Payer: MEDICARE

## 2022-10-17 DIAGNOSIS — Z86.718 HISTORY OF VENOUS THROMBOEMBOLISM: ICD-10-CM

## 2022-10-17 LAB — INR PPP: 2.4 (ref 2–3.5)

## 2022-10-17 PROCEDURE — 85610 PROTHROMBIN TIME: CPT

## 2022-10-17 PROCEDURE — 99211 OFF/OP EST MAY X REQ PHY/QHP: CPT

## 2022-10-17 NOTE — PROGRESS NOTES
Anticoagulation Summary  As of 10/17/2022      INR goal:  2.0-3.0   TTR:  75.3 % (3.1 y)   INR used for dosin.40 (10/17/2022)   Warfarin maintenance plan:  2.5 mg (2.5 mg x 1) every Mon, Fri, Sat; 5 mg (2.5 mg x 2) all other days   Weekly warfarin total:  27.5 mg   Plan last modified:  Deedee BatistaD (10/10/2022)   Next INR check:  2022   Target end date:  Indefinite    Indications    History of venous thromboembolism [Z86.718]  Deep vein thrombosis (DVT) of popliteal vein of both lower extremities (HCC) (Resolved) [I82.433]                 Anticoagulation Episode Summary       INR check location:      Preferred lab:      Send INR reminders to:      Comments:            Anticoagulation Care Providers       Provider Role Specialty Phone number    [facility] Responsible  [phone]    Too Brito M.D.  Family Medicine [phone]            Refer to Patient Findings for HPI:  Patient Findings       Positives:  Change in medications (Finishing abx tomorrow.)    Negatives:  Signs/symptoms of thrombosis, Signs/symptoms of bleeding, Laboratory test error suspected, Change in health, Change in alcohol use, Change in activity, Upcoming invasive procedure, Emergency department visit, Upcoming dental procedure, Missed doses, Extra doses, Change in diet/appetite, Hospital admission, Bruising, Other complaints            There were no vitals filed for this visit.  pt declined vitals    Verified current warfarin dosing schedule.    Medications reconciled   Pt is not on antiplatelet therapy      A/P   INR  is therapeutic.     Warfarin dosing recommendation: Continue current regimen.    Pt educated to contact our clinic with any changes in medications or s/s of bleeding or thrombosis. Pt is aware to seek immediate medical attention for falls, head injury or deep cuts.    Follow up appointment in 2 week(s).    Adriana Killian, DeedeeD

## 2022-10-18 LAB — INR BLD: 2.4 (ref 0.9–1.2)

## 2022-10-23 ENCOUNTER — APPOINTMENT (OUTPATIENT)
Dept: RADIOLOGY | Facility: MEDICAL CENTER | Age: 77
DRG: 690 | End: 2022-10-23
Attending: EMERGENCY MEDICINE
Payer: MEDICARE

## 2022-10-23 ENCOUNTER — HOSPITAL ENCOUNTER (INPATIENT)
Facility: MEDICAL CENTER | Age: 77
LOS: 3 days | DRG: 690 | End: 2022-10-26
Attending: EMERGENCY MEDICINE | Admitting: INTERNAL MEDICINE
Payer: MEDICARE

## 2022-10-23 DIAGNOSIS — Z86.718 HISTORY OF VENOUS THROMBOEMBOLISM: ICD-10-CM

## 2022-10-23 DIAGNOSIS — E83.42 HYPOMAGNESEMIA: ICD-10-CM

## 2022-10-23 DIAGNOSIS — R78.81 GRAM-NEGATIVE BACTEREMIA: ICD-10-CM

## 2022-10-23 DIAGNOSIS — E87.6 HYPOKALEMIA: ICD-10-CM

## 2022-10-23 PROBLEM — N17.9 ACUTE KIDNEY INJURY (HCC): Status: ACTIVE | Noted: 2022-10-23

## 2022-10-23 PROBLEM — N39.0 UTI (URINARY TRACT INFECTION): Status: ACTIVE | Noted: 2022-10-23

## 2022-10-23 LAB
ALBUMIN SERPL BCP-MCNC: 3.8 G/DL (ref 3.2–4.9)
ALBUMIN/GLOB SERPL: 1.1 G/DL
ALP SERPL-CCNC: 64 U/L (ref 30–99)
ALT SERPL-CCNC: 24 U/L (ref 2–50)
ANION GAP SERPL CALC-SCNC: 10 MMOL/L (ref 7–16)
APPEARANCE UR: ABNORMAL
AST SERPL-CCNC: 30 U/L (ref 12–45)
BACTERIA #/AREA URNS HPF: ABNORMAL /HPF
BASOPHILS # BLD AUTO: 0.3 % (ref 0–1.8)
BASOPHILS # BLD: 0.05 K/UL (ref 0–0.12)
BILIRUB SERPL-MCNC: 2 MG/DL (ref 0.1–1.5)
BILIRUB UR QL STRIP.AUTO: NEGATIVE
BUN SERPL-MCNC: 27 MG/DL (ref 8–22)
CALCIUM SERPL-MCNC: 8.9 MG/DL (ref 8.4–10.2)
CHLORIDE SERPL-SCNC: 100 MMOL/L (ref 96–112)
CO2 SERPL-SCNC: 24 MMOL/L (ref 20–33)
COLOR UR: YELLOW
CREAT SERPL-MCNC: 1.44 MG/DL (ref 0.5–1.4)
EOSINOPHIL # BLD AUTO: 0.02 K/UL (ref 0–0.51)
EOSINOPHIL NFR BLD: 0.1 % (ref 0–6.9)
EPI CELLS #/AREA URNS HPF: ABNORMAL /HPF
ERYTHROCYTE [DISTWIDTH] IN BLOOD BY AUTOMATED COUNT: 44 FL (ref 35.9–50)
FLUAV RNA SPEC QL NAA+PROBE: NEGATIVE
FLUBV RNA SPEC QL NAA+PROBE: NEGATIVE
GFR SERPLBLD CREATININE-BSD FMLA CKD-EPI: 50 ML/MIN/1.73 M 2
GLOBULIN SER CALC-MCNC: 3.4 G/DL (ref 1.9–3.5)
GLUCOSE BLD STRIP.AUTO-MCNC: 201 MG/DL (ref 65–99)
GLUCOSE BLD STRIP.AUTO-MCNC: 229 MG/DL (ref 65–99)
GLUCOSE SERPL-MCNC: 189 MG/DL (ref 65–99)
GLUCOSE UR STRIP.AUTO-MCNC: NEGATIVE MG/DL
HCT VFR BLD AUTO: 36.4 % (ref 42–52)
HGB BLD-MCNC: 12.2 G/DL (ref 14–18)
IMM GRANULOCYTES # BLD AUTO: 0.06 K/UL (ref 0–0.11)
IMM GRANULOCYTES NFR BLD AUTO: 0.4 % (ref 0–0.9)
INR PPP: 2.68 (ref 0.87–1.13)
KETONES UR STRIP.AUTO-MCNC: NEGATIVE MG/DL
LACTATE SERPL-SCNC: 1.7 MMOL/L (ref 0.5–2)
LEUKOCYTE ESTERASE UR QL STRIP.AUTO: ABNORMAL
LYMPHOCYTES # BLD AUTO: 0.95 K/UL (ref 1–4.8)
LYMPHOCYTES NFR BLD: 5.8 % (ref 22–41)
MCH RBC QN AUTO: 28.1 PG (ref 27–33)
MCHC RBC AUTO-ENTMCNC: 33.5 G/DL (ref 33.7–35.3)
MCV RBC AUTO: 83.9 FL (ref 81.4–97.8)
MICRO URNS: ABNORMAL
MONOCYTES # BLD AUTO: 1.27 K/UL (ref 0–0.85)
MONOCYTES NFR BLD AUTO: 7.8 % (ref 0–13.4)
NEUTROPHILS # BLD AUTO: 13.92 K/UL (ref 1.82–7.42)
NEUTROPHILS NFR BLD: 85.6 % (ref 44–72)
NITRITE UR QL STRIP.AUTO: POSITIVE
NRBC # BLD AUTO: 0 K/UL
NRBC BLD-RTO: 0 /100 WBC
PH UR STRIP.AUTO: 5.5 [PH] (ref 5–8)
PLATELET # BLD AUTO: 166 K/UL (ref 164–446)
PMV BLD AUTO: 9.6 FL (ref 9–12.9)
POTASSIUM SERPL-SCNC: 3.5 MMOL/L (ref 3.6–5.5)
PROT SERPL-MCNC: 7.2 G/DL (ref 6–8.2)
PROT UR QL STRIP: 30 MG/DL
PROTHROMBIN TIME: 27 SEC (ref 12–14.6)
RBC # BLD AUTO: 4.34 M/UL (ref 4.7–6.1)
RBC # URNS HPF: ABNORMAL /HPF
RBC UR QL AUTO: ABNORMAL
RSV RNA SPEC QL NAA+PROBE: NEGATIVE
SARS-COV-2 RNA RESP QL NAA+PROBE: NOTDETECTED
SODIUM SERPL-SCNC: 134 MMOL/L (ref 135–145)
SP GR UR STRIP.AUTO: 1.02
SPECIMEN SOURCE: NORMAL
WBC # BLD AUTO: 16.3 K/UL (ref 4.8–10.8)
WBC #/AREA URNS HPF: ABNORMAL /HPF

## 2022-10-23 PROCEDURE — 700105 HCHG RX REV CODE 258: Performed by: INTERNAL MEDICINE

## 2022-10-23 PROCEDURE — 0241U HCHG SARS-COV-2 COVID-19 NFCT DS RESP RNA 4 TRGT MIC: CPT

## 2022-10-23 PROCEDURE — 36415 COLL VENOUS BLD VENIPUNCTURE: CPT

## 2022-10-23 PROCEDURE — 83605 ASSAY OF LACTIC ACID: CPT

## 2022-10-23 PROCEDURE — 71045 X-RAY EXAM CHEST 1 VIEW: CPT

## 2022-10-23 PROCEDURE — 80053 COMPREHEN METABOLIC PANEL: CPT

## 2022-10-23 PROCEDURE — A9270 NON-COVERED ITEM OR SERVICE: HCPCS | Performed by: INTERNAL MEDICINE

## 2022-10-23 PROCEDURE — 770006 HCHG ROOM/CARE - MED/SURG/GYN SEMI*

## 2022-10-23 PROCEDURE — 700111 HCHG RX REV CODE 636 W/ 250 OVERRIDE (IP): Performed by: EMERGENCY MEDICINE

## 2022-10-23 PROCEDURE — 99285 EMERGENCY DEPT VISIT HI MDM: CPT

## 2022-10-23 PROCEDURE — 700102 HCHG RX REV CODE 250 W/ 637 OVERRIDE(OP): Performed by: INTERNAL MEDICINE

## 2022-10-23 PROCEDURE — 82962 GLUCOSE BLOOD TEST: CPT

## 2022-10-23 PROCEDURE — 96374 THER/PROPH/DIAG INJ IV PUSH: CPT

## 2022-10-23 PROCEDURE — 700111 HCHG RX REV CODE 636 W/ 250 OVERRIDE (IP): Performed by: INTERNAL MEDICINE

## 2022-10-23 PROCEDURE — C9803 HOPD COVID-19 SPEC COLLECT: HCPCS | Performed by: EMERGENCY MEDICINE

## 2022-10-23 PROCEDURE — 87040 BLOOD CULTURE FOR BACTERIA: CPT

## 2022-10-23 PROCEDURE — 51798 US URINE CAPACITY MEASURE: CPT

## 2022-10-23 PROCEDURE — 85025 COMPLETE CBC W/AUTO DIFF WBC: CPT

## 2022-10-23 PROCEDURE — 85610 PROTHROMBIN TIME: CPT

## 2022-10-23 PROCEDURE — 81001 URINALYSIS AUTO W/SCOPE: CPT

## 2022-10-23 PROCEDURE — 87086 URINE CULTURE/COLONY COUNT: CPT

## 2022-10-23 PROCEDURE — 87077 CULTURE AEROBIC IDENTIFY: CPT

## 2022-10-23 PROCEDURE — 87186 SC STD MICRODIL/AGAR DIL: CPT

## 2022-10-23 PROCEDURE — 94760 N-INVAS EAR/PLS OXIMETRY 1: CPT

## 2022-10-23 PROCEDURE — 99223 1ST HOSP IP/OBS HIGH 75: CPT | Mod: AI | Performed by: INTERNAL MEDICINE

## 2022-10-23 RX ORDER — AMOXICILLIN 250 MG
2 CAPSULE ORAL 2 TIMES DAILY
Status: DISCONTINUED | OUTPATIENT
Start: 2022-10-23 | End: 2022-10-26 | Stop reason: HOSPADM

## 2022-10-23 RX ORDER — POLYETHYLENE GLYCOL 3350 17 G/17G
1 POWDER, FOR SOLUTION ORAL
Status: DISCONTINUED | OUTPATIENT
Start: 2022-10-23 | End: 2022-10-26 | Stop reason: HOSPADM

## 2022-10-23 RX ORDER — SODIUM CHLORIDE 9 MG/ML
INJECTION, SOLUTION INTRAVENOUS CONTINUOUS
Status: DISCONTINUED | OUTPATIENT
Start: 2022-10-23 | End: 2022-10-26 | Stop reason: HOSPADM

## 2022-10-23 RX ORDER — ONDANSETRON 2 MG/ML
4 INJECTION INTRAMUSCULAR; INTRAVENOUS EVERY 4 HOURS PRN
Status: DISCONTINUED | OUTPATIENT
Start: 2022-10-23 | End: 2022-10-26 | Stop reason: HOSPADM

## 2022-10-23 RX ORDER — AMLODIPINE BESYLATE 5 MG/1
5 TABLET ORAL DAILY
Status: DISCONTINUED | OUTPATIENT
Start: 2022-10-24 | End: 2022-10-26 | Stop reason: HOSPADM

## 2022-10-23 RX ORDER — CEFTRIAXONE 1 G/1
1 INJECTION, POWDER, FOR SOLUTION INTRAMUSCULAR; INTRAVENOUS ONCE
Status: COMPLETED | OUTPATIENT
Start: 2022-10-23 | End: 2022-10-23

## 2022-10-23 RX ORDER — TAMSULOSIN HYDROCHLORIDE 0.4 MG/1
0.4 CAPSULE ORAL DAILY
Status: DISCONTINUED | OUTPATIENT
Start: 2022-10-24 | End: 2022-10-26 | Stop reason: HOSPADM

## 2022-10-23 RX ORDER — WARFARIN SODIUM 5 MG/1
5 TABLET ORAL
Status: COMPLETED | OUTPATIENT
Start: 2022-10-23 | End: 2022-10-23

## 2022-10-23 RX ORDER — ONDANSETRON 4 MG/1
4 TABLET, ORALLY DISINTEGRATING ORAL EVERY 4 HOURS PRN
Status: DISCONTINUED | OUTPATIENT
Start: 2022-10-23 | End: 2022-10-26 | Stop reason: HOSPADM

## 2022-10-23 RX ORDER — CEPHALEXIN 500 MG/1
500 CAPSULE ORAL 2 TIMES DAILY
Status: ON HOLD | COMMUNITY
End: 2022-10-26

## 2022-10-23 RX ORDER — BISACODYL 10 MG
10 SUPPOSITORY, RECTAL RECTAL
Status: DISCONTINUED | OUTPATIENT
Start: 2022-10-23 | End: 2022-10-26 | Stop reason: HOSPADM

## 2022-10-23 RX ORDER — ENOXAPARIN SODIUM 100 MG/ML
40 INJECTION SUBCUTANEOUS DAILY
Status: DISCONTINUED | OUTPATIENT
Start: 2022-10-23 | End: 2022-10-23

## 2022-10-23 RX ORDER — ACETAMINOPHEN 325 MG/1
650 TABLET ORAL EVERY 6 HOURS PRN
Status: DISCONTINUED | OUTPATIENT
Start: 2022-10-23 | End: 2022-10-26 | Stop reason: HOSPADM

## 2022-10-23 RX ORDER — CEFUROXIME AXETIL 250 MG/1
250 TABLET ORAL 2 TIMES DAILY
Status: ON HOLD | COMMUNITY
End: 2022-10-26

## 2022-10-23 RX ORDER — DEXTROSE MONOHYDRATE 25 G/50ML
25 INJECTION, SOLUTION INTRAVENOUS
Status: DISCONTINUED | OUTPATIENT
Start: 2022-10-23 | End: 2022-10-26 | Stop reason: HOSPADM

## 2022-10-23 RX ADMIN — ONDANSETRON 4 MG: 2 INJECTION INTRAMUSCULAR; INTRAVENOUS at 15:57

## 2022-10-23 RX ADMIN — WARFARIN SODIUM 5 MG: 5 TABLET ORAL at 17:13

## 2022-10-23 RX ADMIN — INSULIN HUMAN 2 UNITS: 100 INJECTION, SOLUTION PARENTERAL at 20:22

## 2022-10-23 RX ADMIN — INSULIN HUMAN 2 UNITS: 100 INJECTION, SOLUTION PARENTERAL at 16:54

## 2022-10-23 RX ADMIN — SODIUM CHLORIDE: 9 INJECTION, SOLUTION INTRAVENOUS at 14:55

## 2022-10-23 RX ADMIN — SENNOSIDES AND DOCUSATE SODIUM 2 TABLET: 50; 8.6 TABLET ORAL at 17:13

## 2022-10-23 RX ADMIN — CEFTRIAXONE SODIUM 1 G: 1 INJECTION, POWDER, FOR SOLUTION INTRAMUSCULAR; INTRAVENOUS at 13:13

## 2022-10-23 RX ADMIN — ACETAMINOPHEN 650 MG: 325 TABLET, FILM COATED ORAL at 17:12

## 2022-10-23 ASSESSMENT — COGNITIVE AND FUNCTIONAL STATUS - GENERAL
MOBILITY SCORE: 21
DAILY ACTIVITIY SCORE: 24
SUGGESTED CMS G CODE MODIFIER MOBILITY: CJ
SUGGESTED CMS G CODE MODIFIER DAILY ACTIVITY: CH
CLIMB 3 TO 5 STEPS WITH RAILING: A LITTLE
STANDING UP FROM CHAIR USING ARMS: A LITTLE
WALKING IN HOSPITAL ROOM: A LITTLE

## 2022-10-23 ASSESSMENT — ENCOUNTER SYMPTOMS
HEADACHES: 0
SHORTNESS OF BREATH: 0
PHOTOPHOBIA: 0
SENSORY CHANGE: 0
WEAKNESS: 1
COUGH: 0
WEIGHT LOSS: 0
SPEECH CHANGE: 0
TREMORS: 0
BLURRED VISION: 0
HALLUCINATIONS: 0
DIARRHEA: 0
VOMITING: 0
ABDOMINAL PAIN: 0
FOCAL WEAKNESS: 0
CHILLS: 1
BACK PAIN: 0
NAUSEA: 0
ORTHOPNEA: 0
FEVER: 0
DIZZINESS: 0
CONSTIPATION: 0
PALPITATIONS: 0
SPUTUM PRODUCTION: 0
EYE PAIN: 0
NECK PAIN: 0
TINGLING: 0
DOUBLE VISION: 0
MYALGIAS: 0

## 2022-10-23 ASSESSMENT — PATIENT HEALTH QUESTIONNAIRE - PHQ9
1. LITTLE INTEREST OR PLEASURE IN DOING THINGS: NOT AT ALL
SUM OF ALL RESPONSES TO PHQ9 QUESTIONS 1 AND 2: 0
2. FEELING DOWN, DEPRESSED, IRRITABLE, OR HOPELESS: NOT AT ALL

## 2022-10-23 ASSESSMENT — LIFESTYLE VARIABLES
TOTAL SCORE: 0
EVER FELT BAD OR GUILTY ABOUT YOUR DRINKING: NO
SUBSTANCE_ABUSE: 0
AVERAGE NUMBER OF DAYS PER WEEK YOU HAVE A DRINK CONTAINING ALCOHOL: 0
TOTAL SCORE: 0
EVER HAD A DRINK FIRST THING IN THE MORNING TO STEADY YOUR NERVES TO GET RID OF A HANGOVER: NO
ON A TYPICAL DAY WHEN YOU DRINK ALCOHOL HOW MANY DRINKS DO YOU HAVE: 0
TOTAL SCORE: 0
CONSUMPTION TOTAL: NEGATIVE
HAVE YOU EVER FELT YOU SHOULD CUT DOWN ON YOUR DRINKING: NO
HAVE PEOPLE ANNOYED YOU BY CRITICIZING YOUR DRINKING: NO
HOW MANY TIMES IN THE PAST YEAR HAVE YOU HAD 5 OR MORE DRINKS IN A DAY: 0
ALCOHOL_USE: NO

## 2022-10-23 ASSESSMENT — FIBROSIS 4 INDEX: FIB4 SCORE: 3.25

## 2022-10-23 ASSESSMENT — PAIN DESCRIPTION - PAIN TYPE
TYPE: ACUTE PAIN

## 2022-10-23 NOTE — ED NOTES
Med rec updated and complete, per pts wife   Allergies reviewed, per pts wife  Interviewed pt with wife at bedside with permission from pt.  Pts wife reports that pt finished his BCG immunity therapy on 9/20/2022

## 2022-10-23 NOTE — ASSESSMENT & PLAN NOTE
May be prerenal due to dehydration, continue IVFs, almost to baseline  Renal u/s unremarkable   Avoid nephrotoxins   Repeat creatinine in a.m.

## 2022-10-23 NOTE — ED NOTES
"Patient reports generalized \"unwell\". Reports chills, headache, and weakness when walking approximately x3 weeks. Worsening symptoms x2 days.  Denies fever.   Reports covid/pneum vaccines, no flu shot.     Per wife Last bladder cancer treatment BCG 9/20, recently on ABX cephalexin, and cefuroxime (finished 3-4 days ago). Reports patient complained x1 for burning/pain with urination x2 days ago, but patient denies dysuria, N/V/D, or frequency at this time.   " Yes

## 2022-10-23 NOTE — ASSESSMENT & PLAN NOTE
Patient has history of bladder cancer and he follows urology Dr. Hunter.  He received BCG treatment approximately a month ago.  Urology consulted by ER physician -pending their recommendations

## 2022-10-23 NOTE — ASSESSMENT & PLAN NOTE
He takes oral hypoglycemic agent.  I started him on insulin sliding scale with hypoglycemia protocol.  Holding oral hypoglycemic agent while he is in the hospital

## 2022-10-23 NOTE — CARE PLAN
The patient is Stable - Low risk of patient condition declining or worsening    Shift Goals  Clinical Goals: Zero falls this shift  Patient Goals: Feel better    Progress made toward(s) clinical / shift goals:  Pt has had zero falls this shift.     Problem: Knowledge Deficit - Standard  Goal: Patient and family/care givers will demonstrate understanding of plan of care, disease process/condition, diagnostic tests and medications  Outcome: Progressing     Problem: Fall Risk  Goal: Patient will remain free from falls  Outcome: Progressing       Patient is not progressing towards the following goals:

## 2022-10-23 NOTE — ASSESSMENT & PLAN NOTE
Patient has history of venous thromboembolism.  He takes warfarin as outpatient  I reviewed last note from pharmacist for his anticoagulation appointment on October 17  INR therapeutic  Warfarin dose adjustment as per pharmacy.

## 2022-10-23 NOTE — PROGRESS NOTES
4 Eyes Skin Assessment Completed by PORSCHE Adler and PORSCHE Stevenson.    Head WDL  Ears WDL  Nose WDL  Mouth WDL  Neck WDL  Breast/Chest WDL  Shoulder Blades WDL  Spine WDL  (R) Arm/Elbow/Hand WDL  (L) Arm/Elbow/Hand WDL  Abdomen Scar  Groin WDL  Scrotum/Coccyx/Buttocks WDL  (R) Leg WDL  (L) Leg WDL  (R) Heel/Foot/Toe Redness and Blanching  (L) Heel/Foot/Toe WDL          Devices In Places Blood Pressure Cuff and Pulse Ox      Interventions In Place Pillows    Possible Skin Injury No    Pictures Uploaded Into Epic N/A  Wound Consult Placed N/A  RN Wound Prevention Protocol Ordered No

## 2022-10-23 NOTE — PROGRESS NOTES
Pt arrived to room 213-2 from ED at 1420. Pt ambulated from gurney to bed with assistance. Pt denies pain, reports chills. POC discussed. Bed locked in lowest position with call light and belongings in reach. Spouse at bedside. Spouse in emergency contact, contact information in Epic.

## 2022-10-23 NOTE — H&P
Hospital Medicine History & Physical Note    Date of Service  10/23/2022    Primary Care Physician  Leobardo Wright M.D.    Consultants  urology    Specialist Names: Dr. Nando Julian    Code Status  Full Code    I discussed CODE STATUS with patient and he would like to be full code.    Chief Complaint  Chief Complaint   Patient presents with    Weakness    Chills       History of Presenting Illness  Magnus Claudio is a 77 y.o. male with past medical history of diabetes mellitus and bladder cancer who presented 10/23/2022 with weakness and chills for 2 weeks.  Patient reported that he has been taking antibiotic for his urinary tract infection and his last dose of antibiotic was 2 to 3 days ago.  He has been having weakness and off-and-on burning sensation when he urinates.  He expressed that he goes to the bathroom every night 3 times and this is usual for him.  He follows Dr. Hunter urology for his bladder cancer and he received BCG treatment last month per patient and his wife.  He denies symptoms of nausea, vomiting, diarrhea and constipation.  He reported associated symptoms of chills.  No specific aggravating or elevating factors.  He has poor appetite.  He denies fever at home.  He denies symptoms of chest pain, shortness of breath, abdominal pain and cough.      Patient and his wife at the bedside provided this information.    ER course: Patient found to have elevated white blood cell count and ongoing symptoms of off-and-on dysuria he was started on IV ceftriaxone.  ER physician discussed case with urologist Dr. Julian and he recommended that urology service will evaluate this patient tomorrow.    I discussed plan of care with patient and his wife at the bedside and answered all of their questions.    I discussed the plan of care with patient and family.    Review of Systems  Review of Systems   Constitutional:  Positive for chills and malaise/fatigue. Negative for fever and weight loss.    HENT:  Negative for hearing loss and tinnitus.    Eyes:  Negative for blurred vision, double vision, photophobia and pain.   Respiratory:  Negative for cough, sputum production and shortness of breath.    Cardiovascular:  Negative for chest pain, palpitations, orthopnea and leg swelling.   Gastrointestinal:  Negative for abdominal pain, constipation, diarrhea, nausea and vomiting.   Genitourinary:  Positive for dysuria. Negative for frequency and urgency.   Musculoskeletal:  Negative for back pain, joint pain, myalgias and neck pain.   Skin:  Negative for rash.   Neurological:  Positive for weakness. Negative for dizziness, tingling, tremors, sensory change, speech change, focal weakness and headaches.   Psychiatric/Behavioral:  Negative for hallucinations and substance abuse.    All other systems reviewed and are negative.    Past Medical History   has a past medical history of Anemia, Blood clotting disorder (HCC), Cancer (HCC) (2021), Cataract, Diabetes (HCC), Diverticulitis, Hypertension, and Vertigo, benign positional.    Surgical History   has a past surgical history that includes colon resection; pr resec liver,part lobectomy; laminotomy; eye surgery (Bilateral); cataract extraction with iol; other (05/2021); and inguinal hernia repair robotic xi (Left, 2/11/2022).     Family History  family history includes Cancer in his mother; Diabetes in his sister; Heart Attack in his father; Hypertension in his father.   Family history reviewed with patient. There is no family history that is pertinent to the chief complaint.     Social History   reports that he has never smoked. He has never used smokeless tobacco. He reports that he does not currently use alcohol. He reports that he does not use drugs.    Allergies  Allergies   Allergen Reactions    Morphine Unspecified     Hallucinations, 15+ years ago       Medications  Prior to Admission Medications   Prescriptions Last Dose Informant Patient Reported? Taking?    B Complex-Folic Acid (B COMPLEX-VITAMIN B12 PO) 10/23/2022 at 0830. Significant Other Yes No   Sig: Take 1 Tablet by mouth every day.   Lancets Unknown at Unknown Significant Other No No   Sig: Lancets for glucometer covered by patient's insurance Sig: use daily and prn ssx high or low sugar.   amLODIPine (NORVASC) 5 MG Tab 10/23/2022 at 0830 Significant Other No No   Sig: TAKE 1 TABLET BY MOUTH EVERY DAY   Patient taking differently: Take 5 mg by mouth every day.   cefUROXime (CEFTIN) 250 MG Tab 10/14/2022 at FINISHED Significant Other Yes Yes   Sig: Take 250 mg by mouth 2 times a day. Pt started on 10/8/2022 for 7 day course   cephALEXin (KEFLEX) 500 MG Cap 10/10/2022 at FINISHED Significant Other Yes Yes   Sig: Take 500 mg by mouth 2 times a day. Pt started on 10/4/2022 for 7 day course   glimepiride (AMARYL) 4 MG Tab 10/23/2022 at 0830 Significant Other No No   Sig: TAKE 1 TABLET BY MOUTH EVERY MORNING   hydroCHLOROthiazide (HYDRODIURIL) 12.5 MG tablet 10/23/2022 at 0830 Significant Other No No   Sig: TAKE 1 TABLET BY MOUTH DAILY   Patient taking differently: Take 12.5 mg by mouth every day.   losartan (COZAAR) 100 MG Tab 10/23/2022 at 0830 Significant Other No No   Sig: TAKE 1 TABLET BY MOUTH DAILY   Patient taking differently: Take 100 mg by mouth every day.   metFORMIN ER (GLUCOPHAGE XR) 500 MG TABLET SR 24 HR 10/23/2022 at 0830 Significant Other No No   Sig: TAKE 2 TABLETS BY MOUTH TWICE DAILY   Patient taking differently: Take 1,000 mg by mouth 2 times a day. TAKE 2 TABLETS BY MOUTH TWICE DAILY   tamsulosin (FLOMAX) 0.4 MG capsule 10/22/2022 at 1930 Significant Other No No   Sig: TAKE 1 CAPSULE BY MOUTH EVERY DAY   Patient taking differently: Take 0.4 mg by mouth every evening.   therapeutic multivitamin-minerals (THERAGRAN-M) Tab 10/23/2022 at 0830 Significant Other Yes No   Sig: Take 1 Tab by mouth every day.   warfarin (COUMADIN) 2.5 MG Tab 10/22/2022 at 1930 Significant Other No No   Sig: Take 1-2  Tablets by mouth every day. As directed by [facility]   Patient taking differently: Take 2.5-5 mg by mouth every day. As directed by [facility]  Pt takes 2.5MG on Mon, Fri, and Sat  5MG all other days      Facility-Administered Medications: None       Physical Exam  Temp:  [36.1 °C (96.9 °F)] 36.1 °C (96.9 °F)  Pulse:  [69-77] 69  Resp:  [18] 18  BP: (109-122)/(56-70) 113/58  SpO2:  [95 %-98 %] 95 %  Blood Pressure : 113/58   Temperature: 36.1 °C (96.9 °F)   Pulse: 69   Respiration: 18   Pulse Oximetry: 95 %       Physical Exam  Vitals reviewed.   Constitutional:       General: He is not in acute distress.  HENT:      Head: Normocephalic and atraumatic. No contusion.      Right Ear: External ear normal.      Left Ear: External ear normal.      Nose: Nose normal.      Mouth/Throat:      Pharynx: No oropharyngeal exudate.   Eyes:      General:         Right eye: No discharge.         Left eye: No discharge.      Pupils: Pupils are equal, round, and reactive to light.   Cardiovascular:      Rate and Rhythm: Normal rate and regular rhythm.      Heart sounds: No murmur heard.    No friction rub. No gallop.   Pulmonary:      Effort: Pulmonary effort is normal.      Breath sounds: No wheezing or rhonchi.   Abdominal:      General: Bowel sounds are normal. There is no distension.      Palpations: Abdomen is soft.      Tenderness: There is no abdominal tenderness. There is no right CVA tenderness, left CVA tenderness or rebound.   Musculoskeletal:         General: No swelling or tenderness. Normal range of motion.      Cervical back: No rigidity. No muscular tenderness.   Skin:     General: Skin is warm and dry.      Coloration: Skin is not jaundiced.   Neurological:      General: No focal deficit present.      Mental Status: He is alert and oriented to person, place, and time.      Cranial Nerves: No cranial nerve deficit.      Sensory: No sensory deficit.      Comments: Motor 5/5 in  all 4 extremities   Psychiatric:         Mood and Affect: Mood normal.       Laboratory:  Recent Labs     10/23/22  1141   WBC 16.3*   RBC 4.34*   HEMOGLOBIN 12.2*   HEMATOCRIT 36.4*   MCV 83.9   MCH 28.1   MCHC 33.5*   RDW 44.0   PLATELETCT 166   MPV 9.6     Recent Labs     10/23/22  1141   SODIUM 134*   POTASSIUM 3.5*   CHLORIDE 100   CO2 24   GLUCOSE 189*   BUN 27*   CREATININE 1.44*   CALCIUM 8.9     Recent Labs     10/23/22  1141   ALTSGPT 24   ASTSGOT 30   ALKPHOSPHAT 64   TBILIRUBIN 2.0*   GLUCOSE 189*         No results for input(s): NTPROBNP in the last 72 hours.      No results for input(s): TROPONINT in the last 72 hours.    Imaging:  DX-CHEST-PORTABLE (1 VIEW)   Final Result         Ill-defined pulmonary opacifications have decreased compared to prior radiograph. Findings could indicate decrease in pulmonary edema or inflammation.          X-Ray:  My impression is: Minimal pulmonary edema.  Compared to prior chest x-ray showed improvement.    Assessment/Plan:  Justification for Admission Status  I anticipate this patient will require at least two midnights for appropriate medical management, necessitating inpatient admission because patient failed outpatient treatment for his drain tract infection.  He found to have significantly elevated white blood cell count along with that he is feeling significantly weak.  He will need administration of IV antibiotic due to his complicated UTI and he needs to follow-up on blood culture and urine culture results.  In the light of above information I believe that patient will require at least 2 midnight for medical management.  He also found to have a acute kidney injury associated with his UTI most likely prerenal and he will need also IV fluid and administration and monitor for renal functions as well.    Patient will need a  bed on MedSurg service .  The need is secondary to complicated UTI.    * UTI (urinary tract infection)- (present on admission)  Assessment &  Plan  He found to have urinary tract infection and he has been having off-and-on symptoms for 2 weeks.  He received outpatient antibiotic but his symptoms did not resolve.  I reviewed last urine culture results which showed Klebsiella.  In the light of prior urine culture I started him on ceftriaxone.  I ordered urine cultures.  Blood cultures in process.  Plan is to admit him to medical floor and follow culture results and tailor antibiotic accordingly.      Acute kidney injury (HCC)  Assessment & Plan  He found to have creatinine of 1.44 and BUN of 27 which is elevated from his baseline.  Most likely due to prerenal  I started him on IV fluid.  Monitor renal function and medications dose adjustment as per GFR.  Avoid nephrotoxins but  Ordered lab work-up for tomorrow morning.    History of bladder cancer- (present on admission)  Assessment & Plan  Patient has history of bladder cancer and he follows urology Dr. Hunter.  He received BCG treatment approximately a month ago.  Urology consulted by ER physician and they will evaluate him tomorrow.    History of venous thromboembolism- (present on admission)  Assessment & Plan  Patient has history of venous thromboembolism.  He takes warfarin as outpatient but  I reviewed last note from pharmacist for his anticoagulation appointment on October 17  Ordered INR  Warfarin dose adjustment as per pharmacy.    Essential hypertension- (present on admission)  Assessment & Plan  Continue outpatient amlodipine for  Due to low blood pressure and mild JEVON I held hydrochlorothiazide and losartan  Continue to monitor.    I discussed plan of care with ER physician Dr. Avila.     I discussed plan of care with patient and his wife at the bedside and answered all of their questions.  VTE prophylaxis: therapeutic anticoagulation with warfarin

## 2022-10-23 NOTE — ED TRIAGE NOTES
Chief Complaint   Patient presents with    Weakness    Chills     Pt currently on bladder cancer treatment. Has been feeling increase weakness and chills for the past month.  Pt AOx4.

## 2022-10-23 NOTE — PROGRESS NOTES
Inpatient Anticoagulation Service Note    Date: 10/23/2022  Reason for Anticoagulation: Deep Vein Thrombosis           Hemoglobin Value: (!) 12.2  Hematocrit Value: (!) 36.4  Lab Platelet Value: 166  Target INR: 2.0 to 3.0    INR from last 7 days       Date/Time INR Value    10/23/22 1141 2.68          Dose from last 7 days       Date/Time Dose (mg)    10/23/22 1445 5          Significant Interactions: Antibiotics (ceftriaxone) (If less than 5 days and overlap therapy discontinued -- document reason (i.e. Bleed Risk))    (If still on overlap therapy, if No -- document reason (i.e. Bleed Risk))    Comments: Pt takes 2.5MG on Mon, Fri, and Sat  5MG all other days    Plan:  warfarin 5 mg today, INR tomorrow     Pharmacist suggested discharge dosing: resume home regimen with close INR f/u (72 hours)     Brionna Langley, PharmD

## 2022-10-23 NOTE — ED PROVIDER NOTES
ED Provider Note    CHIEF COMPLAINT  No chief complaint on file.      HPI  Magnus Claudio is a 77 y.o. male who presents with chief complaint of diffuse weakness.  Patient has a history of bladder cancer currently being treated with BCG therapy.  Patient reports that since his last BCG therapy he has had ongoing dysuria and malaise.  He was treated for urinary tract infection for 2 weeks, but despite this his symptoms persisted.  Patient reports diffuse weakness, he denies any focal symptoms.  He denies any fevers but does report some chills.  He denies any associated neck or back pain.  Patient denies any nausea or vomiting.  Patient denies any associated chest pain or shortness of breath.  He does report an occasional and sporadic cough that is typically worse in the morning.    REVIEW OF SYSTEMS  ROS    See HPI for further details. All other systems are negative.     PAST MEDICAL HISTORY   has a past medical history of Anemia, Blood clotting disorder (HCC), Cancer (HCC) (2021), Cataract, Diabetes (HCC), Diverticulitis, Hypertension, and Vertigo, benign positional.    SOCIAL HISTORY  Social History     Tobacco Use    Smoking status: Never    Smokeless tobacco: Never   Vaping Use    Vaping Use: Never used   Substance and Sexual Activity    Alcohol use: Not Currently     Alcohol/week: 0.0 oz    Drug use: Never    Sexual activity: Not Currently     Partners: Female       SURGICAL HISTORY   has a past surgical history that includes colon resection; resec liver,part lobectomy; laminotomy; eye surgery (Bilateral); cataract extraction with iol; other (05/2021); and inguinal hernia repair robotic xi (Left, 2/11/2022).    CURRENT MEDICATIONS  Home Medications    **Home medications have not yet been reviewed for this encounter**         ALLERGIES  Allergies   Allergen Reactions    Morphine Unspecified     Hallucinations, 15+ years ago       PHYSICAL EXAM  Vitals:    10/23/22 0934   BP: 115/70   Pulse: 77   Resp: 18    Temp: 36.1 °C (96.9 °F)   SpO2: 98%       Physical Exam  Constitutional:       Appearance: He is well-developed.   HENT:      Head: Normocephalic and atraumatic.   Eyes:      Conjunctiva/sclera: Conjunctivae normal.      Pupils: Pupils are equal, round, and reactive to light.   Cardiovascular:      Rate and Rhythm: Normal rate and regular rhythm.      Heart sounds: No murmur heard.    No friction rub. No gallop.   Pulmonary:      Effort: Pulmonary effort is normal. No respiratory distress.      Breath sounds: Normal breath sounds. No wheezing.   Abdominal:      General: Bowel sounds are normal. There is no distension.      Palpations: Abdomen is soft.      Tenderness: There is no abdominal tenderness. There is no rebound.   Musculoskeletal:      Cervical back: Normal range of motion and neck supple.   Skin:     General: Skin is warm and dry.   Neurological:      General: No focal deficit present.      Mental Status: He is alert and oriented to person, place, and time.      Comments: Distal and proximal strength 5/5 in upper and lower extremities. Normal gait. No dysmetria. No sensation deficits. No visual field deficits. Cranial nerves intact.        Psychiatric:         Behavior: Behavior normal.         DIAGNOSTIC STUDIES / PROCEDURES      LABS  Results for orders placed or performed during the hospital encounter of 10/23/22   URINALYSIS    Specimen: Urine   Result Value Ref Range    Color Yellow     Character Hazy (A)     Specific Gravity 1.025 <1.035    Ph 5.5 5.0 - 8.0    Glucose Negative Negative mg/dL    Ketones Negative Negative mg/dL    Protein 30 (A) Negative mg/dL    Bilirubin Negative Negative    Nitrite Positive (A) Negative    Leukocyte Esterase Small (A) Negative    Occult Blood Small (A) Negative    Micro Urine Req Microscopic    CBC WITH DIFFERENTIAL   Result Value Ref Range    WBC 16.3 (H) 4.8 - 10.8 K/uL    RBC 4.34 (L) 4.70 - 6.10 M/uL    Hemoglobin 12.2 (L) 14.0 - 18.0 g/dL    Hematocrit 36.4  (L) 42.0 - 52.0 %    MCV 83.9 81.4 - 97.8 fL    MCH 28.1 27.0 - 33.0 pg    MCHC 33.5 (L) 33.7 - 35.3 g/dL    RDW 44.0 35.9 - 50.0 fL    Platelet Count 166 164 - 446 K/uL    MPV 9.6 9.0 - 12.9 fL    Neutrophils-Polys 85.60 (H) 44.00 - 72.00 %    Lymphocytes 5.80 (L) 22.00 - 41.00 %    Monocytes 7.80 0.00 - 13.40 %    Eosinophils 0.10 0.00 - 6.90 %    Basophils 0.30 0.00 - 1.80 %    Immature Granulocytes 0.40 0.00 - 0.90 %    Nucleated RBC 0.00 /100 WBC    Neutrophils (Absolute) 13.92 (H) 1.82 - 7.42 K/uL    Lymphs (Absolute) 0.95 (L) 1.00 - 4.80 K/uL    Monos (Absolute) 1.27 (H) 0.00 - 0.85 K/uL    Eos (Absolute) 0.02 0.00 - 0.51 K/uL    Baso (Absolute) 0.05 0.00 - 0.12 K/uL    Immature Granulocytes (abs) 0.06 0.00 - 0.11 K/uL    NRBC (Absolute) 0.00 K/uL   CMP   Result Value Ref Range    Sodium 134 (L) 135 - 145 mmol/L    Potassium 3.5 (L) 3.6 - 5.5 mmol/L    Chloride 100 96 - 112 mmol/L    Co2 24 20 - 33 mmol/L    Anion Gap 10.0 7.0 - 16.0    Glucose 189 (H) 65 - 99 mg/dL    Bun 27 (H) 8 - 22 mg/dL    Creatinine 1.44 (H) 0.50 - 1.40 mg/dL    Calcium 8.9 8.4 - 10.2 mg/dL    AST(SGOT) 30 12 - 45 U/L    ALT(SGPT) 24 2 - 50 U/L    Alkaline Phosphatase 64 30 - 99 U/L    Total Bilirubin 2.0 (H) 0.1 - 1.5 mg/dL    Albumin 3.8 3.2 - 4.9 g/dL    Total Protein 7.2 6.0 - 8.2 g/dL    Globulin 3.4 1.9 - 3.5 g/dL    A-G Ratio 1.1 g/dL   LACTIC ACID   Result Value Ref Range    Lactic Acid 1.7 0.5 - 2.0 mmol/L   CoV-2, FLU A/B, and RSV by PCR (2-4 Hours CEPHEID) : Collect NP swab in VTM    Specimen: Respirate   Result Value Ref Range    Influenza virus A RNA Negative Negative    Influenza virus B, PCR Negative Negative    RSV, PCR Negative Negative    SARS-CoV-2 by PCR NotDetected     SARS-CoV-2 Source Nasal Swab    URINE MICROSCOPIC (W/UA)   Result Value Ref Range    WBC 20-50 (A) /hpf    RBC 2-5 (A) /hpf    Bacteria Moderate (A) None /hpf    Epithelial Cells Rare Few /hpf   ESTIMATED GFR   Result Value Ref Range    GFR (CKD-EPI)  50 (A) >60 mL/min/1.73 m 2   Prothrombin Time   Result Value Ref Range    PT 27.0 (H) 12.0 - 14.6 sec    INR 2.68 (H) 0.87 - 1.13         RADIOLOGY  DX-CHEST-PORTABLE (1 VIEW)   Final Result         Ill-defined pulmonary opacifications have decreased compared to prior radiograph. Findings could indicate decrease in pulmonary edema or inflammation.              COURSE & MEDICAL DECISION MAKING  Pertinent Labs & Imaging studies reviewed. (See chart for details)    Patient here with vague symptoms.  He has some mild vague malaise.  He does not have any focal weakness or neurologic symptoms on his exam.  Patient certainly could have recurrence and ongoing cystitis or possible developing pyelonephritis.  Patient's wife reports she brought him to the emergency department as she is worried he may develop sepsis if he is having ongoing infection.  Patient has chills without any fever.  He is afebrile here with very reassuring vitals.  Patient labs are concerning with new leukocytosis.  Urinalysis is consistent with ongoing infection.  Patient given ceftriaxone.  I discussed the case with urology, they report that patient's prior urine culture which was resulted on the fourth of this month was pansensitive Klebsiella.  Given the patient did not improve following outpatient antibiotics I do believe that admission is indicated.  I offered to check CT to evaluate for possible obstructive or anatomical cause of patient's response to outpatient therapy, urology would like to defer this at this point and will see patient tomorrow.      FINAL IMPRESSION  1.  Klebsiella cystitis, failure of outpatient oral antibiotics, weakness, leukocytosis    Electronically signed by: Costa Avila M.D., 10/23/2022 10:53 AM

## 2022-10-23 NOTE — ASSESSMENT & PLAN NOTE
Continue outpatient amlodipine for  Due to low blood pressure and mild JVEON I held hydrochlorothiazide and losartan  Continue to monitor.

## 2022-10-23 NOTE — ASSESSMENT & PLAN NOTE
Dysuria and frequency for the last 2 weeks, failed 2 courses of outpatient antibiotics  I reviewed last urine culture results which showed Klebsiella aerogenes (HACEDEMETRIS)  D/W pharmacy and they recommend Cefepime  Cefepime 2g IV BID

## 2022-10-24 ENCOUNTER — APPOINTMENT (OUTPATIENT)
Dept: RADIOLOGY | Facility: MEDICAL CENTER | Age: 77
DRG: 690 | End: 2022-10-24
Attending: INTERNAL MEDICINE
Payer: MEDICARE

## 2022-10-24 PROBLEM — R78.81 GRAM-NEGATIVE BACTEREMIA: Status: ACTIVE | Noted: 2022-10-24

## 2022-10-24 LAB
ALBUMIN SERPL BCP-MCNC: 3.3 G/DL (ref 3.2–4.9)
ALBUMIN/GLOB SERPL: 1 G/DL
ALP SERPL-CCNC: 85 U/L (ref 30–99)
ALT SERPL-CCNC: 24 U/L (ref 2–50)
ANION GAP SERPL CALC-SCNC: 10 MMOL/L (ref 7–16)
AST SERPL-CCNC: 28 U/L (ref 12–45)
BASOPHILS # BLD AUTO: 0.1 % (ref 0–1.8)
BASOPHILS # BLD: 0.02 K/UL (ref 0–0.12)
BILIRUB SERPL-MCNC: 1.1 MG/DL (ref 0.1–1.5)
BUN SERPL-MCNC: 28 MG/DL (ref 8–22)
CALCIUM SERPL-MCNC: 8.6 MG/DL (ref 8.4–10.2)
CHLORIDE SERPL-SCNC: 105 MMOL/L (ref 96–112)
CO2 SERPL-SCNC: 22 MMOL/L (ref 20–33)
CREAT SERPL-MCNC: 1.45 MG/DL (ref 0.5–1.4)
EOSINOPHIL # BLD AUTO: 0.04 K/UL (ref 0–0.51)
EOSINOPHIL NFR BLD: 0.3 % (ref 0–6.9)
ERYTHROCYTE [DISTWIDTH] IN BLOOD BY AUTOMATED COUNT: 45.2 FL (ref 35.9–50)
GFR SERPLBLD CREATININE-BSD FMLA CKD-EPI: 49 ML/MIN/1.73 M 2
GLOBULIN SER CALC-MCNC: 3.4 G/DL (ref 1.9–3.5)
GLUCOSE BLD STRIP.AUTO-MCNC: 146 MG/DL (ref 65–99)
GLUCOSE BLD STRIP.AUTO-MCNC: 159 MG/DL (ref 65–99)
GLUCOSE BLD STRIP.AUTO-MCNC: 193 MG/DL (ref 65–99)
GLUCOSE BLD STRIP.AUTO-MCNC: 217 MG/DL (ref 65–99)
GLUCOSE SERPL-MCNC: 179 MG/DL (ref 65–99)
HCT VFR BLD AUTO: 35 % (ref 42–52)
HGB BLD-MCNC: 11.6 G/DL (ref 14–18)
IMM GRANULOCYTES # BLD AUTO: 0.05 K/UL (ref 0–0.11)
IMM GRANULOCYTES NFR BLD AUTO: 0.4 % (ref 0–0.9)
INR PPP: 2.84 (ref 0.87–1.13)
LYMPHOCYTES # BLD AUTO: 0.91 K/UL (ref 1–4.8)
LYMPHOCYTES NFR BLD: 6.8 % (ref 22–41)
MAGNESIUM SERPL-MCNC: 1.7 MG/DL (ref 1.5–2.5)
MCH RBC QN AUTO: 28.2 PG (ref 27–33)
MCHC RBC AUTO-ENTMCNC: 33.1 G/DL (ref 33.7–35.3)
MCV RBC AUTO: 85 FL (ref 81.4–97.8)
MONOCYTES # BLD AUTO: 1.2 K/UL (ref 0–0.85)
MONOCYTES NFR BLD AUTO: 8.9 % (ref 0–13.4)
NEUTROPHILS # BLD AUTO: 11.26 K/UL (ref 1.82–7.42)
NEUTROPHILS NFR BLD: 83.5 % (ref 44–72)
NRBC # BLD AUTO: 0 K/UL
NRBC BLD-RTO: 0 /100 WBC
PLATELET # BLD AUTO: 178 K/UL (ref 164–446)
PMV BLD AUTO: 10.1 FL (ref 9–12.9)
POTASSIUM SERPL-SCNC: 3.9 MMOL/L (ref 3.6–5.5)
PROT SERPL-MCNC: 6.7 G/DL (ref 6–8.2)
PROTHROMBIN TIME: 28.2 SEC (ref 12–14.6)
RBC # BLD AUTO: 4.12 M/UL (ref 4.7–6.1)
SODIUM SERPL-SCNC: 137 MMOL/L (ref 135–145)
WBC # BLD AUTO: 13.5 K/UL (ref 4.8–10.8)

## 2022-10-24 PROCEDURE — 99232 SBSQ HOSP IP/OBS MODERATE 35: CPT | Performed by: INTERNAL MEDICINE

## 2022-10-24 PROCEDURE — 770006 HCHG ROOM/CARE - MED/SURG/GYN SEMI*

## 2022-10-24 PROCEDURE — 82962 GLUCOSE BLOOD TEST: CPT | Mod: 91

## 2022-10-24 PROCEDURE — 76775 US EXAM ABDO BACK WALL LIM: CPT

## 2022-10-24 PROCEDURE — 85610 PROTHROMBIN TIME: CPT

## 2022-10-24 PROCEDURE — 700105 HCHG RX REV CODE 258: Performed by: INTERNAL MEDICINE

## 2022-10-24 PROCEDURE — A9270 NON-COVERED ITEM OR SERVICE: HCPCS | Performed by: INTERNAL MEDICINE

## 2022-10-24 PROCEDURE — 94760 N-INVAS EAR/PLS OXIMETRY 1: CPT

## 2022-10-24 PROCEDURE — 51798 US URINE CAPACITY MEASURE: CPT

## 2022-10-24 PROCEDURE — 700111 HCHG RX REV CODE 636 W/ 250 OVERRIDE (IP): Performed by: INTERNAL MEDICINE

## 2022-10-24 PROCEDURE — 36415 COLL VENOUS BLD VENIPUNCTURE: CPT

## 2022-10-24 PROCEDURE — 83735 ASSAY OF MAGNESIUM: CPT

## 2022-10-24 PROCEDURE — 700102 HCHG RX REV CODE 250 W/ 637 OVERRIDE(OP): Performed by: INTERNAL MEDICINE

## 2022-10-24 PROCEDURE — 80053 COMPREHEN METABOLIC PANEL: CPT

## 2022-10-24 PROCEDURE — 85025 COMPLETE CBC W/AUTO DIFF WBC: CPT

## 2022-10-24 RX ORDER — WARFARIN SODIUM 5 MG/1
5 TABLET ORAL
Status: COMPLETED | OUTPATIENT
Start: 2022-10-24 | End: 2022-10-24

## 2022-10-24 RX ADMIN — SODIUM CHLORIDE: 9 INJECTION, SOLUTION INTRAVENOUS at 12:39

## 2022-10-24 RX ADMIN — CEFEPIME 2 G: 2 INJECTION, POWDER, FOR SOLUTION INTRAVENOUS at 12:40

## 2022-10-24 RX ADMIN — CEFTRIAXONE SODIUM 2 G: 2 INJECTION, POWDER, FOR SOLUTION INTRAMUSCULAR; INTRAVENOUS at 05:14

## 2022-10-24 RX ADMIN — INSULIN HUMAN 2 UNITS: 100 INJECTION, SOLUTION PARENTERAL at 12:39

## 2022-10-24 RX ADMIN — TAMSULOSIN HYDROCHLORIDE 0.4 MG: 0.4 CAPSULE ORAL at 05:13

## 2022-10-24 RX ADMIN — ACETAMINOPHEN 650 MG: 325 TABLET, FILM COATED ORAL at 04:17

## 2022-10-24 RX ADMIN — ACETAMINOPHEN 650 MG: 325 TABLET, FILM COATED ORAL at 18:17

## 2022-10-24 RX ADMIN — WARFARIN SODIUM 5 MG: 5 TABLET ORAL at 17:53

## 2022-10-24 RX ADMIN — INSULIN HUMAN 1 UNITS: 100 INJECTION, SOLUTION PARENTERAL at 17:53

## 2022-10-24 RX ADMIN — AMLODIPINE BESYLATE 5 MG: 5 TABLET ORAL at 05:13

## 2022-10-24 RX ADMIN — INSULIN HUMAN 1 UNITS: 100 INJECTION, SOLUTION PARENTERAL at 20:37

## 2022-10-24 ASSESSMENT — ENCOUNTER SYMPTOMS
BACK PAIN: 0
DEPRESSION: 0
NAUSEA: 0
BLOOD IN STOOL: 0
ABDOMINAL PAIN: 0
DIZZINESS: 0
SORE THROAT: 0
MYALGIAS: 0
FOCAL WEAKNESS: 0
HEADACHES: 0
CHILLS: 0
SHORTNESS OF BREATH: 0
VOMITING: 0
DIARRHEA: 0
PALPITATIONS: 0
COUGH: 0
HALLUCINATIONS: 0
CHILLS: 1
WEAKNESS: 0
HEARTBURN: 0
FEVER: 0
FLANK PAIN: 0

## 2022-10-24 ASSESSMENT — PATIENT HEALTH QUESTIONNAIRE - PHQ9
SUM OF ALL RESPONSES TO PHQ9 QUESTIONS 1 AND 2: 0
1. LITTLE INTEREST OR PLEASURE IN DOING THINGS: NOT AT ALL

## 2022-10-24 ASSESSMENT — PAIN DESCRIPTION - PAIN TYPE: TYPE: ACUTE PAIN

## 2022-10-24 NOTE — PROGRESS NOTES
Lab called at 0127 reporting that first set of blood cultures prelim came back positive for gram-negative rods. Patient is on rocephin at this time. Dr. Leslie made aware at 0138.

## 2022-10-24 NOTE — PROGRESS NOTES
Inpatient Anticoagulation Service Note    Date: 10/24/2022  Reason for Anticoagulation: Deep Vein Thrombosis           Hemoglobin Value: (!) 11.6  Hematocrit Value: (!) 35  Lab Platelet Value: 178  Target INR: 2.0 to 3.0    INR from last 7 days       Date/Time INR Value    10/24/22 0134 2.84    10/23/22 1141 2.68          Dose from last 7 days       Date/Time Dose (mg)    10/24/22 0854 5    10/23/22 1445 5          Significant Interactions: Antibiotics (ceftriaxone)     Comments: Home warfarin dose:  2.5MG on Mon, Fri, and Sat  5MG all other days    Plan:  warfarin 5 mg today, INR tomorrow     Pharmacist suggested discharge dosing: Home regimen with INR 3 days within discharge      Brionna Langley, PharmD

## 2022-10-24 NOTE — CONSULTS
[facility] Consult/H&P Note    Primary Service: Medicine  Attending: Xiomara Ortiz D.O.  Patient's Name/MRN: Magnus Claudio, [id number]    Admit Date:10/23/2022  Today's Date: 10/24/2022   Length of stay:  LOS: 1 day   Room #: 2213/02      Reason for consult/chief complaint: UTI  ID/HPI: Magnus Claudio is a 77 y.o. male patient known to the urology service with a history of HGT1 bladder cancer, undergoing maintenance BCG instillations. He was last seen in the Urology office on 10/4 where his BCG was not performed 2/2 UTI. Ucx 10/4 found to be positive for pan sensitive klebsiella, treated with cefuroxime. He was admitted 10/23 with c/o dysuria, and found to have UA suspicious for infection, elevated WBC of 16.3, and Cr 1.44. He was admitted to the hospitalist, started on rocephin, and urology was consulted.    10/24: Seen and examined, lying in bed in NAD. WBC improved to 13.5, 1/2 blood cx positive for GNR, AFVSS overnight. Reports he feels much better than he did yesterday. Denies N/V/F/C overnight, and denies GH, flank pain, dizziness. Notes that he has had issues with UTIs after BCG instillations and has upcoming appointment next week with Dr Hunter to discuss alternative plan of care.       Past Medical History:   Past Medical History:   Diagnosis Date    Anemia     Blood clotting disorder (HCC)     leg and lung    Cancer (HCC) 2021    Colon 20+ years ago and bladder    Cataract     Diabetes (HCC)     Diverticulitis     Hypertension     Vertigo, benign positional         Past Surgical History:   Past Surgical History:   Procedure Laterality Date    INGUINAL HERNIA REPAIR ROBOTIC XI Left 2/11/2022    Procedure: REPAIR, HERNIA, INGUINAL, ROBOT-ASSISTED, USING DA NIKITA XI - WITH MESH PLACEMENT;  Surgeon: Nelson Denney M.D.;  Location: SURGERY [facility];  Service: Gen Robotic    OTHER  05/2021    bladder cancer surgery    CATARACT EXTRACTION WITH IOL      COLON RESECTION      EYE SURGERY  Bilateral     Cataract surgery    LAMINOTOMY      NC RESEC LIVER,PART LOBECTOMY          Family History:   Family History   Problem Relation Age of Onset    Hypertension Father     Heart Attack Father     Cancer Mother         Breast    Diabetes Sister     Hyperlipidemia Neg Hx         unknown         Social History:   Social History     Tobacco Use    Smoking status: Never    Smokeless tobacco: Never   Vaping Use    Vaping Use: Never used   Substance Use Topics    Alcohol use: Not Currently     Alcohol/week: 0.0 oz    Drug use: Never      Social History     Social History Narrative    Not on file        Allergies: he Morphine    Medications:   Medications Prior to Admission   Medication Sig Dispense Refill Last Dose    cefUROXime (CEFTIN) 250 MG Tab Take 250 mg by mouth 2 times a day. Pt started on 10/8/2022 for 7 day course   10/14/2022 at FINISHED    cephALEXin (KEFLEX) 500 MG Cap Take 500 mg by mouth 2 times a day. Pt started on 10/4/2022 for 7 day course   10/10/2022 at FINISHED    tamsulosin (FLOMAX) 0.4 MG capsule TAKE 1 CAPSULE BY MOUTH EVERY DAY (Patient taking differently: Take 0.4 mg by mouth every evening.) 100 Capsule 4 10/22/2022 at 1930    warfarin (COUMADIN) 2.5 MG Tab Take 1-2 Tablets by mouth every day. As directed by [facility] (Patient taking differently: Take 2.5-5 mg by mouth every day. As directed by [facility]  Pt takes 2.5MG on Mon, Fri, and Sat  5MG all other days) 180 Tablet 1 10/22/2022 at 1930    glimepiride (AMARYL) 4 MG Tab TAKE 1 TABLET BY MOUTH EVERY MORNING 100 Tablet 4 10/23/2022 at 0830    B Complex-Folic Acid (B COMPLEX-VITAMIN B12 PO) Take 1 Tablet by mouth every day.   10/23/2022 at 0830.    amLODIPine (NORVASC) 5 MG Tab TAKE 1 TABLET BY MOUTH EVERY DAY (Patient taking differently: Take 5 mg by mouth every day.) 100 Tablet 4 10/23/2022 at 0830    hydroCHLOROthiazide (HYDRODIURIL) 12.5 MG tablet TAKE 1 TABLET BY MOUTH DAILY (Patient taking  "differently: Take 12.5 mg by mouth every day.) 100 Tablet 4 10/23/2022 at 0830    losartan (COZAAR) 100 MG Tab TAKE 1 TABLET BY MOUTH DAILY (Patient taking differently: Take 100 mg by mouth every day.) 100 Tablet 4 10/23/2022 at 0830    metFORMIN ER (GLUCOPHAGE XR) 500 MG TABLET SR 24 HR TAKE 2 TABLETS BY MOUTH TWICE DAILY (Patient taking differently: Take 1,000 mg by mouth 2 times a day. TAKE 2 TABLETS BY MOUTH TWICE DAILY) 360 Tablet 4 10/23/2022 at 0830    Lancets Lancets for glucometer covered by patient's insurance Sig: use daily and prn ssx high or low sugar. 300 Each 12 Unknown at Unknown    therapeutic multivitamin-minerals (THERAGRAN-M) Tab Take 1 Tab by mouth every day.   10/23/2022 at 0830         Review of Systems  Review of Systems   Constitutional:  Negative for chills and fever.   Respiratory:  Negative for cough.    Cardiovascular:  Negative for chest pain.   Gastrointestinal:  Negative for abdominal pain, nausea and vomiting.   Genitourinary:  Positive for dysuria. Negative for flank pain and hematuria.   All other systems reviewed and are negative.     Physical Exam  VITAL SIGNS: /54   Pulse 85   Temp 36.7 °C (98 °F) (Oral)   Resp 18   Ht 1.803 m (5' 11\")   Wt 81.7 kg (180 lb 1.9 oz)   SpO2 92%   BMI 25.12 kg/m²   Physical Exam  Constitutional:       Appearance: Normal appearance.   HENT:      Head: Normocephalic and atraumatic.      Nose: Nose normal.   Eyes:      Extraocular Movements: Extraocular movements intact.      Conjunctiva/sclera: Conjunctivae normal.   Pulmonary:      Effort: Pulmonary effort is normal.   Abdominal:      General: There is no distension.      Palpations: Abdomen is soft.   Musculoskeletal:         General: Normal range of motion.      Cervical back: Normal range of motion and neck supple.   Skin:     General: Skin is warm and dry.   Neurological:      General: No focal deficit present.      Mental Status: He is alert and oriented to person, place, and time. "   Psychiatric:         Mood and Affect: Mood normal.         Behavior: Behavior normal.         Labs:  Recent Labs     10/23/22  1141 10/24/22  0134   WBC 16.3* 13.5*   RBC 4.34* 4.12*   HEMOGLOBIN 12.2* 11.6*   HEMATOCRIT 36.4* 35.0*   MCV 83.9 85.0   MCH 28.1 28.2   MCHC 33.5* 33.1*   RDW 44.0 45.2   PLATELETCT 166 178   MPV 9.6 10.1     Recent Labs     10/23/22  1141 10/24/22  0134   SODIUM 134* 137   POTASSIUM 3.5* 3.9   CHLORIDE 100 105   CO2 24 22   GLUCOSE 189* 179*   BUN 27* 28*   CREATININE 1.44* 1.45*   CALCIUM 8.9 8.6     Recent Labs     10/23/22  1141 10/24/22  0134   INR 2.68* 2.84*     Glucose:  Recent Labs     10/23/22  1141 10/24/22  0134   GLUCOSE 189* 179*     Coags:  Recent Labs     10/24/22  0134   INR 2.84*         Urinalysis:   Recent Labs     10/23/22  1141   COLORURINE Yellow   CLARITY Hazy*   SPECGRAVITY 1.025   PHURINE 5.5   GLUCOSEUR Negative   KETONES Negative   NITRITE Positive*   OCCULTBLOOD Small*   RBCURINE 2-5*   BACTERIA Moderate*   EPITHELCELL Rare       Imaging:  DX-CHEST-PORTABLE (1 VIEW)   Final Result         Ill-defined pulmonary opacifications have decreased compared to prior radiograph. Findings could indicate decrease in pulmonary edema or inflammation.      US-RENAL    (Results Pending)       @lastct@     Assessment/Recommendation   77 y.o. M with hx of bladder cancer, admitted with UTI and sepsis    Continue IV abx per primary team, culture sensitivities  Upon discharge, recommend at least 7 days of appropriate PO abx  No urologic intervention anticipated during this admission, patient will keep outpatient follow up as scheduled  Urology signing off, please call with questions    Dr. Julian is aware of consult and has directed this patient's plan of care.       BRIGHT Akers-AMANDA.   [address] [place] [address]   [phone]

## 2022-10-24 NOTE — CARE PLAN
The patient is Stable - Low risk of patient condition declining or worsening    Shift Goals  Clinical Goals: PVRs, Safety  Patient Goals: get home  Family Goals: CLAUDIO    Progress made toward(s) clinical / shift goals:    Problem: Fall Risk  Goal: Patient will remain free from falls  Outcome: Progressing       Patient is not progressing towards the following goals:

## 2022-10-24 NOTE — ASSESSMENT & PLAN NOTE
1 out of 2 blood cultures from 10/23 positive for gram-negative bacteria, likely the same as urine  Cefepime 2 g IV twice daily based on Klebsiella aerogenes (HACEK organism)

## 2022-10-24 NOTE — DIETARY
Nutrition Services:    Report of wound and TF/TPN/supplements PTA on Nutrition Admit Screen. No wound or wound consult noted in RN's 4 Eyes Skin Assessment.     Pt is currently on a consistent CHO (diabetic) diet and per chart pt PO % of most meals.  No report of tube feed or TPN PTA. Pt may have been drinking supplements PTA. Per MD's note, pt had poor appetite PTA. Current PO intake in the hospital is adequate and supplements are not indicated at this time.     Ht: 180.3 cm, Wt: 81.7 kg, BMI 25.12.     Consult RD as needed. RD will re-screen weekly.      RD available prn

## 2022-10-24 NOTE — HOSPITAL COURSE
History of bladder cancer status post BCG therapy 1 month ago admitted for dysuria and fever which has been present on and off for the last 2 weeks which failed to improve with 2 antibiotic courses prescribed by urology.  1 of 2 blood cx positive for Gram - bacteria

## 2022-10-24 NOTE — PROGRESS NOTES
Hospital Medicine Daily Progress Note    Date of Service  10/24/2022    Chief Complaint  Magnus Claudio is a 77 y.o. male admitted 10/23/2022 with dysuria and fever    Hospital Course  History of bladder cancer status post BCG therapy 1 month ago admitted for dysuria and fever which has been present on and off for the last 2 weeks which failed to improve with 2 antibiotic courses prescribed by urology.  1 of 2 blood cx positive for Gram - bacteria    Interval Problem Update  10/24/2022: Feeling much better this morning, 1 of 2 blood cultures positive, urine with Klebsiella aerogeneses.  Change abx to cefepime.     I have discussed this patient's plan of care and discharge plan at IDT rounds today with Case Management, Nursing, Nursing leadership, and other members of the IDT team.    Consultants/Specialty  urology    Code Status  Full Code    Disposition  Patient is not medically cleared for discharge.   Anticipate discharge to to home with close outpatient follow-up.  I have placed the appropriate orders for post-discharge needs.    Review of Systems  Review of Systems   Constitutional:  Positive for chills (now resolved). Negative for fever and malaise/fatigue.   HENT:  Negative for sore throat.    Respiratory:  Negative for cough and shortness of breath.    Cardiovascular:  Negative for chest pain and palpitations.   Gastrointestinal:  Negative for abdominal pain, blood in stool, diarrhea, heartburn, nausea and vomiting.   Genitourinary:  Positive for dysuria and frequency.   Musculoskeletal:  Negative for back pain and myalgias.   Neurological:  Negative for dizziness, focal weakness, weakness and headaches.   Psychiatric/Behavioral:  Negative for depression and hallucinations.    All other systems reviewed and are negative.     Physical Exam  Temp:  [36.7 °C (98 °F)-39.2 °C (102.6 °F)] 36.7 °C (98 °F)  Pulse:  [69-92] 85  Resp:  [16-20] 18  BP: (104-145)/(54-66) 122/54  SpO2:  [89 %-97 %] 92 %    Physical  Exam  Constitutional:       Appearance: Normal appearance.   HENT:      Head: Normocephalic and atraumatic.      Nose: Nose normal.      Mouth/Throat:      Mouth: Mucous membranes are moist.   Eyes:      Extraocular Movements: Extraocular movements intact.      Pupils: Pupils are equal, round, and reactive to light.   Cardiovascular:      Rate and Rhythm: Normal rate and regular rhythm.      Heart sounds: No murmur heard.  Pulmonary:      Effort: Pulmonary effort is normal. No respiratory distress.      Breath sounds: Normal breath sounds. No stridor.   Abdominal:      General: Abdomen is flat. Bowel sounds are normal. There is no distension.      Palpations: Abdomen is soft.      Tenderness: There is no abdominal tenderness.   Musculoskeletal:         General: No swelling, tenderness or deformity.      Cervical back: Normal range of motion and neck supple.   Skin:     General: Skin is warm and dry.      Coloration: Skin is not pale.   Neurological:      General: No focal deficit present.      Mental Status: He is alert and oriented to person, place, and time. Mental status is at baseline.   Psychiatric:         Mood and Affect: Mood normal.         Behavior: Behavior normal.         Thought Content: Thought content normal.       Fluids    Intake/Output Summary (Last 24 hours) at 10/24/2022 1051  Last data filed at 10/24/2022 0800  Gross per 24 hour   Intake 480 ml   Output 750 ml   Net -270 ml       Laboratory  Recent Labs     10/23/22  1141 10/24/22  0134   WBC 16.3* 13.5*   RBC 4.34* 4.12*   HEMOGLOBIN 12.2* 11.6*   HEMATOCRIT 36.4* 35.0*   MCV 83.9 85.0   MCH 28.1 28.2   MCHC 33.5* 33.1*   RDW 44.0 45.2   PLATELETCT 166 178   MPV 9.6 10.1     Recent Labs     10/23/22  1141 10/24/22  0134   SODIUM 134* 137   POTASSIUM 3.5* 3.9   CHLORIDE 100 105   CO2 24 22   GLUCOSE 189* 179*   BUN 27* 28*   CREATININE 1.44* 1.45*   CALCIUM 8.9 8.6     Recent Labs     10/23/22  1141 10/24/22  0134   INR 2.68* 2.84*                Imaging  DX-CHEST-PORTABLE (1 VIEW)   Final Result         Ill-defined pulmonary opacifications have decreased compared to prior radiograph. Findings could indicate decrease in pulmonary edema or inflammation.      US-RENAL    (Results Pending)        Assessment/Plan  * UTI (urinary tract infection)- (present on admission)  Assessment & Plan  Dysuria and frequency for the last 2 weeks, failed 2 courses of outpatient antibiotics  I reviewed last urine culture results which showed Klebsiella aerogenes (HACEK)  D/W pharmacy and they recommend Cefepime  Cefepime 2g IV BID    Gram-negative bacteremia  Assessment & Plan  1 out of 2 blood cultures from 10/23 positive for gram-negative bacteria, likely the same as urine  Cefepime 2 g IV twice daily based on Klebsiella aerogenes (HACEK organism)    Acute kidney injury (HCC)  Assessment & Plan  He found to have creatinine of 1.44 and BUN of 27 which is elevated from his baseline.  May be prerenal due to dehydration secondary IV fluids  Check renal ultrasound  Avoid nephrotoxins but  Repeat creatinine in a.m.    History of bladder cancer- (present on admission)  Assessment & Plan  Patient has history of bladder cancer and he follows urology Dr. Hunter.  He received BCG treatment approximately a month ago.  Urology consulted by ER physician -pending their recommendations    History of venous thromboembolism- (present on admission)  Assessment & Plan  Patient has history of venous thromboembolism.  He takes warfarin as outpatient  I reviewed last note from pharmacist for his anticoagulation appointment on October 17  INR therapeutic  Warfarin dose adjustment as per pharmacy.    Essential hypertension- (present on admission)  Assessment & Plan  Continue outpatient amlodipine for  Due to low blood pressure and mild JEVON I held hydrochlorothiazide and losartan  Continue to monitor.       VTE prophylaxis: therapeutic anticoagulation with coumadin    I have performed a physical  exam and reviewed and updated ROS and Plan today (10/24/2022). In review of yesterday's note (10/23/2022), there are no changes except as documented above.

## 2022-10-25 LAB
ALBUMIN SERPL BCP-MCNC: 2.7 G/DL (ref 3.2–4.9)
BACTERIA UR CULT: ABNORMAL
BACTERIA UR CULT: ABNORMAL
BASOPHILS # BLD AUTO: 0.4 % (ref 0–1.8)
BASOPHILS # BLD: 0.04 K/UL (ref 0–0.12)
BUN SERPL-MCNC: 19 MG/DL (ref 8–22)
CALCIUM SERPL-MCNC: 7.9 MG/DL (ref 8.4–10.2)
CHLORIDE SERPL-SCNC: 108 MMOL/L (ref 96–112)
CO2 SERPL-SCNC: 19 MMOL/L (ref 20–33)
CREAT SERPL-MCNC: 1.36 MG/DL (ref 0.5–1.4)
EKG IMPRESSION: NORMAL
EOSINOPHIL # BLD AUTO: 0.13 K/UL (ref 0–0.51)
EOSINOPHIL NFR BLD: 1.4 % (ref 0–6.9)
ERYTHROCYTE [DISTWIDTH] IN BLOOD BY AUTOMATED COUNT: 44.7 FL (ref 35.9–50)
GFR SERPLBLD CREATININE-BSD FMLA CKD-EPI: 53 ML/MIN/1.73 M 2
GLUCOSE BLD STRIP.AUTO-MCNC: 125 MG/DL (ref 65–99)
GLUCOSE BLD STRIP.AUTO-MCNC: 200 MG/DL (ref 65–99)
GLUCOSE BLD STRIP.AUTO-MCNC: 206 MG/DL (ref 65–99)
GLUCOSE BLD STRIP.AUTO-MCNC: 228 MG/DL (ref 65–99)
GLUCOSE SERPL-MCNC: 129 MG/DL (ref 65–99)
HCT VFR BLD AUTO: 30.7 % (ref 42–52)
HGB BLD-MCNC: 10.2 G/DL (ref 14–18)
IMM GRANULOCYTES # BLD AUTO: 0.05 K/UL (ref 0–0.11)
IMM GRANULOCYTES NFR BLD AUTO: 0.5 % (ref 0–0.9)
INR PPP: 3.86 (ref 0.87–1.13)
LYMPHOCYTES # BLD AUTO: 0.89 K/UL (ref 1–4.8)
LYMPHOCYTES NFR BLD: 9.8 % (ref 22–41)
MAGNESIUM SERPL-MCNC: 1.6 MG/DL (ref 1.5–2.5)
MCH RBC QN AUTO: 28 PG (ref 27–33)
MCHC RBC AUTO-ENTMCNC: 33.2 G/DL (ref 33.7–35.3)
MCV RBC AUTO: 84.3 FL (ref 81.4–97.8)
MONOCYTES # BLD AUTO: 0.88 K/UL (ref 0–0.85)
MONOCYTES NFR BLD AUTO: 9.6 % (ref 0–13.4)
NEUTROPHILS # BLD AUTO: 7.13 K/UL (ref 1.82–7.42)
NEUTROPHILS NFR BLD: 78.3 % (ref 44–72)
NRBC # BLD AUTO: 0 K/UL
NRBC BLD-RTO: 0 /100 WBC
PHOSPHATE SERPL-MCNC: 2.5 MG/DL (ref 2.5–4.5)
PLATELET # BLD AUTO: 159 K/UL (ref 164–446)
PMV BLD AUTO: 9.9 FL (ref 9–12.9)
POTASSIUM SERPL-SCNC: 3.3 MMOL/L (ref 3.6–5.5)
PROTHROMBIN TIME: 35.6 SEC (ref 12–14.6)
RBC # BLD AUTO: 3.64 M/UL (ref 4.7–6.1)
SIGNIFICANT IND 70042: ABNORMAL
SITE SITE: ABNORMAL
SODIUM SERPL-SCNC: 138 MMOL/L (ref 135–145)
SOURCE SOURCE: ABNORMAL
WBC # BLD AUTO: 9.1 K/UL (ref 4.8–10.8)

## 2022-10-25 PROCEDURE — 85025 COMPLETE CBC W/AUTO DIFF WBC: CPT

## 2022-10-25 PROCEDURE — 770006 HCHG ROOM/CARE - MED/SURG/GYN SEMI*

## 2022-10-25 PROCEDURE — 700105 HCHG RX REV CODE 258: Performed by: INTERNAL MEDICINE

## 2022-10-25 PROCEDURE — 700111 HCHG RX REV CODE 636 W/ 250 OVERRIDE (IP): Performed by: INTERNAL MEDICINE

## 2022-10-25 PROCEDURE — 85610 PROTHROMBIN TIME: CPT

## 2022-10-25 PROCEDURE — 93005 ELECTROCARDIOGRAM TRACING: CPT | Performed by: FAMILY MEDICINE

## 2022-10-25 PROCEDURE — 93010 ELECTROCARDIOGRAM REPORT: CPT | Performed by: INTERNAL MEDICINE

## 2022-10-25 PROCEDURE — 700111 HCHG RX REV CODE 636 W/ 250 OVERRIDE (IP): Performed by: FAMILY MEDICINE

## 2022-10-25 PROCEDURE — 80069 RENAL FUNCTION PANEL: CPT

## 2022-10-25 PROCEDURE — A9270 NON-COVERED ITEM OR SERVICE: HCPCS | Performed by: INTERNAL MEDICINE

## 2022-10-25 PROCEDURE — 99232 SBSQ HOSP IP/OBS MODERATE 35: CPT | Performed by: FAMILY MEDICINE

## 2022-10-25 PROCEDURE — 94760 N-INVAS EAR/PLS OXIMETRY 1: CPT

## 2022-10-25 PROCEDURE — 82962 GLUCOSE BLOOD TEST: CPT | Mod: 91

## 2022-10-25 PROCEDURE — A9270 NON-COVERED ITEM OR SERVICE: HCPCS | Performed by: FAMILY MEDICINE

## 2022-10-25 PROCEDURE — 700102 HCHG RX REV CODE 250 W/ 637 OVERRIDE(OP): Performed by: FAMILY MEDICINE

## 2022-10-25 PROCEDURE — 36415 COLL VENOUS BLD VENIPUNCTURE: CPT

## 2022-10-25 PROCEDURE — 700102 HCHG RX REV CODE 250 W/ 637 OVERRIDE(OP): Performed by: INTERNAL MEDICINE

## 2022-10-25 PROCEDURE — 83735 ASSAY OF MAGNESIUM: CPT

## 2022-10-25 RX ORDER — LANOLIN ALCOHOL/MO/W.PET/CERES
400 CREAM (GRAM) TOPICAL DAILY
Status: DISCONTINUED | OUTPATIENT
Start: 2022-10-25 | End: 2022-10-26 | Stop reason: HOSPADM

## 2022-10-25 RX ORDER — POTASSIUM CHLORIDE 20 MEQ/1
40 TABLET, EXTENDED RELEASE ORAL ONCE
Status: COMPLETED | OUTPATIENT
Start: 2022-10-25 | End: 2022-10-25

## 2022-10-25 RX ORDER — MAGNESIUM SULFATE HEPTAHYDRATE 40 MG/ML
2 INJECTION, SOLUTION INTRAVENOUS ONCE
Status: COMPLETED | OUTPATIENT
Start: 2022-10-25 | End: 2022-10-25

## 2022-10-25 RX ADMIN — INSULIN HUMAN 1 UNITS: 100 INJECTION, SOLUTION PARENTERAL at 17:45

## 2022-10-25 RX ADMIN — CEFEPIME 2 G: 2 INJECTION, POWDER, FOR SOLUTION INTRAVENOUS at 17:44

## 2022-10-25 RX ADMIN — POTASSIUM CHLORIDE 40 MEQ: 1500 TABLET, EXTENDED RELEASE ORAL at 09:31

## 2022-10-25 RX ADMIN — TAMSULOSIN HYDROCHLORIDE 0.4 MG: 0.4 CAPSULE ORAL at 05:12

## 2022-10-25 RX ADMIN — INSULIN HUMAN 2 UNITS: 100 INJECTION, SOLUTION PARENTERAL at 12:27

## 2022-10-25 RX ADMIN — INSULIN HUMAN 2 UNITS: 100 INJECTION, SOLUTION PARENTERAL at 20:19

## 2022-10-25 RX ADMIN — MAGNESIUM SULFATE 2 G: 2 INJECTION INTRAVENOUS at 09:30

## 2022-10-25 RX ADMIN — CEFEPIME 2 G: 2 INJECTION, POWDER, FOR SOLUTION INTRAVENOUS at 00:34

## 2022-10-25 RX ADMIN — AMLODIPINE BESYLATE 5 MG: 5 TABLET ORAL at 05:12

## 2022-10-25 RX ADMIN — Medication 400 MG: at 09:31

## 2022-10-25 RX ADMIN — CEFEPIME 2 G: 2 INJECTION, POWDER, FOR SOLUTION INTRAVENOUS at 05:13

## 2022-10-25 ASSESSMENT — ENCOUNTER SYMPTOMS
VOMITING: 0
SHORTNESS OF BREATH: 0
HALLUCINATIONS: 0
FEVER: 0
WEAKNESS: 0
CHILLS: 1
SORE THROAT: 0
ABDOMINAL PAIN: 0
COUGH: 0
DEPRESSION: 0
HEADACHES: 0
MYALGIAS: 0
NAUSEA: 0
BACK PAIN: 0
PALPITATIONS: 0
DIZZINESS: 0
BLOOD IN STOOL: 0
DIARRHEA: 0
FOCAL WEAKNESS: 0
HEARTBURN: 0

## 2022-10-25 ASSESSMENT — PAIN DESCRIPTION - PAIN TYPE
TYPE: ACUTE PAIN
TYPE: ACUTE PAIN

## 2022-10-25 NOTE — CARE PLAN
The patient is Stable - Low risk of patient condition declining or worsening    Shift Goals Safety management  Clinical Goals: urinating, free of falls  Patient Goals: discharge  Family Goals: n/a    Progress made toward(s) clinical / shift goals:    Problem: Knowledge Deficit - Standard  Goal: Patient and family/care givers will demonstrate understanding of plan of care, disease process/condition, diagnostic tests and medications  Outcome: Progressing     Problem: Fall Risk  Goal: Patient will remain free from falls  Outcome: Progressing     Problem: Pain - Standard  Goal: Alleviation of pain or a reduction in pain to the patient’s comfort goal  Outcome: Progressing       Patient is not progressing towards the following goals:

## 2022-10-25 NOTE — PROGRESS NOTES
Hospital Medicine Daily Progress Note    Date of Service  10/25/2022    Chief Complaint  Magnus Claudio is a 77 y.o. male admitted 10/23/2022 with dysuria and fever    Hospital Course  This is a 76yo male with a history of DM II, bladder cancer on BCG treatment, HTN and anemia - he presented to hospital with weakness, dysuria and chills. He had been taking an antibiotic for a UTI with his last dose 2-3 days ago.     Interval Problem Update  10/24/2022: Feeling much better this morning, 1 of 2 blood cultures positive, urine with Klebsiella aerogeneses.  Change abx to cefepime.     10/25 - Pt with temp to 102.6 yesterday am at 0415, otherwise afebrile since. White count continues to decline to 9.1 today from 13.5, replace K+ for 3.3. Developing a metabolic acidosis with bicarb of 19, CR going down to 1.36, baseline is around 1.1-1.2. Give 2gm Mag and start oral supplementation for a level of 1.6. Urine culture growing Klebsiella with some resistance, GNR in one of two blood cultures. Is on D3 of appropriate antibiotics, likely dc tomorrow with Levaquin if blood culture is also Klebsiella and sensitive to Levaquin. Check EKG for Qtc.    I have discussed this patient's plan of care and discharge plan at IDT rounds today with Case Management, Nursing, Nursing leadership, and other members of the IDT team.    Consultants/Specialty  urology    Code Status  Full Code    Disposition  Patient is not medically cleared for discharge.   Anticipate discharge to to home with close outpatient follow-up.  I have placed the appropriate orders for post-discharge needs.    Review of Systems  Review of Systems   Constitutional:  Positive for chills (now resolved). Negative for fever and malaise/fatigue.   HENT:  Negative for sore throat.    Respiratory:  Negative for cough and shortness of breath.    Cardiovascular:  Negative for chest pain and palpitations.   Gastrointestinal:  Negative for abdominal pain, blood in stool,  diarrhea, heartburn, nausea and vomiting.   Genitourinary:  Positive for dysuria and frequency.   Musculoskeletal:  Negative for back pain and myalgias.   Neurological:  Negative for dizziness, focal weakness, weakness and headaches.   Psychiatric/Behavioral:  Negative for depression and hallucinations.    All other systems reviewed and are negative.     Physical Exam  Temp:  [36.9 °C (98.5 °F)-37.6 °C (99.7 °F)] 37.6 °C (99.7 °F)  Pulse:  [71-89] 72  Resp:  [18] 18  BP: (110-153)/(52-99) 125/58  SpO2:  [93 %-99 %] 93 %    Physical Exam  Constitutional:       Appearance: Normal appearance.   HENT:      Head: Normocephalic and atraumatic.      Nose: Nose normal.      Mouth/Throat:      Mouth: Mucous membranes are moist.   Eyes:      Extraocular Movements: Extraocular movements intact.      Pupils: Pupils are equal, round, and reactive to light.   Cardiovascular:      Rate and Rhythm: Normal rate and regular rhythm.      Heart sounds: No murmur heard.  Pulmonary:      Effort: Pulmonary effort is normal. No respiratory distress.      Breath sounds: Normal breath sounds. No stridor.   Abdominal:      General: Abdomen is flat. Bowel sounds are normal. There is no distension.      Palpations: Abdomen is soft.      Tenderness: There is no abdominal tenderness.   Musculoskeletal:         General: No swelling, tenderness or deformity.      Cervical back: Normal range of motion and neck supple.   Skin:     General: Skin is warm and dry.      Coloration: Skin is not pale.   Neurological:      General: No focal deficit present.      Mental Status: He is alert and oriented to person, place, and time. Mental status is at baseline.   Psychiatric:         Mood and Affect: Mood normal.         Behavior: Behavior normal.         Thought Content: Thought content normal.       Fluids    Intake/Output Summary (Last 24 hours) at 10/25/2022 0752  Last data filed at 10/25/2022 0700  Gross per 24 hour   Intake 1080 ml   Output 200 ml   Net  880 ml         Laboratory  Recent Labs     10/23/22  1141 10/24/22  0134 10/25/22  0321   WBC 16.3* 13.5* 9.1   RBC 4.34* 4.12* 3.64*   HEMOGLOBIN 12.2* 11.6* 10.2*   HEMATOCRIT 36.4* 35.0* 30.7*   MCV 83.9 85.0 84.3   MCH 28.1 28.2 28.0   MCHC 33.5* 33.1* 33.2*   RDW 44.0 45.2 44.7   PLATELETCT 166 178 159*   MPV 9.6 10.1 9.9       Recent Labs     10/23/22  1141 10/24/22  0134 10/25/22  0321   SODIUM 134* 137 138   POTASSIUM 3.5* 3.9 3.3*   CHLORIDE 100 105 108   CO2 24 22 19*   GLUCOSE 189* 179* 129*   BUN 27* 28* 19   CREATININE 1.44* 1.45* 1.36   CALCIUM 8.9 8.6 7.9*       Recent Labs     10/23/22  1141 10/24/22  0134 10/25/22  0321   INR 2.68* 2.84* 3.86*                 Imaging  US-RENAL   Final Result         1.  Nonobstructive bilateral renal calculi.      2.  No hydronephrosis.      DX-CHEST-PORTABLE (1 VIEW)   Final Result         Ill-defined pulmonary opacifications have decreased compared to prior radiograph. Findings could indicate decrease in pulmonary edema or inflammation.             Assessment/Plan  * UTI (urinary tract infection)- (present on admission)  Assessment & Plan  Dysuria and frequency for the last 2 weeks, failed 2 courses of outpatient antibiotics  I reviewed last urine culture results which showed Klebsiella aerogenes (HACEK)  D/W pharmacy and they recommend Cefepime  Cefepime 2g IV BID    Gram-negative bacteremia  Assessment & Plan  1 out of 2 blood cultures from 10/23 positive for gram-negative bacteria, likely the same as urine  Cefepime 2 g IV twice daily based on Klebsiella aerogenes (HACEK organism)    Acute kidney injury (HCC)  Assessment & Plan  May be prerenal due to dehydration, continue IVFs, almost to baseline  Renal u/s unremarkable   Avoid nephrotoxins   Repeat creatinine in a.m.    History of bladder cancer- (present on admission)  Assessment & Plan  Patient has history of bladder cancer and he follows urology Dr. Hunter.  He received BCG treatment approximately a month  ago.  Urology consulted by ER physician -pending their recommendations    History of venous thromboembolism- (present on admission)  Assessment & Plan  Patient has history of venous thromboembolism.  He takes warfarin as outpatient  I reviewed last note from pharmacist for his anticoagulation appointment on October 17  INR therapeutic  Warfarin dose adjustment as per pharmacy.    Essential hypertension- (present on admission)  Assessment & Plan  Continue outpatient amlodipine for  Due to low blood pressure and mild JEVON I held hydrochlorothiazide and losartan  Continue to monitor.       VTE prophylaxis: therapeutic anticoagulation with coumadin    I have performed a physical exam and reviewed and updated ROS and Plan today (10/25/2022). In review of yesterday's note (10/24/2022), there are no changes except as documented above.

## 2022-10-25 NOTE — CARE PLAN
Patient A&Ox4, VS stable, room air, IV infusing, call light within reach, bed low and locked    The patient is Stable - Low risk of patient condition declining or worsening    Shift Goals  Clinical Goals: urinating, free of falls  Patient Goals: discharge  Family Goals: n/a    Progress made toward(s) clinical / shift goals:    Problem: Knowledge Deficit - Standard  Goal: Patient and family/care givers will demonstrate understanding of plan of care, disease process/condition, diagnostic tests and medications  Outcome: Progressing     Problem: Fall Risk  Goal: Patient will remain free from falls  Outcome: Progressing     Problem: Pain - Standard  Goal: Alleviation of pain or a reduction in pain to the patient’s comfort goal  Outcome: Progressing       Patient is not progressing towards the following goals:

## 2022-10-25 NOTE — DISCHARGE PLANNING
Case Management Discharge Planning    Admission Date: 10/23/2022  GMLOS: 2.8  ALOS: 2    6-Clicks ADL Score: 24  6-Clicks Mobility Score: 21      Anticipated Discharge Dispo: Discharge Disposition: Discharged to home/self care (01)    DME Needed: No    Action(s) Taken: OTHER, CM RN complete chart review. Pt gets INR follow ups at [facility]. Per MD pt will need follow up apt within 1 day. WESTON RN called and  to schedule pt follow up INR follow up. [facility]ON RN informed [facility] [place] has no openings until Monday and would need new referral paced to schedule pt at [facility]. WEST[facility] RN reached out to MD for referral.     1208: WEST[facility] RN called  to make pt an apt. [facility]ST[facility] RN spoke with Luma who scheduled pt at [facility] for INR follow up. Apt set for Thursday at 0900.     Escalations Completed:  MD    Next Steps: Case management to follow up medical team with any other needs     Barriers to Discharge: Medical clearance    Is the patient up for discharge tomorrow: No

## 2022-10-25 NOTE — PROGRESS NOTES
Inpatient Anticoagulation Service Note    Date: 10/25/2022    Reason for Anticoagulation: Deep Vein Thrombosis   Target INR: 2.0 to 3.0         Hemoglobin Value: (!) 10.2  Hematocrit Value: (!) 30.7  Lab Platelet Value: (!) 159    INR from last 7 days       Date/Time INR Value    10/25/22 0321 3.86    10/24/22 0134 2.84    10/23/22 1141 2.68          Dose from last 7 days       Date/Time Dose (mg)    10/24/22 0854 5    10/23/22 1445 5          Significant Interactions: Antibiotics (ceftriaxone) (If less than 5 days and overlap therapy discontinued -- document reason (i.e. Bleed Risk))    (If still on overlap therapy, if No -- document reason (i.e. Bleed Risk))    Comments: Pt takes 2.5MG on Mon, Fri, and Sat  5MG all other days    Plan:  hold warfarin this evening for elevated INR     Pharmacist suggested discharge dosing: resume home dose if appropriate.      Andrae Rivera, PharmD

## 2022-10-26 VITALS
RESPIRATION RATE: 18 BRPM | WEIGHT: 180.12 LBS | DIASTOLIC BLOOD PRESSURE: 60 MMHG | TEMPERATURE: 97.9 F | SYSTOLIC BLOOD PRESSURE: 132 MMHG | HEIGHT: 71 IN | BODY MASS INDEX: 25.22 KG/M2 | HEART RATE: 77 BPM | OXYGEN SATURATION: 93 %

## 2022-10-26 LAB
ANION GAP SERPL CALC-SCNC: 11 MMOL/L (ref 7–16)
BACTERIA BLD CULT: ABNORMAL
BACTERIA BLD CULT: ABNORMAL
BASOPHILS # BLD AUTO: 0.4 % (ref 0–1.8)
BASOPHILS # BLD: 0.03 K/UL (ref 0–0.12)
BUN SERPL-MCNC: 15 MG/DL (ref 8–22)
CALCIUM SERPL-MCNC: 7.8 MG/DL (ref 8.4–10.2)
CHLORIDE SERPL-SCNC: 110 MMOL/L (ref 96–112)
CO2 SERPL-SCNC: 18 MMOL/L (ref 20–33)
CREAT SERPL-MCNC: 1.19 MG/DL (ref 0.5–1.4)
EOSINOPHIL # BLD AUTO: 0.15 K/UL (ref 0–0.51)
EOSINOPHIL NFR BLD: 2.1 % (ref 0–6.9)
ERYTHROCYTE [DISTWIDTH] IN BLOOD BY AUTOMATED COUNT: 47.9 FL (ref 35.9–50)
GFR SERPLBLD CREATININE-BSD FMLA CKD-EPI: 62 ML/MIN/1.73 M 2
GLUCOSE BLD STRIP.AUTO-MCNC: 143 MG/DL (ref 65–99)
GLUCOSE SERPL-MCNC: 153 MG/DL (ref 65–99)
HCT VFR BLD AUTO: 32.7 % (ref 42–52)
HGB BLD-MCNC: 10.3 G/DL (ref 14–18)
IMM GRANULOCYTES # BLD AUTO: 0.04 K/UL (ref 0–0.11)
IMM GRANULOCYTES NFR BLD AUTO: 0.6 % (ref 0–0.9)
INR PPP: 3.72 (ref 0.87–1.13)
LYMPHOCYTES # BLD AUTO: 0.8 K/UL (ref 1–4.8)
LYMPHOCYTES NFR BLD: 11 % (ref 22–41)
MCH RBC QN AUTO: 28.1 PG (ref 27–33)
MCHC RBC AUTO-ENTMCNC: 31.5 G/DL (ref 33.7–35.3)
MCV RBC AUTO: 89.3 FL (ref 81.4–97.8)
MONOCYTES # BLD AUTO: 0.5 K/UL (ref 0–0.85)
MONOCYTES NFR BLD AUTO: 6.9 % (ref 0–13.4)
NEUTROPHILS # BLD AUTO: 5.73 K/UL (ref 1.82–7.42)
NEUTROPHILS NFR BLD: 79 % (ref 44–72)
NRBC # BLD AUTO: 0 K/UL
NRBC BLD-RTO: 0 /100 WBC
PLATELET # BLD AUTO: 161 K/UL (ref 164–446)
PMV BLD AUTO: 10.2 FL (ref 9–12.9)
POTASSIUM SERPL-SCNC: 3.5 MMOL/L (ref 3.6–5.5)
PROTHROMBIN TIME: 34.6 SEC (ref 12–14.6)
RBC # BLD AUTO: 3.66 M/UL (ref 4.7–6.1)
SIGNIFICANT IND 70042: ABNORMAL
SITE SITE: ABNORMAL
SODIUM SERPL-SCNC: 139 MMOL/L (ref 135–145)
SOURCE SOURCE: ABNORMAL
WBC # BLD AUTO: 7.3 K/UL (ref 4.8–10.8)

## 2022-10-26 PROCEDURE — 85025 COMPLETE CBC W/AUTO DIFF WBC: CPT

## 2022-10-26 PROCEDURE — 80048 BASIC METABOLIC PNL TOTAL CA: CPT

## 2022-10-26 PROCEDURE — 99239 HOSP IP/OBS DSCHRG MGMT >30: CPT | Performed by: FAMILY MEDICINE

## 2022-10-26 PROCEDURE — 700102 HCHG RX REV CODE 250 W/ 637 OVERRIDE(OP): Performed by: FAMILY MEDICINE

## 2022-10-26 PROCEDURE — 700105 HCHG RX REV CODE 258: Performed by: INTERNAL MEDICINE

## 2022-10-26 PROCEDURE — 82962 GLUCOSE BLOOD TEST: CPT

## 2022-10-26 PROCEDURE — 700102 HCHG RX REV CODE 250 W/ 637 OVERRIDE(OP): Performed by: INTERNAL MEDICINE

## 2022-10-26 PROCEDURE — A9270 NON-COVERED ITEM OR SERVICE: HCPCS | Performed by: FAMILY MEDICINE

## 2022-10-26 PROCEDURE — A9270 NON-COVERED ITEM OR SERVICE: HCPCS | Performed by: INTERNAL MEDICINE

## 2022-10-26 PROCEDURE — 36415 COLL VENOUS BLD VENIPUNCTURE: CPT

## 2022-10-26 PROCEDURE — 700111 HCHG RX REV CODE 636 W/ 250 OVERRIDE (IP): Performed by: INTERNAL MEDICINE

## 2022-10-26 PROCEDURE — 85610 PROTHROMBIN TIME: CPT

## 2022-10-26 RX ORDER — LEVOFLOXACIN 500 MG/1
500 TABLET, FILM COATED ORAL DAILY
Qty: 5 TABLET | Refills: 0 | Status: SHIPPED | OUTPATIENT
Start: 2022-10-26 | End: 2022-11-01

## 2022-10-26 RX ORDER — POTASSIUM CHLORIDE 750 MG/1
10 TABLET, EXTENDED RELEASE ORAL DAILY
Qty: 30 EACH | Refills: 0 | Status: SHIPPED | OUTPATIENT
Start: 2022-10-26 | End: 2023-02-27

## 2022-10-26 RX ORDER — LEVOFLOXACIN 500 MG/1
500 TABLET, FILM COATED ORAL DAILY
Qty: 3 TABLET | Refills: 0 | Status: SHIPPED | OUTPATIENT
Start: 2022-10-26 | End: 2022-10-26 | Stop reason: SDUPTHER

## 2022-10-26 RX ORDER — LANOLIN ALCOHOL/MO/W.PET/CERES
400 CREAM (GRAM) TOPICAL DAILY
Qty: 30 TABLET | Refills: 0 | Status: SHIPPED | OUTPATIENT
Start: 2022-10-27 | End: 2023-02-27

## 2022-10-26 RX ADMIN — TAMSULOSIN HYDROCHLORIDE 0.4 MG: 0.4 CAPSULE ORAL at 05:41

## 2022-10-26 RX ADMIN — AMLODIPINE BESYLATE 5 MG: 5 TABLET ORAL at 05:41

## 2022-10-26 RX ADMIN — CEFEPIME 2 G: 2 INJECTION, POWDER, FOR SOLUTION INTRAVENOUS at 05:42

## 2022-10-26 RX ADMIN — Medication 400 MG: at 05:41

## 2022-10-26 RX ADMIN — SENNOSIDES AND DOCUSATE SODIUM 2 TABLET: 50; 8.6 TABLET ORAL at 05:40

## 2022-10-26 NOTE — DISCHARGE INSTRUCTIONS
Diet: Diet Order Diet: Consistent CHO (Diabetic)  Activity: As tolerated  Follow Up: Please make an appointment with Urology in 2 weeks. Please see your primary care in one week for lab checks.  Your potassium and magnesium were both persistently low in hospital, so I have prescribed supplementation of both. Levaquin can cause low blood sugars in combination with Glimepiride - please check your blood sugars frequently for the next 3-4 days.   Disposition:Home  Diagnosis: UTI (urinary tract infection)    Follow up with your Primary Care Provider Leobardo Wright M.D. as scheduled or sooner if your symptoms persist or worsen.  Return to Emergency Room for severe chest pain, shortness of breath, signs of a stroke, or any other emergencies.      Your INR is still elevated despite holding your warfarin, and Levaquin will make it slow to come down.  Do not take your Warfarin until directed by the Anticoagulation Clinic - we have made you an appointment for tomorrow at 9am. It will have to be at the [facility] anticoagulation clinic as the one on [facility] did not have an appointment available.

## 2022-10-26 NOTE — PROGRESS NOTES
Patient discharged. States understanding of instructions and where to  new medications. No s/s of distress. Accompanied by spouse who also states understanding.

## 2022-10-26 NOTE — CARE PLAN
The patient is Stable - Low risk of patient condition declining or worsening    Shift Goals Free of falls  Clinical Goals: Monitor abnormal lab values  Patient Goals: Sleep comfortably  Family Goals: n/a    Progress made toward(s) clinical / shift goals:    Problem: Knowledge Deficit - Standard  Goal: Patient and family/care givers will demonstrate understanding of plan of care, disease process/condition, diagnostic tests and medications  Outcome: Progressing     Problem: Fall Risk  Goal: Patient will remain free from falls  Outcome: Progressing     Problem: Pain - Standard  Goal: Alleviation of pain or a reduction in pain to the patient’s comfort goal  Outcome: Progressing       Patient is not progressing towards the following goals:

## 2022-10-26 NOTE — DISCHARGE SUMMARY
Discharge Summary    CHIEF COMPLAINT ON ADMISSION  Chief Complaint   Patient presents with    Weakness    Chills       Reason for Admission  Chills, Unable to Walk     Admission Date  10/23/2022    CODE STATUS  Full Code    HPI & HOSPITAL COURSE  This is a 77 y.o. male here with a history of DM II, bladder cancer on BCG treatment, HTN and anemia - he presented to hospital with weakness, dysuria and chills. He had been taking an antibiotic for a UTI with his last dose 2-3 days PTA x 2 weeks without improvement.  He was found to have a Klebsiella aerogenes UTI with some antibiotic resistance, as well as 1/2 blood cultures positive for the same. Given his failure with previous cephalosporins, and given his bacteremia in discussion with Pharmacy, Levaquin was felt to be the best option given it's 100% oral bioavailability. As such, he will complete 7d total of antibiotics for the UTI and bacteremia.  Of note, he had some hypokalemia and hypomagnesemia during his stay that may be related to his home HCTZ - I have started supplementation and advised him to follow up with his PCP for repeat labs in one week. We have made him an appt with the anticoagulation clinic tomorrow to follow his INR closely given Levaquin usage. The patient is stable for discharge.       Therefore, he is discharged in good and stable condition to home with close outpatient follow-up.    The patient met 2-midnight criteria for an inpatient stay at the time of discharge.    Discharge Date  10/26/22    FOLLOW UP ITEMS POST DISCHARGE  Pt to see his PCP in one week, Urology in two weeks, and INR clinic tomorrow.     DISCHARGE DIAGNOSES  Principal Problem:    UTI (urinary tract infection) POA: Yes  Active Problems:    Essential hypertension POA: Yes    History of venous thromboembolism POA: Yes    History of bladder cancer POA: Yes    Acute kidney injury (HCC) POA: Unknown    Gram-negative bacteremia POA: Unknown  Resolved Problems:    * No resolved  hospital problems. *      FOLLOW UP  Future Appointments   Date Time Provider Department Center   10/27/2022  9:30 AM [facility] PHARMACIST KARINA ANNE MijaresJimenez   11/1/2022 10:30 AM V EXAM 4 [facility] None   11/14/2022 10:00 AM MAIN LAB [facility] None   11/23/2022 10:20 AM Leobardo Wright M.D. [facility] None     Leobardo Wright M.D.  [address]  [place] [id number] [phone]    Follow up in 1 week(s)      [facility] [place]  [address]  [place] [address]  [phone]    Call        MEDICATIONS ON DISCHARGE     Medication List        START taking these medications        Instructions   levoFLOXacin 500 MG tablet  Commonly known as: LEVAQUIN  Next Dose Due: 10/26   Take 1 Tablet by mouth every day.  Dose: 500 mg     magnesium oxide 400 (240 Mg) MG Tabs  Start taking on: October 27, 2022  Next Dose Due: 10/27   Take 1 Tablet by mouth every day.  Dose: 400 mg     potassium chloride SA 10 MEQ Tbcr  Commonly known as: K-DUR  Next Dose Due: 10/26   Take 1 Tablet by mouth every day.  Dose: 10 mEq            CHANGE how you take these medications        Instructions   amLODIPine 5 MG Tabs  What changed: when to take this  Commonly known as: NORVASC  Next Dose Due: 10/27   TAKE 1 TABLET BY MOUTH EVERY DAY     metFORMIN  MG Tb24  What changed:   how much to take  how to take this  when to take this  Commonly known as: GLUCOPHAGE XR  Next Dose Due: 10/26   TAKE 2 TABLETS BY MOUTH TWICE DAILY     tamsulosin 0.4 MG capsule  What changed: when to take this  Commonly known as: FLOMAX  Next Dose Due: 10/27   TAKE 1 CAPSULE BY MOUTH EVERY DAY  Dose: 0.4 mg     warfarin 2.5 MG Tabs  What changed: additional instructions  Commonly known as: COUMADIN  Next Dose Due: 10/26   Take 1-2 Tablets by mouth every day. As directed by [facility]  Dose: 2.5-5 mg            CONTINUE taking these medications        Instructions   B COMPLEX-VITAMIN B12 PO   Take 1 Tablet by mouth every day.  Dose: 1  Tablet     glimepiride 4 MG Tabs  Commonly known as: AMARYL  Next Dose Due: 10/26   TAKE 1 TABLET BY MOUTH EVERY MORNING  Dose: 4 mg     hydroCHLOROthiazide 12.5 MG tablet  Commonly known as: HYDRODIURIL  Next Dose Due: 10/26   TAKE 1 TABLET BY MOUTH DAILY     Lancets   Doctor's comments: E11.9     Controlled type 2 diabetes mellitus without complication,  without long-term current use of insulin  Lancets for glucometer covered by patient's insurance Sig: use daily and prn ssx high or low sugar.     losartan 100 MG Tabs  Commonly known as: COZAAR  Next Dose Due: 10/26   TAKE 1 TABLET BY MOUTH DAILY     therapeutic multivitamin-minerals Tabs  Next Dose Due: 10/26   Take 1 Tab by mouth every day.  Dose: 1 Tablet            STOP taking these medications      cefUROXime 250 MG Tabs  Commonly known as: CEFTIN     cephALEXin 500 MG Caps  Commonly known as: KEFLEX              Allergies  Allergies   Allergen Reactions    Morphine Unspecified     Hallucinations, 15+ years ago       DIET  Orders Placed This Encounter   Procedures    Diet Order Diet: Consistent CHO (Diabetic)     Standing Status:   Standing     Number of Occurrences:   1     Order Specific Question:   Diet:     Answer:   Consistent CHO (Diabetic) [4]       ACTIVITY  As tolerated.  Weight bearing as tolerated    CONSULTATIONS  Urology    PROCEDURES  None    LABORATORY  Lab Results   Component Value Date    SODIUM 139 10/26/2022    POTASSIUM 3.5 (L) 10/26/2022    CHLORIDE 110 10/26/2022    CO2 18 (L) 10/26/2022    GLUCOSE 153 (H) 10/26/2022    BUN 15 10/26/2022    CREATININE 1.19 10/26/2022        Lab Results   Component Value Date    WBC 7.3 10/26/2022    HEMOGLOBIN 10.3 (L) 10/26/2022    HEMATOCRIT 32.7 (L) 10/26/2022    PLATELETCT 161 (L) 10/26/2022        Total time of the discharge process exceeds 36 minutes.

## 2022-10-26 NOTE — CARE PLAN
The patient is Stable - Low risk of patient condition declining or worsening    Shift Goals  Clinical Goals: discharge  Patient Goals: discharge  Family Goals: n/a    Progress made toward(s) clinical / shift goals:  n/a    Patient is not progressing towards the following goals:Pt did not get discharged. Will continue to monitor.

## 2022-10-27 ENCOUNTER — PATIENT OUTREACH (OUTPATIENT)
Dept: MEDICAL GROUP | Facility: LAB | Age: 77
End: 2022-10-27

## 2022-10-27 ENCOUNTER — ANTICOAGULATION VISIT (OUTPATIENT)
Dept: MEDICAL GROUP | Facility: MEDICAL CENTER | Age: 77
End: 2022-10-27
Payer: MEDICARE

## 2022-10-27 DIAGNOSIS — Z86.718 HISTORY OF VENOUS THROMBOEMBOLISM: ICD-10-CM

## 2022-10-27 LAB — INR PPP: 3.3 (ref 2–3.5)

## 2022-10-27 PROCEDURE — 85610 PROTHROMBIN TIME: CPT | Performed by: INTERNAL MEDICINE

## 2022-10-27 PROCEDURE — 99211 OFF/OP EST MAY X REQ PHY/QHP: CPT | Performed by: INTERNAL MEDICINE

## 2022-10-27 NOTE — PROGRESS NOTES
Subjective:     Magnus Claudio is a 77 y.o. male who presents for Hospital Follow-up.    POST DISCHARGE CALL:  Discharge Date:10/26/2022   Date of Outreach Call: 10/27/2022  9:37 AM  Now that you're home, how are you doing? Good  Did you receive any new prescriptions? Yes  Were you able to fill those medications? Yes  How did you fill those prescriptions? Pharmacy  If not able to fill prescriptions, why? *    Do you have questions about your medications? No  Do you have a follow-up appointment scheduled?Yes  Any issues or paperwork you wish to discuss with your PCP? no  Does patient qualify for CCM Program? Yes  If patient qualifies, was CCM Program Introduced? Yes  If patient does not qualify, comment? *  Number of Attempts: 1  Current or previous attempts completed within two business days of discharge? Yes  Provided education regarding treatment plan, medication, self-management, ADLs? Yes  Has patient completed Advance Directive? If yes, advise them to bring to appointment. No  Care Manager phone number provided? Yes  Is there anything else I can help you with? No  Chief Complaint? weakness, chills  Admitting Dx? chills, unable to walk  Discharge Diagnosis? UTI  Additional Comments? *    HPI:   Recently hospitalized 10/23-10/26 after presenting to the ED with weakness, dysuria, and chills.  Had been treated as an outpatient for UTI with no improvement, eventually found to have Klebsiella Stanford's UTI with resistance as well as 1/2 blood cultures positive for same.  He was treated with oral Levaquin for a 7-day total course.    Also noted to have hypokalemia and hypomagnesia and was started on supplementation.    Doing well since discharge.  No current concerns or complaints.  Finished course of Levaquin.  No return of dysuria, no fevers, no weakness.  Did see urology earlier this week and has been following with anticoagulation clinic for warfarin adjustment.    Current medicines (including reconciliation  performed today)  Current Outpatient Medications   Medication Sig Dispense Refill    tamsulosin (FLOMAX) 0.4 MG capsule TAKE 1 CAPSULE BY MOUTH EVERY DAY (Patient taking differently: Take 0.4 mg by mouth every evening.) 100 Capsule 4    potassium chloride SA (K-DUR) 10 MEQ Tab CR Take 1 Tablet by mouth every day. 30 Each 0    magnesium oxide 400 (240 Mg) MG Tab Take 1 Tablet by mouth every day. 30 Tablet 0    levoFLOXacin (LEVAQUIN) 500 MG tablet Take 1 Tablet by mouth every day. 5 Tablet 0    warfarin (COUMADIN) 2.5 MG Tab Take 1-2 Tablets by mouth every day. As directed by [facility] 180 Tablet 1    glimepiride (AMARYL) 4 MG Tab TAKE 1 TABLET BY MOUTH EVERY MORNING 100 Tablet 4    B Complex-Folic Acid (B COMPLEX-VITAMIN B12 PO) Take 1 Tablet by mouth every day.      amLODIPine (NORVASC) 5 MG Tab TAKE 1 TABLET BY MOUTH EVERY DAY (Patient taking differently: Take 5 mg by mouth every day.) 100 Tablet 4    hydroCHLOROthiazide (HYDRODIURIL) 12.5 MG tablet TAKE 1 TABLET BY MOUTH DAILY (Patient taking differently: Take 12.5 mg by mouth every day.) 100 Tablet 4    losartan (COZAAR) 100 MG Tab TAKE 1 TABLET BY MOUTH DAILY (Patient taking differently: Take 100 mg by mouth every day.) 100 Tablet 4    metFORMIN ER (GLUCOPHAGE XR) 500 MG TABLET SR 24 HR TAKE 2 TABLETS BY MOUTH TWICE DAILY (Patient taking differently: Take 1,000 mg by mouth 2 times a day. TAKE 2 TABLETS BY MOUTH TWICE DAILY) 360 Tablet 4    Lancets Lancets for glucometer covered by patient's insurance Sig: use daily and prn ssx high or low sugar. 300 Each 12    therapeutic multivitamin-minerals (THERAGRAN-M) Tab Take 1 Tab by mouth every day.       No current facility-administered medications for this visit.       Allergies:   Morphine    Social History     Tobacco Use    Smoking status: Never    Smokeless tobacco: Never   Vaping Use    Vaping Use: Never used   Substance Use Topics    Alcohol use: Not Currently     Alcohol/week: 0.0 oz     "Drug use: Never       ROS:  See HPI    Objective:     Vitals:    11/02/22 1053   BP: 126/60   BP Location: Right arm   Patient Position: Sitting   BP Cuff Size: Adult   Pulse: 78   Resp: 12   Temp: 36.3 °C (97.4 °F)   SpO2: 97%   Weight: 82.5 kg (181 lb 12.8 oz)   Height: 1.803 m (5' 11\")     Body mass index is 25.36 kg/m².    Physical Exam:  Constitutional: Alert. Well appearing. No distress.  Skin: Warm, dry, good turgor, no visible rashes.  Eye: Equal, round and reactive to light, conjunctiva clear, lids normal.  Neck: Trachea midline, no masses, no thyromegaly.   Respiratory: Normal effort. Lungs are clear to auscultation bilaterally.  Cardiovascular: Regular rate and rhythm. Normal S1/S2. No murmurs, rubs or gallops.   Neuro: Moves all four extremities. No facial droop.  Psych: Answers questions appropriately. Normal affect and mood.    Assessment and Plan:     Recently hospitalized 10/23-10/26 after presenting to the ED with weakness, dysuria, and chills.  Had been treated as an outpatient for UTI with no improvement, eventually found to have Klebsiella Stanford's UTI with resistance as well as 1/2 blood cultures positive for same.  He was treated with oral Levaquin for a 7-day total course.    Also noted to have hypokalemia and hypomagnesia and was started on supplementation.    Symptoms resolved after completing Levaquin course.  No continued concerns today.    Continue mag and potassium supplementation and will recheck labs as below.  We will consider discontinuing his hydrochlorothiazide if BP continues to be well controlled at next visit.    1. Hospital discharge follow-up  2. Klebsiella cystitis  3. Gram-negative bacteremia  - Basic Metabolic Panel; Future  - CBC WITH DIFFERENTIAL; Future  - MAGNESIUM; Future      4. Hypokalemia  - MAGNESIUM; Future    5. Hypomagnesemia  - MAGNESIUM; Future    6. History of venous thromboembolism    - Chart and discharge summary were reviewed.   - Hospitalization and " results reviewed with patient.   - Medications reviewed including instructions regarding high risk medications, dosing and side effects.  - Recommended Services: No services needed at this time  - Advance directive/POLST on file?  No     Follow-up:No follow-ups on file.    Face-to-face transitional care management services with MODERATE (today's visit is within 14 days post discharge & LACE+ score of 28-58) medical decision complexity were provided.     LACE+ Historical Score Over Time (0-28: Low, 29-58: Medium, 59+: High): 81

## 2022-10-27 NOTE — PROGRESS NOTES
OP Anticoagulation Service Note    Date: 10/27/2022    Anticoagulation Summary  As of 10/27/2022      INR goal:  2.0-3.0   TTR:  75.5 % (3.1 y)   INR used for dosing:  3.30 (10/27/2022)   Warfarin maintenance plan:  2.5 mg (2.5 mg x 1) every Mon, Wed, Fri; 5 mg (2.5 mg x 2) all other days   Weekly warfarin total:  27.5 mg   Plan last modified:  Mitch Wilkinson, PharmD (10/27/2022)   Next INR check:  11/1/2022   Target end date:  Indefinite    Indications    History of venous thromboembolism [Z86.718]  Deep vein thrombosis (DVT) of popliteal vein of both lower extremities (HCC) (Resolved) [I82.433]                 Anticoagulation Episode Summary       INR check location:      Preferred lab:      Send INR reminders to:      Comments:            Anticoagulation Care Providers       Provider Role Specialty Phone number    [facility] Responsible  [phone]    Too Brito M.D.  Family Medicine [phone]          Anticoagulation Patient Findings  Patient Findings       Positives:  Emergency department visit, Change in medications (Started K+, Mg, & levofloxacin (has 4 more days left)), Hospital admission (D/t weakness, chills, bacteremia, and UTI)    Negatives:  Signs/symptoms of thrombosis, Signs/symptoms of bleeding, Laboratory test error suspected, Change in health, Change in alcohol use, Change in activity, Upcoming invasive procedure, Upcoming dental procedure, Missed doses, Extra doses, Change in diet/appetite, Bruising, Other complaints            HPI:   Magnus Claudio is in the Anticoagulation Clinic today for an INR check on their anticoagulation therapy.     The reason for today's visit is to prevent morbidity and mortality from a blood clot and/or stroke and to reduce the risk of bleeding while on a anticoagulant.     PCP:  Leobardo Wright M.D.  [address] [id number] [phone]    3 vitals included with today's appt-unless patient  declined:  (BP, HR, weight, ht, RR)   There were no vitals filed for this visit.    Verified current warfarin dosing schedule.    Medications reconciled   Pt is not on antiplatelet therapy    Assessment:   INR  SUPRA-therapeutic.     Plan:  Instructed pt to take a decreased dose of 2.5 mg today and to then resume warfarin 2.5 mg thrice weekly (M/W/F) and 5 mg ROW.    Follow up:  Follow up appointment in 4 days.       Other info:  Pt educated to contact our clinic with any changes in medications or s/s of bleeding or thrombosis.  Education was provided today regarding tips to reduce their bleed risk and dietary constraints while on a anticoagulant.    National Recommendations:  The CHEST guidelines recommends frequent INR monitoring at regular intervals (a few days up to a max of 12 weeks) to ensure patients are on the proper dose of warfarin, and patients are not having any complications from therapy.  INRs can dramatically change over a short time period due to diet, medications, and medical conditions.     Mitch Wilkinson, PharmD, BCACP    [facility]  Phone [phone] fax [phone]

## 2022-10-28 LAB
BACTERIA BLD CULT: NORMAL
SIGNIFICANT IND 70042: NORMAL
SITE SITE: NORMAL
SOURCE SOURCE: NORMAL

## 2022-11-01 ENCOUNTER — ANTICOAGULATION VISIT (OUTPATIENT)
Dept: VASCULAR LAB | Facility: MEDICAL CENTER | Age: 77
End: 2022-11-01
Attending: INTERNAL MEDICINE
Payer: MEDICARE

## 2022-11-01 DIAGNOSIS — Z86.718 HISTORY OF VENOUS THROMBOEMBOLISM: ICD-10-CM

## 2022-11-01 LAB
INR BLD: 3.2 (ref 0.9–1.2)
INR PPP: 3.2 (ref 2–3.5)

## 2022-11-01 PROCEDURE — 99212 OFFICE O/P EST SF 10 MIN: CPT

## 2022-11-01 PROCEDURE — 85610 PROTHROMBIN TIME: CPT

## 2022-11-01 NOTE — PROGRESS NOTES
OP Anticoagulation Service Note    Date: 11/1/2022    Anticoagulation Summary  As of 11/1/2022      INR goal:  2.0-3.0   TTR:  75.1 % (3.2 y)   INR used for dosing:  3.20 (11/1/2022)   Warfarin maintenance plan:  5 mg (2.5 mg x 2) every Mon, Wed, Fri; 2.5 mg (2.5 mg x 1) all other days   Weekly warfarin total:  25 mg   Plan last modified:  Chau Hawkins, PharmD (11/1/2022)   Next INR check:  11/10/2022   Target end date:  Indefinite    Indications    History of venous thromboembolism [Z86.718]  Deep vein thrombosis (DVT) of popliteal vein of both lower extremities (HCC) (Resolved) [I82.433]                 Anticoagulation Episode Summary       INR check location:      Preferred lab:      Send INR reminders to:      Comments:            Anticoagulation Care Providers       Provider Role Specialty Phone number    [facility] Responsible  [phone]    Too Brito M.D.  Family Medicine [phone]          Anticoagulation Patient Findings  Patient Findings       Negatives:  Signs/symptoms of thrombosis, Signs/symptoms of bleeding, Laboratory test error suspected, Change in health, Change in alcohol use, Change in activity, Upcoming invasive procedure, Emergency department visit, Upcoming dental procedure, Missed doses, Extra doses, Change in medications, Change in diet/appetite, Hospital admission, Bruising, Other complaints            [facility]       HPI:     Magnus Claudio is in the Anticoagulation Clinic today for a INR check on their anticoagulation therapy.     The reason for today's visit is to prevent morbidity and mortality from a blood clot and/or stroke and to reduce the risk of bleeding while on a anticoagulant.     PCP:  Leobardo Wright M.D.  [address] [id number] [phone]    3 vitals included with today's appt-unless patient declined:  (BP, HR, weight, ht, RR)   There  were no vitals filed for this visit.    Assessment:     INR  supra-therapeutic.   Is pt on antiplatelet therapy: n  Is pt on NSAID: n      Current Outpatient Medications:     tamsulosin, 0.4 mg, Oral, DAILY (Patient taking differently: 0.4 mg, Oral, EVERY EVENING)    potassium chloride SA, 10 mEq, Oral, DAILY    magnesium oxide, 400 mg, Oral, DAILY    warfarin, 2.5-5 mg, Oral, DAILY    glimepiride, 4 mg, Oral, QAM    B Complex-Folic Acid (B COMPLEX-VITAMIN B12 PO), 1 Tablet, Oral, DAILY    amLODIPine, TAKE 1 TABLET BY MOUTH EVERY DAY (Patient taking differently: 5 mg, Oral, DAILY)    hydroCHLOROthiazide, TAKE 1 TABLET BY MOUTH DAILY (Patient taking differently: 12.5 mg, Oral, DAILY)    losartan, TAKE 1 TABLET BY MOUTH DAILY (Patient taking differently: 100 mg, Oral, DAILY)    metFORMIN ER, TAKE 2 TABLETS BY MOUTH TWICE DAILY (Patient taking differently: 1,000 mg, Oral, 2 TIMES DAILY, TAKE 2 TABLETS BY MOUTH TWICE DAILY)    Lancets, Lancets for glucometer covered by patient's insurance Sig: use daily and prn ssx high or low sugar.    therapeutic multivitamin-minerals, 1 Tablet, Oral, DAILY  Lab Results   Component Value Date/Time    HBA1C 6.4 (A) 08/23/2022 10:24 AM      Lab Results   Component Value Date/Time    CHOLSTRLTOT 145 09/02/2021 11:29 AM    LDL 57 09/02/2021 11:29 AM    HDL 29 (A) 09/02/2021 11:29 AM    TRIGLYCERIDE 297 (H) 09/02/2021 11:29 AM       Lab Results   Component Value Date/Time    SODIUM 139 10/26/2022 03:20 AM    POTASSIUM 3.5 (L) 10/26/2022 03:20 AM    CHLORIDE 110 10/26/2022 03:20 AM    CO2 18 (L) 10/26/2022 03:20 AM    GLUCOSE 153 (H) 10/26/2022 03:20 AM    BUN 15 10/26/2022 03:20 AM    CREATININE 1.19 10/26/2022 03:20 AM     Lab Results   Component Value Date/Time    ALKPHOSPHAT 85 10/24/2022 01:34 AM    ASTSGOT 28 10/24/2022 01:34 AM    ALTSGPT 24 10/24/2022 01:34 AM    TBILIRUBIN 1.1 10/24/2022 01:34 AM    INR 3.20 11/01/2022 12:00 AM    ALBUMIN 2.7 (L) 10/25/2022 03:21 AM      Lab  Results   Component Value Date/Time    MALBCRT 12 09/02/2021 11:28 AM    DAO 1.7 09/02/2021 11:28 AM       Plan:     Decrease weekly warfarin dose as noted above.       Follow-up:     Test in 1 week(s).    This decision was made using shared decision making with the pt and benefits vs risks were discussed.      Additional information discussed with patient:     Pt educated to contact our clinic with any changes in medications or s/s of bleeding or thrombosis.  Education was provided today regarding tips to reduce their bleed risk and dietary constraints while on a anticoagulant.    National recommendations regarding anticoagulation therapy:     The CHEST guidelines recommends frequent INR monitoring at regular intervals (a few days up to a max of 12 weeks) to ensure patients are on the proper dose of warfarin, and patients are not having any complications from therapy.  INRs can dramatically change over a short time period due to diet, medications, and medical conditions.     Chau Hawkins, PharmD, MS, BCACP, C  [facility]  Phone: [phone]  Fax: [phone]  On call: [phone]  General scheduling/information [phone]  For emergencies please dial 911  Please do not use [employer] for urgent matters, call the phone numbers listed above.    This note was created using voice recognition software (Dragon). The accuracy of the dictation is limited by the abilities of the software. I have reviewed the note prior to signing, however some errors in grammar and context are still possible. If you have any questions related to this note please do not hesitate to contact our office.

## 2022-11-02 ENCOUNTER — OFFICE VISIT (OUTPATIENT)
Dept: MEDICAL GROUP | Facility: LAB | Age: 77
End: 2022-11-02
Payer: MEDICARE

## 2022-11-02 VITALS
HEIGHT: 71 IN | RESPIRATION RATE: 12 BRPM | SYSTOLIC BLOOD PRESSURE: 126 MMHG | OXYGEN SATURATION: 97 % | HEART RATE: 78 BPM | TEMPERATURE: 97.4 F | WEIGHT: 181.8 LBS | DIASTOLIC BLOOD PRESSURE: 60 MMHG | BODY MASS INDEX: 25.45 KG/M2

## 2022-11-02 DIAGNOSIS — E83.42 HYPOMAGNESEMIA: ICD-10-CM

## 2022-11-02 DIAGNOSIS — B96.1 KLEBSIELLA CYSTITIS: ICD-10-CM

## 2022-11-02 DIAGNOSIS — Z86.718 HISTORY OF VENOUS THROMBOEMBOLISM: ICD-10-CM

## 2022-11-02 DIAGNOSIS — N30.90 KLEBSIELLA CYSTITIS: ICD-10-CM

## 2022-11-02 DIAGNOSIS — Z09 HOSPITAL DISCHARGE FOLLOW-UP: ICD-10-CM

## 2022-11-02 DIAGNOSIS — E87.6 HYPOKALEMIA: ICD-10-CM

## 2022-11-02 DIAGNOSIS — R78.81 GRAM-NEGATIVE BACTEREMIA: ICD-10-CM

## 2022-11-02 PROBLEM — Z00.00 HEALTHCARE MAINTENANCE: Status: RESOLVED | Noted: 2017-10-10 | Resolved: 2022-11-02

## 2022-11-02 PROBLEM — N17.9 ACUTE KIDNEY INJURY (HCC): Status: RESOLVED | Noted: 2022-10-23 | Resolved: 2022-11-02

## 2022-11-02 PROBLEM — N39.0 UTI (URINARY TRACT INFECTION): Status: RESOLVED | Noted: 2022-10-23 | Resolved: 2022-11-02

## 2022-11-02 PROCEDURE — 99213 OFFICE O/P EST LOW 20 MIN: CPT | Performed by: FAMILY MEDICINE

## 2022-11-02 RX ORDER — COVID-19 ANTIGEN TEST
KIT MISCELLANEOUS
COMMUNITY
End: 2022-11-02

## 2022-11-02 RX ORDER — COVID-19 MOLECULAR TEST ASSAY
KIT MISCELLANEOUS
COMMUNITY
Start: 2022-09-30 | End: 2022-11-02

## 2022-11-02 RX ORDER — MAGNESIUM OXIDE 400 MG/1
TABLET ORAL
COMMUNITY
End: 2022-11-02

## 2022-11-02 RX ORDER — CEFUROXIME AXETIL 250 MG/1
TABLET ORAL
COMMUNITY
End: 2022-11-02

## 2022-11-02 RX ORDER — CEPHALEXIN 500 MG/1
CAPSULE ORAL
COMMUNITY
End: 2022-11-02

## 2022-11-02 RX ORDER — DOXYCYCLINE HYCLATE 100 MG/1
CAPSULE ORAL
COMMUNITY
End: 2022-11-02

## 2022-11-02 ASSESSMENT — FIBROSIS 4 INDEX: FIB4 SCORE: 2.77

## 2022-11-04 ENCOUNTER — NON-PROVIDER VISIT (OUTPATIENT)
Dept: MEDICAL GROUP | Facility: LAB | Age: 77
End: 2022-11-04
Payer: MEDICARE

## 2022-11-04 DIAGNOSIS — Z23 NEED FOR VACCINATION: ICD-10-CM

## 2022-11-04 PROCEDURE — G0008 ADMIN INFLUENZA VIRUS VAC: HCPCS | Performed by: FAMILY MEDICINE

## 2022-11-04 PROCEDURE — 90662 IIV NO PRSV INCREASED AG IM: CPT | Performed by: FAMILY MEDICINE

## 2022-11-04 NOTE — PROGRESS NOTES
"Magnus Claudio is a 77 y.o. male here for a non-provider visit for:   FLU    Reason for immunization: Annual Flu Vaccine  Immunization records indicate need for vaccine: Yes, confirmed with Epic  Minimum interval has been met for this vaccine: Yes  ABN completed: Yes    VIS Dated  8/6/2021 was given to patient: Yes  All IAC Questionnaire questions were answered \"No.\"    Patient tolerated injection and no adverse effects were observed or reported: Yes    Pt scheduled for next dose in series: NO  "

## 2022-11-08 ENCOUNTER — PATIENT MESSAGE (OUTPATIENT)
Dept: HEALTH INFORMATION MANAGEMENT | Facility: OTHER | Age: 77
End: 2022-11-08

## 2022-11-10 ENCOUNTER — ANTICOAGULATION VISIT (OUTPATIENT)
Dept: MEDICAL GROUP | Facility: MEDICAL CENTER | Age: 77
End: 2022-11-10
Payer: MEDICARE

## 2022-11-10 DIAGNOSIS — Z86.718 HISTORY OF VENOUS THROMBOEMBOLISM: ICD-10-CM

## 2022-11-10 LAB — INR PPP: 2.4 (ref 2–3.5)

## 2022-11-10 PROCEDURE — 85610 PROTHROMBIN TIME: CPT | Performed by: INTERNAL MEDICINE

## 2022-11-10 PROCEDURE — 93793 ANTICOAG MGMT PT WARFARIN: CPT | Performed by: INTERNAL MEDICINE

## 2022-11-10 NOTE — PROGRESS NOTES
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  Family Medicine [phone]          Anticoagulation Patient Findings  Patient Findings       Positives:  Change in medications (Completed abx.)    Negatives:  Signs/symptoms of thrombosis, Signs/symptoms of bleeding, Laboratory test error suspected, Change in health, Change in alcohol use, Change in activity, Upcoming invasive procedure, Emergency department visit, Upcoming dental procedure, Missed doses, Extra doses, Change in diet/appetite, Hospital admission, Bruising, Other complaints            HPI:   Magnus Claudio is in the Anticoagulation Clinic today for an INR check on their anticoagulation therapy.     The reason for today's visit is to prevent morbidity and mortality from a blood clot and/or stroke and to reduce the risk of bleeding while on a anticoagulant.     PCP:  Leobardo Wright M.D.  [address] [id number] [phone]    3 vitals included with today's appt-unless patient declined:  (BP, HR, weight, ht, RR)   There were no vitals filed for  this visit.    Verified current warfarin dosing schedule.    Medications reconciled   Pt is not on antiplatelet therapy    Assessment:   INR  therapeutic.     Plan:  Instructed pt to continue on with current regimen.    Follow up:  Follow up appointment in 3 week(s).       Other info:  Pt educated to contact our clinic with any changes in medications or s/s of bleeding or thrombosis.  Education was provided today regarding tips to reduce their bleed risk and dietary constraints while on a anticoagulant.    National Recommendations:  The CHEST guidelines recommends frequent INR monitoring at regular intervals (a few days up to a max of 12 weeks) to ensure patients are on the proper dose of warfarin, and patients are not having any complications from therapy.  INRs can dramatically change over a short time period due to diet, medications, and medical conditions.     Mitch Wilkinson, PharmD, BCACP    [facility]  Phone [phone] fax [phone]

## 2022-11-14 ENCOUNTER — HOSPITAL ENCOUNTER (OUTPATIENT)
Dept: LAB | Facility: MEDICAL CENTER | Age: 77
End: 2022-11-14
Attending: FAMILY MEDICINE
Payer: MEDICARE

## 2022-11-14 ENCOUNTER — PATIENT MESSAGE (OUTPATIENT)
Dept: HEALTH INFORMATION MANAGEMENT | Facility: OTHER | Age: 77
End: 2022-11-14

## 2022-11-14 ENCOUNTER — PATIENT OUTREACH (OUTPATIENT)
Dept: HEALTH INFORMATION MANAGEMENT | Facility: OTHER | Age: 77
End: 2022-11-14
Payer: MEDICARE

## 2022-11-14 DIAGNOSIS — E11.9 CONTROLLED TYPE 2 DIABETES MELLITUS WITHOUT COMPLICATION, WITHOUT LONG-TERM CURRENT USE OF INSULIN (HCC): ICD-10-CM

## 2022-11-14 DIAGNOSIS — E83.42 HYPOMAGNESEMIA: ICD-10-CM

## 2022-11-14 DIAGNOSIS — B96.1 KLEBSIELLA CYSTITIS: ICD-10-CM

## 2022-11-14 DIAGNOSIS — N30.90 KLEBSIELLA CYSTITIS: ICD-10-CM

## 2022-11-14 DIAGNOSIS — I10 ESSENTIAL HYPERTENSION: ICD-10-CM

## 2022-11-14 DIAGNOSIS — E87.6 HYPOKALEMIA: ICD-10-CM

## 2022-11-14 LAB
ANION GAP SERPL CALC-SCNC: 12 MMOL/L (ref 7–16)
BASOPHILS # BLD AUTO: 0.6 % (ref 0–1.8)
BASOPHILS # BLD: 0.05 K/UL (ref 0–0.12)
BUN SERPL-MCNC: 17 MG/DL (ref 8–22)
CALCIUM SERPL-MCNC: 8.9 MG/DL (ref 8.4–10.2)
CHLORIDE SERPL-SCNC: 101 MMOL/L (ref 96–112)
CHOLEST SERPL-MCNC: 147 MG/DL (ref 100–199)
CO2 SERPL-SCNC: 25 MMOL/L (ref 20–33)
CREAT SERPL-MCNC: 1.11 MG/DL (ref 0.5–1.4)
CREAT UR-MCNC: 163.78 MG/DL
EOSINOPHIL # BLD AUTO: 0.16 K/UL (ref 0–0.51)
EOSINOPHIL NFR BLD: 2 % (ref 0–6.9)
ERYTHROCYTE [DISTWIDTH] IN BLOOD BY AUTOMATED COUNT: 49.1 FL (ref 35.9–50)
FASTING STATUS PATIENT QL REPORTED: NORMAL
GFR SERPLBLD CREATININE-BSD FMLA CKD-EPI: 68 ML/MIN/1.73 M 2
GLUCOSE SERPL-MCNC: 141 MG/DL (ref 65–99)
HCT VFR BLD AUTO: 40.3 % (ref 42–52)
HDLC SERPL-MCNC: 32 MG/DL
HGB BLD-MCNC: 13 G/DL (ref 14–18)
IMM GRANULOCYTES # BLD AUTO: 0.02 K/UL (ref 0–0.11)
IMM GRANULOCYTES NFR BLD AUTO: 0.3 % (ref 0–0.9)
LDLC SERPL CALC-MCNC: 67 MG/DL
LYMPHOCYTES # BLD AUTO: 1.32 K/UL (ref 1–4.8)
LYMPHOCYTES NFR BLD: 16.7 % (ref 22–41)
MAGNESIUM SERPL-MCNC: 1.8 MG/DL (ref 1.5–2.5)
MCH RBC QN AUTO: 28.1 PG (ref 27–33)
MCHC RBC AUTO-ENTMCNC: 32.3 G/DL (ref 33.7–35.3)
MCV RBC AUTO: 87.2 FL (ref 81.4–97.8)
MICROALBUMIN UR-MCNC: <1.2 MG/DL
MICROALBUMIN/CREAT UR: NORMAL MG/G (ref 0–30)
MONOCYTES # BLD AUTO: 0.45 K/UL (ref 0–0.85)
MONOCYTES NFR BLD AUTO: 5.7 % (ref 0–13.4)
NEUTROPHILS # BLD AUTO: 5.91 K/UL (ref 1.82–7.42)
NEUTROPHILS NFR BLD: 74.7 % (ref 44–72)
NRBC # BLD AUTO: 0 K/UL
NRBC BLD-RTO: 0 /100 WBC
PLATELET # BLD AUTO: 183 K/UL (ref 164–446)
PMV BLD AUTO: 9.8 FL (ref 9–12.9)
POTASSIUM SERPL-SCNC: 3.8 MMOL/L (ref 3.6–5.5)
RBC # BLD AUTO: 4.62 M/UL (ref 4.7–6.1)
SODIUM SERPL-SCNC: 138 MMOL/L (ref 135–145)
TRIGL SERPL-MCNC: 239 MG/DL (ref 0–149)
WBC # BLD AUTO: 7.9 K/UL (ref 4.8–10.8)

## 2022-11-14 PROCEDURE — 82570 ASSAY OF URINE CREATININE: CPT

## 2022-11-14 PROCEDURE — 85025 COMPLETE CBC W/AUTO DIFF WBC: CPT

## 2022-11-14 PROCEDURE — 36415 COLL VENOUS BLD VENIPUNCTURE: CPT

## 2022-11-14 PROCEDURE — 80061 LIPID PANEL: CPT

## 2022-11-14 PROCEDURE — 83735 ASSAY OF MAGNESIUM: CPT

## 2022-11-14 PROCEDURE — 80048 BASIC METABOLIC PNL TOTAL CA: CPT

## 2022-11-14 PROCEDURE — 82043 UR ALBUMIN QUANTITATIVE: CPT

## 2022-11-14 NOTE — PROGRESS NOTES
CHW Arabella called pt to offer Personal Care Management program. CHW spoke with pt's wife Mely. Mely stated that they were on their way out to an appointment. CHW provided CCM contact information via [employer] message. Mely said she would talk with  and would give us a call if they were interested. CHW will not continue to follow at this time.

## 2022-11-23 ENCOUNTER — OFFICE VISIT (OUTPATIENT)
Dept: MEDICAL GROUP | Facility: LAB | Age: 77
End: 2022-11-23
Payer: MEDICARE

## 2022-11-23 VITALS
BODY MASS INDEX: 25.37 KG/M2 | HEART RATE: 72 BPM | RESPIRATION RATE: 12 BRPM | TEMPERATURE: 96.9 F | WEIGHT: 181.2 LBS | HEIGHT: 71 IN | SYSTOLIC BLOOD PRESSURE: 130 MMHG | DIASTOLIC BLOOD PRESSURE: 60 MMHG | OXYGEN SATURATION: 97 %

## 2022-11-23 DIAGNOSIS — E87.6 HYPOKALEMIA: ICD-10-CM

## 2022-11-23 DIAGNOSIS — E83.42 HYPOMAGNESEMIA: ICD-10-CM

## 2022-11-23 DIAGNOSIS — E11.9 CONTROLLED TYPE 2 DIABETES MELLITUS WITHOUT COMPLICATION, WITHOUT LONG-TERM CURRENT USE OF INSULIN (HCC): ICD-10-CM

## 2022-11-23 LAB
HBA1C MFR BLD: 6.6 % (ref 0–5.6)
INT CON NEG: ABNORMAL
INT CON POS: ABNORMAL

## 2022-11-23 PROCEDURE — 83036 HEMOGLOBIN GLYCOSYLATED A1C: CPT | Performed by: FAMILY MEDICINE

## 2022-11-23 PROCEDURE — 99214 OFFICE O/P EST MOD 30 MIN: CPT | Performed by: FAMILY MEDICINE

## 2022-11-23 ASSESSMENT — FIBROSIS 4 INDEX: FIB4 SCORE: 2.44

## 2022-11-23 NOTE — PROGRESS NOTES
"Subjective:     CC: diabetes follow up    HPI:   Magnus presents today follow-up on diabetes.  A1c today is 6.6%.  He is on metformin 1000 mg twice daily and glimepiride 4 mg daily.    He would like to get off of potassium and magnesium supplement if possible.  He was started on these during recent hospitalization.    Medications, past medical history, allergies, and social history have been reviewed and updated.      Objective:       Exam:  /60 (BP Location: Left arm, Patient Position: Sitting, BP Cuff Size: Adult)   Pulse 72   Temp 36.1 °C (96.9 °F)   Resp 12   Ht 1.803 m (5' 11\")   Wt 82.2 kg (181 lb 3.2 oz)   SpO2 97%   BMI 25.27 kg/m²  Body mass index is 25.27 kg/m².    Constitutional: Alert. Well appearing. No distress.  Skin: Warm, dry, good turgor, no visible rashes.  Eye: Equal, round and reactive to light, conjunctiva clear, lids normal.  Respiratory: Normal effort.  Neuro: Moves all four extremities. No facial droop.  Psych: Answers questions appropriately. Normal affect and mood.      Assessment & Plan:     77 y.o. male with the following -     1. Controlled type 2 diabetes mellitus without complication, without long-term current use of insulin ([facility])  A1c 6.6%.  Continue metformin and glimepiride.  Eye exam is scheduled.  Other screenings are up-to-date.  Follow-up 6 months.  - POCT Hemoglobin A1C    2. Hypokalemia  3. Hypomagnesemia  Started on supplementation during recent hospitalization and electrolytes have normalized.  He would like to get off of supplements if possible.  Hold potassium and magnesium for 1 week and recheck labs.  We will stay off supplements if his potassium and mag stay in the normal range.  - Basic Metabolic Panel; Future  - MAGNESIUM; Future      Please note that this note was created using voice recognition software.      "

## 2022-12-01 ENCOUNTER — ANTICOAGULATION VISIT (OUTPATIENT)
Dept: MEDICAL GROUP | Facility: MEDICAL CENTER | Age: 77
End: 2022-12-01
Payer: MEDICARE

## 2022-12-01 DIAGNOSIS — Z86.718 HISTORY OF VENOUS THROMBOEMBOLISM: ICD-10-CM

## 2022-12-01 LAB — INR PPP: 1.8 (ref 2–3.5)

## 2022-12-01 PROCEDURE — 99211 OFF/OP EST MAY X REQ PHY/QHP: CPT | Performed by: FAMILY MEDICINE

## 2022-12-01 PROCEDURE — 85610 PROTHROMBIN TIME: CPT | Performed by: FAMILY MEDICINE

## 2022-12-01 NOTE — PROGRESS NOTES
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  Family Medicine [phone]          Anticoagulation Patient Findings  Patient Findings       Negatives:  Signs/symptoms of thrombosis, Signs/symptoms of bleeding, Laboratory test error suspected, Change in health, Change in alcohol use, Change in activity, Upcoming invasive procedure, Emergency department visit, Upcoming dental procedure, Missed doses, Extra doses, Change in medications, Change in diet/appetite, Hospital admission, Bruising, Other complaints            HPI:   Magnus Claudio is in the Anticoagulation Clinic today for an INR check on their anticoagulation therapy.     The reason for today's visit is to prevent morbidity and mortality from a blood clot and/or stroke and to reduce the risk of bleeding while on a anticoagulant.     PCP:  Leobardo Wright M.D.  [address] [id number] [phone]    3 vitals included with today's appt-unless patient declined:  (BP, HR, weight, ht, RR)   There were no vitals filed for this visit.    Verified current  warfarin dosing schedule.    Medications reconciled   Pt is not on antiplatelet therapy    Assessment:   INR  SUB-therapeutic.     Plan:  Instructed pt to BOLUS x 1 dose w/ 3.75 mg and to then continue on w/ his current regimen.    Follow up:  Follow up appointment in 2 week(s).       Other info:  Pt educated to contact our clinic with any changes in medications or s/s of bleeding or thrombosis.  Education was provided today regarding tips to reduce their bleed risk and dietary constraints while on a anticoagulant.    National Recommendations:  The CHEST guidelines recommends frequent INR monitoring at regular intervals (a few days up to a max of 12 weeks) to ensure patients are on the proper dose of warfarin, and patients are not having any complications from therapy.  INRs can dramatically change over a short time period due to diet, medications, and medical conditions.     Mitch Wilkinson, PharmD, BCACP    [facility]  Phone [phone] fax [phone]

## 2022-12-02 ENCOUNTER — HOSPITAL ENCOUNTER (OUTPATIENT)
Dept: LAB | Facility: MEDICAL CENTER | Age: 77
End: 2022-12-02
Attending: FAMILY MEDICINE
Payer: MEDICARE

## 2022-12-02 DIAGNOSIS — E83.42 HYPOMAGNESEMIA: ICD-10-CM

## 2022-12-02 DIAGNOSIS — E87.6 HYPOKALEMIA: ICD-10-CM

## 2022-12-02 LAB
ANION GAP SERPL CALC-SCNC: 10 MMOL/L (ref 7–16)
BUN SERPL-MCNC: 15 MG/DL (ref 8–22)
CALCIUM SERPL-MCNC: 9 MG/DL (ref 8.4–10.2)
CHLORIDE SERPL-SCNC: 107 MMOL/L (ref 96–112)
CO2 SERPL-SCNC: 24 MMOL/L (ref 20–33)
CREAT SERPL-MCNC: 1.19 MG/DL (ref 0.5–1.4)
FASTING STATUS PATIENT QL REPORTED: NORMAL
GFR SERPLBLD CREATININE-BSD FMLA CKD-EPI: 62 ML/MIN/1.73 M 2
GLUCOSE SERPL-MCNC: 121 MG/DL (ref 65–99)
MAGNESIUM SERPL-MCNC: 1.8 MG/DL (ref 1.5–2.5)
POTASSIUM SERPL-SCNC: 3.7 MMOL/L (ref 3.6–5.5)
SODIUM SERPL-SCNC: 141 MMOL/L (ref 135–145)

## 2022-12-02 PROCEDURE — 80048 BASIC METABOLIC PNL TOTAL CA: CPT

## 2022-12-02 PROCEDURE — 36415 COLL VENOUS BLD VENIPUNCTURE: CPT

## 2022-12-02 PROCEDURE — 83735 ASSAY OF MAGNESIUM: CPT

## 2022-12-15 ENCOUNTER — ANTICOAGULATION VISIT (OUTPATIENT)
Dept: MEDICAL GROUP | Facility: MEDICAL CENTER | Age: 77
End: 2022-12-15
Payer: MEDICARE

## 2022-12-15 DIAGNOSIS — Z86.718 HISTORY OF VENOUS THROMBOEMBOLISM: ICD-10-CM

## 2022-12-15 LAB — INR PPP: 1.8 (ref 2–3.5)

## 2022-12-15 PROCEDURE — 85610 PROTHROMBIN TIME: CPT | Performed by: STUDENT IN AN ORGANIZED HEALTH CARE EDUCATION/TRAINING PROGRAM

## 2022-12-15 PROCEDURE — 99211 OFF/OP EST MAY X REQ PHY/QHP: CPT | Performed by: STUDENT IN AN ORGANIZED HEALTH CARE EDUCATION/TRAINING PROGRAM

## 2022-12-15 NOTE — PROGRESS NOTES
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  Family Medicine [phone]          Anticoagulation Patient Findings  Patient Findings       Negatives:  Signs/symptoms of thrombosis, Signs/symptoms of bleeding, Laboratory test error suspected, Change in health, Change in alcohol use, Change in activity, Upcoming invasive procedure, Emergency department visit, Upcoming dental procedure, Missed doses, Extra doses, Change in medications, Change in diet/appetite, Hospital admission, Bruising, Other complaints            HPI:   Magnus Claudio is in the Anticoagulation Clinic today for an INR check on their anticoagulation therapy.     The reason for today's visit is to prevent morbidity and mortality from a blood clot and/or stroke and to reduce the risk of bleeding while on a anticoagulant.     PCP:  Leobardo Wright M.D.  [address] [id number] [phone]    3 vitals included with today's appt-unless patient declined:  (BP, HR, weight, ht, RR)   There were no vitals filed for this visit.    Verified  current warfarin dosing schedule.    Medications reconciled   Pt is not on antiplatelet therapy    Assessment:   INR  SUB-therapeutic.     Plan:  Instructed pt to begin newly increased regimen of 2.5 mg M/W/F and 5 mg ROW.    Follow up:  Follow up appointment in 2 week(s).       Other info:  Pt educated to contact our clinic with any changes in medications or s/s of bleeding or thrombosis.  Education was provided today regarding tips to reduce their bleed risk and dietary constraints while on a anticoagulant.    National Recommendations:  The CHEST guidelines recommends frequent INR monitoring at regular intervals (a few days up to a max of 12 weeks) to ensure patients are on the proper dose of warfarin, and patients are not having any complications from therapy.  INRs can dramatically change over a short time period due to diet, medications, and medical conditions.     Mitch Wilkinson, PharmD, BCACP    [facility]  Phone [phone] fax [phone]

## 2022-12-27 ENCOUNTER — HOSPITAL ENCOUNTER (OUTPATIENT)
Dept: LAB | Facility: MEDICAL CENTER | Age: 77
End: 2022-12-27
Attending: FAMILY MEDICINE
Payer: MEDICARE

## 2022-12-27 DIAGNOSIS — E83.42 HYPOMAGNESEMIA: ICD-10-CM

## 2022-12-27 DIAGNOSIS — E87.6 HYPOKALEMIA: ICD-10-CM

## 2022-12-27 LAB
ANION GAP SERPL CALC-SCNC: 10 MMOL/L (ref 7–16)
BUN SERPL-MCNC: 18 MG/DL (ref 8–22)
CALCIUM SERPL-MCNC: 9.1 MG/DL (ref 8.4–10.2)
CHLORIDE SERPL-SCNC: 108 MMOL/L (ref 96–112)
CO2 SERPL-SCNC: 25 MMOL/L (ref 20–33)
CREAT SERPL-MCNC: 1.08 MG/DL (ref 0.5–1.4)
FASTING STATUS PATIENT QL REPORTED: NORMAL
GFR SERPLBLD CREATININE-BSD FMLA CKD-EPI: 70 ML/MIN/1.73 M 2
GLUCOSE SERPL-MCNC: 180 MG/DL (ref 65–99)
MAGNESIUM SERPL-MCNC: 1.7 MG/DL (ref 1.5–2.5)
POTASSIUM SERPL-SCNC: 3.6 MMOL/L (ref 3.6–5.5)
SODIUM SERPL-SCNC: 143 MMOL/L (ref 135–145)

## 2022-12-27 PROCEDURE — 36415 COLL VENOUS BLD VENIPUNCTURE: CPT

## 2022-12-27 PROCEDURE — 83735 ASSAY OF MAGNESIUM: CPT

## 2022-12-27 PROCEDURE — 80048 BASIC METABOLIC PNL TOTAL CA: CPT

## 2022-12-29 ENCOUNTER — ANTICOAGULATION VISIT (OUTPATIENT)
Dept: MEDICAL GROUP | Facility: MEDICAL CENTER | Age: 77
End: 2022-12-29
Payer: MEDICARE

## 2022-12-29 DIAGNOSIS — Z86.718 HISTORY OF VENOUS THROMBOEMBOLISM: ICD-10-CM

## 2022-12-29 LAB — INR PPP: 2.3 (ref 2–3.5)

## 2022-12-29 PROCEDURE — 93793 ANTICOAG MGMT PT WARFARIN: CPT | Performed by: FAMILY MEDICINE

## 2022-12-29 PROCEDURE — 85610 PROTHROMBIN TIME: CPT | Performed by: FAMILY MEDICINE

## 2022-12-29 NOTE — PROGRESS NOTES
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  Family Medicine [phone]          Anticoagulation Patient Findings  Patient Findings       Negatives:  Signs/symptoms of thrombosis, Signs/symptoms of bleeding, Laboratory test error suspected, Change in health, Change in alcohol use, Change in activity, Upcoming invasive procedure, Emergency department visit, Upcoming dental procedure, Missed doses, Extra doses, Change in medications, Change in diet/appetite, Hospital admission, Bruising, Other complaints            HPI:   Magnus Claudio is in the Anticoagulation Clinic today for an INR check on their anticoagulation therapy.     The reason for today's visit is to prevent morbidity and mortality from a blood clot and/or stroke and to reduce the risk of bleeding while on a anticoagulant.     PCP:  Leobardo Wright M.D.  [address] [id number] [phone]    3 vitals included with today's appt-unless patient declined:  (BP, HR, weight, ht, RR)   There were no vitals filed for this visit.    Verified current warfarin dosing  schedule.    Medications reconciled   Pt is not on antiplatelet therapy    Assessment:   INR  therapeutic.     Plan:  Instructed pt to continue on with current regimen.    Follow up:  Follow up appointment in 3 week(s).       Other info:  Pt educated to contact our clinic with any changes in medications or s/s of bleeding or thrombosis.  Education was provided today regarding tips to reduce their bleed risk and dietary constraints while on a anticoagulant.    National Recommendations:  The CHEST guidelines recommends frequent INR monitoring at regular intervals (a few days up to a max of 12 weeks) to ensure patients are on the proper dose of warfarin, and patients are not having any complications from therapy.  INRs can dramatically change over a short time period due to diet, medications, and medical conditions.     Mitch Wilkinson, PharmD, BCACP    [facility]  Phone [phone] fax [phone]

## 2023-01-19 ENCOUNTER — ANTICOAGULATION VISIT (OUTPATIENT)
Dept: MEDICAL GROUP | Facility: MEDICAL CENTER | Age: 78
End: 2023-01-19
Payer: MEDICARE

## 2023-01-19 DIAGNOSIS — Z86.718 HISTORY OF VENOUS THROMBOEMBOLISM: ICD-10-CM

## 2023-01-19 LAB — INR PPP: 2.2 (ref 2–3.5)

## 2023-01-19 PROCEDURE — 85610 PROTHROMBIN TIME: CPT | Performed by: INTERNAL MEDICINE

## 2023-01-19 PROCEDURE — 93793 ANTICOAG MGMT PT WARFARIN: CPT | Performed by: INTERNAL MEDICINE

## 2023-01-19 NOTE — PROGRESS NOTES
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  Family Medicine [phone]          Anticoagulation Patient Findings  Patient Findings       Negatives:  Signs/symptoms of thrombosis, Signs/symptoms of bleeding, Laboratory test error suspected, Change in health, Change in alcohol use, Change in activity, Upcoming invasive procedure, Emergency department visit, Upcoming dental procedure, Missed doses, Extra doses, Change in medications, Change in diet/appetite, Hospital admission, Bruising, Other complaints            HPI:   Magnus Claudio is in the Anticoagulation Clinic today for an INR check on their anticoagulation therapy.     The reason for today's visit is to prevent morbidity and mortality from a blood clot and/or stroke and to reduce the risk of bleeding while on a anticoagulant.     PCP:  Leobardo Wright M.D.  [address] [id number] [phone]    3 vitals included with today's appt-unless patient declined:  (BP, HR, weight, ht, RR)   There were no vitals filed for this visit.    Verified current warfarin dosing  schedule.    Medications reconciled   Pt is not on antiplatelet therapy    Assessment:   INR  therapeutic.     Plan:  Instructed pt to continue on with current regimen.    Follow up:  Follow up appointment in 4 week(s).       Other info:  Pt educated to contact our clinic with any changes in medications or s/s of bleeding or thrombosis.  Education was provided today regarding tips to reduce their bleed risk and dietary constraints while on a anticoagulant.    National Recommendations:  The CHEST guidelines recommends frequent INR monitoring at regular intervals (a few days up to a max of 12 weeks) to ensure patients are on the proper dose of warfarin, and patients are not having any complications from therapy.  INRs can dramatically change over a short time period due to diet, medications, and medical conditions.     Mitch Wilkinson, PharmD, BCACP    [facility]  Phone [phone] fax [phone]

## 2023-01-23 DIAGNOSIS — Z79.01 CHRONIC ANTICOAGULATION: ICD-10-CM

## 2023-01-23 RX ORDER — WARFARIN SODIUM 2.5 MG/1
2.5-5 TABLET ORAL DAILY
Qty: 180 TABLET | Refills: 1 | Status: SHIPPED | OUTPATIENT
Start: 2023-01-23 | End: 2023-06-15 | Stop reason: SDUPTHER

## 2023-02-02 ENCOUNTER — HOSPITAL ENCOUNTER (OUTPATIENT)
Dept: LAB | Facility: MEDICAL CENTER | Age: 78
End: 2023-02-02
Attending: INTERNAL MEDICINE
Payer: MEDICARE

## 2023-02-02 LAB
ALBUMIN SERPL BCP-MCNC: 4.1 G/DL (ref 3.2–4.9)
ALBUMIN/GLOB SERPL: 1.3 G/DL
ALP SERPL-CCNC: 60 U/L (ref 30–99)
ALT SERPL-CCNC: 18 U/L (ref 2–50)
ANION GAP SERPL CALC-SCNC: 9 MMOL/L (ref 7–16)
AST SERPL-CCNC: 25 U/L (ref 12–45)
BASOPHILS # BLD AUTO: 0.5 % (ref 0–1.8)
BASOPHILS # BLD: 0.04 K/UL (ref 0–0.12)
BILIRUB SERPL-MCNC: 1.7 MG/DL (ref 0.1–1.5)
BUN SERPL-MCNC: 20 MG/DL (ref 8–22)
CALCIUM ALBUM COR SERPL-MCNC: 8.6 MG/DL (ref 8.5–10.5)
CALCIUM SERPL-MCNC: 8.7 MG/DL (ref 8.4–10.2)
CEA SERPL-MCNC: 2.8 NG/ML (ref 0–3)
CHLORIDE SERPL-SCNC: 106 MMOL/L (ref 96–112)
CO2 SERPL-SCNC: 23 MMOL/L (ref 20–33)
CREAT SERPL-MCNC: 1.16 MG/DL (ref 0.5–1.4)
EOSINOPHIL # BLD AUTO: 0.09 K/UL (ref 0–0.51)
EOSINOPHIL NFR BLD: 1.2 % (ref 0–6.9)
ERYTHROCYTE [DISTWIDTH] IN BLOOD BY AUTOMATED COUNT: 43.4 FL (ref 35.9–50)
FASTING STATUS PATIENT QL REPORTED: NORMAL
GFR SERPLBLD CREATININE-BSD FMLA CKD-EPI: 64 ML/MIN/1.73 M 2
GLOBULIN SER CALC-MCNC: 3.2 G/DL (ref 1.9–3.5)
GLUCOSE SERPL-MCNC: 159 MG/DL (ref 65–99)
HCT VFR BLD AUTO: 43.4 % (ref 42–52)
HGB BLD-MCNC: 14.6 G/DL (ref 14–18)
IMM GRANULOCYTES # BLD AUTO: 0.02 K/UL (ref 0–0.11)
IMM GRANULOCYTES NFR BLD AUTO: 0.3 % (ref 0–0.9)
LYMPHOCYTES # BLD AUTO: 1.25 K/UL (ref 1–4.8)
LYMPHOCYTES NFR BLD: 16.1 % (ref 22–41)
MCH RBC QN AUTO: 28.6 PG (ref 27–33)
MCHC RBC AUTO-ENTMCNC: 33.6 G/DL (ref 33.7–35.3)
MCV RBC AUTO: 84.9 FL (ref 81.4–97.8)
MONOCYTES # BLD AUTO: 0.44 K/UL (ref 0–0.85)
MONOCYTES NFR BLD AUTO: 5.7 % (ref 0–13.4)
NEUTROPHILS # BLD AUTO: 5.94 K/UL (ref 1.82–7.42)
NEUTROPHILS NFR BLD: 76.2 % (ref 44–72)
NRBC # BLD AUTO: 0 K/UL
NRBC BLD-RTO: 0 /100 WBC
PLATELET # BLD AUTO: 144 K/UL (ref 164–446)
PMV BLD AUTO: 9.7 FL (ref 9–12.9)
POTASSIUM SERPL-SCNC: 3.8 MMOL/L (ref 3.6–5.5)
PROT SERPL-MCNC: 7.3 G/DL (ref 6–8.2)
RBC # BLD AUTO: 5.11 M/UL (ref 4.7–6.1)
SODIUM SERPL-SCNC: 138 MMOL/L (ref 135–145)
WBC # BLD AUTO: 7.8 K/UL (ref 4.8–10.8)

## 2023-02-02 PROCEDURE — 82378 CARCINOEMBRYONIC ANTIGEN: CPT

## 2023-02-02 PROCEDURE — 36415 COLL VENOUS BLD VENIPUNCTURE: CPT

## 2023-02-02 PROCEDURE — 85025 COMPLETE CBC W/AUTO DIFF WBC: CPT

## 2023-02-02 PROCEDURE — 80053 COMPREHEN METABOLIC PANEL: CPT

## 2023-02-06 ENCOUNTER — HOSPITAL ENCOUNTER (OUTPATIENT)
Dept: RADIOLOGY | Facility: MEDICAL CENTER | Age: 78
End: 2023-02-06
Attending: INTERNAL MEDICINE
Payer: MEDICARE

## 2023-02-06 DIAGNOSIS — I82.402 ACUTE EMBOLISM AND THROMBOSIS OF DEEP VEIN OF LOWER EXTREMITY, LEFT (HCC): ICD-10-CM

## 2023-02-06 DIAGNOSIS — I26.99 OTHER PULMONARY EMBOLISM WITHOUT ACUTE COR PULMONALE, UNSPECIFIED CHRONICITY (HCC): ICD-10-CM

## 2023-02-06 DIAGNOSIS — C18.8 MALIGNANT NEOPLASM OF OVERLAPPING SITES OF COLON (HCC): ICD-10-CM

## 2023-02-06 PROCEDURE — 71260 CT THORAX DX C+: CPT

## 2023-02-06 PROCEDURE — 700117 HCHG RX CONTRAST REV CODE 255: Performed by: INTERNAL MEDICINE

## 2023-02-06 RX ADMIN — IOHEXOL 100 ML: 350 INJECTION, SOLUTION INTRAVENOUS at 11:10

## 2023-02-16 ENCOUNTER — ANTICOAGULATION VISIT (OUTPATIENT)
Dept: MEDICAL GROUP | Facility: MEDICAL CENTER | Age: 78
End: 2023-02-16
Payer: MEDICARE

## 2023-02-16 DIAGNOSIS — Z86.718 HISTORY OF VENOUS THROMBOEMBOLISM: ICD-10-CM

## 2023-02-16 LAB — INR PPP: 2.1 (ref 2–3.5)

## 2023-02-16 PROCEDURE — 99211 OFF/OP EST MAY X REQ PHY/QHP: CPT | Performed by: STUDENT IN AN ORGANIZED HEALTH CARE EDUCATION/TRAINING PROGRAM

## 2023-02-16 PROCEDURE — 85610 PROTHROMBIN TIME: CPT | Performed by: STUDENT IN AN ORGANIZED HEALTH CARE EDUCATION/TRAINING PROGRAM

## 2023-02-16 NOTE — PROGRESS NOTES
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  Family Medicine [phone]          Anticoagulation Patient Findings  Patient Findings       Negatives:  Signs/symptoms of thrombosis, Signs/symptoms of bleeding, Laboratory test error suspected, Change in health, Change in alcohol use, Change in activity, Upcoming invasive procedure, Emergency department visit, Upcoming dental procedure, Missed doses, Extra doses, Change in medications, Change in diet/appetite, Hospital admission, Bruising, Other complaints            HPI:   Magnus Claudio is in the Anticoagulation Clinic today for an INR check on their anticoagulation therapy.     The reason for today's visit is to prevent morbidity and mortality from a blood clot and/or stroke and to reduce the risk of bleeding while on a anticoagulant.     PCP:  Leobardo Wright M.D.  [address] [phone] [phone]    3 vitals included with today's appt-unless patient declined:  (BP, HR, weight, ht, RR)   There were no vitals filed for this visit.    Verified current warfarin dosing  schedule.    Medications reconciled   Pt is not on antiplatelet therapy    Assessment:   INR  therapeutic.     Plan:  Instructed pt to continue on with current regimen.    Follow up:  Follow up appointment in 5 week(s).       Other info:  Pt educated to contact our clinic with any changes in medications or s/s of bleeding or thrombosis.  Education was provided today regarding tips to reduce their bleed risk and dietary constraints while on a anticoagulant.    National Recommendations:  The CHEST guidelines recommends frequent INR monitoring at regular intervals (a few days up to a max of 12 weeks) to ensure patients are on the proper dose of warfarin, and patients are not having any complications from therapy.  INRs can dramatically change over a short time period due to diet, medications, and medical conditions.     Mitch Wilkinson, PharmD, BCACP    [facility]  Phone [phone] fax [phone]

## 2023-02-21 ENCOUNTER — OFFICE VISIT (OUTPATIENT)
Dept: URGENT CARE | Facility: CLINIC | Age: 78
End: 2023-02-21
Payer: MEDICARE

## 2023-02-21 VITALS
SYSTOLIC BLOOD PRESSURE: 132 MMHG | RESPIRATION RATE: 20 BRPM | DIASTOLIC BLOOD PRESSURE: 64 MMHG | TEMPERATURE: 97.9 F | HEART RATE: 78 BPM | OXYGEN SATURATION: 97 % | WEIGHT: 181 LBS | HEIGHT: 71 IN | BODY MASS INDEX: 25.34 KG/M2

## 2023-02-21 DIAGNOSIS — H61.23 BILATERAL IMPACTED CERUMEN: ICD-10-CM

## 2023-02-21 PROCEDURE — 99213 OFFICE O/P EST LOW 20 MIN: CPT | Performed by: PHYSICIAN ASSISTANT

## 2023-02-21 ASSESSMENT — FIBROSIS 4 INDEX: FIB4 SCORE: 3.19

## 2023-02-21 NOTE — PROGRESS NOTES
"Subjective:   Magnus Claudio is a 77 y.o. male who presents for Ear Fullness (Lt ear, will have hearing aids next week/)     Patient presents to have his ears cleaned today.  He is scheduled to have new hearing aids placed next week.  He has had some issues with cerumen impaction in the past.  He does wear hearing aids.  Denies pain or itching.        Medications:  amLODIPine Tabs  B COMPLEX-VITAMIN B12 PO  glimepiride Tabs  hydroCHLOROthiazide  Lancets  losartan Tabs  magnesium oxide Tabs  metFORMIN ER Tb24  potassium chloride SA Tbcr  tamsulosin  therapeutic multivitamin-minerals Tabs  warfarin Tabs    Allergies:             Morphine    Surgical History:         Past Surgical History:   Procedure Laterality Date    INGUINAL HERNIA REPAIR ROBOTIC XI Left 2/11/2022    Procedure: REPAIR, HERNIA, INGUINAL, ROBOT-ASSISTED, USING D square nv XI - WITH MESH PLACEMENT;  Surgeon: Nelson Denney M.D.;  Location: SURGERY [facility];  Service: Gen Robotic    OTHER  05/2021    bladder cancer surgery    CATARACT EXTRACTION WITH IOL      COLON RESECTION      EYE SURGERY Bilateral     Cataract surgery    LAMINOTOMY      TX RESEC LIVER,PART LOBECTOMY         Past Social Hx:  Magnus Claudio  reports that he has never smoked. He has never used smokeless tobacco. He reports that he does not currently use alcohol. He reports that he does not use drugs.     Past Family Hx:   Magnus Claudio family history includes Cancer in his mother; Diabetes in his sister; Heart Attack in his father; Hypertension in his father.       Problem list, medications, and allergies reviewed by myself today in Epic.     Objective:     /64 (BP Location: Left arm, Patient Position: Sitting, BP Cuff Size: Adult)   Pulse 78   Temp 36.6 °C (97.9 °F) (Temporal)   Resp 20   Ht 1.803 m (5' 11\")   Wt 82.1 kg (181 lb)   SpO2 97%   BMI 25.24 kg/m²     Physical Exam  Vitals and nursing note reviewed.   Constitutional:       General: He is " not in acute distress.     Appearance: Normal appearance. He is not ill-appearing or toxic-appearing.   HENT:      Head: Normocephalic.      Right Ear: Hearing and external ear normal. No decreased hearing noted. No drainage, swelling or tenderness. There is impacted cerumen. No foreign body. Tympanic membrane is not injected or bulging.      Left Ear: Hearing and external ear normal. No decreased hearing noted. No drainage, swelling or tenderness. There is impacted cerumen. No foreign body. Tympanic membrane is not injected, erythematous or bulging.      Ears:        Comments: Bilateral cerumen impaction is noted, the right EAC is approximately 60% occluded, the left % occluded.  Post lavage EAC clear.  He does have tortuous and small EACs.  No evidence of otitis media.  There is a small brownish rough lesion noted to the left luis enrique.  It is nontender.  There is no sign of active infection.  It appears to sit in the area where his hearing aid sits.  It is not symptomatic.     Nose: Congestion and rhinorrhea present.      Mouth/Throat:      Mouth: Mucous membranes are moist.      Pharynx: Oropharynx is clear. No oropharyngeal exudate or posterior oropharyngeal erythema.      Tonsils: No tonsillar exudate.   Eyes:      General:         Right eye: No discharge.         Left eye: No discharge.      Pupils: Pupils are equal, round, and reactive to light.   Cardiovascular:      Rate and Rhythm: Normal rate and regular rhythm.      Pulses: Normal pulses.      Heart sounds: Normal heart sounds.   Pulmonary:      Effort: Pulmonary effort is normal. No respiratory distress.      Breath sounds: Normal breath sounds. No stridor or decreased air movement. No wheezing, rhonchi or rales.   Musculoskeletal:      Cervical back: Neck supple. No rigidity.   Lymphadenopathy:      Cervical: No cervical adenopathy.   Skin:     General: Skin is warm.   Neurological:      Mental Status: He is alert.       Assessment/Plan:      Diagnosis and Associated Orders:     1. Bilateral impacted cerumen        Comments/MDM:    Patient presents with bilateral cerumen impaction, preparing for new hearing aid placement.  Post lavage EAC is clear.  No evidence of otitis media.  Return precautions discussed.  May use over-the-counter Debrox if necessary in the future    I personally reviewed prior external notes and test results pertinent to today's visit.  Red flags discussed as well as indications to present to the Emergency Department.  Supportive care, natural history, differential diagnoses, and indications for immediate follow-up discussed.  Patient expresses understanding and agrees to plan.  Patient denies any other questions or concerns.    Follow-up with the primary care physician for recheck, reevaluation, and consideration of further management.      Please note that this dictation was created using voice recognition software. I have made a reasonable attempt to correct obvious errors, but I expect that there are errors of grammar and possibly content that I did not discover before finalizing the note.    This note was electronically signed by Fannie Alvarenga PA-C

## 2023-02-27 ENCOUNTER — OFFICE VISIT (OUTPATIENT)
Dept: URGENT CARE | Facility: CLINIC | Age: 78
End: 2023-02-27
Payer: MEDICARE

## 2023-02-27 VITALS
BODY MASS INDEX: 25.34 KG/M2 | RESPIRATION RATE: 16 BRPM | HEIGHT: 71 IN | TEMPERATURE: 97 F | SYSTOLIC BLOOD PRESSURE: 122 MMHG | WEIGHT: 181 LBS | DIASTOLIC BLOOD PRESSURE: 80 MMHG | HEART RATE: 68 BPM | OXYGEN SATURATION: 97 %

## 2023-02-27 DIAGNOSIS — T16.1XXA FOREIGN BODY OF RIGHT EAR, INITIAL ENCOUNTER: ICD-10-CM

## 2023-02-27 DIAGNOSIS — H60.502 ACUTE OTITIS EXTERNA OF LEFT EAR, UNSPECIFIED TYPE: ICD-10-CM

## 2023-02-27 PROCEDURE — 99214 OFFICE O/P EST MOD 30 MIN: CPT | Performed by: NURSE PRACTITIONER

## 2023-02-27 ASSESSMENT — VISUAL ACUITY: OU: 1

## 2023-02-27 ASSESSMENT — FIBROSIS 4 INDEX: FIB4 SCORE: 3.19

## 2023-02-27 ASSESSMENT — ENCOUNTER SYMPTOMS
CONSTITUTIONAL NEGATIVE: 1
FEVER: 0

## 2023-02-27 NOTE — PROGRESS NOTES
"Subjective:     Magnus Claudio is a 77 y.o. male who presents for Foreign Body in Ear (Right ear, \"part of hearing aid is in my ear.\" )       Other  This is a new problem. Pertinent negatives include no fever.     Present who also provides history.    Seen in  2/21/2023 for cerumen impaction. Had lavage.    Went to hearing aid center at [facility] today.  Was advised of foreign body in the right ear canal in front of the eardrum.  They brought a picture that they printed.  It appears to be a circular piece from a past hearing aid.  Wife reports patient started to wear hearing aid 2 months ago in the right ear but it was not working, and it seems to be likely due to the foreign body.  Patient was wearing different hearing aid for several months prior to that one.    Also informed by the hearing center that left ear canal appears to be infected. Otherwise, patient denies pain, fever, or other symptoms.    Review of Systems   Constitutional: Negative.  Negative for fever.   HENT:  Negative for ear pain.         FB right ear   All other systems reviewed and are negative.    Refer to HPI for additional details.    During this visit, appropriate PPE was worn, hand hygiene was performed, and the patient and any visitors were masked.    PMH:  has a past medical history of Anemia, Blood clotting disorder (HCC), Cancer (HCC) (2021), Cataract, Diabetes (HCC), Diverticulitis, Hypertension, and Vertigo, benign positional.    MEDS:   Current Outpatient Medications:     neomycin sulf/polymyx B sulf/HC soln (CORTISPORIN HC SOL) 3.5-46382-7 Solution, Administer 4 Drops into the left ear 4 times a day for 10 days., Disp: 10 mL, Rfl: 0    warfarin (COUMADIN) 2.5 MG Tab, Take 1-2 Tablets by mouth every day. As directed by [facility], Disp: 180 Tablet, Rfl: 1    tamsulosin (FLOMAX) 0.4 MG capsule, TAKE 1 CAPSULE BY MOUTH EVERY DAY (Patient taking differently: Take 0.4 mg by mouth every evening.), Disp: 100 " Capsule, Rfl: 4    glimepiride (AMARYL) 4 MG Tab, TAKE 1 TABLET BY MOUTH EVERY MORNING, Disp: 100 Tablet, Rfl: 4    B Complex-Folic Acid (B COMPLEX-VITAMIN B12 PO), Take 1 Tablet by mouth every day., Disp: , Rfl:     amLODIPine (NORVASC) 5 MG Tab, TAKE 1 TABLET BY MOUTH EVERY DAY (Patient taking differently: Take 5 mg by mouth every day.), Disp: 100 Tablet, Rfl: 4    hydroCHLOROthiazide (HYDRODIURIL) 12.5 MG tablet, TAKE 1 TABLET BY MOUTH DAILY (Patient taking differently: Take 12.5 mg by mouth every day.), Disp: 100 Tablet, Rfl: 4    losartan (COZAAR) 100 MG Tab, TAKE 1 TABLET BY MOUTH DAILY (Patient taking differently: Take 100 mg by mouth every day.), Disp: 100 Tablet, Rfl: 4    metFORMIN ER (GLUCOPHAGE XR) 500 MG TABLET SR 24 HR, TAKE 2 TABLETS BY MOUTH TWICE DAILY (Patient taking differently: Take 1,000 mg by mouth 2 times a day. TAKE 2 TABLETS BY MOUTH TWICE DAILY), Disp: 360 Tablet, Rfl: 4    Lancets, Lancets for glucometer covered by patient's insurance Sig: use daily and prn ssx high or low sugar., Disp: 300 Each, Rfl: 12    therapeutic multivitamin-minerals (THERAGRAN-M) Tab, Take 1 Tab by mouth every day., Disp: , Rfl:     ALLERGIES:   Allergies   Allergen Reactions    Morphine Unspecified     Hallucinations, 15+ years ago     SURGHX:   Past Surgical History:   Procedure Laterality Date    INGUINAL HERNIA REPAIR ROBOTIC XI Left 2/11/2022    Procedure: REPAIR, HERNIA, INGUINAL, ROBOT-ASSISTED, USING DA NIKITA XI - WITH MESH PLACEMENT;  Surgeon: Nelson Denney M.D.;  Location: SURGERY [facility];  Service: Gen Robotic    OTHER  05/2021    bladder cancer surgery    CATARACT EXTRACTION WITH IOL      COLON RESECTION      EYE SURGERY Bilateral     Cataract surgery    LAMINOTOMY      TX RESEC LIVER,PART LOBECTOMY       SOCHX:  reports that he has never smoked. He has never used smokeless tobacco. He reports that he does not currently use alcohol. He reports that he does not use drugs.    FH: Per HPI as  "applicable/pertinent.      Objective:     /80 (BP Location: Right arm, Patient Position: Sitting, BP Cuff Size: Adult)   Pulse 68   Temp 36.1 °C (97 °F) (Temporal)   Resp 16   Ht 1.803 m (5' 11\")   Wt 82.1 kg (181 lb)   SpO2 97%   BMI 25.24 kg/m²     Physical Exam  Nursing note reviewed.   Constitutional:       General: He is not in acute distress.     Appearance: He is well-developed. He is not ill-appearing or toxic-appearing.   HENT:      Right Ear: No drainage, swelling or tenderness. A foreign body is present. Tympanic membrane is not perforated, erythematous or bulging.      Left Ear: Swelling (EAC, with erythema) present. No drainage or tenderness. Tympanic membrane is not perforated, erythematous or bulging.   Eyes:      General: Vision grossly intact.   Cardiovascular:      Rate and Rhythm: Normal rate.   Pulmonary:      Effort: Pulmonary effort is normal. No respiratory distress.   Musculoskeletal:         General: No deformity. Normal range of motion.   Skin:     General: Skin is warm and dry.      Coloration: Skin is not pale.   Neurological:      Mental Status: He is alert and oriented to person, place, and time.      Motor: No weakness.   Psychiatric:         Behavior: Behavior normal. Behavior is cooperative.       Assessment/Plan:     1. Foreign body of right ear, initial encounter    2. Acute otitis externa of left ear, unspecified type  - neomycin sulf/polymyx B sulf/HC soln (CORTISPORIN HC SOL) 3.5-22162-5 Solution; Administer 4 Drops into the left ear 4 times a day for 10 days.  Dispense: 10 mL; Refill: 0    Ear lavage was performed in right external auditory canal. Patient tolerated well with no complications. Small plastic hearing aid piece removed. Reexamination of right ear shows TM normal, EAC normal with no more FB.    Left ear symptoms could be due to irritation from hearing aid use, but will cover for infectious etiology. Rx as above sent electronically.    Patient to follow " up with hearing aid center.    Differential diagnosis, natural history, supportive care, over-the-counter symptom management per 's instructions, close monitoring, and indications for immediate follow-up discussed.     All questions answered. Patient/wife agrees with the plan of care.    Discharge summary provided via [employer].    Billing note: 30 minutes was allotted and spent for patient care and coordination of care (not reported separately) including preparing for the visit, obtaining/reviewing history from/with patient/wife, performing an exam/evaluation, ordering Rx, developing a plan of care, counseling/educating the patient/wife, developing the discharge summary for release to [employer], updating the medical record, reconciling outside information, and documentation. This template was updated to summarize care specific to this encounter. Established patient. [id number]. Please refer to the chart for additional details on the care provided.

## 2023-03-03 RX ORDER — METFORMIN HYDROCHLORIDE 500 MG/1
TABLET, EXTENDED RELEASE ORAL
Qty: 360 TABLET | Refills: 4 | Status: SHIPPED | OUTPATIENT
Start: 2023-03-03

## 2023-03-23 ENCOUNTER — ANTICOAGULATION VISIT (OUTPATIENT)
Dept: MEDICAL GROUP | Facility: MEDICAL CENTER | Age: 78
End: 2023-03-23
Payer: MEDICARE

## 2023-03-23 DIAGNOSIS — Z86.718 HISTORY OF VENOUS THROMBOEMBOLISM: ICD-10-CM

## 2023-03-23 LAB — INR PPP: 1.9 (ref 2–3.5)

## 2023-03-23 PROCEDURE — 85610 PROTHROMBIN TIME: CPT | Performed by: FAMILY MEDICINE

## 2023-03-23 PROCEDURE — 99211 OFF/OP EST MAY X REQ PHY/QHP: CPT | Performed by: FAMILY MEDICINE

## 2023-03-23 NOTE — PROGRESS NOTES
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  Family Medicine [phone]          Anticoagulation Patient Findings  Patient Findings       Positives:  Change in diet/appetite (Eating more greens lately - plans to continue.)    Negatives:  Signs/symptoms of thrombosis, Signs/symptoms of bleeding, Laboratory test error suspected, Change in health, Change in alcohol use, Change in activity, Upcoming invasive procedure, Emergency department visit, Upcoming dental procedure, Missed doses, Extra doses, Change in medications, Hospital admission, Bruising, Other complaints            HPI:   Magnus Claudio is in the Anticoagulation Clinic today for an INR check on their anticoagulation therapy.     The reason for today's visit is to prevent morbidity and mortality from a blood clot and/or stroke and to reduce the risk of bleeding while on a anticoagulant.     PCP:  Leobardo Wright M.D.  [address] [id number] [phone]    3 vitals included with today's appt-unless patient declined:  (BP, HR, weight, ht, RR)   There were no vitals filed for  this visit.    Verified current warfarin dosing schedule.    Medications reconciled   Pt is not on antiplatelet therapy    Assessment:   INR  SUB-therapeutic.     Plan:  Instructed pt to BOLUS x 1 dose w/ 3.75 mg today and to then begin newly increased regimen of 2.5 mg Mon/Fri and 5 mg ROW.    Follow up:  Follow up appointment in 3 week(s).       Other info:  Pt educated to contact our clinic with any changes in medications or s/s of bleeding or thrombosis.  Education was provided today regarding tips to reduce their bleed risk and dietary constraints while on a anticoagulant.    National Recommendations:  The CHEST guidelines recommends frequent INR monitoring at regular intervals (a few days up to a max of 12 weeks) to ensure patients are on the proper dose of warfarin, and patients are not having any complications from therapy.  INRs can dramatically change over a short time period due to diet, medications, and medical conditions.     Mitch Wilkinson, PharmD, BCACP    [facility]  Phone [phone] fax [phone]

## 2023-04-05 NOTE — PROGRESS NOTES
Med rec complete per pt at bedside  Interviewed pt with family at bedside with permission from pt  Allergies reviewed and updated.       Photo Preface (Leave Blank If You Do Not Want): Photographs were obtained today Detail Level: Detailed

## 2023-04-13 ENCOUNTER — ANTICOAGULATION VISIT (OUTPATIENT)
Dept: MEDICAL GROUP | Facility: MEDICAL CENTER | Age: 78
End: 2023-04-13
Payer: MEDICARE

## 2023-04-13 DIAGNOSIS — Z86.718 HISTORY OF VENOUS THROMBOEMBOLISM: ICD-10-CM

## 2023-04-13 LAB — INR PPP: 2.2 (ref 2–3.5)

## 2023-04-13 PROCEDURE — 93793 ANTICOAG MGMT PT WARFARIN: CPT | Performed by: NURSE PRACTITIONER

## 2023-04-13 PROCEDURE — 85610 PROTHROMBIN TIME: CPT | Performed by: NURSE PRACTITIONER

## 2023-04-13 NOTE — PROGRESS NOTES
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  Family Medicine [phone]          Anticoagulation Patient Findings  Patient Findings       Negatives:  Signs/symptoms of thrombosis, Signs/symptoms of bleeding, Laboratory test error suspected, Change in health, Change in alcohol use, Change in activity, Upcoming invasive procedure, Emergency department visit, Upcoming dental procedure, Missed doses, Extra doses, Change in medications, Change in diet/appetite, Hospital admission, Bruising, Other complaints            HPI:   Magnus Claudio is in the Anticoagulation Clinic today for an INR check on their anticoagulation therapy.     The reason for today's visit is to prevent morbidity and mortality from a blood clot and/or stroke and to reduce the risk of bleeding while on a anticoagulant.     PCP:  Leobardo Wright M.D.  [address] [id number] [phone]    3 vitals included with today's appt-unless patient declined:  (BP, HR, weight, ht, RR)   There were no vitals filed for this visit.    Verified current warfarin dosing  schedule.    Medications reconciled   Pt is not on antiplatelet therapy    Assessment:   INR  therapeutic.     Plan:  Instructed pt to continue on with current regimen.    Follow up:  Follow up appointment in 4 week(s).       Other info:  Pt educated to contact our clinic with any changes in medications or s/s of bleeding or thrombosis.  Education was provided today regarding tips to reduce their bleed risk and dietary constraints while on a anticoagulant.    National Recommendations:  The CHEST guidelines recommends frequent INR monitoring at regular intervals (a few days up to a max of 12 weeks) to ensure patients are on the proper dose of warfarin, and patients are not having any complications from therapy.  INRs can dramatically change over a short time period due to diet, medications, and medical conditions.     Mitch Wilkinson, PharmD, BCACP    [facility]  Phone [phone] fax [phone]

## 2023-05-03 ENCOUNTER — TELEPHONE (OUTPATIENT)
Dept: HEALTH INFORMATION MANAGEMENT | Facility: OTHER | Age: 78
End: 2023-05-03
Payer: MEDICARE

## 2023-05-05 RX ORDER — LOSARTAN POTASSIUM 100 MG/1
TABLET ORAL
Qty: 100 TABLET | Refills: 4 | Status: SHIPPED | OUTPATIENT
Start: 2023-05-05 | End: 2023-06-06

## 2023-05-05 NOTE — TELEPHONE ENCOUNTER
Received request via: Pharmacy    Was the patient seen in the last year in this department? Yes  LOV 11/23/2022  Does the patient have an active prescription (recently filled or refills available) for medication(s) requested? No    Does the patient have retirement Plus and need 100 day supply (blood pressure, diabetes and cholesterol meds only)? Patient does not have SCP

## 2023-05-11 ENCOUNTER — ANTICOAGULATION VISIT (OUTPATIENT)
Dept: MEDICAL GROUP | Facility: MEDICAL CENTER | Age: 78
End: 2023-05-11
Payer: MEDICARE

## 2023-05-11 VITALS — SYSTOLIC BLOOD PRESSURE: 137 MMHG | HEART RATE: 78 BPM | DIASTOLIC BLOOD PRESSURE: 65 MMHG

## 2023-05-11 DIAGNOSIS — Z86.718 HISTORY OF VENOUS THROMBOEMBOLISM: ICD-10-CM

## 2023-05-11 LAB — INR PPP: 2.3 (ref 2–3.5)

## 2023-05-11 PROCEDURE — 85610 PROTHROMBIN TIME: CPT | Performed by: INTERNAL MEDICINE

## 2023-05-11 PROCEDURE — 99999 PR NO CHARGE: CPT | Performed by: INTERNAL MEDICINE

## 2023-05-11 PROCEDURE — 93793 ANTICOAG MGMT PT WARFARIN: CPT

## 2023-05-11 NOTE — PROGRESS NOTES
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  Family Medicine [phone]          Refer to Patient Findings for HPI:    Vitals:    23 1123   BP: 137/65   Pulse: 78       Patient seen in clinic today for follow up on anticoagulation therapy with warfarin (a high risk medication) for hx of DVT  Verified current warfarin dosing schedule.  Patient denies any missed doses of warfarin.      The reason for today's visit is to prevent morbidity and mortality from a blood clot and/or stroke and to reduce the risk of bleeding while on a anticoagulant.     PCP: Christina = Leobardo Wright M.D.  [address] [id number] [phone]    Medications reconciled   Pt is not on antiplatelet therapy      A/P   INR is therapeutic today at 2.3    Warfarin dosing recommendation: Continue with the current dosing regimen.   Patient will follow up again in 5 weeks.     Pt educated to contact our clinic with any changes in medications or s/s of bleeding or thrombosis. Pt is aware to seek immediate medical attention for falls, head injury or deep cuts.    National Recommendations:  The CHEST guidelines recommends  frequent INR monitoring at regular intervals (a few days up to a max of 12 weeks) to ensure patients are on the proper dose of warfarin, and patients are not having any complications from therapy.  INRs can dramatically change over a short time period due to diet, medications, and medical conditions.     Follow up appointment in 5 week(s).    Next appt: Thurs, Inga 15 @ 11am     Ian Deng PharmD

## 2023-05-13 ENCOUNTER — HOSPITAL ENCOUNTER (OUTPATIENT)
Dept: LAB | Facility: MEDICAL CENTER | Age: 78
End: 2023-05-13
Attending: PEDIATRICS
Payer: MEDICARE

## 2023-05-13 LAB — TESTOST SERPL-MCNC: 316 NG/DL (ref 175–781)

## 2023-05-13 PROCEDURE — 84403 ASSAY OF TOTAL TESTOSTERONE: CPT

## 2023-05-13 PROCEDURE — 36415 COLL VENOUS BLD VENIPUNCTURE: CPT

## 2023-06-06 ENCOUNTER — OFFICE VISIT (OUTPATIENT)
Dept: URGENT CARE | Facility: CLINIC | Age: 78
End: 2023-06-06
Payer: MEDICARE

## 2023-06-06 VITALS
SYSTOLIC BLOOD PRESSURE: 136 MMHG | WEIGHT: 180 LBS | OXYGEN SATURATION: 95 % | HEART RATE: 85 BPM | TEMPERATURE: 97.5 F | DIASTOLIC BLOOD PRESSURE: 60 MMHG | RESPIRATION RATE: 16 BRPM | HEIGHT: 71 IN | BODY MASS INDEX: 25.2 KG/M2

## 2023-06-06 DIAGNOSIS — Z97.4 USES HEARING AID: ICD-10-CM

## 2023-06-06 DIAGNOSIS — H61.23 EXCESSIVE CERUMEN IN EAR CANAL, BILATERAL: ICD-10-CM

## 2023-06-06 PROCEDURE — 3078F DIAST BP <80 MM HG: CPT | Performed by: PHYSICIAN ASSISTANT

## 2023-06-06 PROCEDURE — 3075F SYST BP GE 130 - 139MM HG: CPT | Performed by: PHYSICIAN ASSISTANT

## 2023-06-06 PROCEDURE — 99212 OFFICE O/P EST SF 10 MIN: CPT | Performed by: PHYSICIAN ASSISTANT

## 2023-06-06 ASSESSMENT — ENCOUNTER SYMPTOMS
NAUSEA: 0
FEVER: 0
CHILLS: 0
VOMITING: 0

## 2023-06-06 ASSESSMENT — FIBROSIS 4 INDEX: FIB4 SCORE: 3.19

## 2023-06-06 NOTE — PROGRESS NOTES
Subjective:   Magnus Claudio is a 77 y.o. male who presents for Ear Fullness (Had ear aids cleaned, they stated he needs his ears cleaned )        Patient presents with concerns of bilateral ear fullness.  States that he had some adjustments made on his hearing aids yesterday and was told that he had wax in both ears.  He is otherwise feeling well and is not experiencing any nausea, vomiting, fevers, chills, ear pain.      Review of Systems   Constitutional:  Negative for chills and fever.   HENT:  Positive for hearing loss (baseline). Negative for ear pain.    Gastrointestinal:  Negative for nausea and vomiting.       PMH:  has a past medical history of Anemia, Blood clotting disorder (HCC), Cancer (HCC) (2021), Cataract, Diabetes (HCC), Diverticulitis, Hypertension, and Vertigo, benign positional.  MEDS:   Current Outpatient Medications:     amLODIPine (NORVASC) 5 MG Tab, Take 1 Tablet by mouth every day., Disp: 100 Tablet, Rfl: 3    losartan-hydrochlorothiazide (HYZAAR) 100-12.5 MG per tablet, Take 1 Tablet by mouth every day., Disp: 100 Tablet, Rfl: 3    metFORMIN ER (GLUCOPHAGE XR) 500 MG TABLET SR 24 HR, TAKE 2 TABLETS BY MOUTH TWICE DAILY, Disp: 360 Tablet, Rfl: 4    warfarin (COUMADIN) 2.5 MG Tab, Take 1-2 Tablets by mouth every day. As directed by [facility], Disp: 180 Tablet, Rfl: 1    tamsulosin (FLOMAX) 0.4 MG capsule, TAKE 1 CAPSULE BY MOUTH EVERY DAY (Patient taking differently: Take 0.4 mg by mouth every evening.), Disp: 100 Capsule, Rfl: 4    glimepiride (AMARYL) 4 MG Tab, TAKE 1 TABLET BY MOUTH EVERY MORNING, Disp: 100 Tablet, Rfl: 4    B Complex-Folic Acid (B COMPLEX-VITAMIN B12 PO), Take 1 Tablet by mouth every day., Disp: , Rfl:     Lancets, Lancets for glucometer covered by patient's insurance Sig: use daily and prn ssx high or low sugar., Disp: 300 Each, Rfl: 12    therapeutic multivitamin-minerals (THERAGRAN-M) Tab, Take 1 Tab by mouth every day., Disp: , Rfl:  "  ALLERGIES:   Allergies   Allergen Reactions    Morphine Unspecified     Hallucinations, 15+ years ago     SURGHX:   Past Surgical History:   Procedure Laterality Date    INGUINAL HERNIA REPAIR ROBOTIC XI Left 2/11/2022    Procedure: REPAIR, HERNIA, INGUINAL, ROBOT-ASSISTED, USING DA Legal Egg XI - WITH MESH PLACEMENT;  Surgeon: Nelson Denney M.D.;  Location: SURGERY [facility];  Service: Gen Robotic    OTHER  05/2021    bladder cancer surgery    CATARACT EXTRACTION WITH IOL      COLON RESECTION      EYE SURGERY Bilateral     Cataract surgery    LAMINOTOMY      DE RESEC LIVER,PART LOBECTOMY       SOCHX:  reports that he has never smoked. He has never used smokeless tobacco. He reports that he does not currently use alcohol. He reports that he does not use drugs.  FH: Family history was reviewed, no pertinent findings to report   Objective:   /60   Pulse 85   Temp 36.4 °C (97.5 °F) (Temporal)   Resp 16   Ht 1.803 m (5' 11\")   Wt 81.6 kg (180 lb)   SpO2 95%   BMI 25.10 kg/m²   Physical Exam  Vitals reviewed.   Constitutional:       General: He is not in acute distress.     Appearance: Normal appearance. He is well-developed. He is not toxic-appearing.   HENT:      Head: Normocephalic and atraumatic.      Comments: Moderate amount of cerumen in both the ears without complete obstruction.  TMs are intact bilaterally.  No effusion.  Cerumen removed using lavage.  TMs are intact and atraumatic.     Right Ear: External ear normal.      Left Ear: External ear normal.      Nose: Nose normal.   Cardiovascular:      Rate and Rhythm: Normal rate and regular rhythm.   Pulmonary:      Effort: Pulmonary effort is normal. No respiratory distress.      Breath sounds: No stridor.   Skin:     General: Skin is dry.   Neurological:      Comments: Alert and oriented.    Psychiatric:         Speech: Speech normal.         Behavior: Behavior normal.           Assessment/Plan:   1. Excessive cerumen in ear canal, " bilateral    2. Uses hearing aid    Return precautions reviewed.

## 2023-06-08 ENCOUNTER — OFFICE VISIT (OUTPATIENT)
Dept: MEDICAL GROUP | Facility: LAB | Age: 78
End: 2023-06-08
Payer: MEDICARE

## 2023-06-08 VITALS
OXYGEN SATURATION: 96 % | RESPIRATION RATE: 12 BRPM | SYSTOLIC BLOOD PRESSURE: 142 MMHG | HEIGHT: 71 IN | WEIGHT: 181.2 LBS | DIASTOLIC BLOOD PRESSURE: 76 MMHG | HEART RATE: 78 BPM | TEMPERATURE: 97.4 F | BODY MASS INDEX: 25.37 KG/M2

## 2023-06-08 DIAGNOSIS — I10 ESSENTIAL HYPERTENSION: ICD-10-CM

## 2023-06-08 DIAGNOSIS — E11.9 CONTROLLED TYPE 2 DIABETES MELLITUS WITHOUT COMPLICATION, WITHOUT LONG-TERM CURRENT USE OF INSULIN (HCC): ICD-10-CM

## 2023-06-08 DIAGNOSIS — N52.8 OTHER MALE ERECTILE DYSFUNCTION: ICD-10-CM

## 2023-06-08 LAB
HBA1C MFR BLD: 6.5 % (ref ?–5.8)
POCT INT CON NEG: NEGATIVE
POCT INT CON POS: POSITIVE

## 2023-06-08 PROCEDURE — 83036 HEMOGLOBIN GLYCOSYLATED A1C: CPT | Performed by: FAMILY MEDICINE

## 2023-06-08 PROCEDURE — 3077F SYST BP >= 140 MM HG: CPT | Performed by: FAMILY MEDICINE

## 2023-06-08 PROCEDURE — 3078F DIAST BP <80 MM HG: CPT | Performed by: FAMILY MEDICINE

## 2023-06-08 PROCEDURE — 99214 OFFICE O/P EST MOD 30 MIN: CPT | Performed by: FAMILY MEDICINE

## 2023-06-08 RX ORDER — COVID-19 MOLECULAR TEST ASSAY
KIT MISCELLANEOUS
COMMUNITY
Start: 2023-05-09 | End: 2023-06-08

## 2023-06-08 RX ORDER — NEOMYCIN SULFATE, POLYMYXIN B SULFATE AND HYDROCORTISONE 10; 3.5; 1 MG/ML; MG/ML; [USP'U]/ML
SUSPENSION/ DROPS AURICULAR (OTIC)
COMMUNITY
Start: 2023-03-12 | End: 2023-06-08

## 2023-06-08 RX ORDER — SILDENAFIL CITRATE 20 MG/1
TABLET ORAL
COMMUNITY
End: 2023-06-08

## 2023-06-08 RX ORDER — AMLODIPINE BESYLATE 10 MG/1
10 TABLET ORAL DAILY
Qty: 90 TABLET | Refills: 3 | Status: SHIPPED | OUTPATIENT
Start: 2023-06-08 | End: 2023-08-23

## 2023-06-08 ASSESSMENT — PATIENT HEALTH QUESTIONNAIRE - PHQ9: CLINICAL INTERPRETATION OF PHQ2 SCORE: 0

## 2023-06-08 ASSESSMENT — FIBROSIS 4 INDEX: FIB4 SCORE: 3.19

## 2023-06-08 NOTE — PROGRESS NOTES
"Subjective:     CC: Diabetes follow-up    HPI:   Magnus presents today to follow-up on diabetes.  A1c today is 6.5%.  He is on metformin 1000 mg twice daily and glimepiride 4 mg daily.  BP slightly elevated today and has been above 130/80 on multiple recent visits.    Has had issues with erectile dysfunction since treatment for bladder cancer, urology prescribed sildenafil but doses up to 100 mg have not been effective.  He is wondering if he could try OTC topical treatment for this.      Medications, past medical history, allergies, and social history have been reviewed and updated.      Objective:       Exam:  BP (!) 142/76 (BP Location: Right arm, Patient Position: Sitting, BP Cuff Size: Adult)   Pulse 78   Temp 36.3 °C (97.4 °F)   Resp 12   Ht 1.803 m (5' 11\")   Wt 82.2 kg (181 lb 3.2 oz)   SpO2 96%   BMI 25.27 kg/m²  Body mass index is 25.27 kg/m².    Constitutional: Alert. Well appearing. No distress.  Skin: Warm, dry, good turgor, no visible rashes.  ENMT: Moist mucous membranes. Normal dentition.  Respiratory: Normal effort. Lungs are clear to auscultation bilaterally.  Cardiovascular: Regular rate and rhythm. Normal S1/S2. No murmurs, rubs or gallops.   Neuro: Moves all four extremities. No facial droop.  Psych: Answers questions appropriately. Normal affect and mood.    Assessment & Plan:     77 y.o. male with the following -     1. Controlled type 2 diabetes mellitus without complication, without long-term current use of insulin ([facility])  A1c 6.5%.  Screenings up-to-date.  Continue metformin and glimepiride.  - POCT Hemoglobin A1C    2. Essential hypertension  Blood pressure above goal, increase amlodipine 10 mg daily.  Recheck in 1 month.  - amLODIPine (NORVASC) 10 MG Tab; Take 1 Tablet by mouth every day.  Dispense: 90 Tablet; Refill: 3    3. Other male erectile dysfunction  Has had issues with erectile dysfunction since treatment for bladder cancer, urology prescribed sildenafil but doses up to " 100 mg have not been effective.  He is wondering if he could try OTC topical treatment for this, discussed that I think this would be safe to try but may not be effective.  Given his history I did recommend he consider other treatment options with urology.  Cialis or Levitra would be options but unclear if they are going to be effective.      Please note that this note was created using voice recognition software.

## 2023-06-15 ENCOUNTER — ANTICOAGULATION VISIT (OUTPATIENT)
Dept: MEDICAL GROUP | Facility: MEDICAL CENTER | Age: 78
End: 2023-06-15
Payer: MEDICARE

## 2023-06-15 DIAGNOSIS — Z79.01 CHRONIC ANTICOAGULATION: ICD-10-CM

## 2023-06-15 DIAGNOSIS — Z86.718 HISTORY OF VENOUS THROMBOEMBOLISM: ICD-10-CM

## 2023-06-15 LAB — INR PPP: 2.4 (ref 2–3.5)

## 2023-06-15 PROCEDURE — 85610 PROTHROMBIN TIME: CPT | Performed by: NURSE PRACTITIONER

## 2023-06-15 PROCEDURE — 93793 ANTICOAG MGMT PT WARFARIN: CPT | Performed by: NURSE PRACTITIONER

## 2023-06-15 RX ORDER — WARFARIN SODIUM 2.5 MG/1
2.5-5 TABLET ORAL DAILY
Qty: 180 TABLET | Refills: 1 | Status: ON HOLD | OUTPATIENT
Start: 2023-06-15 | End: 2023-09-28

## 2023-06-15 NOTE — PROGRESS NOTES
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  Family Medicine [phone]          Anticoagulation Patient Findings  Patient Findings       Negatives:  Signs/symptoms of thrombosis, Signs/symptoms of bleeding, Laboratory test error suspected, Change in health, Change in alcohol use, Change in activity, Upcoming invasive procedure, Emergency department visit, Upcoming dental procedure, Missed doses, Extra doses, Change in medications, Change in diet/appetite, Hospital admission, Bruising, Other complaints            HPI:   Magnus Claudio is in the Anticoagulation Clinic today for an INR check on their anticoagulation therapy.     The reason for today's visit is to prevent morbidity and mortality from a blood clot and/or stroke and to reduce the risk of bleeding while on a anticoagulant.     PCP:  Leobardo Wright M.D.  [address] [id number] [phone]    3 vitals included with today's appt-unless patient declined:  (BP, HR, weight, ht, RR)   There were no vitals filed for this visit.    Verified current warfarin dosing  schedule.    Medications reconciled   Pt is not on antiplatelet therapy    Assessment:   INR  therapeutic.     Plan:  Instructed pt to continue on with current regimen.    Follow up:  Follow up appointment in 6 week(s).       Other info:  Pt educated to contact our clinic with any changes in medications or s/s of bleeding or thrombosis.  Education was provided today regarding tips to reduce their bleed risk and dietary constraints while on a anticoagulant.    National Recommendations:  The CHEST guidelines recommends frequent INR monitoring at regular intervals (a few days up to a max of 12 weeks) to ensure patients are on the proper dose of warfarin, and patients are not having any complications from therapy.  INRs can dramatically change over a short time period due to diet, medications, and medical conditions.     Mitch Wilkinson, PharmD, BCACP    [facility]  Phone [phone] fax [phone]

## 2023-07-11 ENCOUNTER — OFFICE VISIT (OUTPATIENT)
Dept: MEDICAL GROUP | Facility: LAB | Age: 78
End: 2023-07-11
Payer: MEDICARE

## 2023-07-11 VITALS
WEIGHT: 184.4 LBS | SYSTOLIC BLOOD PRESSURE: 126 MMHG | BODY MASS INDEX: 25.81 KG/M2 | HEART RATE: 74 BPM | OXYGEN SATURATION: 97 % | DIASTOLIC BLOOD PRESSURE: 62 MMHG | TEMPERATURE: 97 F | RESPIRATION RATE: 12 BRPM | HEIGHT: 71 IN

## 2023-07-11 DIAGNOSIS — Z23 NEED FOR VACCINATION: ICD-10-CM

## 2023-07-11 DIAGNOSIS — I10 ESSENTIAL HYPERTENSION: ICD-10-CM

## 2023-07-11 DIAGNOSIS — E11.9 CONTROLLED TYPE 2 DIABETES MELLITUS WITHOUT COMPLICATION, WITHOUT LONG-TERM CURRENT USE OF INSULIN (HCC): ICD-10-CM

## 2023-07-11 PROBLEM — K57.92 DIVERTICULITIS: Status: RESOLVED | Noted: 2020-09-03 | Resolved: 2023-07-11

## 2023-07-11 PROBLEM — R78.81 GRAM-NEGATIVE BACTEREMIA: Status: RESOLVED | Noted: 2022-10-24 | Resolved: 2023-07-11

## 2023-07-11 PROBLEM — R53.83 FATIGUE: Status: RESOLVED | Noted: 2021-04-13 | Resolved: 2023-07-11

## 2023-07-11 PROCEDURE — 99214 OFFICE O/P EST MOD 30 MIN: CPT | Performed by: FAMILY MEDICINE

## 2023-07-11 PROCEDURE — 3078F DIAST BP <80 MM HG: CPT | Performed by: FAMILY MEDICINE

## 2023-07-11 PROCEDURE — 3074F SYST BP LT 130 MM HG: CPT | Performed by: FAMILY MEDICINE

## 2023-07-11 RX ORDER — HYDROCHLOROTHIAZIDE 12.5 MG/1
1 TABLET ORAL DAILY
COMMUNITY
End: 2023-07-11

## 2023-07-11 RX ORDER — GLIMEPIRIDE 4 MG/1
1 TABLET ORAL EVERY MORNING
COMMUNITY
End: 2023-07-11

## 2023-07-11 RX ORDER — METFORMIN HYDROCHLORIDE 500 MG/1
2 TABLET, EXTENDED RELEASE ORAL 2 TIMES DAILY
COMMUNITY
End: 2023-07-11

## 2023-07-11 RX ORDER — TAMSULOSIN HYDROCHLORIDE 0.4 MG/1
1 CAPSULE ORAL
COMMUNITY
End: 2023-07-11

## 2023-07-11 RX ORDER — LOSARTAN POTASSIUM 100 MG/1
1 TABLET ORAL DAILY
COMMUNITY
End: 2023-07-11

## 2023-07-11 RX ORDER — AMLODIPINE BESYLATE 5 MG/1
1 TABLET ORAL
COMMUNITY
End: 2023-07-11

## 2023-07-11 ASSESSMENT — FIBROSIS 4 INDEX: FIB4 SCORE: 3.23

## 2023-07-11 NOTE — PROGRESS NOTES
"Subjective:     CC: BP follow up    HPI:   Magnus presents today to follow-up on blood pressure.  Amlodipine increased to 10 mg daily 1 month ago due to BP above goal.  He also continues on losartan-HCTZ.  Reports home average 110s/60s.  No significant increase in lower extremity edema.    Medications, past medical history, allergies, and social history have been reviewed and updated.      Objective:       Exam:  /62 (BP Location: Right arm, Patient Position: Sitting, BP Cuff Size: Adult)   Pulse 74   Temp 36.1 °C (97 °F)   Resp 12   Ht 1.803 m (5' 11\")   Wt 83.6 kg (184 lb 6.4 oz)   SpO2 97%   BMI 25.72 kg/m²  Body mass index is 25.72 kg/m².    Constitutional: Alert. Well appearing. No distress.  Skin: Warm, dry, good turgor, no visible rashes.  ENMT: Moist mucous membranes. Normal dentition.  Respiratory: Normal effort. Lungs are clear to auscultation bilaterally.  Cardiovascular: Regular rate and rhythm. Normal S1/S2. No murmurs, rubs or gallops.   Neuro: Moves all four extremities. No facial droop.  Ext: Multiple varicosities.  Trace bilateral lower extremity edema.  Psych: Answers questions appropriately. Normal affect and mood.    Assessment & Plan:     78 y.o. male with the following -     1. Essential hypertension  BP at goal.  Continue amlodipine 10 mg, losartan-HCTZ 100-12.5 mg daily.    2. Controlled type 2 diabetes mellitus without complication, without long-term current use of insulin (HCC)  Last A1c 6.5% 1 month ago.  He continues on metformin 1000 mg twice daily.  Recheck A1c in 5 months.    3. Need for vaccination  Recommended Tdap and Shingrix at pharmacy.           Please note that this note was created using voice recognition software.      "

## 2023-07-27 ENCOUNTER — ANTICOAGULATION VISIT (OUTPATIENT)
Dept: MEDICAL GROUP | Facility: MEDICAL CENTER | Age: 78
End: 2023-07-27
Payer: MEDICARE

## 2023-07-27 DIAGNOSIS — Z86.718 HISTORY OF VENOUS THROMBOEMBOLISM: ICD-10-CM

## 2023-07-27 LAB — INR PPP: 2.3 (ref 2–3.5)

## 2023-07-27 PROCEDURE — 85610 PROTHROMBIN TIME: CPT | Performed by: NURSE PRACTITIONER

## 2023-07-27 PROCEDURE — 93793 ANTICOAG MGMT PT WARFARIN: CPT | Performed by: NURSE PRACTITIONER

## 2023-07-27 NOTE — PROGRESS NOTES
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  Family Medicine [phone]          Anticoagulation Patient Findings  Patient Findings       Positives:  Change in medications (Started gladiator supplement a week ago for ED)    Negatives:  Signs/symptoms of thrombosis, Signs/symptoms of bleeding, Laboratory test error suspected, Change in health, Change in alcohol use, Change in activity, Upcoming invasive procedure, Emergency department visit, Upcoming dental procedure, Missed doses, Extra doses, Change in diet/appetite, Hospital admission, Bruising, Other complaints            HPI:   Magnus Claudio is in the Anticoagulation Clinic today for an INR check on their anticoagulation therapy.     The reason for today's visit is to prevent morbidity and mortality from a blood clot and/or stroke and to reduce the risk of bleeding while on a anticoagulant.     PCP:  Leobardo Wright M.D.  [address] [id number] [phone]    3 vitals included with today's appt-unless patient declined:  (BP, HR, weight, ht, RR)   There were no vitals filed for  this visit.    Verified current warfarin dosing schedule.    Medications reconciled   Pt is not on antiplatelet therapy    Assessment:   INR  therapeutic.     Plan:  Instructed pt to continue on with current regimen.    Follow up:  Follow up appointment in 6 week(s).       Other info:  Pt educated to contact our clinic with any changes in medications or s/s of bleeding or thrombosis.  Education was provided today regarding tips to reduce their bleed risk and dietary constraints while on a anticoagulant.    National Recommendations:  The CHEST guidelines recommends frequent INR monitoring at regular intervals (a few days up to a max of 12 weeks) to ensure patients are on the proper dose of warfarin, and patients are not having any complications from therapy.  INRs can dramatically change over a short time period due to diet, medications, and medical conditions.     Mitch Wilkinson, PharmD, BCACP    [facility]  Phone [phone] fax [phone]

## 2023-08-10 ENCOUNTER — TELEPHONE (OUTPATIENT)
Dept: CARDIOLOGY | Facility: MEDICAL CENTER | Age: 78
End: 2023-08-10

## 2023-08-10 ENCOUNTER — APPOINTMENT (OUTPATIENT)
Dept: RADIOLOGY | Facility: MEDICAL CENTER | Age: 78
End: 2023-08-10
Attending: EMERGENCY MEDICINE
Payer: MEDICARE

## 2023-08-10 ENCOUNTER — APPOINTMENT (OUTPATIENT)
Dept: CARDIOLOGY | Facility: MEDICAL CENTER | Age: 78
End: 2023-08-10
Attending: EMERGENCY MEDICINE
Payer: MEDICARE

## 2023-08-10 ENCOUNTER — HOSPITAL ENCOUNTER (EMERGENCY)
Facility: MEDICAL CENTER | Age: 78
End: 2023-08-10
Attending: EMERGENCY MEDICINE
Payer: MEDICARE

## 2023-08-10 VITALS
SYSTOLIC BLOOD PRESSURE: 125 MMHG | OXYGEN SATURATION: 93 % | TEMPERATURE: 98.7 F | BODY MASS INDEX: 25.34 KG/M2 | WEIGHT: 181 LBS | HEIGHT: 71 IN | RESPIRATION RATE: 16 BRPM | DIASTOLIC BLOOD PRESSURE: 71 MMHG | HEART RATE: 75 BPM

## 2023-08-10 VITALS
WEIGHT: 181 LBS | HEIGHT: 71 IN | BODY MASS INDEX: 25.34 KG/M2 | OXYGEN SATURATION: 96 % | RESPIRATION RATE: 20 BRPM | TEMPERATURE: 97.2 F | HEART RATE: 70 BPM | DIASTOLIC BLOOD PRESSURE: 76 MMHG | SYSTOLIC BLOOD PRESSURE: 135 MMHG

## 2023-08-10 DIAGNOSIS — R79.89 ELEVATED TROPONIN: ICD-10-CM

## 2023-08-10 DIAGNOSIS — I48.3 TYPICAL ATRIAL FLUTTER (HCC): ICD-10-CM

## 2023-08-10 DIAGNOSIS — I48.92 NEW ONSET ATRIAL FLUTTER (HCC): ICD-10-CM

## 2023-08-10 DIAGNOSIS — R07.9 CHEST PAIN, UNSPECIFIED TYPE: Primary | ICD-10-CM

## 2023-08-10 DIAGNOSIS — I10 ESSENTIAL HYPERTENSION: ICD-10-CM

## 2023-08-10 LAB
ALBUMIN SERPL BCP-MCNC: 4 G/DL (ref 3.2–4.9)
ALBUMIN SERPL BCP-MCNC: 4.2 G/DL (ref 3.2–4.9)
ALBUMIN/GLOB SERPL: 1.4 G/DL
ALBUMIN/GLOB SERPL: 1.5 G/DL
ALP SERPL-CCNC: 56 U/L (ref 30–99)
ALP SERPL-CCNC: 65 U/L (ref 30–99)
ALT SERPL-CCNC: 25 U/L (ref 2–50)
ALT SERPL-CCNC: 30 U/L (ref 2–50)
ANION GAP SERPL CALC-SCNC: 13 MMOL/L (ref 7–16)
ANION GAP SERPL CALC-SCNC: 13 MMOL/L (ref 7–16)
AST SERPL-CCNC: 30 U/L (ref 12–45)
AST SERPL-CCNC: 44 U/L (ref 12–45)
BASOPHILS # BLD AUTO: 0.1 % (ref 0–1.8)
BASOPHILS # BLD AUTO: 0.2 % (ref 0–1.8)
BASOPHILS # BLD: 0.01 K/UL (ref 0–0.12)
BASOPHILS # BLD: 0.02 K/UL (ref 0–0.12)
BILIRUB SERPL-MCNC: 1.9 MG/DL (ref 0.1–1.5)
BILIRUB SERPL-MCNC: 2.1 MG/DL (ref 0.1–1.5)
BUN SERPL-MCNC: 22 MG/DL (ref 8–22)
BUN SERPL-MCNC: 22 MG/DL (ref 8–22)
CALCIUM ALBUM COR SERPL-MCNC: 8.7 MG/DL (ref 8.5–10.5)
CALCIUM ALBUM COR SERPL-MCNC: 8.7 MG/DL (ref 8.5–10.5)
CALCIUM SERPL-MCNC: 8.7 MG/DL (ref 8.4–10.2)
CALCIUM SERPL-MCNC: 8.9 MG/DL (ref 8.4–10.2)
CHLORIDE SERPL-SCNC: 102 MMOL/L (ref 96–112)
CHLORIDE SERPL-SCNC: 103 MMOL/L (ref 96–112)
CO2 SERPL-SCNC: 21 MMOL/L (ref 20–33)
CO2 SERPL-SCNC: 22 MMOL/L (ref 20–33)
CREAT SERPL-MCNC: 1.42 MG/DL (ref 0.5–1.4)
CREAT SERPL-MCNC: 1.46 MG/DL (ref 0.5–1.4)
EKG IMPRESSION: NORMAL
EOSINOPHIL # BLD AUTO: 0.11 K/UL (ref 0–0.51)
EOSINOPHIL # BLD AUTO: 0.13 K/UL (ref 0–0.51)
EOSINOPHIL NFR BLD: 1.4 % (ref 0–6.9)
EOSINOPHIL NFR BLD: 1.5 % (ref 0–6.9)
ERYTHROCYTE [DISTWIDTH] IN BLOOD BY AUTOMATED COUNT: 42.6 FL (ref 35.9–50)
ERYTHROCYTE [DISTWIDTH] IN BLOOD BY AUTOMATED COUNT: 43.7 FL (ref 35.9–50)
GFR SERPLBLD CREATININE-BSD FMLA CKD-EPI: 49 ML/MIN/1.73 M 2
GFR SERPLBLD CREATININE-BSD FMLA CKD-EPI: 50 ML/MIN/1.73 M 2
GLOBULIN SER CALC-MCNC: 2.7 G/DL (ref 1.9–3.5)
GLOBULIN SER CALC-MCNC: 3 G/DL (ref 1.9–3.5)
GLUCOSE SERPL-MCNC: 259 MG/DL (ref 65–99)
GLUCOSE SERPL-MCNC: 291 MG/DL (ref 65–99)
HCT VFR BLD AUTO: 40.7 % (ref 42–52)
HCT VFR BLD AUTO: 44.3 % (ref 42–52)
HGB BLD-MCNC: 13.9 G/DL (ref 14–18)
HGB BLD-MCNC: 15.1 G/DL (ref 14–18)
IMM GRANULOCYTES # BLD AUTO: 0.01 K/UL (ref 0–0.11)
IMM GRANULOCYTES # BLD AUTO: 0.02 K/UL (ref 0–0.11)
IMM GRANULOCYTES NFR BLD AUTO: 0.1 % (ref 0–0.9)
IMM GRANULOCYTES NFR BLD AUTO: 0.2 % (ref 0–0.9)
INR PPP: 2.37 (ref 0.87–1.13)
LIPASE SERPL-CCNC: 42 U/L (ref 11–82)
LV EJECT FRACT  99904: 50
LV EJECT FRACT MOD 2C 99903: 54.42
LV EJECT FRACT MOD 4C 99902: 48.62
LV EJECT FRACT MOD BP 99901: 51.22
LYMPHOCYTES # BLD AUTO: 1.19 K/UL (ref 1–4.8)
LYMPHOCYTES # BLD AUTO: 1.47 K/UL (ref 1–4.8)
LYMPHOCYTES NFR BLD: 16 % (ref 22–41)
LYMPHOCYTES NFR BLD: 16.5 % (ref 22–41)
MAGNESIUM SERPL-MCNC: 1.8 MG/DL (ref 1.5–2.5)
MCH RBC QN AUTO: 29.6 PG (ref 27–33)
MCH RBC QN AUTO: 29.7 PG (ref 27–33)
MCHC RBC AUTO-ENTMCNC: 34.1 G/DL (ref 32.3–36.5)
MCHC RBC AUTO-ENTMCNC: 34.2 G/DL (ref 32.3–36.5)
MCV RBC AUTO: 86.8 FL (ref 81.4–97.8)
MCV RBC AUTO: 87 FL (ref 81.4–97.8)
MONOCYTES # BLD AUTO: 0.57 K/UL (ref 0–0.85)
MONOCYTES # BLD AUTO: 0.62 K/UL (ref 0–0.85)
MONOCYTES NFR BLD AUTO: 6.7 % (ref 0–13.4)
MONOCYTES NFR BLD AUTO: 7.9 % (ref 0–13.4)
NEUTROPHILS # BLD AUTO: 5.34 K/UL (ref 1.82–7.42)
NEUTROPHILS # BLD AUTO: 6.95 K/UL (ref 1.82–7.42)
NEUTROPHILS NFR BLD: 73.9 % (ref 44–72)
NEUTROPHILS NFR BLD: 75.5 % (ref 44–72)
NRBC # BLD AUTO: 0 K/UL
NRBC # BLD AUTO: 0 K/UL
NRBC BLD-RTO: 0 /100 WBC (ref 0–0.2)
NRBC BLD-RTO: 0 /100 WBC (ref 0–0.2)
PLATELET # BLD AUTO: 157 K/UL (ref 164–446)
PLATELET # BLD AUTO: 163 K/UL (ref 164–446)
PMV BLD AUTO: 9.8 FL (ref 9–12.9)
PMV BLD AUTO: 9.8 FL (ref 9–12.9)
POTASSIUM SERPL-SCNC: 4 MMOL/L (ref 3.6–5.5)
POTASSIUM SERPL-SCNC: 4.1 MMOL/L (ref 3.6–5.5)
PROT SERPL-MCNC: 6.7 G/DL (ref 6–8.2)
PROT SERPL-MCNC: 7.2 G/DL (ref 6–8.2)
PROTHROMBIN TIME: 26.8 SEC (ref 12–14.6)
RBC # BLD AUTO: 4.69 M/UL (ref 4.7–6.1)
RBC # BLD AUTO: 5.09 M/UL (ref 4.7–6.1)
SODIUM SERPL-SCNC: 136 MMOL/L (ref 135–145)
SODIUM SERPL-SCNC: 138 MMOL/L (ref 135–145)
TROPONIN T SERPL-MCNC: 107 NG/L (ref 6–19)
TROPONIN T SERPL-MCNC: 110 NG/L (ref 6–19)
TROPONIN T SERPL-MCNC: 92 NG/L (ref 6–19)
TSH SERPL DL<=0.005 MIU/L-ACNC: 3.12 UIU/ML (ref 0.38–5.33)
WBC # BLD AUTO: 7.2 K/UL (ref 4.8–10.8)
WBC # BLD AUTO: 9.2 K/UL (ref 4.8–10.8)

## 2023-08-10 PROCEDURE — 93306 TTE W/DOPPLER COMPLETE: CPT

## 2023-08-10 PROCEDURE — 93005 ELECTROCARDIOGRAM TRACING: CPT | Performed by: EMERGENCY MEDICINE

## 2023-08-10 PROCEDURE — 71045 X-RAY EXAM CHEST 1 VIEW: CPT

## 2023-08-10 PROCEDURE — 93005 ELECTROCARDIOGRAM TRACING: CPT

## 2023-08-10 PROCEDURE — 85025 COMPLETE CBC W/AUTO DIFF WBC: CPT | Mod: 91

## 2023-08-10 PROCEDURE — 84484 ASSAY OF TROPONIN QUANT: CPT

## 2023-08-10 PROCEDURE — 96365 THER/PROPH/DIAG IV INF INIT: CPT | Mod: XU

## 2023-08-10 PROCEDURE — 83690 ASSAY OF LIPASE: CPT

## 2023-08-10 PROCEDURE — 92960 CARDIOVERSION ELECTRIC EXT: CPT

## 2023-08-10 PROCEDURE — 85610 PROTHROMBIN TIME: CPT

## 2023-08-10 PROCEDURE — 700105 HCHG RX REV CODE 258: Performed by: EMERGENCY MEDICINE

## 2023-08-10 PROCEDURE — 99152 MOD SED SAME PHYS/QHP 5/>YRS: CPT

## 2023-08-10 PROCEDURE — 85025 COMPLETE CBC W/AUTO DIFF WBC: CPT

## 2023-08-10 PROCEDURE — 92960 CARDIOVERSION ELECTRIC EXT: CPT | Performed by: INTERNAL MEDICINE

## 2023-08-10 PROCEDURE — 80053 COMPREHEN METABOLIC PANEL: CPT

## 2023-08-10 PROCEDURE — 99284 EMERGENCY DEPT VISIT MOD MDM: CPT | Mod: 27

## 2023-08-10 PROCEDURE — 94799 UNLISTED PULMONARY SVC/PX: CPT

## 2023-08-10 PROCEDURE — 93306 TTE W/DOPPLER COMPLETE: CPT | Mod: 26 | Performed by: INTERNAL MEDICINE

## 2023-08-10 PROCEDURE — 83735 ASSAY OF MAGNESIUM: CPT

## 2023-08-10 PROCEDURE — 96368 THER/DIAG CONCURRENT INF: CPT | Mod: XU

## 2023-08-10 PROCEDURE — 700111 HCHG RX REV CODE 636 W/ 250 OVERRIDE (IP): Mod: JZ

## 2023-08-10 PROCEDURE — 84484 ASSAY OF TROPONIN QUANT: CPT | Mod: 91

## 2023-08-10 PROCEDURE — 36415 COLL VENOUS BLD VENIPUNCTURE: CPT

## 2023-08-10 PROCEDURE — 80053 COMPREHEN METABOLIC PANEL: CPT | Mod: 91

## 2023-08-10 PROCEDURE — 99285 EMERGENCY DEPT VISIT HI MDM: CPT

## 2023-08-10 PROCEDURE — 700111 HCHG RX REV CODE 636 W/ 250 OVERRIDE (IP): Mod: JZ | Performed by: EMERGENCY MEDICINE

## 2023-08-10 PROCEDURE — 94760 N-INVAS EAR/PLS OXIMETRY 1: CPT

## 2023-08-10 PROCEDURE — 84443 ASSAY THYROID STIM HORMONE: CPT

## 2023-08-10 PROCEDURE — 99205 OFFICE O/P NEW HI 60 MIN: CPT | Mod: 25 | Performed by: INTERNAL MEDICINE

## 2023-08-10 PROCEDURE — 96375 TX/PRO/DX INJ NEW DRUG ADDON: CPT | Mod: XU

## 2023-08-10 RX ORDER — ADENOSINE 3 MG/ML
INJECTION, SOLUTION INTRAVENOUS
Status: COMPLETED
Start: 2023-08-10 | End: 2023-08-10

## 2023-08-10 RX ORDER — ADENOSINE 3 MG/ML
INJECTION, SOLUTION INTRAVENOUS
Status: DISCONTINUED
Start: 2023-08-10 | End: 2023-08-10 | Stop reason: HOSPADM

## 2023-08-10 RX ORDER — DEXTROSE MONOHYDRATE 50 MG/ML
INJECTION, SOLUTION INTRAVENOUS CONTINUOUS
Status: DISCONTINUED | OUTPATIENT
Start: 2023-08-10 | End: 2023-08-10 | Stop reason: HOSPADM

## 2023-08-10 RX ORDER — PROPOFOL 10 MG/ML
200 INJECTION, EMULSION INTRAVENOUS ONCE
Status: COMPLETED | OUTPATIENT
Start: 2023-08-10 | End: 2023-08-10

## 2023-08-10 RX ORDER — DILTIAZEM HYDROCHLORIDE 5 MG/ML
10 INJECTION INTRAVENOUS ONCE
Status: COMPLETED | OUTPATIENT
Start: 2023-08-10 | End: 2023-08-10

## 2023-08-10 RX ORDER — AMIODARONE HYDROCHLORIDE 200 MG/1
200 TABLET ORAL 2 TIMES DAILY
Qty: 28 TABLET | Refills: 1 | Status: SHIPPED | OUTPATIENT
Start: 2023-08-10 | End: 2023-08-17 | Stop reason: SDUPTHER

## 2023-08-10 RX ADMIN — PROPOFOL 80 MG: 10 INJECTION, EMULSION INTRAVENOUS at 12:45

## 2023-08-10 RX ADMIN — DILTIAZEM HYDROCHLORIDE 10 MG: 5 INJECTION INTRAVENOUS at 10:00

## 2023-08-10 RX ADMIN — AMIODARONE HYDROCHLORIDE 1 MG/MIN: 1.8 INJECTION, SOLUTION INTRAVENOUS at 11:44

## 2023-08-10 RX ADMIN — ADENOSINE 6 MG: 3 INJECTION INTRAVENOUS at 09:42

## 2023-08-10 RX ADMIN — DEXTROSE MONOHYDRATE: 50 INJECTION, SOLUTION INTRAVENOUS at 12:21

## 2023-08-10 ASSESSMENT — FIBROSIS 4 INDEX
FIB4 SCORE: 3.02
FIB4 SCORE: 3.02
FIB4 SCORE: 3.23

## 2023-08-10 ASSESSMENT — PAIN DESCRIPTION - DESCRIPTORS: DESCRIPTORS: SHARP

## 2023-08-10 NOTE — ED TRIAGE NOTES
Pt amb to triage c/o irregular HR/tachycardia since this am. Pt denies hx of SVT. EKG compl in triage. Pt brought directly to rm#1.

## 2023-08-10 NOTE — DISCHARGE INSTRUCTIONS
Take amiodarone twice daily for 14 days then switch to once a day.  Call cardiology for follow-up with an EPS cardiologist within the month.  Return for recurrent rapid heart rate dizziness chest pain or shortness of breath.

## 2023-08-10 NOTE — ED NOTES
Rounding completed  Patient reports he is no longer dizzy  Confirms he would like to go home  Educated on post-sedation care  Emphasized no driving or operating heavy machinery for the next 24 hours; patient and wife verbalized understanding and agreeable.

## 2023-08-10 NOTE — ED NOTES
"Block Charting:    Patient brought back to exam room; ambulatory without dizziness  Patient denies chest pain or shortness of breath but states that he has been feeling \"tired\" the last few days  Patient on monitor; vitals cycling  ERP at the bedside and discussed options for treatment.  Patient agreeable to try Adenosine.  IV place; code card brought into room and patient placed on zoll to print rhythm  Discussed plan with patient; verbalized understanding and agreeable  6mg Adenosine pushed at 0952  Patient tolerated well  Rhythm slowed and showed A-flutter  Rhythm printed.  Plan to try diltiazem  "

## 2023-08-10 NOTE — ED NOTES
Med rec completed per pt and wife   Allergies reviewed     Pt takes Warfarin   2.5 mg on Monday and Friday   5 mg all other days

## 2023-08-10 NOTE — ED NOTES
Patient discharged home in stable condition with wife  AVS provided with recommended follow up and home care instructions and education information  Prescription for amiodorone electronically provided at this time  Patient and wife verbalized understanding  Refused offer for wheelchair; Ambulatory at time of discharge

## 2023-08-10 NOTE — ED NOTES
Time out performed  Dr. Wolf, RT, RN , tech and Cardiologist at the bedside  Patient on monitor, vitals cycling  Consent at the bedside

## 2023-08-10 NOTE — ED PROVIDER NOTES
ED Provider Note    Scribed for Tip Wolf M.D. by Tanisha Pickering. 8/10/2023  9:41 AM    Primary care provider: Leobardo Wright M.D.  Means of arrival: The patient was brought in by his wife,    CHIEF COMPLAINT  Chief Complaint   Patient presents with    Irregular Heart Beat       LIMITATION TO HISTORY   None.    HPI    Magnus Claudio is a 78 y.o. male who presents to the Emergency Department for evaluation of rapid heart beat onset this 2 days ago. He describes that he is able to feel his heart racing and feels like he is running. The patient's wife reports that the patient's Fitbit has notified him for the past 3 days that he has had a rapid heart rate. The patient states he also has left leg swelling, but denies any fatigue, shortness of breath, cough, fever, headache, dizziness, or chest pain. He states that his left leg intermittently swells up slightly. He denies history of a previous episode similar to this. He also denies any history of MI, Atriral Fibrillation, or SVT.  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     OUTSIDE HISTORIAN(S):  Wife at bedside. The patient's wife reports that for the past 3 days, the patient's Fitbit has notified him of a rapid heart rate. She notes that the patient's leg intermittently swells up. She denies any thyroid issues. She mentions that the patient consumes about 2 cups of coffee a day.     EXTERNAL RECORDS REVIEWED  External clinic notes from July 11th and 27th show that the patient has a history of hypertension and is on Coumadin for DVT.     REVIEW OF SYSTEMS  Pertinent positives include: tachycardia and left leg swelling.  Pertinent negatives include: fatigue, shortness of breath, cough, fever, headache, dizziness, chest pain.      PAST MEDICAL HISTORY  Past Medical History:   Diagnosis Date    Anemia     Blood clotting disorder (HCC)     leg and lung    Cancer (HCC) 2021    Colon 20+ years ago and bladder     Cataract     Diabetes (HCC)     Diverticulitis     Hypertension     Vertigo, benign positional      FAMILY HISTORY  Family History   Problem Relation Age of Onset    Hypertension Father     Heart Attack Father     Cancer Mother         Breast    Diabetes Sister     Hyperlipidemia Neg Hx         unknown     SOCIAL HISTORY  Social History     Tobacco Use    Smoking status: Never    Smokeless tobacco: Never   Vaping Use    Vaping Use: Never used   Substance Use Topics    Alcohol use: Not Currently     Alcohol/week: 0.0 oz    Drug use: Never     Social History     Substance and Sexual Activity   Drug Use Never     SURGICAL HISTORY  Past Surgical History:   Procedure Laterality Date    INGUINAL HERNIA REPAIR ROBOTIC XI Left 2/11/2022    Procedure: REPAIR, HERNIA, INGUINAL, ROBOT-ASSISTED, USING [employer] XI - WITH MESH PLACEMENT;  Surgeon: Nelson Denney M.D.;  Location: SURGERY [facility];  Service: Gen Robotic    OTHER  05/2021    bladder cancer surgery    CATARACT EXTRACTION WITH IOL      COLON RESECTION      EYE SURGERY Bilateral     Cataract surgery    LAMINOTOMY      MA RESEC LIVER,PART LOBECTOMY       CURRENT MEDICATIONS    Current Facility-Administered Medications:     dextrose 5% infusion, , Intravenous, Continuous, Tip Wolf M.D., Last Rate: 30 mL/hr at 08/10/23 1221, New Bag at 08/10/23 1221    amiodarone (Nexterone) 360 mg/200 mL infusion, 1 mg/min, Intravenous, Once, Last Rate: 33.3 mL/hr at 08/10/23 1144, 1 mg/min at 08/10/23 1144 **FOLLOWED BY** amiodarone (Nexterone) 360 mg/200 mL infusion, 0.5 mg/min, Intravenous, Continuous, Tip Wolf M.D.    ADENOSINE 6 MG/2ML IV SOLN, , , ,     propofol (DIPRIVAN) 20mL vial injection (RWN), 200 mg, Intravenous, Once, Tip Wolf M.D.    Current Outpatient Medications:     Multiple Vitamins-Minerals (ENERGY BOOSTER PO), Take 1 Tablet by mouth every day. OTC energy supplement, Disp: , Rfl:     warfarin (COUMADIN) 2.5 MG Tab, Take 1-2 Tablets by mouth every  "day. As directed by [facility] (Patient taking differently: Take 2.5-5 mg by mouth every day. 2.5 mg on Monday and Friday  5 mg all other days), Disp: 180 Tablet, Rfl: 1    amLODIPine (NORVASC) 10 MG Tab, Take 1 Tablet by mouth every day., Disp: 90 Tablet, Rfl: 3    losartan-hydrochlorothiazide (HYZAAR) 100-12.5 MG per tablet, Take 1 Tablet by mouth every day., Disp: 100 Tablet, Rfl: 3    metFORMIN ER (GLUCOPHAGE XR) 500 MG TABLET SR 24 HR, TAKE 2 TABLETS BY MOUTH TWICE DAILY (Patient taking differently: Take 1,000 mg by mouth 2 times a day. TAKE 2 TABLETS BY MOUTH TWICE DAILY), Disp: 360 Tablet, Rfl: 4    tamsulosin (FLOMAX) 0.4 MG capsule, TAKE 1 CAPSULE BY MOUTH EVERY DAY, Disp: 100 Capsule, Rfl: 4    glimepiride (AMARYL) 4 MG Tab, TAKE 1 TABLET BY MOUTH EVERY MORNING, Disp: 100 Tablet, Rfl: 4    B Complex-Folic Acid (B COMPLEX-VITAMIN B12 PO), Take 1 Tablet by mouth every day., Disp: , Rfl:     therapeutic multivitamin-minerals (THERAGRAN-M) Tab, Take 1 Tab by mouth every day., Disp: , Rfl:     ALLERGIES  Allergies   Allergen Reactions    Morphine Unspecified     Hallucinations, 15+ years ago     PHYSICAL EXAM  VITAL SIGNS: BP (!) 136/103   Pulse (!) 150   Temp 36 °C (96.8 °F) (Temporal)   Resp 18   Ht 1.803 m (5' 11\")   Wt 83 kg (182 lb 15.7 oz)   SpO2 97%   BMI 25.52 kg/m²     Reviewed and tachycardic  Constitutional: Well developed, Well nourished, hard of hearing, wearing hearing aids.  HENT: Normocephalic, atraumatic, bilateral external ears normal, No intraoral erythema, edema, exudate  Eyes: PERRLA, conjunctiva pink, no scleral icterus.   Cardiovascular: Tachycardic rate and rhythm. No murmurs, rubs or gallops.  No dependent edema or calf tenderness  Respiratory: Lungs clear to auscultation bilaterally. No wheezes, rales, or rhonchi.  Abdominal:  Abdomen soft, non-tender, non distended. No rebound, or guarding.    Skin: No erythema, no rash. No wounds or bruising. Right upper " quadrant surgical scar that is well healed. 2+ pitting edema to the left leg. Trace edema to the right leg.   Genitourinary: No costovertebral angle tenderness.   Musculoskeletal: no deformities.   Neurologic: Alert & oriented x 3, cranial nerves 2-12 intact by passive exam.  No focal deficit noted.  Psychiatric: Affect normal, Judgment normal, Mood normal.     LABS Ordered and Reviewed by Me:         Results for orders placed or performed during the hospital encounter of 08/10/23   EC-ECHOCARDIOGRAM COMPLETE W/O CONT   Result Value Ref Range    Eject.Frac. MOD BP 51.22     Eject.Frac. MOD 4C 48.62     Eject.Frac. MOD 2C 54.42     Left Ventrical Ejection Fraction 50    CBC WITH DIFFERENTIAL   Result Value Ref Range    WBC 9.2 4.8 - 10.8 K/uL    RBC 5.09 4.70 - 6.10 M/uL    Hemoglobin 15.1 14.0 - 18.0 g/dL    Hematocrit 44.3 42.0 - 52.0 %    MCV 87.0 81.4 - 97.8 fL    MCH 29.7 27.0 - 33.0 pg    MCHC 34.1 32.3 - 36.5 g/dL    RDW 43.7 35.9 - 50.0 fL    Platelet Count 157 (L) 164 - 446 K/uL    MPV 9.8 9.0 - 12.9 fL    Neutrophils-Polys 75.50 (H) 44.00 - 72.00 %    Lymphocytes 16.00 (L) 22.00 - 41.00 %    Monocytes 6.70 0.00 - 13.40 %    Eosinophils 1.40 0.00 - 6.90 %    Basophils 0.20 0.00 - 1.80 %    Immature Granulocytes 0.20 0.00 - 0.90 %    Nucleated RBC 0.00 0.00 - 0.20 /100 WBC    Neutrophils (Absolute) 6.95 1.82 - 7.42 K/uL    Lymphs (Absolute) 1.47 1.00 - 4.80 K/uL    Monos (Absolute) 0.62 0.00 - 0.85 K/uL    Eos (Absolute) 0.13 0.00 - 0.51 K/uL    Baso (Absolute) 0.02 0.00 - 0.12 K/uL    Immature Granulocytes (abs) 0.02 0.00 - 0.11 K/uL    NRBC (Absolute) 0.00 K/uL   Comp Metabolic Panel   Result Value Ref Range    Sodium 138 135 - 145 mmol/L    Potassium 4.1 3.6 - 5.5 mmol/L    Chloride 103 96 - 112 mmol/L    Co2 22 20 - 33 mmol/L    Anion Gap 13.0 7.0 - 16.0    Glucose 259 (H) 65 - 99 mg/dL    Bun 22 8 - 22 mg/dL    Creatinine 1.46 (H) 0.50 - 1.40 mg/dL    Calcium 8.9 8.4 - 10.2 mg/dL    Correct Calcium 8.7  8.5 - 10.5 mg/dL    AST(SGOT) 30 12 - 45 U/L    ALT(SGPT) 25 2 - 50 U/L    Alkaline Phosphatase 56 30 - 99 U/L    Total Bilirubin 2.1 (H) 0.1 - 1.5 mg/dL    Albumin 4.2 3.2 - 4.9 g/dL    Total Protein 7.2 6.0 - 8.2 g/dL    Globulin 3.0 1.9 - 3.5 g/dL    A-G Ratio 1.4 g/dL   TSH   Result Value Ref Range    TSH 3.120 0.380 - 5.330 uIU/mL   Prothrombin Time   Result Value Ref Range    PT 26.8 (H) 12.0 - 14.6 sec    INR 2.37 (H) 0.87 - 1.13   MAGNESIUM   Result Value Ref Range    Magnesium 1.8 1.5 - 2.5 mg/dL   TROPONIN   Result Value Ref Range    Troponin T 92 (H) 6 - 19 ng/L         Rhythm Strip: Interpretation by me Rapid SVT.     EKG Interpretation by me    Indication: Irregular heart beat/Tachycardia    Rhythm: sinus tachycardia  Rate: Tachycardic at 150 without P waves   Axis: normal  Ectopy: none  Conduction: normal  ST/T Waves: no acute change  Q Waves: none  R Wave progression: normal  Hypertrophy changes: Absent    Comparison: compared to prior EKG on 10/25/2022, early repolarization is now present, and sinus rhythm is no longer present.     Clinical Impression:SVT    RADIOLOGY  I have independently interpreted the chest x-ray associated with this visit demonstrating no evidence of pneumonia.  I am awaiting the final reading from the radiologist.     Final Radiology Report  EC-ECHOCARDIOGRAM COMPLETE W/O CONT         DX-CHEST-LIMITED (1 VIEW)   Final Result      Bilateral linear atelectasis.        Radiologist interpretation have been reviewed by me.            ED COURSE:    ED Observation Status? Yes; Patient placed in observation status at 9:41 AM 08/10/23 for tachycardia. Lab work, imaging, EKG, and treatment of his rapid heart rate will be performed.     9:41 AM - Patient seen and examined at bedside. The patient is a 78 year old male who presents to the ED for evaluation of irregular heart beat onset this morning. He describes that he is able to feel his heart racing and feels like he is running. The  patient's wife reports that the patient's Fitbit has notified him for the past 3 days that he has had a rapid heart rate.     Adenosine 6 mg IV rapid push was administered after informed consent obtained.  This unmasked underlying atrial flutter.  Heart rate returned to 150 once adenosine wore off.    The patient will be treated with diltiazem 10 mg to lower his heart rate.     10:58 AM - Patient was reevaluated at bedside. I spoke with the patent about his options for further care. He would like me to consult with cardiology.  Heart rate improved to 102 with diltiazem.    11:09 AM - Patient was reevaluated at bedside. Long discussion was had about chemical versus electrical cardioversion. The patient states that he prefers chemical diversion.     12:32 PM -I discussed the patient preferences with  At [place] cardiology.  She presented to consult on the patient and eventually convinced him to undergo electrical cardioversion.. Cardioversion will be performed. Respiratory will be called.     12:42 PM - Cardioversion performed.     12:53 PM - Cardioversion complete.  The patient desat to 92, but returned to normal sinus rhythm.     1:08 PM -  The patient will be referred to a cardiologist to followup in a few weeks. Discussed discharge instructions and return precautions with the patient and they were cleared for discharge. Patient was given the opportunity to ask any further questions. He is comfortable with discharge at this time.       INTERVENTIONS BY ME:  Medications   dextrose 5% infusion ( Intravenous New Bag 8/10/23 1221)   amiodarone (Nexterone) 360 mg/200 mL infusion (1 mg/min Intravenous New Bag 8/10/23 1144)     Followed by   amiodarone (Nexterone) 360 mg/200 mL infusion (has no administration in time range)   ADENOSINE 6 MG/2ML IV SOLN (  Canceled Entry 8/10/23 1200)   propofol (DIPRIVAN) 20mL vial injection (RWN) (has no administration in time range)   ADENOSINE 6 MG/2ML IV SOLN (6 mg  Given 8/10/23  0942)   dilTIAZem (Cardizem) injection 10 mg (10 mg Intravenous Given 8/10/23 1000)     10:10 AM - On reassessment response to intervention, the patient's heart rate has come down to 100 after diltiazem 10 mg administration.     Conscious Sedation Procedure Note  12:42- 12:45    Indication: cardioversion    Consent: I have discussed with the patient and/or the patient representative the indication, alternatives, and the possible risks and/or complications of the planned procedure and the anesthesia methods. The patient and/or patient representative appear to understand and agree to proceed.    Physician Involvement: The attending physician was present and supervising this procedure.    Pre-Sedation Documentation and Exam: I have personally completed a history, physical exam & review of systems for this patient (see notes).    Airway Assessment: normal    Prior History of Anesthesia Complications: none    ASA Classification: Class 3 - A patient with severe systemic disease that limits activity but is not incapacitating    Sedation/ Anesthesia Plan: intravenous sedation    Medications Used: propofol 80 mg intravenously    Monitoring and Safety: The patient was placed on a cardiac monitor and vital signs, pulse oximetry and level of consciousness were continuously evaluated throughout the procedure. The patient was closely monitored until recovery from the medications was complete and the patient had returned to baseline status. Respiratory therapy was on standby at all times during the procedure.      (The following sections must be completed)  Post-Sedation Vital Signs: Vital signs were reviewed and were stable after the procedure (see flow sheet for vitals)  Vitals:    08/10/23 1341   BP: 135/76   Pulse: 70   Resp: 20   Temp:    SpO2: 96%            Intraservice Time: 12 minutes (Minutes)    Post-Sedation Exam: Cardiovascular: normal           Complications: Dyspnea.  Patient was given gentle mask ventilation and  never experienced hypoxia.    I provided both the sedation and procedure, a nurse was present at the bedside for the entire procedure.        I have discussed management of the patient with the following physicians and sources:    Dr. Xavier    1:09 PM - DC from ED observation.    MEDICAL DECISION MAKING:  PROBLEMS EVALUATED THIS VISIT:    This patient presents with a rapid heart rate and palpitation presented with a rhythm that appeared to be SVT was actually rapid atrial flutter.  This was unmasked with adenosine.  Rate control was temporarily successful with diltiazem.  The patient has had symptoms for more than 48 hours but is already adequately anticoagulated due to prior history of thromboembolism and Coumadin use.  He eventually consulted to electrical cardioversion after 3 separate discussions of this procedure.  He was sedated by me and cardioverted by Dr. Xavier.  He had restoration of sinus rhythm and no complications.  There is no evidence of heart failure, thyroid disease, electrolyte disorder or myocardial infarction.    RISK:  I given need for IV medication requiring monitoring    Critical care warranted given stability of cardiovascular deterioration and need for IV adenosine and electrical cardioversion.  Critical care time not including procedures 40 minutes     PLAN:  Discharge Medication List as of 8/10/2023  1:40 PM        START taking these medications    Details   amiodarone (CORDARONE) 200 MG Tab Take 1 Tablet by mouth 2 times a day. After 2 weeks switch to once a day, Disp-28 Tablet, R-1, Normal           Return for rapid heart rate dizziness chest pain or shortness of breath    Atrial flutter handout given        Followup:  [facility]  [address] [facility], [address]  [facility] [id number] [phone]  Schedule an appointment as soon as possible for a visit   with [facility]    CONDITION: Good.     FINAL IMPRESSION  1. Typical atrial flutter (HCC)    Critical care  Procedural  sedation  ED Observation Care     I, Tanisha Pickering (Scribe), am scribing for, and in the presence of, Tip Wolf M.D..    Electronically signed by: Tanisha Pickering (Scribe), 8/10/2023    ITip M.D. personally performed the services described in this documentation, as scribed by Tanisha Pickering in my presence, and it is both accurate and complete.    The note accurately reflects work and decisions made by me.  Tip Wolf M.D.  8/10/2023  3:45 PM

## 2023-08-10 NOTE — CONSULTS
Cardiology Initial Consult Note    Date of note:    8/10/2023      Consulting Physician: Tip Wolf M.D.    Name:   Magnus Claudio   YOB: 1945  Age:   78 y.o.  male   MRN:   [id number]      Reason for Consultation: New onset atrial flutter    HPI:  Magnus Claudio is a 78 y.o. male with a history of secondary hypercoagulable state, on chronic warfarin with therapeutic INRs, diabetes and hypertension who presented to the ER with elevated heart rate noted on Fitbit along with ongoing fatigue.  Wife at bedside.    Per patient, for the past few days he has been experiencing increasing fatigue, decrease in exercise tolerance and generalized weakness.  Fitbit alerted for elevated heart rate today.  Upon review of Fitbit data, found out that he  has had elevated heart rate for the past 2 days.  Denies any palpitations, lightheadedness, dizziness, chest pain or pressure.  No prior known atrial arrhythmias.  Denies any recent sick contacts or travel history.  Has also noted mild decrease in appetite but no nausea, vomiting or abdominal pain.      ROS  A complete ROS was performed and is negative except for pertinent positives mentioned in HPI.      Past Medical History:   Diagnosis Date    Anemia     Blood clotting disorder (HCC)     leg and lung    Cancer (HCC) 2021    Colon 20+ years ago and bladder    Cataract     Diabetes (HCC)     Diverticulitis     Hypertension     Vertigo, benign positional        Past Surgical History:   Procedure Laterality Date    INGUINAL HERNIA REPAIR ROBOTIC XI Left 2/11/2022    Procedure: REPAIR, HERNIA, INGUINAL, ROBOT-ASSISTED, USING DA NIKITA XI - WITH MESH PLACEMENT;  Surgeon: Nelson Denney M.D.;  Location: SURGERY [facility];  Service: Gen Robotic    OTHER  05/2021    bladder cancer surgery    CATARACT EXTRACTION WITH IOL      COLON RESECTION      EYE SURGERY Bilateral     Cataract surgery    LAMINOTOMY      HI RESEC LIVER,PART LOBECTOMY           (Not in a  hospital admission)    Current Facility-Administered Medications   Medication Dose Route Frequency Provider Last Rate Last Admin    dextrose 5% infusion   Intravenous Continuous Tip Wolf M.D. 30 mL/hr at 08/10/23 1221 New Bag at 08/10/23 1221    amiodarone (Nexterone) 360 mg/200 mL infusion  1 mg/min Intravenous Once Tip Wolf M.D. 33.3 mL/hr at 08/10/23 1144 1 mg/min at 08/10/23 1144    Followed by    amiodarone (Nexterone) 360 mg/200 mL infusion  0.5 mg/min Intravenous Continuous Tip Wolf M.D.        ADENOSINE 6 MG/2ML IV SOLN             propofol (DIPRIVAN) 20mL vial injection (RWN)  200 mg Intravenous Once Tip Wolf M.D.         Current Outpatient Medications   Medication Sig Dispense Refill    Multiple Vitamins-Minerals (ENERGY BOOSTER PO) Take 1 Tablet by mouth every day. OTC energy supplement      warfarin (COUMADIN) 2.5 MG Tab Take 1-2 Tablets by mouth every day. As directed by [facility] (Patient taking differently: Take 2.5-5 mg by mouth every day. 2.5 mg on Monday and Friday   5 mg all other days) 180 Tablet 1    amLODIPine (NORVASC) 10 MG Tab Take 1 Tablet by mouth every day. 90 Tablet 3    losartan-hydrochlorothiazide (HYZAAR) 100-12.5 MG per tablet Take 1 Tablet by mouth every day. 100 Tablet 3    metFORMIN ER (GLUCOPHAGE XR) 500 MG TABLET SR 24 HR TAKE 2 TABLETS BY MOUTH TWICE DAILY (Patient taking differently: Take 1,000 mg by mouth 2 times a day. TAKE 2 TABLETS BY MOUTH TWICE DAILY) 360 Tablet 4    tamsulosin (FLOMAX) 0.4 MG capsule TAKE 1 CAPSULE BY MOUTH EVERY  Capsule 4    glimepiride (AMARYL) 4 MG Tab TAKE 1 TABLET BY MOUTH EVERY MORNING 100 Tablet 4    B Complex-Folic Acid (B COMPLEX-VITAMIN B12 PO) Take 1 Tablet by mouth every day.      therapeutic multivitamin-minerals (THERAGRAN-M) Tab Take 1 Tab by mouth every day.           Allergies   Allergen Reactions    Morphine Unspecified     Hallucinations, 15+ years ago         Family History   Problem  "Relation Age of Onset    Hypertension Father     Heart Attack Father     Cancer Mother         Breast    Diabetes Sister     Hyperlipidemia Neg Hx         unknown       Social History     Socioeconomic History    Marital status:      Spouse name: Not on file    Number of children: Not on file    Years of education: Not on file    Highest education level: Not on file   Occupational History    Not on file   Tobacco Use    Smoking status: Never    Smokeless tobacco: Never   Vaping Use    Vaping Use: Never used   Substance and Sexual Activity    Alcohol use: Not Currently     Alcohol/week: 0.0 oz    Drug use: Never    Sexual activity: Not Currently     Partners: Female   Other Topics Concern    Not on file   Social History Narrative    Not on file     Social Determinants of Health     Financial Resource Strain: Not on file   Food Insecurity: Not on file   Transportation Needs: Not on file   Physical Activity: Not on file   Stress: Not on file   Social Connections: Not on file   Intimate Partner Violence: Not on file   Housing Stability: Not on file       No intake or output data in the 24 hours ending 08/10/23 1240     Physical Exam  Body mass index is 25.52 kg/m².  BP (!) 140/78   Pulse (!) 103   Temp 36 °C (96.8 °F) (Temporal)   Resp 14   Ht 1.803 m (5' 11\")   Wt 83 kg (182 lb 15.7 oz)   SpO2 93%   Vitals:    08/10/23 1144 08/10/23 1150 08/10/23 1206 08/10/23 1216   BP: 127/87  (!) 147/80 (!) 140/78   Pulse:  100 (!) 101 (!) 103   Resp:  16 (!) 11 14   Temp:       TempSrc:       SpO2:  92% 93% 93%   Weight:       Height:         Oxygen Therapy:  Pulse Oximetry: 93 %, O2 Delivery Device: None - Room Air    General: In no apparent distress  Eyes: nl conjunctiva  ENT: OP clear  Neck: JVP not elevated,  no carotid bruits  Lungs: normal respiratory effort, CTAB  Heart: tachycardic, irregular rhythm, no murmurs, no rubs or gallops, no edema bilateral lower extremities. No LV/RV heave on cardiac palpatation. 2+ " bilateral radial pulses.   Abdomen: soft, non tender, non distended, no masses, normal bowel sounds.  No HSM.  Extremities/MSK: no clubbing, no cyanosis  Neurological: No focal sensory deficits  Psychiatric: Appropriate affect, A/O x 3  Skin: Warm extremities      Labs (personally reviewed and notable for):   Lab Results   Component Value Date/Time    SODIUM 138 08/10/2023 09:46 AM    POTASSIUM 4.1 08/10/2023 09:46 AM    CHLORIDE 103 08/10/2023 09:46 AM    CO2 22 08/10/2023 09:46 AM    GLUCOSE 259 (H) 08/10/2023 09:46 AM    BUN 22 08/10/2023 09:46 AM    CREATININE 1.46 (H) 08/10/2023 09:46 AM      Lab Results   Component Value Date/Time    WBC 9.2 08/10/2023 09:46 AM    RBC 5.09 08/10/2023 09:46 AM    HEMOGLOBIN 15.1 08/10/2023 09:46 AM    HEMATOCRIT 44.3 08/10/2023 09:46 AM    MCV 87.0 08/10/2023 09:46 AM    MCH 29.7 08/10/2023 09:46 AM    MCHC 34.1 08/10/2023 09:46 AM    MPV 9.8 08/10/2023 09:46 AM    NEUTSPOLYS 75.50 (H) 08/10/2023 09:46 AM    LYMPHOCYTES 16.00 (L) 08/10/2023 09:46 AM    MONOCYTES 6.70 08/10/2023 09:46 AM    EOSINOPHILS 1.40 08/10/2023 09:46 AM    BASOPHILS 0.20 08/10/2023 09:46 AM      Lab Results   Component Value Date/Time    CHOLSTRLTOT 147 11/14/2022 09:57 AM    LDL 67 11/14/2022 09:57 AM    HDL 32 (A) 11/14/2022 09:57 AM    TRIGLYCERIDE 239 (H) 11/14/2022 09:57 AM       Lab Results   Component Value Date/Time    TROPONINT 92 (H) 08/10/2023 0946     Lab Results   Component Value Date/Time    NTPROBNP 91 08/17/2019 1506         Cardiac Imaging and Procedures Review:    EKG dated 8/10/2023: My personal interpretation is A flutter, RVR     Echo dated 8/10/2023: My personal interpretation is normal left ventricular size and systolic function, mild TR, no WMA      Radiology test Review:  EC-ECHOCARDIOGRAM COMPLETE W/O CONT         DX-CHEST-LIMITED (1 VIEW)   Final Result      Bilateral linear atelectasis.          Problem list:  Active Problems:    * No active hospital problems. *  Resolved  Problems:    * No resolved hospital problems. *      Impression and Medical Decision Making:  # New onset atrial flutter  # Atrial for with rapid ventricular response   #Secondary hypercoagulable state  # Chronic anticoagulation with warfarin    Recommendations:  --Patient with new onset atrial flutter.  Heart rate has been quite elevated.  Minimal exertion and patient heart rate jumped back up to 160 bpm.  We discussed pathophysiology, natural progression and treatment options in detail.    --Discussed different treatment options with the patient in terms of rate vs rhythm control strategy.  Given that this is new onset and atrial flutter is difficult to rate control, discussed option to restore sinus rhythm.  After shared decision making, patient is in agreement to proceed with the cardioversion.  INR has been therapeutic for the past several months, hence, no need for FRANCISCO.  INR 2.37 today.     The risks, benefits, and alternatives to electrical cardioversion were discussed in great detail. We discussed that conversion of atrial fibrillation to normal rhythm, at least transiently, is successful in 90 to 95% of patients. However, maintaining a normal rhythm depends on a number of factors, including underlying heart disease and antiarrhythmic medications. Atrial fibrillation often recurs with time and other treatments may be necessary. Risks of  cardioversion are low as long as anticoagulation issues are handled appropriately. There is a small (less than 1%) risk of embolic events, including stroke. Risks of electrical shock include mild muscle soreness and mild skin burning at the site of electrode placement. There is also a risk that cardioversion can stimulate more dangerous arrhythmias. The patient verbalized understanding of these potential complications and wishes to proceed with this procedure.    Patient understands that he will need to be on uninterrupted oral anticoagulation for 4 weeks post cardioversion.   No bleeding concerns noted.    -- We discussed future plan for ablation.  Will refer to EP as an outpatient.      Thank you for allowing me to participate in the care of this patient.  Please contact me with any questions.      Yasmany Xavier M.D.  Cardiologist, [facility] [place]   [phone]    This note was generated using voice recognition software which has a small chance of producing errors of grammar and possibly content. I have made every reasonable attempt to find and correct any obvious errors, but expect that some may not be found prior to finalization of this note.

## 2023-08-10 NOTE — PROCEDURES
Procedure performed: External DC Cardioversion    : Yasmany Xavier MD    Assistant: None    Anesthesia: Per ERP    Indication: Atrial fibrillation    Preprocedural Diagnosis:   Atrial Flutter  Postprocedural Diagnosis: Sinus Rhythm    Description of procedure:    Informed consent was obtained. Defibrillator pads were placed in the anterior and posterior position.  Adequate sedation was obtained with ERP. Patient was successfully cardioverted with 50 J synchronized biphasic energy into sinus rhythm. He was monitored in the recovery area until criteria met and will be discharged home.    Conclusion: Successful DC cardioversion    EBL: None    Complications: None apparent      Electronically signed: Yasmany Xavier MD  Cardiologist [facility]

## 2023-08-11 NOTE — ED TRIAGE NOTES
Chief Complaint   Patient presents with    Epigastric Pain     Sudden sharp epigastric/ central chest pain PTA while sitting watching tv. Denies SOB.   Nothing makes this pain better or worse. VS stable, NSR.   Patient was cardioverted earlier today in this emergency department.

## 2023-08-11 NOTE — ED PROVIDER NOTES
ED Provider Note    Scribed for Leobardo Sampson by Leobardo Sampson. 8/10/2023  6:12 PM    Primary care provider: Leobardo Wright M.D.  Means of arrival: Private vehicle  History obtained from: Patient  History limited by: None    CHIEF COMPLAINT  Chief Complaint   Patient presents with    Epigastric Pain     Sudden sharp epigastric/ central chest pain PTA while sitting watching tv. Denies SOB.   Nothing makes this pain better or worse. VS stable, NSR.   Patient was cardioverted earlier today in this emergency department.        EXTERNAL RECORDS REVIEWED  Outpatient Notes patient was seen at his PCP office on the 11th of last month for high blood pressure and medication management, history of diabetes    HPI/ROS  LIMITATION TO HISTORY   Select: : None  OUTSIDE HISTORIAN(S):  Wife at bedside provides helpful collateral formation    HPI  Magnus Claudio is a 78 y.o. male who presents to the Emergency Department with chest pain.  On chart review Patient was seen here earlier today for atrial flutter and was cardioverted and discharged.  He was stable at time of discharge.  He returns now complaining of chest pain.  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  He was at home on the couch watching TV when he had sudden onset of epigastric central chest pain last about 10 to 15 minutes and then resolved by the time he got to the ED.  His wife drove him here.  No nausea or vomiting, he is chest pain-free now.  Was nonradiating, nonexertional, nonpleuritic.  Review of systems otherwise negative.    REVIEW OF SYSTEMS  As above, all other systems reviewed and are negative.   See HPI for further details.     PAST MEDICAL HISTORY   has a past medical history of Anemia, Blood clotting disorder (HCC), Cancer (HCC) (2021), Cataract, Diabetes (HCC), Diverticulitis, Hypertension, and Vertigo, benign  "positional.  SURGICAL HISTORY   has a past surgical history that includes colon resection; resec liver,part lobectomy; laminotomy; eye surgery (Bilateral); cataract extraction with iol; other (05/2021); and inguinal hernia repair robotic xi (Left, 2/11/2022).  SOCIAL HISTORY  Social History     Tobacco Use    Smoking status: Never    Smokeless tobacco: Never   Vaping Use    Vaping Use: Never used   Substance Use Topics    Alcohol use: Not Currently     Alcohol/week: 0.0 oz    Drug use: Never      Social History     Substance and Sexual Activity   Drug Use Never     FAMILY HISTORY  Family History   Problem Relation Age of Onset    Hypertension Father     Heart Attack Father     Cancer Mother         Breast    Diabetes Sister     Hyperlipidemia Neg Hx         unknown     CURRENT MEDICATIONS  Home Medications    **Home medications have not yet been reviewed for this encounter**       ALLERGIES  Allergies   Allergen Reactions    Morphine Unspecified     Hallucinations, 15+ years ago       PHYSICAL EXAM    VITAL SIGNS:   Vitals:    08/10/23 1809 08/10/23 1814 08/10/23 1835   BP:  125/71    Pulse:  80 75   Resp:  17 16   Temp:  37.1 °C (98.7 °F)    TempSrc:  Temporal    SpO2:  95% 93%   Weight: 82.1 kg (181 lb)     Height: 1.803 m (5' 11\")       Vitals: My interpretation: normotensive, not tachycardic, afebrile, not hypoxic    Reinterpretation of vitals: Unchanged unremarkable    Cardiac Monitor Interpretation: The cardiac monitor revealed normal Sinus Rhythm as interpreted by me. The cardiac monitor was ordered secondary to the patient's history of chest pain and to monitor for dysrhythmia and/or tachycardia.    PE:   Gen: sitting comfortably, speaking clearly, appears in no acute distress   ENT: Mucous membranes moist, posterior pharynx clear, uvula midline, nares patent bilaterally   Neck: Supple, FROM  Pulmonary: Lungs are clear to auscultation bilaterally. No tachypnea  CV:  RRR, no murmur appreciated, pulses 2+ in " both upper and lower extremities  Abdomen: soft, NT/ND; no rebound/guarding  : no CVA or suprapubic tenderness   Neuro: A&Ox4 (person, place, time, situation), speech fluent, gait steady, no focal deficits appreciated  Skin: No rash or lesions.  No pallor or jaundice.  No cyanosis.  Warm and dry.      DIAGNOSTIC STUDIES / PROCEDURES    LABS  Results for orders placed or performed during the hospital encounter of 08/10/23   CBC WITH DIFFERENTIAL   Result Value Ref Range    WBC 7.2 4.8 - 10.8 K/uL    RBC 4.69 (L) 4.70 - 6.10 M/uL    Hemoglobin 13.9 (L) 14.0 - 18.0 g/dL    Hematocrit 40.7 (L) 42.0 - 52.0 %    MCV 86.8 81.4 - 97.8 fL    MCH 29.6 27.0 - 33.0 pg    MCHC 34.2 32.3 - 36.5 g/dL    RDW 42.6 35.9 - 50.0 fL    Platelet Count 163 (L) 164 - 446 K/uL    MPV 9.8 9.0 - 12.9 fL    Neutrophils-Polys 73.90 (H) 44.00 - 72.00 %    Lymphocytes 16.50 (L) 22.00 - 41.00 %    Monocytes 7.90 0.00 - 13.40 %    Eosinophils 1.50 0.00 - 6.90 %    Basophils 0.10 0.00 - 1.80 %    Immature Granulocytes 0.10 0.00 - 0.90 %    Nucleated RBC 0.00 0.00 - 0.20 /100 WBC    Neutrophils (Absolute) 5.34 1.82 - 7.42 K/uL    Lymphs (Absolute) 1.19 1.00 - 4.80 K/uL    Monos (Absolute) 0.57 0.00 - 0.85 K/uL    Eos (Absolute) 0.11 0.00 - 0.51 K/uL    Baso (Absolute) 0.01 0.00 - 0.12 K/uL    Immature Granulocytes (abs) 0.01 0.00 - 0.11 K/uL    NRBC (Absolute) 0.00 K/uL   COMP METABOLIC PANEL   Result Value Ref Range    Sodium 136 135 - 145 mmol/L    Potassium 4.0 3.6 - 5.5 mmol/L    Chloride 102 96 - 112 mmol/L    Co2 21 20 - 33 mmol/L    Anion Gap 13.0 7.0 - 16.0    Glucose 291 (H) 65 - 99 mg/dL    Bun 22 8 - 22 mg/dL    Creatinine 1.42 (H) 0.50 - 1.40 mg/dL    Calcium 8.7 8.4 - 10.2 mg/dL    Correct Calcium 8.7 8.5 - 10.5 mg/dL    AST(SGOT) 44 12 - 45 U/L    ALT(SGPT) 30 2 - 50 U/L    Alkaline Phosphatase 65 30 - 99 U/L    Total Bilirubin 1.9 (H) 0.1 - 1.5 mg/dL    Albumin 4.0 3.2 - 4.9 g/dL    Total Protein 6.7 6.0 - 8.2 g/dL    Globulin 2.7  1.9 - 3.5 g/dL    A-G Ratio 1.5 g/dL   TROPONIN   Result Value Ref Range    Troponin T 110 (H) 6 - 19 ng/L   LIPASE   Result Value Ref Range    Lipase 42 11 - 82 U/L   ESTIMATED GFR   Result Value Ref Range    GFR (CKD-EPI) 50 (A) >60 mL/min/1.73 m 2   TROPONIN   Result Value Ref Range    Troponin T 107 (H) 6 - 19 ng/L   EKG   Result Value Ref Range    Report       [facility]    Test Date:  2023-08-10  Pt Name:    GIORGIO SALMERON               Department: EDS  MRN:        [id number]                      Room:  Gender:     Male                         Technician: CSA  :        1945                   Requested By:ER TRIAGE PROTOCOL  Order #:    [id number]                    Reading MD: Joy Montesinos    Measurements  Intervals                                Axis  Rate:       84                           P:          0  WI:         0                            QRS:        -21  QRSD:       98                           T:          36  QT:         367  QTc:        434    Interpretive Statements  NORMAL SINUS RHYTHM    Borderline left axis deviation  Compared to ECG 08/10/2023 12:48:13  Sinus rhythm no longer present  Electronically Signed On 08- 18:11:41 PDT by Joy Montesinos     EKG (NOW)   Result Value Ref Range    Report       [facility]    Test Date:  2023-08-10  Pt Name:    GIORGIO SALMERON               Department: EDS  MRN:        [id number]                      [facility]  Gender:     Male                         Technician: [id number]  :        1945                   Requested By:JOY MONTESINOS  Order #:    [id number]                    Reading MD: Joy Montesinos    Measurements  Intervals                                Axis  Rate:       74                           P:          36  WI:         141                          QRS:        -27  QRSD:       106                          T:          11  QT:          394  QTc:        438    Interpretive Statements  Sinus rhythm  Left ventricular hypertrophy  Anterior ST elevation, probably due to LVH  Compared to ECG 08/10/2023 17:59:27  Left ventricular hypertrophy now present  ST (T wave) deviation now present  Electronically Signed On 08- 19:03:13 PDT by Leobardo Sampson        All labs reviewed by me. Labs were compared to prior labs if they were available. Significant for no leukocytosis, no significant EMEA, normal electrolytes, mild hyperglycemia of 291, normal renal function, normal liver enzymes, normal bilirubin, troponin mildly elevated 110 after cardioversion, lipase normal.    RADIOLOGY  I have independently interpreted the diagnostic imaging associated with this visit and am waiting the final reading from the radiologist.   My preliminary interpretation is a follows: No significant cardiomegaly, no focal saltation  Radiologist interpretation is as follows:  DX-CHEST-PORTABLE (1 VIEW)   Final Result      No acute cardiopulmonary abnormality.           COURSE & MEDICAL DECISION MAKING  Nursing notes, VS, PMSFHx, labs, imaging, EKG reviewed in chart.    Heart Score: Moderate    ED Observation Status? No; Patient does not meet criteria for ED Observation.     Ddx: Strain, sprain, fracture, dislocation, aspiration, pneumonia, MI, PE    MDM: 6:12 PM Magnus Claudio is a 78 y.o. male who presented with episode of chest discomfort this afternoon that was nonexertional, nonpleuritic, nonradiating.  Patient was cardioverted this morning for atrial flutter, with 50 J, in the ED, discharge, already anticoagulated.  Returns after sitting in the couch today having about a 10-minute episode of chest discomfort that was central, while at rest.  It is resolved by time he reached the ED.  Wife at bedside with right collateral formation.  His vital signs thankfully are completely unremarkable and he is a very reassuring exam, his chest pain-free.  Will initiate  workup here with EKG which shows reassuring sinus rhythm without signs of ischemia, and labs ordered as well as chest x-ray.  All labs reviewed by me. Labs were compared to prior labs if they were available. Significant for no leukocytosis, no significant EMEA, normal electrolytes, mild hyperglycemia of 291, normal renal function, normal liver enzymes, normal bilirubin, troponin mildly elevated 110 after cardioversion, lipase normal.  Repeat EKG is unchanged, showing normal sinus rhythm without ischemic changes or arrhythmia.  Repeat troponin ordered shows downtrending at 107.  At this time as patient is observed for several hours, as chest pain for the entirety of his evaluation here, remains in normal sinus rhythm on the monitor, with normal vital signs and repeat benign serial exams, he is appropriate for discharge home and outpatient follow-up as needed.  Wife and him at bedside verbalized understand strict return precautions outpatient follow plan and are amenable.    ADDITIONAL PROBLEM LIST AND DISPOSITION    I have discussed management of the patient with the following physicians and MEGHA's: None    Discussion of management with other QHP or appropriate source(s): None     Escalation of care considered, and ultimately not performed:acute inpatient care management, however at this time, the patient is most appropriate for outpatient management    FINAL IMPRESSION  1. Chest pain, unspecified type Acute   2. Elevated troponin Acute      The note accurately reflects work and decisions made by me.  Leobardo Sampson  8/10/2023  6:12 PM

## 2023-08-11 NOTE — DISCHARGE INSTRUCTIONS
Thankfully your troponin is downtrending which means you are not having a heart attack.  It is good that your chest pain resolved.  If you have any worsening symptoms follow-up with your PCP or return to ED.  Thank for coming in today.

## 2023-08-11 NOTE — ED NOTES
PT cleared for discharge home pt has no further questions or concerns  pt to f/u with pcp 1-2days pt verbalized understanding. pt advised to return to closest ED for any worsenign symptotms  Pt and wife ambulated out of  ED w/steady gait

## 2023-08-13 ENCOUNTER — APPOINTMENT (OUTPATIENT)
Dept: RADIOLOGY | Facility: MEDICAL CENTER | Age: 78
End: 2023-08-13
Attending: EMERGENCY MEDICINE
Payer: MEDICARE

## 2023-08-13 ENCOUNTER — TELEPHONE (OUTPATIENT)
Dept: MEDICAL GROUP | Facility: PHYSICIAN GROUP | Age: 78
End: 2023-08-13
Payer: MEDICARE

## 2023-08-13 ENCOUNTER — HOSPITAL ENCOUNTER (OUTPATIENT)
Facility: MEDICAL CENTER | Age: 78
End: 2023-08-14
Attending: EMERGENCY MEDICINE | Admitting: HOSPITALIST
Payer: MEDICARE

## 2023-08-13 DIAGNOSIS — I24.9 ACUTE CORONARY SYNDROME (HCC): ICD-10-CM

## 2023-08-13 DIAGNOSIS — R07.9 CHEST PAIN, UNSPECIFIED TYPE: ICD-10-CM

## 2023-08-13 DIAGNOSIS — I48.91 ATRIAL FIBRILLATION, UNSPECIFIED TYPE (HCC): ICD-10-CM

## 2023-08-13 LAB
ALBUMIN SERPL BCP-MCNC: 4 G/DL (ref 3.2–4.9)
ALBUMIN/GLOB SERPL: 1.4 G/DL
ALP SERPL-CCNC: 96 U/L (ref 30–99)
ALT SERPL-CCNC: 51 U/L (ref 2–50)
ANION GAP SERPL CALC-SCNC: 12 MMOL/L (ref 7–16)
AST SERPL-CCNC: 94 U/L (ref 12–45)
BASOPHILS # BLD AUTO: 0.3 % (ref 0–1.8)
BASOPHILS # BLD: 0.02 K/UL (ref 0–0.12)
BILIRUB SERPL-MCNC: 1.3 MG/DL (ref 0.1–1.5)
BUN SERPL-MCNC: 25 MG/DL (ref 8–22)
CALCIUM ALBUM COR SERPL-MCNC: 8.6 MG/DL (ref 8.5–10.5)
CALCIUM SERPL-MCNC: 8.6 MG/DL (ref 8.4–10.2)
CHLORIDE SERPL-SCNC: 104 MMOL/L (ref 96–112)
CO2 SERPL-SCNC: 22 MMOL/L (ref 20–33)
CREAT SERPL-MCNC: 1.53 MG/DL (ref 0.5–1.4)
EKG IMPRESSION: NORMAL
EOSINOPHIL # BLD AUTO: 0.12 K/UL (ref 0–0.51)
EOSINOPHIL NFR BLD: 2.1 % (ref 0–6.9)
ERYTHROCYTE [DISTWIDTH] IN BLOOD BY AUTOMATED COUNT: 42.7 FL (ref 35.9–50)
GFR SERPLBLD CREATININE-BSD FMLA CKD-EPI: 46 ML/MIN/1.73 M 2
GLOBULIN SER CALC-MCNC: 2.8 G/DL (ref 1.9–3.5)
GLUCOSE BLD STRIP.AUTO-MCNC: 92 MG/DL (ref 65–99)
GLUCOSE SERPL-MCNC: 166 MG/DL (ref 65–99)
HCT VFR BLD AUTO: 36.9 % (ref 42–52)
HGB BLD-MCNC: 12.6 G/DL (ref 14–18)
IMM GRANULOCYTES # BLD AUTO: 0.02 K/UL (ref 0–0.11)
IMM GRANULOCYTES NFR BLD AUTO: 0.3 % (ref 0–0.9)
INR PPP: 2.55 (ref 0.87–1.13)
LYMPHOCYTES # BLD AUTO: 0.87 K/UL (ref 1–4.8)
LYMPHOCYTES NFR BLD: 15.1 % (ref 22–41)
MCH RBC QN AUTO: 29.7 PG (ref 27–33)
MCHC RBC AUTO-ENTMCNC: 34.1 G/DL (ref 32.3–36.5)
MCV RBC AUTO: 87 FL (ref 81.4–97.8)
MONOCYTES # BLD AUTO: 0.36 K/UL (ref 0–0.85)
MONOCYTES NFR BLD AUTO: 6.2 % (ref 0–13.4)
NEUTROPHILS # BLD AUTO: 4.38 K/UL (ref 1.82–7.42)
NEUTROPHILS NFR BLD: 76 % (ref 44–72)
NRBC # BLD AUTO: 0 K/UL
NRBC BLD-RTO: 0 /100 WBC (ref 0–0.2)
NT-PROBNP SERPL IA-MCNC: 115 PG/ML (ref 0–125)
PLATELET # BLD AUTO: 169 K/UL (ref 164–446)
PMV BLD AUTO: 9.7 FL (ref 9–12.9)
POTASSIUM SERPL-SCNC: 3.7 MMOL/L (ref 3.6–5.5)
PROT SERPL-MCNC: 6.8 G/DL (ref 6–8.2)
PROTHROMBIN TIME: 28.5 SEC (ref 12–14.6)
RBC # BLD AUTO: 4.24 M/UL (ref 4.7–6.1)
SODIUM SERPL-SCNC: 138 MMOL/L (ref 135–145)
TROPONIN T SERPL-MCNC: 103 NG/L (ref 6–19)
TROPONIN T SERPL-MCNC: 90 NG/L (ref 6–19)
WBC # BLD AUTO: 5.8 K/UL (ref 4.8–10.8)

## 2023-08-13 PROCEDURE — 71275 CT ANGIOGRAPHY CHEST: CPT

## 2023-08-13 PROCEDURE — 83036 HEMOGLOBIN GLYCOSYLATED A1C: CPT

## 2023-08-13 PROCEDURE — 80053 COMPREHEN METABOLIC PANEL: CPT

## 2023-08-13 PROCEDURE — 36415 COLL VENOUS BLD VENIPUNCTURE: CPT

## 2023-08-13 PROCEDURE — 99285 EMERGENCY DEPT VISIT HI MDM: CPT

## 2023-08-13 PROCEDURE — G0378 HOSPITAL OBSERVATION PER HR: HCPCS

## 2023-08-13 PROCEDURE — 700117 HCHG RX CONTRAST REV CODE 255: Performed by: HOSPITALIST

## 2023-08-13 PROCEDURE — 85027 COMPLETE CBC AUTOMATED: CPT

## 2023-08-13 PROCEDURE — 93005 ELECTROCARDIOGRAM TRACING: CPT | Performed by: EMERGENCY MEDICINE

## 2023-08-13 PROCEDURE — A9270 NON-COVERED ITEM OR SERVICE: HCPCS | Performed by: HOSPITALIST

## 2023-08-13 PROCEDURE — 80061 LIPID PANEL: CPT

## 2023-08-13 PROCEDURE — 85025 COMPLETE CBC W/AUTO DIFF WBC: CPT

## 2023-08-13 PROCEDURE — 71045 X-RAY EXAM CHEST 1 VIEW: CPT

## 2023-08-13 PROCEDURE — 99223 1ST HOSP IP/OBS HIGH 75: CPT | Performed by: HOSPITALIST

## 2023-08-13 PROCEDURE — 85610 PROTHROMBIN TIME: CPT | Mod: 91

## 2023-08-13 PROCEDURE — 700102 HCHG RX REV CODE 250 W/ 637 OVERRIDE(OP): Performed by: HOSPITALIST

## 2023-08-13 PROCEDURE — 94760 N-INVAS EAR/PLS OXIMETRY 1: CPT

## 2023-08-13 PROCEDURE — 83880 ASSAY OF NATRIURETIC PEPTIDE: CPT

## 2023-08-13 PROCEDURE — 84484 ASSAY OF TROPONIN QUANT: CPT | Mod: 91

## 2023-08-13 PROCEDURE — 80048 BASIC METABOLIC PNL TOTAL CA: CPT

## 2023-08-13 PROCEDURE — 93005 ELECTROCARDIOGRAM TRACING: CPT

## 2023-08-13 PROCEDURE — 82962 GLUCOSE BLOOD TEST: CPT

## 2023-08-13 RX ORDER — WARFARIN SODIUM 2.5 MG/1
2.5 TABLET ORAL
Status: DISCONTINUED | OUTPATIENT
Start: 2023-08-14 | End: 2023-08-14 | Stop reason: HOSPADM

## 2023-08-13 RX ORDER — ASPIRIN 81 MG/1
324 TABLET, CHEWABLE ORAL ONCE
Status: DISCONTINUED | OUTPATIENT
Start: 2023-08-13 | End: 2023-08-14 | Stop reason: HOSPADM

## 2023-08-13 RX ORDER — TAMSULOSIN HYDROCHLORIDE 0.4 MG/1
0.4 CAPSULE ORAL DAILY
Status: DISCONTINUED | OUTPATIENT
Start: 2023-08-14 | End: 2023-08-14 | Stop reason: HOSPADM

## 2023-08-13 RX ORDER — ASPIRIN 81 MG/1
81 TABLET ORAL DAILY
Status: DISCONTINUED | OUTPATIENT
Start: 2023-08-14 | End: 2023-08-14 | Stop reason: HOSPADM

## 2023-08-13 RX ORDER — ONDANSETRON 4 MG/1
4 TABLET, ORALLY DISINTEGRATING ORAL EVERY 4 HOURS PRN
Status: DISCONTINUED | OUTPATIENT
Start: 2023-08-13 | End: 2023-08-14 | Stop reason: HOSPADM

## 2023-08-13 RX ORDER — AMINOPHYLLINE 25 MG/ML
100 INJECTION, SOLUTION INTRAVENOUS
Status: DISCONTINUED | OUTPATIENT
Start: 2023-08-13 | End: 2023-08-14 | Stop reason: HOSPADM

## 2023-08-13 RX ORDER — ASPIRIN 325 MG
325 TABLET ORAL ONCE
Status: DISCONTINUED | OUTPATIENT
Start: 2023-08-13 | End: 2023-08-14 | Stop reason: HOSPADM

## 2023-08-13 RX ORDER — REGADENOSON 0.08 MG/ML
0.4 INJECTION, SOLUTION INTRAVENOUS
Status: COMPLETED | OUTPATIENT
Start: 2023-08-13 | End: 2023-08-14

## 2023-08-13 RX ORDER — ENOXAPARIN SODIUM 100 MG/ML
40 INJECTION SUBCUTANEOUS DAILY
Status: DISCONTINUED | OUTPATIENT
Start: 2023-08-13 | End: 2023-08-13

## 2023-08-13 RX ORDER — OXYCODONE HYDROCHLORIDE 5 MG/1
2.5 TABLET ORAL
Status: DISCONTINUED | OUTPATIENT
Start: 2023-08-13 | End: 2023-08-14 | Stop reason: HOSPADM

## 2023-08-13 RX ORDER — POLYETHYLENE GLYCOL 3350 17 G/17G
1 POWDER, FOR SOLUTION ORAL
Status: DISCONTINUED | OUTPATIENT
Start: 2023-08-13 | End: 2023-08-14 | Stop reason: HOSPADM

## 2023-08-13 RX ORDER — ACETAMINOPHEN 325 MG/1
650 TABLET ORAL EVERY 6 HOURS PRN
Status: DISCONTINUED | OUTPATIENT
Start: 2023-08-13 | End: 2023-08-14 | Stop reason: HOSPADM

## 2023-08-13 RX ORDER — LOSARTAN POTASSIUM 50 MG/1
100 TABLET ORAL
Status: DISCONTINUED | OUTPATIENT
Start: 2023-08-14 | End: 2023-08-14 | Stop reason: HOSPADM

## 2023-08-13 RX ORDER — GLIMEPIRIDE 2 MG/1
4 TABLET ORAL EVERY MORNING
Status: DISCONTINUED | OUTPATIENT
Start: 2023-08-14 | End: 2023-08-14 | Stop reason: HOSPADM

## 2023-08-13 RX ORDER — AMOXICILLIN 250 MG
2 CAPSULE ORAL 2 TIMES DAILY
Status: DISCONTINUED | OUTPATIENT
Start: 2023-08-13 | End: 2023-08-14 | Stop reason: HOSPADM

## 2023-08-13 RX ORDER — BISACODYL 10 MG
10 SUPPOSITORY, RECTAL RECTAL
Status: DISCONTINUED | OUTPATIENT
Start: 2023-08-13 | End: 2023-08-14 | Stop reason: HOSPADM

## 2023-08-13 RX ORDER — ASPIRIN 300 MG/1
300 SUPPOSITORY RECTAL ONCE
Status: DISCONTINUED | OUTPATIENT
Start: 2023-08-13 | End: 2023-08-14 | Stop reason: HOSPADM

## 2023-08-13 RX ORDER — LOSARTAN POTASSIUM AND HYDROCHLOROTHIAZIDE 12.5; 1 MG/1; MG/1
1 TABLET ORAL DAILY
Status: DISCONTINUED | OUTPATIENT
Start: 2023-08-14 | End: 2023-08-13

## 2023-08-13 RX ORDER — WARFARIN SODIUM 5 MG/1
5 TABLET ORAL
Status: DISCONTINUED | OUTPATIENT
Start: 2023-08-13 | End: 2023-08-14 | Stop reason: HOSPADM

## 2023-08-13 RX ORDER — NITROGLYCERIN 0.4 MG/1
0.4 TABLET SUBLINGUAL
Status: DISCONTINUED | OUTPATIENT
Start: 2023-08-13 | End: 2023-08-14 | Stop reason: HOSPADM

## 2023-08-13 RX ORDER — AMLODIPINE BESYLATE 5 MG/1
10 TABLET ORAL DAILY
Status: DISCONTINUED | OUTPATIENT
Start: 2023-08-14 | End: 2023-08-14 | Stop reason: HOSPADM

## 2023-08-13 RX ORDER — DEXTROSE MONOHYDRATE 25 G/50ML
25 INJECTION, SOLUTION INTRAVENOUS
Status: DISCONTINUED | OUTPATIENT
Start: 2023-08-13 | End: 2023-08-14 | Stop reason: HOSPADM

## 2023-08-13 RX ORDER — HYDROCHLOROTHIAZIDE 12.5 MG/1
12.5 TABLET ORAL
Status: DISCONTINUED | OUTPATIENT
Start: 2023-08-14 | End: 2023-08-14 | Stop reason: HOSPADM

## 2023-08-13 RX ORDER — HYDROMORPHONE HYDROCHLORIDE 1 MG/ML
0.25 INJECTION, SOLUTION INTRAMUSCULAR; INTRAVENOUS; SUBCUTANEOUS
Status: DISCONTINUED | OUTPATIENT
Start: 2023-08-13 | End: 2023-08-14 | Stop reason: HOSPADM

## 2023-08-13 RX ORDER — AMIODARONE HYDROCHLORIDE 200 MG/1
200 TABLET ORAL 2 TIMES DAILY
Status: DISCONTINUED | OUTPATIENT
Start: 2023-08-13 | End: 2023-08-14 | Stop reason: HOSPADM

## 2023-08-13 RX ORDER — ONDANSETRON 2 MG/ML
4 INJECTION INTRAMUSCULAR; INTRAVENOUS EVERY 4 HOURS PRN
Status: DISCONTINUED | OUTPATIENT
Start: 2023-08-13 | End: 2023-08-14 | Stop reason: HOSPADM

## 2023-08-13 RX ORDER — LABETALOL HYDROCHLORIDE 5 MG/ML
10 INJECTION, SOLUTION INTRAVENOUS EVERY 4 HOURS PRN
Status: DISCONTINUED | OUTPATIENT
Start: 2023-08-13 | End: 2023-08-14 | Stop reason: HOSPADM

## 2023-08-13 RX ORDER — OXYCODONE HYDROCHLORIDE 5 MG/1
5 TABLET ORAL
Status: DISCONTINUED | OUTPATIENT
Start: 2023-08-13 | End: 2023-08-14 | Stop reason: HOSPADM

## 2023-08-13 RX ADMIN — IOHEXOL 100 ML: 350 INJECTION, SOLUTION INTRAVENOUS at 19:33

## 2023-08-13 RX ADMIN — WARFARIN SODIUM 5 MG: 5 TABLET ORAL at 23:02

## 2023-08-13 RX ADMIN — AMIODARONE HYDROCHLORIDE 200 MG: 200 TABLET ORAL at 23:02

## 2023-08-13 ASSESSMENT — CHA2DS2 SCORE
SEX: MALE
VASCULAR DISEASE: YES
CHA2DS2 VASC SCORE: 5
DIABETES: YES
AGE 65 TO 74: NO
PRIOR STROKE OR TIA OR THROMBOEMBOLISM: NO
CHF OR LEFT VENTRICULAR DYSFUNCTION: NO
HYPERTENSION: YES
AGE 75 OR GREATER: YES

## 2023-08-13 ASSESSMENT — COGNITIVE AND FUNCTIONAL STATUS - GENERAL
SUGGESTED CMS G CODE MODIFIER DAILY ACTIVITY: CH
MOBILITY SCORE: 24
DAILY ACTIVITIY SCORE: 24
SUGGESTED CMS G CODE MODIFIER MOBILITY: CH

## 2023-08-13 ASSESSMENT — ENCOUNTER SYMPTOMS
BACK PAIN: 0
DIZZINESS: 0
BRUISES/BLEEDS EASILY: 0
TINGLING: 0
INSOMNIA: 0
PND: 0
NAUSEA: 1
DEPRESSION: 0
DIAPHORESIS: 0
SPUTUM PRODUCTION: 0
SENSORY CHANGE: 0
COUGH: 0
FEVER: 0
BLOOD IN STOOL: 0
ABDOMINAL PAIN: 0
RESPIRATORY NEGATIVE: 1
EYE DISCHARGE: 0
CHILLS: 0
POLYDIPSIA: 0
FOCAL WEAKNESS: 0
PHOTOPHOBIA: 0
PSYCHIATRIC NEGATIVE: 1
NEUROLOGICAL NEGATIVE: 1
FALLS: 0
DOUBLE VISION: 0
SHORTNESS OF BREATH: 0
MUSCULOSKELETAL NEGATIVE: 1
WHEEZING: 0
SEIZURES: 0
EYE REDNESS: 0
ORTHOPNEA: 0
FLANK PAIN: 0
EYES NEGATIVE: 1
SINUS PAIN: 0
MYALGIAS: 0
HEARTBURN: 0
STRIDOR: 0

## 2023-08-13 ASSESSMENT — COPD QUESTIONNAIRES
COPD SCREENING SCORE: 2
HAVE YOU SMOKED AT LEAST 100 CIGARETTES IN YOUR ENTIRE LIFE: NO/DON'T KNOW
DURING THE PAST 4 WEEKS HOW MUCH DID YOU FEEL SHORT OF BREATH: NONE/LITTLE OF THE TIME
DO YOU EVER COUGH UP ANY MUCUS OR PHLEGM?: NO/ONLY WITH OCCASIONAL COLDS OR INFECTIONS

## 2023-08-13 ASSESSMENT — LIFESTYLE VARIABLES
SUBSTANCE_ABUSE: 0
HAVE YOU EVER FELT YOU SHOULD CUT DOWN ON YOUR DRINKING: NO
AVERAGE NUMBER OF DAYS PER WEEK YOU HAVE A DRINK CONTAINING ALCOHOL: 0
TOTAL SCORE: 0
ALCOHOL_USE: NO
EVER FELT BAD OR GUILTY ABOUT YOUR DRINKING: NO
TOTAL SCORE: 0
TOTAL SCORE: 0
EVER_SMOKED: NEVER
CONSUMPTION TOTAL: NEGATIVE
HAVE PEOPLE ANNOYED YOU BY CRITICIZING YOUR DRINKING: NO
HOW MANY TIMES IN THE PAST YEAR HAVE YOU HAD 5 OR MORE DRINKS IN A DAY: 0
EVER HAD A DRINK FIRST THING IN THE MORNING TO STEADY YOUR NERVES TO GET RID OF A HANGOVER: NO
ON A TYPICAL DAY WHEN YOU DRINK ALCOHOL HOW MANY DRINKS DO YOU HAVE: 0

## 2023-08-13 ASSESSMENT — PAIN DESCRIPTION - PAIN TYPE: TYPE: ACUTE PAIN

## 2023-08-13 ASSESSMENT — PATIENT HEALTH QUESTIONNAIRE - PHQ9
2. FEELING DOWN, DEPRESSED, IRRITABLE, OR HOPELESS: NOT AT ALL
SUM OF ALL RESPONSES TO PHQ9 QUESTIONS 1 AND 2: 0
1. LITTLE INTEREST OR PLEASURE IN DOING THINGS: NOT AT ALL

## 2023-08-13 ASSESSMENT — FIBROSIS 4 INDEX
FIB4 SCORE: 3.89
FIB4 SCORE: 6.15

## 2023-08-13 NOTE — ED TRIAGE NOTES
Pt amb to triage c/o mid-sternal CP <30min. Pt last seen in er 8-10-23 for same s/s. EKG compl in triage

## 2023-08-13 NOTE — ED NOTES
Med rec updated and complete, per pts wife   Allergies reviewed, per pts wife  Interviewed pt with wife at bedside with permission from pt.  Pts wife reports that pt took all his morning pill yesterday morning and last night.

## 2023-08-14 ENCOUNTER — APPOINTMENT (OUTPATIENT)
Dept: RADIOLOGY | Facility: MEDICAL CENTER | Age: 78
End: 2023-08-14
Attending: HOSPITALIST
Payer: MEDICARE

## 2023-08-14 VITALS
HEIGHT: 71 IN | TEMPERATURE: 97.6 F | DIASTOLIC BLOOD PRESSURE: 60 MMHG | SYSTOLIC BLOOD PRESSURE: 132 MMHG | BODY MASS INDEX: 27.62 KG/M2 | OXYGEN SATURATION: 93 % | HEART RATE: 70 BPM | WEIGHT: 197.31 LBS | RESPIRATION RATE: 18 BRPM

## 2023-08-14 LAB
ANION GAP SERPL CALC-SCNC: 14 MMOL/L (ref 7–16)
BUN SERPL-MCNC: 21 MG/DL (ref 8–22)
CALCIUM SERPL-MCNC: 8.9 MG/DL (ref 8.4–10.2)
CHLORIDE SERPL-SCNC: 101 MMOL/L (ref 96–112)
CHOLEST SERPL-MCNC: 163 MG/DL (ref 100–199)
CO2 SERPL-SCNC: 24 MMOL/L (ref 20–33)
CREAT SERPL-MCNC: 1.4 MG/DL (ref 0.5–1.4)
EKG IMPRESSION: NORMAL
ERYTHROCYTE [DISTWIDTH] IN BLOOD BY AUTOMATED COUNT: 43 FL (ref 35.9–50)
GFR SERPLBLD CREATININE-BSD FMLA CKD-EPI: 51 ML/MIN/1.73 M 2
GLUCOSE BLD STRIP.AUTO-MCNC: 107 MG/DL (ref 65–99)
GLUCOSE BLD STRIP.AUTO-MCNC: 160 MG/DL (ref 65–99)
GLUCOSE SERPL-MCNC: 90 MG/DL (ref 65–99)
HCT VFR BLD AUTO: 42.5 % (ref 42–52)
HDLC SERPL-MCNC: 35 MG/DL
HGB BLD-MCNC: 14.5 G/DL (ref 14–18)
INR PPP: 2.24 (ref 0.87–1.13)
INR PPP: 2.3 (ref 0.87–1.13)
LDLC SERPL CALC-MCNC: 87 MG/DL
MCH RBC QN AUTO: 29.9 PG (ref 27–33)
MCHC RBC AUTO-ENTMCNC: 34.1 G/DL (ref 32.3–36.5)
MCV RBC AUTO: 87.6 FL (ref 81.4–97.8)
PLATELET # BLD AUTO: 184 K/UL (ref 164–446)
PMV BLD AUTO: 9.8 FL (ref 9–12.9)
POTASSIUM SERPL-SCNC: 3.5 MMOL/L (ref 3.6–5.5)
PROCALCITONIN SERPL-MCNC: 0.1 NG/ML
PROTHROMBIN TIME: 25.9 SEC (ref 12–14.6)
PROTHROMBIN TIME: 26.2 SEC (ref 12–14.6)
RBC # BLD AUTO: 4.85 M/UL (ref 4.7–6.1)
SODIUM SERPL-SCNC: 139 MMOL/L (ref 135–145)
TRIGL SERPL-MCNC: 205 MG/DL (ref 0–149)
TROPONIN T SERPL-MCNC: 90 NG/L (ref 6–19)
WBC # BLD AUTO: 6.4 K/UL (ref 4.8–10.8)

## 2023-08-14 PROCEDURE — 78452 HT MUSCLE IMAGE SPECT MULT: CPT

## 2023-08-14 PROCEDURE — 700111 HCHG RX REV CODE 636 W/ 250 OVERRIDE (IP): Performed by: HOSPITALIST

## 2023-08-14 PROCEDURE — 96374 THER/PROPH/DIAG INJ IV PUSH: CPT

## 2023-08-14 PROCEDURE — 99239 HOSP IP/OBS DSCHRG MGMT >30: CPT | Performed by: INTERNAL MEDICINE

## 2023-08-14 PROCEDURE — 93010 ELECTROCARDIOGRAM REPORT: CPT | Performed by: INTERNAL MEDICINE

## 2023-08-14 PROCEDURE — G0378 HOSPITAL OBSERVATION PER HR: HCPCS

## 2023-08-14 PROCEDURE — 82962 GLUCOSE BLOOD TEST: CPT

## 2023-08-14 PROCEDURE — A9270 NON-COVERED ITEM OR SERVICE: HCPCS | Performed by: HOSPITALIST

## 2023-08-14 PROCEDURE — 85610 PROTHROMBIN TIME: CPT

## 2023-08-14 PROCEDURE — A9270 NON-COVERED ITEM OR SERVICE: HCPCS | Performed by: INTERNAL MEDICINE

## 2023-08-14 PROCEDURE — 700102 HCHG RX REV CODE 250 W/ 637 OVERRIDE(OP): Performed by: HOSPITALIST

## 2023-08-14 PROCEDURE — 84145 PROCALCITONIN (PCT): CPT

## 2023-08-14 PROCEDURE — 93018 CV STRESS TEST I&R ONLY: CPT | Performed by: STUDENT IN AN ORGANIZED HEALTH CARE EDUCATION/TRAINING PROGRAM

## 2023-08-14 PROCEDURE — 78452 HT MUSCLE IMAGE SPECT MULT: CPT | Mod: 26 | Performed by: STUDENT IN AN ORGANIZED HEALTH CARE EDUCATION/TRAINING PROGRAM

## 2023-08-14 PROCEDURE — 36415 COLL VENOUS BLD VENIPUNCTURE: CPT

## 2023-08-14 PROCEDURE — 94760 N-INVAS EAR/PLS OXIMETRY 1: CPT

## 2023-08-14 PROCEDURE — 700102 HCHG RX REV CODE 250 W/ 637 OVERRIDE(OP): Performed by: INTERNAL MEDICINE

## 2023-08-14 RX ORDER — ATORVASTATIN CALCIUM 40 MG/1
40 TABLET, FILM COATED ORAL EVERY EVENING
Status: DISCONTINUED | OUTPATIENT
Start: 2023-08-14 | End: 2023-08-14 | Stop reason: HOSPADM

## 2023-08-14 RX ORDER — ATORVASTATIN CALCIUM 40 MG/1
40 TABLET, FILM COATED ORAL EVERY EVENING
Qty: 30 TABLET | Refills: 0 | Status: SHIPPED | OUTPATIENT
Start: 2023-08-14 | End: 2023-09-15 | Stop reason: SDUPTHER

## 2023-08-14 RX ORDER — OMEPRAZOLE 20 MG/1
20 CAPSULE, DELAYED RELEASE ORAL DAILY
Qty: 30 CAPSULE | Refills: 0 | Status: SHIPPED | OUTPATIENT
Start: 2023-08-14 | End: 2023-08-17

## 2023-08-14 RX ADMIN — GLIMEPIRIDE 4 MG: 2 TABLET ORAL at 05:30

## 2023-08-14 RX ADMIN — METOPROLOL TARTRATE 12.5 MG: 25 TABLET, FILM COATED ORAL at 16:26

## 2023-08-14 RX ADMIN — AMLODIPINE BESYLATE 10 MG: 5 TABLET ORAL at 05:30

## 2023-08-14 RX ADMIN — REGADENOSON 0.4 MG: 0.08 INJECTION, SOLUTION INTRAVENOUS at 10:46

## 2023-08-14 RX ADMIN — AMIODARONE HYDROCHLORIDE 200 MG: 200 TABLET ORAL at 05:30

## 2023-08-14 RX ADMIN — HYDROCHLOROTHIAZIDE 12.5 MG: 12.5 TABLET ORAL at 11:57

## 2023-08-14 RX ADMIN — LOSARTAN POTASSIUM 100 MG: 50 TABLET, FILM COATED ORAL at 11:57

## 2023-08-14 RX ADMIN — TAMSULOSIN HYDROCHLORIDE 0.4 MG: 0.4 CAPSULE ORAL at 05:31

## 2023-08-14 ASSESSMENT — FIBROSIS 4 INDEX: FIB4 SCORE: 5.65

## 2023-08-14 NOTE — ASSESSMENT & PLAN NOTE
Optimize blood pressure management keep systolic blood pressure less than 140 diastolic under 90  Continue with Norvasc 10 mg daily, hydrochlorothiazide 12.5 mg daily, losartan 100 mg daily, as needed labetalol

## 2023-08-14 NOTE — PROGRESS NOTES
Inpatient Anticoagulation Service Note for 8/13/2023      Reason for Anticoagulation: Atrial Fibrillation, Deep Vein Thrombosis   [id number] VASc Score: 5  HAS-BLED Score: 2    Hemoglobin Value: (!) 12.6  Hematocrit Value: (!) 36.9  Lab Platelet Value: 169  Target INR: 2.0 to 3.0    INR from last 7 days       Date/Time INR Value    08/13/23 1640 2.55          Dose from last 7 days       Date/Time Dose (mg)    08/13/23 1951 5          Significant Interactions: Amiodarone, Antiplatelet Medications  Bridge Therapy: No     Reversal Agent Administered: Not Applicable    Patient is warfarin for history of DVT and a-fib. Prior to hospital arrival patient was taking 2.5 mg on Mon, Fri and 5 mg on all other days. Managed by [facility] anticoagulation clinic. Current INR is 2.55. Goal targeting 2-3. Identified DDI amiodarone (new start 8/10/23) and aspirin. Diet ordered. H/H stable no overt s/sx of bleeding. Continue warfarin and adjust according to INR results. INR in AM.     New start amiodarone could increase sensitivity to warfarin. Consider warfarin dose reduction if INR trends up.       Plan: INR therapeutic. Continue home warfarin dose. INR in AM         Education Material Provided?: No    Pharmacist suggested discharge dosing: to be determined closed to discharged, consider resuming home dosing regimen. Consider follow up INR in 48 to 72 hours after patient discharges from the hospital.       Jessica Herrera, PharmD

## 2023-08-14 NOTE — CARE PLAN
The patient is Stable - Low risk of patient condition declining or worsening    Shift Goals  Clinical Goals: Safety, Stress test in AM, NPO at midnight, monito BP  Patient Goals: Rest  Family Goals: Safety with ambulating to bathroom    Progress made toward(s) clinical / shift goals:    Problem: Pain - Standard  Goal: Alleviation of pain or a reduction in pain to the patient’s comfort goal  8/14/2023 0432 by Felton Camarena-Zoltan, R.N.  Outcome: Progressing  Note: Pt's chest pain will be controlled or pt will report decreased pain sensation. Pt reported no pain at the time of first encounter. Pt educated to call if he feels his pain is becoming unbearable. Pt able to get rest during the night.      Problem: Knowledge Deficit - Standard  Goal: Patient and family/care givers will demonstrate understanding of plan of care, disease process/condition, diagnostic tests and medications  Outcome: Progressing  Note: Pt and family educated on POC. Pt and family reported understanding of POC and safety interventions to ensure the pt remains safe and fall free. Pt calls to request toileting needs and is stand by. Pt tolerates ambulating well and needs minimal assisstance.      Problem: Hemodynamics  Goal: Patient's hemodynamics, fluid balance and neurologic status will be stable or improve  Outcome: Progressing  Note: Blood pressure monitored and controlled with pharmacological interventions. Pt's blood pressure maintained and stable. Pt shows no signs of symptoms and is resting comfortably.      Patient is not progressing towards the following goals:

## 2023-08-14 NOTE — PROGRESS NOTES
4 Eyes Skin Assessment Completed by PORSCHE Ya and PORSCHE Salvador.    Head WDL  Ears WDL  Nose WDL  Mouth WDL  Neck WDL  Breast/Chest WDL  Shoulder Blades WDL  Spine WDL  (R) Arm/Elbow/Hand WDL  (L) Arm/Elbow/Hand WDL  Abdomen Scar  Groin WDL  Scrotum/Coccyx/Buttocks WDL  (R) Leg WDL  (L) Leg WDL  (R) Heel/Foot/Toe Blanching  (L) Heel/Foot/Toe Blanching          Devices In Places Tele Box, Blood Pressure Cuff, Pulse Ox, and Nasal Cannula      Interventions In Place Gray Ear Foams and Pillows    Possible Skin Injury No    Pictures Uploaded Into Epic N/A  Wound Consult Placed N/A  RN Wound Prevention Protocol Ordered No

## 2023-08-14 NOTE — CARE PLAN
Problem: Pain - Standard  Goal: Alleviation of pain or a reduction in pain to the patient’s comfort goal  Outcome: Progressing     Problem: Knowledge Deficit - Standard  Goal: Patient and family/care givers will demonstrate understanding of plan of care, disease process/condition, diagnostic tests and medications  Outcome: Progressing     Problem: Hemodynamics  Goal: Patient's hemodynamics, fluid balance and neurologic status will be stable or improve  Outcome: Progressing   The patient is Stable - Low risk of patient condition declining or worsening    Shift Goals  Clinical Goals: Stress  Patient Goals: Rest  Family Goals: answerers     Progress made toward(s) clinical / shift goals: stress    Patient is not progressing towards the following goals: no interventions

## 2023-08-14 NOTE — PROGRESS NOTES
Telemetry Shift Summary     Rhythm SB - SR  HR Range 54 - 78  Ectopy rPAC  Measurements  0.16/0.12/0.44     Normal Values  Rhythm SR  HR Range    Measurements 0.12-0.20 / 0.06-0.10  / 0.30-0.52

## 2023-08-14 NOTE — H&P
Hospital Medicine History & Physical Note    Date of Service  8/13/2023    Primary Care Physician  Leobardo Wright M.D.    Consultants  None    Specialist Names: None    Code Status  Full Code    Chief Complaint  Chief Complaint   Patient presents with    Chest Pain       History of Presenting Illness  Magnus Claudio is a 78 y.o. male who presented 8/13/2023 with chest pain.  Chest pain has come on without any rhyme or reason according to the patient.  He is not able to quantify it and he is not able to tell me the character of the pain he says is just in the middle it just hurts.  Patient recently had atrial fibrillation with cardioversion and has been on amiodarone ever since.  He is currently in sinus rhythm at a rate of 70.  The patient's chest pain is substernal.  There is some nausea and shortness of breath accompanying it.  The patient will undergo serial cardiac markers followed by a stress test in the morning.    I discussed the plan of care with patient, family, bedside RN, and emergency room physician .    Review of Systems  Review of Systems   Constitutional:  Positive for malaise/fatigue. Negative for chills, diaphoresis and fever.   HENT: Negative.  Negative for nosebleeds and sinus pain.    Eyes: Negative.  Negative for double vision, photophobia, discharge and redness.   Respiratory: Negative.  Negative for cough, sputum production, shortness of breath, wheezing and stridor.    Cardiovascular:  Positive for chest pain. Negative for orthopnea, leg swelling and PND.   Gastrointestinal:  Positive for nausea. Negative for abdominal pain, blood in stool and heartburn.   Genitourinary: Negative.  Negative for dysuria, flank pain and frequency.   Musculoskeletal: Negative.  Negative for back pain, falls and myalgias.   Skin: Negative.  Negative for itching.   Neurological: Negative.  Negative for dizziness, tingling, sensory change, focal weakness and seizures.   Endo/Heme/Allergies: Negative.   Negative for polydipsia. Does not bruise/bleed easily.   Psychiatric/Behavioral: Negative.  Negative for depression, substance abuse and suicidal ideas. The patient does not have insomnia.    All other systems reviewed and are negative.      Past Medical History   has a past medical history of Anemia, Blood clotting disorder (HCC), Cancer (HCC) (2021), Cataract, Diabetes (HCC), Diverticulitis, Hypertension, and Vertigo, benign positional.    Surgical History   has a past surgical history that includes colon resection; pr resec liver,part lobectomy; laminotomy; eye surgery (Bilateral); cataract extraction with iol; other (05/2021); and inguinal hernia repair robotic xi (Left, 2/11/2022).     Family History  family history includes Cancer in his mother; Diabetes in his sister; Heart Attack in his father; Hypertension in his father.   Family history reviewed with patient. There is no family history that is pertinent to the chief complaint.     Social History   reports that he has never smoked. He has never used smokeless tobacco. He reports that he does not currently use alcohol. He reports that he does not use drugs.    Allergies  Allergies   Allergen Reactions    Morphine Unspecified     Hallucinations, 15+ years ago       Medications  Prior to Admission Medications   Prescriptions Last Dose Informant Patient Reported? Taking?   B Complex-Folic Acid (B COMPLEX-VITAMIN B12 PO) 8/12/2023 at 0800 Significant Other Yes No   Sig: Take 1 Tablet by mouth every day.   amLODIPine (NORVASC) 10 MG Tab 8/12/2023 at 2030 Significant Other No No   Sig: Take 1 Tablet by mouth every day.   amiodarone (CORDARONE) 200 MG Tab 8/13/2023 at 0900 Significant Other No No   Sig: Take 1 Tablet by mouth 2 times a day. After 2 weeks switch to once a day   Patient taking differently: Take 200 mg by mouth 2 times a day. After 2 weeks switch to once a day, pt started on 8/10/2023   glimepiride (AMARYL) 4 MG Tab 8/12/2023 at 2030 Significant  Other No No   Sig: TAKE 1 TABLET BY MOUTH EVERY MORNING   losartan-hydrochlorothiazide (HYZAAR) 100-12.5 MG per tablet 8/12/2023 at 2030 Significant Other No No   Sig: Take 1 Tablet by mouth every day.   metFORMIN ER (GLUCOPHAGE XR) 500 MG TABLET SR 24 HR 8/13/2023 at 0900 Significant Other No No   Sig: TAKE 2 TABLETS BY MOUTH TWICE DAILY   Patient taking differently: Take 1,000 mg by mouth 2 times a day. TAKE 2 TABLETS BY MOUTH TWICE DAILY   tamsulosin (FLOMAX) 0.4 MG capsule 8/12/2023 at 2030 Significant Other No No   Sig: TAKE 1 CAPSULE BY MOUTH EVERY DAY   therapeutic multivitamin-minerals (THERAGRAN-M) Tab 8/12/2023 at 0800 Significant Other Yes No   Sig: Take 1 Tab by mouth every day.   warfarin (COUMADIN) 2.5 MG Tab 8/12/2023 at 2030 Significant Other No No   Sig: Take 1-2 Tablets by mouth every day. As directed by [facility]   Patient taking differently: Take 2.5-5 mg by mouth every day. 2.5 mg on Monday and Friday   5 mg all other days      Facility-Administered Medications: None       Physical Exam  Temp:  [36.6 °C (97.9 °F)] 36.6 °C (97.9 °F)  Pulse:  [64-74] 64  Resp:  [17] 17  BP: (140-144)/(71-75) 140/75  SpO2:  [94 %-96 %] 94 %  Blood Pressure : (!) 140/75   Temperature: 36.6 °C (97.9 °F)   Pulse: 64   Respiration: 17   Pulse Oximetry: 94 %       Physical Exam  Vitals and nursing note reviewed. Exam conducted with a chaperone present.   Constitutional:       General: He is not in acute distress.     Appearance: Normal appearance. He is well-developed and normal weight. He is not ill-appearing, toxic-appearing or diaphoretic.   HENT:      Head: Normocephalic and atraumatic.      Right Ear: External ear normal.      Left Ear: External ear normal.      Nose: Nose normal.      Mouth/Throat:      Mouth: Mucous membranes are moist.      Pharynx: Oropharynx is clear.   Eyes:      Extraocular Movements: Extraocular movements intact.      Conjunctiva/sclera: Conjunctivae normal.      Pupils:  Pupils are equal, round, and reactive to light.   Neck:      Thyroid: No thyromegaly.      Vascular: No JVD.   Cardiovascular:      Rate and Rhythm: Normal rate and regular rhythm.      Pulses: Normal pulses.      Heart sounds: Normal heart sounds.   Pulmonary:      Effort: Pulmonary effort is normal.      Breath sounds: Normal breath sounds.   Chest:      Chest wall: No tenderness.   Abdominal:      General: Abdomen is flat. Bowel sounds are normal. There is no distension.      Palpations: Abdomen is soft. There is no mass.      Tenderness: There is no abdominal tenderness. There is no guarding or rebound.   Musculoskeletal:         General: Normal range of motion.      Cervical back: Normal range of motion and neck supple.      Right lower leg: No edema.   Lymphadenopathy:      Cervical: No cervical adenopathy.   Skin:     General: Skin is warm and dry.      Capillary Refill: Capillary refill takes more than 3 seconds.      Findings: No rash.   Neurological:      General: No focal deficit present.      Mental Status: He is alert and oriented to person, place, and time. Mental status is at baseline.      GCS: GCS eye subscore is 4. GCS verbal subscore is 5. GCS motor subscore is 6.      Cranial Nerves: No cranial nerve deficit.      Deep Tendon Reflexes: Reflexes are normal and symmetric.   Psychiatric:         Mood and Affect: Mood normal.         Behavior: Behavior normal.         Thought Content: Thought content normal.         Judgment: Judgment normal.         Laboratory:  Recent Labs     08/13/23  1640   WBC 5.8   RBC 4.24*   HEMOGLOBIN 12.6*   HEMATOCRIT 36.9*   MCV 87.0   MCH 29.7   MCHC 34.1   RDW 42.7   PLATELETCT 169   MPV 9.7     Recent Labs     08/13/23  1640   SODIUM 138   POTASSIUM 3.7   CHLORIDE 104   CO2 22   GLUCOSE 166*   BUN 25*   CREATININE 1.53*   CALCIUM 8.6     Recent Labs     08/13/23  1640   ALTSGPT 51*   ASTSGOT 94*   ALKPHOSPHAT 96   TBILIRUBIN 1.3   GLUCOSE 166*     Recent Labs      08/13/23  1640   INR 2.55*     Recent Labs     08/13/23  1640   NTPROBNP 115         Recent Labs     08/13/23  1640 08/13/23  1918   TROPONINT 103* 90*       Imaging:  CT-CTA CHEST PULMONARY ARTERY W/ RECONS   Final Result      1.  No central or segmental pulmonary embolus   2.  Small area of airspace disease in the RIGHT lung could be chronic or subclinical pneumonia   3.  Prior RIGHT hepatectomy            DX-CHEST-PORTABLE (1 VIEW)   Final Result         1. No acute cardiopulmonary abnormalities are identified.      NM-CARDIAC STRESS TEST    (Results Pending)       X-Ray:  I have personally reviewed the images and compared with prior images.  EKG:  I have personally reviewed the images and compared with prior images.    Assessment/Plan:  Justification for Admission Status  I anticipate this patient is appropriate for observation status at this time because chest pain which can be evaluated in 23 hours or less of hospital management    Patient will need a Telemetry bed on MEDICAL service .  The need is secondary to chest pain.    * Chest pain- (present on admission)  Assessment & Plan  The 10-year ASCVD risk score (Car HONG, et al., 2019) is: 64.7%    Values used to calculate the score:      Age: 78 years      Sex: Male      Is Non- : No      Diabetic: Yes      Tobacco smoker: No      Systolic Blood Pressure: 140 mmHg      Is BP treated: Yes      HDL Cholesterol: 32 mg/dL      Total Cholesterol: 147 mg/dL   Patient states chest pain has been ongoing since yesterday.  It got much worse today.  Chest pain is substernal but he cannot describe the type of pain it is  It is steady and accompanied by some nausea and shortness of breath.  Serial troponins  Telemetry monitoring  Stress test in the morning  Echocardiogram      AF (atrial fibrillation) ([facility])  Assessment & Plan  History of atrial fibrillation most recently was cardioverted about 4 days ago at which point in time patient was started  on amiodarone and has been on it since  Coumadin per pharmacy protocol    Controlled type 2 diabetes mellitus without complication, without long-term current use of insulin (HCC)- (present on admission)  Assessment & Plan  Accu-Cheks with sliding scale coverage  Diabetic diet  Continue with glimepiride  Hold metformin  Most recent hemoglobin A1c 6.5    Essential hypertension- (present on admission)  Assessment & Plan  Optimize blood pressure management keep systolic blood pressure less than 140 diastolic under 90  Continue with Norvasc 10 mg daily, hydrochlorothiazide 12.5 mg daily, losartan 100 mg daily, as needed labetalol    History of bladder cancer- (present on admission)  Assessment & Plan  History of bladder cancer continue outpatient surveillance with urology.    Pulmonary hypertension (HCC)- (present on admission)  Assessment & Plan  Optimize preload reduction continue with hydrochlorothiazide and ARB    Gilbert syndrome- (present on admission)  Assessment & Plan  Monitor bilirubin    History of colon cancer, stage IV- (present on admission)  Assessment & Plan  Continue with outpatient surveillance with surgery        VTE prophylaxis: SCDs/TEDs and Coumadin

## 2023-08-14 NOTE — ED PROVIDER NOTES
ED Provider Note    CHIEF COMPLAINT  Chief Complaint   Patient presents with    Chest Pain       EXTERNAL RECORDS REVIEWED  Other I reviewed a mobile encounter note from yesterday that indicates the patient accidentally took his morning medications in the evening last night it does not sound like there are any adverse effects and the patient apparently reported a blood pressure of 143/82 at that time.  I also reviewed ER notes from August 10 of this year the patient was seen in the emergency department in the morning with tachycardia and this was initially thought to be in SVT he was given adenosine and that revealed an underlying atrial flutter the case was reviewed with cardiology and the patient was cardioverted.  The patient is on Coumadin for history of DVTs and states he has not missed any doses.  His troponin at that time was 92.  The patient was subsequently discharged home and he returned later that same day with a complaint of chest pain and epigastric discomfort and he was evaluated his initial troponin was 110 and a recheck showed a value of 107 so it was felt that this was trending downward presumably after his cardioversion and he was discharged home.    HPI/SENAIT    Magnus Claudio is a 78 y.o. male who presents to the emergency department today complaining of 30 minutes of severe midsternal chest pain.  The patient says he was at rest watching television and rather suddenly developed 10 out of 10 midsternal nonradiating chest pain.  His family has brought him to the emergency department he says that his discomfort is tapering and was initially 6 out of 10 during our first talk and has now completely resolved.  He does not recognize any other specific exacerbating or alleviating factors he did not feel a sensation of rapid heart rate or palpitations during this episode.  He did have some mild dyspnea during the episode but no hemoptysis.  Once again the patient confirms he has not missed any doses  of his Coumadin.  Review of systems: No recent fever no nausea vomiting or diarrhea no pain or discomfort with urination.    PAST MEDICAL HISTORY   has a past medical history of Anemia, Blood clotting disorder (HCC), Cancer (HCC) (2021), Cataract, Diabetes (HCC), Diverticulitis, Hypertension, and Vertigo, benign positional.    SURGICAL HISTORY   has a past surgical history that includes colon resection; resec liver,part lobectomy; laminotomy; eye surgery (Bilateral); cataract extraction with iol; other (05/2021); and inguinal hernia repair robotic xi (Left, 2/11/2022).    FAMILY HISTORY  Family History   Problem Relation Age of Onset    Hypertension Father     Heart Attack Father     Cancer Mother         Breast    Diabetes Sister     Hyperlipidemia Neg Hx         unknown       SOCIAL HISTORY  Social History     Tobacco Use    Smoking status: Never    Smokeless tobacco: Never   Vaping Use    Vaping Use: Never used   Substance and Sexual Activity    Alcohol use: Not Currently     Alcohol/week: 0.0 oz    Drug use: Never    Sexual activity: Not Currently     Partners: Female       CURRENT MEDICATIONS  Home Medications       Reviewed by Bing Lima (Pharmacy Tech) on 08/13/23 at 1629  Med List Status: Complete     Medication Last Dose Status   amiodarone (CORDARONE) 200 MG Tab 8/13/2023 Active   amLODIPine (NORVASC) 10 MG Tab 8/12/2023 Active   B Complex-Folic Acid (B COMPLEX-VITAMIN B12 PO) 8/12/2023 Active   glimepiride (AMARYL) 4 MG Tab 8/12/2023 Active   losartan-hydrochlorothiazide (HYZAAR) 100-12.5 MG per tablet 8/12/2023 Active   metFORMIN ER (GLUCOPHAGE XR) 500 MG TABLET SR 24 HR 8/13/2023 Active   tamsulosin (FLOMAX) 0.4 MG capsule 8/12/2023 Active   therapeutic multivitamin-minerals (THERAGRAN-M) Tab 8/12/2023 Active   warfarin (COUMADIN) 2.5 MG Tab 8/12/2023 Active                    ALLERGIES  Allergies   Allergen Reactions    Morphine Unspecified     Hallucinations, 15+ years ago       PHYSICAL  "EXAM  VITAL SIGNS: BP (!) 144/74   Pulse 74   Temp 36.6 °C (97.9 °F) (Temporal)   Resp 17   Ht 1.803 m (5' 11\")   Wt 86.7 kg (191 lb 2.2 oz)   SpO2 95%   BMI 26.66 kg/m²    Constitutional: Awake lucid verbal he does not appear toxic or distressed  HENT: Mucous membranes are dry  Eyes: No erythema discharge or jaundice  Neck: Trachea midline no JVD   Cardiovascular: Regular rate and rhythm  Respiratory: Clear bilaterally I do not hear crackles or wheezes  Abdomen: Soft and nontender no rebound guarding or peritoneal findings  Skin: Warm and dry the patient has  Musculoskeletal: 2+ pitting edema in both lower extremities mostly around the left ankle and apparently he does have chronic edema but his wife thinks that this is a little bit more than he usually has  Neurologic: Awake lucid verbal moving all extremities without difficulty        DIAGNOSTIC STUDIES / PROCEDURES  EKG  I have independently interpreted this EKG     Results for orders placed or performed during the hospital encounter of 23   EKG   Result Value Ref Range    Report       [facility]    Test Date:  2023  Pt Name:    GIORGIO SALMERON               Department: [facility]  MRN:        [id number]                      Room:  Gender:     Male                         Technician: SHEA  :        [id number]                   Requested By:ER TRIAGE PROTOCOL  Order #:    [id number]                    Reading MD: KARISSA WATSON MD    Measurements  Intervals                                Axis  Rate:       78                           P:          17  LA:         152                          QRS:        -24  QRSD:       110                          T:          40  QT:         393  QTc:        448    Interpretive Statements  Sinus rhythm 78 bpm, left axis deviation, no pathologic ST elevation or  depression moderately large voltages noted in V3 through V6.  Findings are  very very similar to the EKG from " 2023 at 6:30 PM  Electronically Signed On 08- 17:25:20 PDT by KARISSA WATSON MD           LABS  CBC shows white blood cell count of 5.8 hemoglobin is adequate at 12.6 INR is therapeutic at 2.55 basic metabolic panel showed a slightly elevated creatinine of 1.53 blood glucose is slightly elevated at 166 liver functions are slightly elevated with AST of 94 ALT of 51 the troponin today is elevated at 103.    RADIOLOGY  I have independently interpreted the diagnostic imaging associated with this visit and am waiting the final reading from the radiologist.   My preliminary interpretation is as follows: I do not see focal infiltrate or effusion  Radiologist interpretation:   DX-CHEST-PORTABLE (1 VIEW)   Final Result         1. No acute cardiopulmonary abnormalities are identified.      CT-CTA CHEST PULMONARY ARTERY W/ RECONS    (Results Pending)     CT-CTA CHEST PULMONARY ARTERY W/ RECONS   Final Result      1.  No central or segmental pulmonary embolus   2.  Small area of airspace disease in the RIGHT lung could be chronic or subclinical pneumonia   3.  Prior RIGHT hepatectomy            DX-CHEST-PORTABLE (1 VIEW)   Final Result         1. No acute cardiopulmonary abnormalities are identified.      NM-CARDIAC STRESS TEST    (Results Pending)           COURSE & MEDICAL DECISION MAKING  In the emergency department an IV was established and the patient placed on the cardiac monitor.  As mentioned above his chest pain has now completely resolved with no specific intervention.  It is concerning that his troponin is 103 I think it would be difficult to attribute that  to  the cardioversion 3 days ago given that the value was 107 at that time so I am concerned about acute coronary syndrome.  I have reviewed all the findings with the patient and his wife and I have reviewed the case with the hospitalist and the patient is referred to the hospitalist service for further evaluation and treatment.      FINAL DIAGNOSIS  1.   Acute coronary syndrome with elevated troponin       Electronically signed by: Bhanu Larsen M.D., 8/13/2023 5:23 PM

## 2023-08-14 NOTE — ASSESSMENT & PLAN NOTE
Accu-Cheks with sliding scale coverage  Diabetic diet  Continue with glimepiride  Hold metformin  Most recent hemoglobin A1c 6.5

## 2023-08-14 NOTE — PROGRESS NOTES
Received report from Manuel MORRELL, over the phone. Pt reported no needs, POC discussed with spouse and adult son at bedside. Pt reported no chest pain. Call light and belongings within reach. Bed locked and in low position. Fall precautions in place.

## 2023-08-14 NOTE — ASSESSMENT & PLAN NOTE
History of atrial fibrillation most recently was cardioverted about 4 days ago at which point in time patient was started on amiodarone and has been on it since  Coumadin per pharmacy protocol

## 2023-08-14 NOTE — PROGRESS NOTES
1525: Called scheduling for cariology appointment next week.     ---they are having a hard time finding an opening.      They informed me the best they can do is keep his current appointment.       Dr. Turner was added to the patients d/c alistair as he was the cards MD dr copeland asked to be added to follow ups.

## 2023-08-14 NOTE — ASSESSMENT & PLAN NOTE
The 10-year ASCVD risk score (Car HONG, et al., 2019) is: 64.7%    Values used to calculate the score:      Age: 78 years      Sex: Male      Is Non- : No      Diabetic: Yes      Tobacco smoker: No      Systolic Blood Pressure: 140 mmHg      Is BP treated: Yes      HDL Cholesterol: 32 mg/dL      Total Cholesterol: 147 mg/dL   Patient states chest pain has been ongoing since yesterday.  It got much worse today.  Chest pain is substernal but he cannot describe the type of pain it is  It is steady and accompanied by some nausea and shortness of breath.  Serial troponins  Telemetry monitoring  Stress test in the morning  Echocardiogram

## 2023-08-14 NOTE — PROGRESS NOTES
Inpatient Anticoagulation Service Note    Date: 8/14/2023    Reason for Anticoagulation: Atrial Fibrillation, Deep Vein Thrombosis   Target INR: 2.0 to 3.0  [id number] VASc Score: 5  HAS-BLED Score: 2   Hemoglobin Value: 14.5  Hematocrit Value: 42.5  Lab Platelet Value: 184    INR from last 7 days       Date/Time INR Value    08/14/23 1230 2.24    08/13/23 2343 2.3    08/13/23 1640 2.55          Dose from last 7 days       Date/Time Dose (mg)    08/14/23 1230 2.5    08/13/23 1951 5          Significant Interactions: Amiodarone, Antiplatelet Medications  Bridge Therapy: No (If less than 5 days and overlap therapy discontinued -- document reason (i.e. Bleed Risk))    (If still on overlap therapy, if No -- document reason (i.e. Bleed Risk))    Reversal Agent Administered: Not Applicable    Plan:  Give home dose of 2.5 mg Warfarin night  Education Material Provided?: No  Pharmacist suggested discharge dosing: Resume home regimen if appropriate.     Bhavna Dimas [facility]

## 2023-08-14 NOTE — DISCHARGE SUMMARY
"Discharge Summary    CHIEF COMPLAINT ON ADMISSION  Chief Complaint   Patient presents with    Chest Pain       Reason for Admission  Chest Pain; SHortness of Breath; D*     Admission Date  8/13/2023    CODE STATUS  Full Code    HPI & HOSPITAL COURSE  As per chart review:  \"78 y.o. male who presented 8/13/2023 with chest pain.  Chest pain has come on without any rhyme or reason according to the patient.  He is not able to quantify it and he is not able to tell me the character of the pain he says is just in the middle it just hurts.  Patient recently had atrial fibrillation with cardioversion and has been on amiodarone ever since.  He is currently in sinus rhythm at a rate of 70.  The patient's chest pain is substernal.  There is some nausea and shortness of breath accompanying it.  The patient will undergo serial cardiac markers followed by a stress test in the morning.\"    The patient was admitted for further management and care.  The patient recently had an echocardiogram on 8/10/2023 as the patient was recently cardioverted.    The patient underwent stress test today which reported:  NUCLEAR IMAGING INTERPRETATION   No reversible ischemia. SDS = 0   Medium sized fixed defect in the inferoseptal and basal inferior segments,    likely prior scar.    Normal left ventricular size, ejection fraction, and wall motion.   ECG INTERPRETATION   Nondiagnostic due to pharmacologic stress test.    We consulted cardiology spoke with Dr. Turner who recommended medical management.  As the patient is on warfarin already he did not recommend further aspirin.  He did recommend beta-blocker so we have started the patient on metoprolol.  The patient already on losartan and we have started the patient on atorvastatin.  The patient denies further chest pain this morning.  As per cardiology the patient can be discharged home and follow-up with them as an outpatient.  Dr. Turner mentioned that he will get him in clinic within the next " week.    Given that we will start the patient was on oxygen, as per nursing staff the patient does not require further oxygen.  We did order procalcitonin in the event the patient was having pneumonia, however was within normal limits.  We did not start the patient on antibiotics.    The patient will require close follow-up with PCP as an outpatient.  The patient feels back to his baseline and would like to go home.  We will discharge patient home.  He will require close follow-up with PCP and cardiology as an outpatient.    It seems the patient has now CKD, creatinine on admission was 1.53, however prior to discharge it was 1.4 which seems to be similar to 8/10/2023.  The patient will require follow-up with PCP and repeat lab work.    Therefore, he is discharged in fair and stable condition to home with close outpatient follow-up.    The patient recovered much more quickly than anticipated on admission.    Discharge Date  08/14/2023    FOLLOW UP ITEMS POST DISCHARGE  Patient will require close follow-up with PCP and cardiology as an outpatient.    DISCHARGE DIAGNOSES  Principal Problem:    Chest pain (POA: Yes)  Active Problems:    History of colon cancer, stage IV (POA: Yes)      Overview: 2000    Essential hypertension (POA: Yes)    Controlled type 2 diabetes mellitus without complication, without long-term current use of insulin (HCC) (POA: Yes)    Gilbert syndrome (POA: Yes)    Pulmonary hypertension (HCC) (POA: Yes)    History of bladder cancer (POA: Yes)    AF (atrial fibrillation) (HCC) (POA: Unknown)  Resolved Problems:    * No resolved hospital problems. *      FOLLOW UP  Future Appointments   Date Time Provider Department Center   9/7/2023 10:30 AM [facility] PHARMACIST [facility]   9/11/2023  1:20 PM Sandra Beckford M.D. [facility] None   12/12/2023 11:00 AM Leobardo Wright M.D. [facility]     Leobardo Wright M.D.  [address]  [id number] [phone]    Schedule an appointment as soon as possible for a visit      Cardiology    Schedule an appointment as soon as possible for a visit        MEDICATIONS ON DISCHARGE     Medication List        START taking these medications        Instructions   atorvastatin 40 MG Tabs  Commonly known as: Lipitor   Take 1 Tablet by mouth every evening.  Dose: 40 mg     metoprolol tartrate 25 MG Tabs  Commonly known as: Lopressor   Take 0.5 Tablets by mouth 2 times a day.  Dose: 12.5 mg     omeprazole 20 MG delayed-release capsule  Commonly known as: PriLOSEC   Take 1 Capsule by mouth every day.  Dose: 20 mg            CHANGE how you take these medications        Instructions   metFORMIN  MG Tb24  What changed:   when to take this  additional instructions  Commonly known as: Glucophage XR   TAKE 2 TABLETS BY MOUTH TWICE DAILY            CONTINUE taking these medications        Instructions   amiodarone 200 MG Tabs  Commonly known as: Cordarone   Take 1 Tablet by mouth 2 times a day. After 2 weeks switch to once a day  Dose: 200 mg     amLODIPine 10 MG Tabs  Commonly known as: Norvasc   Take 1 Tablet by mouth every day.  Dose: 10 mg     B COMPLEX-VITAMIN B12 PO   Take 1 Tablet by mouth every day.  Dose: 1 Tablet     glimepiride 4 MG Tabs  Commonly known as: Amaryl   TAKE 1 TABLET BY MOUTH EVERY MORNING  Dose: 4 mg     losartan-hydrochlorothiazide 100-12.5 MG per tablet  Commonly known as: Hyzaar   Take 1 Tablet by mouth every day.  Dose: 1 Tablet     tamsulosin 0.4 MG capsule  Commonly known as: Flomax   TAKE 1 CAPSULE BY MOUTH EVERY DAY  Dose: 0.4 mg     therapeutic multivitamin-minerals Tabs   Take 1 Tab by mouth every day.  Dose: 1 Tablet     warfarin 2.5 MG Tabs  Commonly known as: Coumadin   Doctor's comments: This rx was submitted by a pharmacist working under a collaborative practice agreement.  Take 1-2 Tablets by mouth every day. As directed by [facility]  Dose: 2.5-5 mg               Allergies  Allergies   Allergen Reactions    Morphine Unspecified     Hallucinations, 15+ years ago       DIET  Orders Placed This Encounter   Procedures    Diet Order Diet: Cardiac; Miscellaneous modifications: (optional): No Decaf, No Caffeine(for test)     Standing Status:   Standing     Number of Occurrences:   1     Order Specific Question:   Diet:     Answer:   Cardiac [6]     Order Specific Question:   Miscellaneous modifications: (optional)     Answer:   No Decaf, No Caffeine(for test) [11]       ACTIVITY  As tolerated.  Weight bearing as tolerated    CONSULTATIONS  Cardiology - Dr Turner    PROCEDURES    STRESS TEST:  NUCLEAR IMAGING INTERPRETATION   No reversible ischemia. SDS = 0   Medium sized fixed defect in the inferoseptal and basal inferior segments,    likely prior scar.    Normal left ventricular size, ejection fraction, and wall motion.   ECG INTERPRETATION   Nondiagnostic due to pharmacologic stress test.      NM-CARDIAC STRESS TEST   Final Result      CT-CTA CHEST PULMONARY ARTERY W/ RECONS   Final Result      1.  No central or segmental pulmonary embolus   2.  Small area of airspace disease in the RIGHT lung could be chronic or subclinical pneumonia   3.  Prior RIGHT hepatectomy            DX-CHEST-PORTABLE (1 VIEW)   Final Result         1. No acute cardiopulmonary abnormalities are identified.           LABORATORY  Lab Results   Component Value Date    SODIUM 139 08/13/2023    POTASSIUM 3.5 (L) 08/13/2023    CHLORIDE 101 08/13/2023    CO2 24 08/13/2023    GLUCOSE 90 08/13/2023    BUN 21 08/13/2023    CREATININE 1.40 08/13/2023        Lab Results   Component Value Date    WBC 6.4 08/13/2023    HEMOGLOBIN 14.5 08/13/2023    HEMATOCRIT 42.5 08/13/2023    PLATELETCT 184 08/13/2023        Total time of the discharge process exceeds 36 minutes.

## 2023-08-14 NOTE — ED NOTES
tech from Lab called with critical result of trop 103 at 1743. Critical lab result read back to tech.   Dr. larsen notified of critical lab result at 1743.  Critical lab result read back by Dr. Larsen.

## 2023-08-15 ENCOUNTER — PATIENT OUTREACH (OUTPATIENT)
Dept: MEDICAL GROUP | Facility: LAB | Age: 78
End: 2023-08-15
Payer: MEDICARE

## 2023-08-15 LAB
EST. AVERAGE GLUCOSE BLD GHB EST-MCNC: 148 MG/DL
HBA1C MFR BLD: 6.8 % (ref 4–5.6)

## 2023-08-15 NOTE — PROGRESS NOTES
Transitional Care Management  TCM Outreach Date and Time: Filed (8/15/2023  9:09 AM)    Discharge Questions  Actual Discharge Date: 08/14/23  Now that you are home, how are you feeling?: Fair (Remains very tired; discussed the need for rest or nap periods)  Did you receive any new prescriptions?: Yes  Were you able to get them filled?: Yes  Meds to Bed or Pharmacy filled?: Pharmacy  Do you have any questions about your current medications or new medications (Review Med Rec)?: No  Do you have a follow up appointment scheduled with your PCP?: Yes  Appointment Date: 08/23/23  Appointment Time: 0940  Any issues or paperwork you wish to discuss with your PCP?: No  Does this patient qualify for the CCM program?: Yes (Referral to CCM made)    Transitional Care  Number of attempts made to contact patient: 1  Current or previous attempts competed within two business days of discharge? : Yes  Provided education regarding treatment plan, medications, self-management, ADLs?: No  Has patient completed an Advanced Directive?: No  Has the Care Manager's phone number provided?: No  Is there anything else I can help you with?: No    Discharge Summary  Chief Complaint: Chest pain  Admitting Diagnosis: Chest pain; SOB  Discharge Diagnosis: Chest pain; HTN

## 2023-08-16 NOTE — PROGRESS NOTES
Chief Complaint   Patient presents with    Chest Pain    Atrial Fibrillation    Hypertension       Subjective     Magnus Claudio is a 78 y.o. male who presents today for follow-up after multiple recent hospitalizations.  Initially patient was admitted on 8/10/2023 with fatigue and shortness of breath found to be in A-fib RVR.  At that time patient underwent DCCV with Dr. Xavier.  Patient then returned ,2 more times with chest pain following the procedure on this visit he underwent a stress test which showed fixed defect and an echo which showed some wall motion abnormality. Cardiology was consulted again on 8/13/2023, they recommended medical management.     Patient has a medical history significant for HTN, DM, and colon cancer.    Today in the office today patient is accompanied by his wife, reports that he is feeling tired.  Did have multiple visits to the emergency department, on the day following his cardioversion patient developed some severe chest pain, work-up in the emergency department is negative, likely related to cardioversion.  He does state that he had some persistent pain for 4 days but this has since resolved and has not had any additional episodes.  No swelling in his legs, denies shortness of breath or dizziness.    The 10-year ASCVD risk score (Car HONG, et al., 2019) is: 54.5%    Values used to calculate the score:      Age: 78 years      Sex: Male      Is Non- : No      Diabetic: Yes      Tobacco smoker: No      Systolic Blood Pressure: 120 mmHg      Is BP treated: Yes      HDL Cholesterol: 35 mg/dL      Total Cholesterol: 163 mg/dL     Past Medical History:   Diagnosis Date    Anemia     Blood clotting disorder (HCC)     leg and lung    Cancer (HCC) 2021    Colon 20+ years ago and bladder    Cataract     Diabetes (HCC)     Diverticulitis     Hypertension     Vertigo, benign positional      Past Surgical History:   Procedure Laterality Date    INGUINAL HERNIA  REPAIR ROBOTIC XI Left 2/11/2022    Procedure: REPAIR, HERNIA, INGUINAL, ROBOT-ASSISTED, USING DA NIKITA XI - WITH MESH PLACEMENT;  Surgeon: Nelson Denney M.D.;  Location: SURGERY [facility];  Service: Gen Robotic    OTHER  05/2021    bladder cancer surgery    CATARACT EXTRACTION WITH IOL      COLON RESECTION      EYE SURGERY Bilateral     Cataract surgery    LAMINOTOMY      OK RESEC LIVER,PART LOBECTOMY       Family History   Problem Relation Age of Onset    Hypertension Father     Heart Attack Father     Cancer Mother         Breast    Diabetes Sister     Hyperlipidemia Neg Hx         unknown     Social History     Socioeconomic History    Marital status:      Spouse name: Not on file    Number of children: Not on file    Years of education: Not on file    Highest education level: Not on file   Occupational History    Not on file   Tobacco Use    Smoking status: Never    Smokeless tobacco: Never   Vaping Use    Vaping Use: Never used   Substance and Sexual Activity    Alcohol use: Not Currently     Alcohol/week: 0.0 oz    Drug use: Never    Sexual activity: Not Currently     Partners: Female   Other Topics Concern    Not on file   Social History Narrative    Not on file     Social Determinants of Health     Financial Resource Strain: Not on file   Food Insecurity: Not on file   Transportation Needs: Not on file   Physical Activity: Not on file   Stress: Not on file   Social Connections: Not on file   Intimate Partner Violence: Not on file   Housing Stability: Not on file     Allergies   Allergen Reactions    Morphine Unspecified     Hallucinations, 15+ years ago     Outpatient Encounter Medications as of 8/17/2023   Medication Sig Dispense Refill    metoprolol SR (TOPROL XL) 25 MG TABLET SR 24 HR Take 1 Tablet by mouth every evening. 30 Tablet 11    amiodarone (CORDARONE) 200 MG Tab Take 1 Tablet by mouth every day. After 2 weeks switch to once a day 30 Tablet 2    atorvastatin (LIPITOR) 40 MG Tab Take 1  Tablet by mouth every evening. 30 Tablet 0    warfarin (COUMADIN) 2.5 MG Tab Take 1-2 Tablets by mouth every day. As directed by [facility] (Patient taking differently: Take 2.5-5 mg by mouth every day. 2.5 mg on Monday and Friday   5 mg all other days) 180 Tablet 1    amLODIPine (NORVASC) 10 MG Tab Take 1 Tablet by mouth every day. 90 Tablet 3    losartan-hydrochlorothiazide (HYZAAR) 100-12.5 MG per tablet Take 1 Tablet by mouth every day. 100 Tablet 3    metFORMIN ER (GLUCOPHAGE XR) 500 MG TABLET SR 24 HR TAKE 2 TABLETS BY MOUTH TWICE DAILY (Patient taking differently: Take 1,000 mg by mouth 2 times a day. TAKE 2 TABLETS BY MOUTH TWICE DAILY) 360 Tablet 4    tamsulosin (FLOMAX) 0.4 MG capsule TAKE 1 CAPSULE BY MOUTH EVERY  Capsule 4    glimepiride (AMARYL) 4 MG Tab TAKE 1 TABLET BY MOUTH EVERY MORNING 100 Tablet 4    B Complex-Folic Acid (B COMPLEX-VITAMIN B12 PO) Take 1 Tablet by mouth every day.      therapeutic multivitamin-minerals (THERAGRAN-M) Tab Take 1 Tab by mouth every day.      [DISCONTINUED] metoprolol tartrate (LOPRESSOR) 25 MG Tab Take 0.5 Tablets by mouth 2 times a day. 60 Tablet 0    [DISCONTINUED] omeprazole (PRILOSEC) 20 MG delayed-release capsule Take 1 Capsule by mouth every day. 30 Capsule 0    [DISCONTINUED] amiodarone (CORDARONE) 200 MG Tab Take 1 Tablet by mouth 2 times a day. After 2 weeks switch to once a day (Patient taking differently: Take 200 mg by mouth 2 times a day. After 2 weeks switch to once a day, pt started on 8/10/2023) 28 Tablet 1     No facility-administered encounter medications on file as of 8/17/2023.     Review of Systems   Constitutional:  Positive for malaise/fatigue.   Respiratory:  Negative for shortness of breath.    Cardiovascular:  Negative for chest pain, palpitations, orthopnea and leg swelling.   Neurological:  Negative for dizziness and loss of consciousness.   All other systems reviewed and are negative.             Objective     BP  "120/60 (BP Location: Left arm, Patient Position: Sitting, BP Cuff Size: Adult)   Pulse 64   Resp 18   Ht 1.803 m (5' 11\")   Wt 84.8 kg (187 lb)   SpO2 95%   BMI 26.08 kg/m²     Physical Exam  Vitals reviewed.   Constitutional:       General: He is not in acute distress.     Appearance: He is normal weight.   Cardiovascular:      Rate and Rhythm: Normal rate and regular rhythm.      Heart sounds: No murmur heard.  Pulmonary:      Effort: Pulmonary effort is normal. No respiratory distress.      Breath sounds: Normal breath sounds. No rhonchi.   Abdominal:      General: There is no distension.      Tenderness: There is no abdominal tenderness.   Musculoskeletal:      Cervical back: Normal range of motion.      Right lower leg: No edema.      Left lower leg: No edema.   Neurological:      General: No focal deficit present.      Mental Status: He is alert.            Lab Results   Component Value Date/Time    CHOLSTRLTOT 163 08/13/2023 11:43 PM    LDL 87 08/13/2023 11:43 PM    HDL 35 (A) 08/13/2023 11:43 PM    TRIGLYCERIDE 205 (H) 08/13/2023 11:43 PM       Lab Results   Component Value Date/Time    SODIUM 139 08/13/2023 11:43 PM    POTASSIUM 3.5 (L) 08/13/2023 11:43 PM    CHLORIDE 101 08/13/2023 11:43 PM    CO2 24 08/13/2023 11:43 PM    GLUCOSE 90 08/13/2023 11:43 PM    BUN 21 08/13/2023 11:43 PM    CREATININE 1.40 08/13/2023 11:43 PM     Lab Results   Component Value Date/Time    ALKPHOSPHAT 96 08/13/2023 04:40 PM    ASTSGOT 94 (H) 08/13/2023 04:40 PM    ALTSGPT 51 (H) 08/13/2023 04:40 PM    TBILIRUBIN 1.3 08/13/2023 04:40 PM      Nuclear medicine stress test 8/14/2023  NUCLEAR IMAGING INTERPRETATION   No reversible ischemia. SDS = 0   Medium sized fixed defect in the inferoseptal and basal inferior segments,    likely prior scar.    Normal left ventricular size, ejection fraction, and wall motion.   ECG INTERPRETATION   Nondiagnostic due to pharmacologic stress test.    Transthoracic echocardiogram " 8/10/2023  CONCLUSIONS  Left ventricular systolic function is low normal.  The left ventricular ejection fraction is visually estimated to be 50%.  Global hypokinesis.  Normal estimated left ventricular filling pressures in the setting of   dysrhythmia.  Mild concentric left ventricular hypertrophy.  The right ventricle is normal in size and systolic function.  Aortic valve sclerosis without significant stenosis.  Right heart pressures are normal.      Compared to the prior study on 9/4/19: The LVEF has decreased slightly   from 60% to 50%.        Assessment & Plan     1. Atrial fibrillation, unspecified type (HCC)  metoprolol SR (TOPROL XL) 25 MG TABLET SR 24 HR    Referral to Pharmacotherapy Service      2. Typical atrial flutter (HCC)  amiodarone (CORDARONE) 200 MG Tab      3. History of venous thromboembolism        4. Pulmonary hypertension (HCC)        5. Secondary hypercoagulable state (HCC)        6. Essential hypertension        7. Controlled type 2 diabetes mellitus without complication, without long-term current use of insulin (HCC)        8. History of colon cancer, stage IV        9. Abnormal stress test            Medical Decision Making: Today's Assessment/Status/Plan:        Atrial fibrillation  -Type: paroxysmal  -Symptoms:fatigue, shortness of breath  -[id number] Score (CHF/CM, HTN, Age, DM, CVA, Vascular disease, Gender):  5  -Risk Factors: advanced age, HTN, DM,   -Medications: Toprol XL 25 mg  -Anticoagulation:coumadin (patient interested in switching to eliquis but had issues with cost in the past, will place referral to pharmacy)  -Rate v. Rhythm Control:DCCV 8/10/23 now in NSR  -EP Consultation Warranted:has f/u with Sun already   -Continue amiodarone taper    Hypertension  -BP well controlled continue Hyzaar, BB and CCB    Abnormal stress/Echo  -EF slightly reduced, echo was done while in afib  -Stress and echo consistent with fixed defect, on GDMT with coumadin, statin , BB and  Dominique    Follow up with Dr. Beckford as scheduled.    Cherie Chong, MSN, APRN  [facility]  [phone]    Please note this dictation was created using voice recognition software.  I have made every reasonable attempt to correct obvious errors, but there may be errors of grammar and possibly content that I did not discover before finalizing the note.

## 2023-08-17 ENCOUNTER — ANTICOAGULATION VISIT (OUTPATIENT)
Dept: VASCULAR LAB | Facility: MEDICAL CENTER | Age: 78
End: 2023-08-17
Attending: INTERNAL MEDICINE
Payer: MEDICARE

## 2023-08-17 ENCOUNTER — OFFICE VISIT (OUTPATIENT)
Dept: CARDIOLOGY | Facility: MEDICAL CENTER | Age: 78
End: 2023-08-17
Attending: NURSE PRACTITIONER
Payer: MEDICARE

## 2023-08-17 VITALS
OXYGEN SATURATION: 95 % | HEART RATE: 64 BPM | WEIGHT: 187 LBS | SYSTOLIC BLOOD PRESSURE: 120 MMHG | DIASTOLIC BLOOD PRESSURE: 60 MMHG | HEIGHT: 71 IN | BODY MASS INDEX: 26.18 KG/M2 | RESPIRATION RATE: 18 BRPM

## 2023-08-17 DIAGNOSIS — E11.9 CONTROLLED TYPE 2 DIABETES MELLITUS WITHOUT COMPLICATION, WITHOUT LONG-TERM CURRENT USE OF INSULIN (HCC): ICD-10-CM

## 2023-08-17 DIAGNOSIS — I27.20 PULMONARY HYPERTENSION (HCC): ICD-10-CM

## 2023-08-17 DIAGNOSIS — I10 ESSENTIAL HYPERTENSION: ICD-10-CM

## 2023-08-17 DIAGNOSIS — Z85.038 HISTORY OF COLON CANCER, STAGE IV: ICD-10-CM

## 2023-08-17 DIAGNOSIS — Z86.718 HISTORY OF VENOUS THROMBOEMBOLISM: ICD-10-CM

## 2023-08-17 DIAGNOSIS — I48.91 ATRIAL FIBRILLATION, UNSPECIFIED TYPE (HCC): ICD-10-CM

## 2023-08-17 DIAGNOSIS — D68.69 SECONDARY HYPERCOAGULABLE STATE (HCC): ICD-10-CM

## 2023-08-17 DIAGNOSIS — I48.3 TYPICAL ATRIAL FLUTTER (HCC): ICD-10-CM

## 2023-08-17 DIAGNOSIS — R94.39 ABNORMAL STRESS TEST: ICD-10-CM

## 2023-08-17 LAB — INR PPP: 3.5 (ref 2–3.5)

## 2023-08-17 PROCEDURE — 85610 PROTHROMBIN TIME: CPT

## 2023-08-17 PROCEDURE — 99214 OFFICE O/P EST MOD 30 MIN: CPT | Performed by: NURSE PRACTITIONER

## 2023-08-17 PROCEDURE — 3078F DIAST BP <80 MM HG: CPT | Performed by: NURSE PRACTITIONER

## 2023-08-17 PROCEDURE — 3074F SYST BP LT 130 MM HG: CPT | Performed by: NURSE PRACTITIONER

## 2023-08-17 PROCEDURE — 99213 OFFICE O/P EST LOW 20 MIN: CPT | Performed by: NURSE PRACTITIONER

## 2023-08-17 PROCEDURE — 99212 OFFICE O/P EST SF 10 MIN: CPT

## 2023-08-17 RX ORDER — METOPROLOL SUCCINATE 25 MG/1
25 TABLET, EXTENDED RELEASE ORAL EVERY EVENING
Qty: 30 TABLET | Refills: 11 | Status: SHIPPED | OUTPATIENT
Start: 2023-08-17 | End: 2023-09-08 | Stop reason: SDUPTHER

## 2023-08-17 RX ORDER — AMIODARONE HYDROCHLORIDE 200 MG/1
200 TABLET ORAL DAILY
Qty: 30 TABLET | Refills: 2 | Status: SHIPPED | OUTPATIENT
Start: 2023-08-17 | End: 2023-09-15 | Stop reason: SDUPTHER

## 2023-08-17 ASSESSMENT — FIBROSIS 4 INDEX: FIB4 SCORE: 5.65

## 2023-08-17 NOTE — PROGRESS NOTES
Anticoagulation Summary  As of 8/17/2023      INR goal:  2.0-3.0   TTR:  76.4 % (3.9 y)   INR used for dosing:  3.50 (8/17/2023)   Warfarin maintenance plan:  2.5 mg (2.5 mg x 1) every Mon, Fri; 5 mg (2.5 mg x 2) all other days   Weekly warfarin total:  30 mg   Plan last modified:  Mitch Wilkinson PharmD (3/23/2023)   Next INR check:  8/22/2023   Target end date:  Indefinite    Indications    History of venous thromboembolism [Z86.718]  Deep vein thrombosis (DVT) of popliteal vein of both lower extremities (HCC) (Resolved) [I82.433]                 Anticoagulation Episode Summary       INR check location:      Preferred lab:      Send INR reminders to:      Comments:            Anticoagulation Care Providers       Provider Role Specialty Phone number    [facility] Responsible  [phone]    Too Brito M.D.  Family Medicine [phone]                  Refer to Patient Findings for HPI:  Patient Findings       Negatives:  Signs/symptoms of thrombosis, Signs/symptoms of bleeding, Laboratory test error suspected, Change in health, Change in alcohol use, Change in activity, Upcoming invasive procedure, Emergency department visit, Upcoming dental procedure, Missed doses, Extra doses, Change in medications, Change in diet/appetite, Hospital admission, Bruising, Other complaints            There were no vitals filed for this visit.  pt declined vitals    Verified current warfarin dosing schedule.    Medications reconciled   Pt is not on antiplatelet therapy      A/P   INR  SUPRA-therapeutic.     Warfarin dosing recommendation: Reduce warfarin to 2.5 mg today and then resume previous warfarin regimen.     Pt educated to contact our clinic with any changes in medications or s/s of bleeding or thrombosis. Pt is aware to seek immediate medical attention for falls, head injury or deep cuts.    Follow up appointment in 5 day(s).    Deedee EmD

## 2023-08-18 PROBLEM — D68.69 SECONDARY HYPERCOAGULABLE STATE (HCC): Status: ACTIVE | Noted: 2023-08-18

## 2023-08-18 PROBLEM — R94.39 ABNORMAL STRESS TEST: Status: ACTIVE | Noted: 2023-08-18

## 2023-08-18 ASSESSMENT — ENCOUNTER SYMPTOMS
SHORTNESS OF BREATH: 0
DIZZINESS: 0
PALPITATIONS: 0
LOSS OF CONSCIOUSNESS: 0
ORTHOPNEA: 0

## 2023-08-22 ENCOUNTER — ANTICOAGULATION VISIT (OUTPATIENT)
Dept: VASCULAR LAB | Facility: MEDICAL CENTER | Age: 78
End: 2023-08-22
Attending: INTERNAL MEDICINE
Payer: MEDICARE

## 2023-08-22 VITALS — HEART RATE: 71 BPM | SYSTOLIC BLOOD PRESSURE: 147 MMHG | DIASTOLIC BLOOD PRESSURE: 75 MMHG

## 2023-08-22 DIAGNOSIS — Z86.718 HISTORY OF VENOUS THROMBOEMBOLISM: ICD-10-CM

## 2023-08-22 LAB — INR PPP: 4 (ref 2–3.5)

## 2023-08-22 PROCEDURE — 3078F DIAST BP <80 MM HG: CPT

## 2023-08-22 PROCEDURE — 3077F SYST BP >= 140 MM HG: CPT

## 2023-08-22 PROCEDURE — 85610 PROTHROMBIN TIME: CPT

## 2023-08-22 PROCEDURE — 99212 OFFICE O/P EST SF 10 MIN: CPT

## 2023-08-22 NOTE — PROGRESS NOTES
Anticoagulation Summary  As of 2023      INR goal:  2.0-3.0   TTR:  76.1 % (4 y)   INR used for dosin.00 (2023)   Warfarin maintenance plan:  5 mg (2.5 mg x 2) every Mon, Wed, Fri; 2.5 mg (2.5 mg x 1) all other days   Weekly warfarin total:  25 mg   Plan last modified:  Brooklyn Subramanian, PharmD (2023)   Next INR check:  2023   Target end date:  Indefinite    Indications    History of venous thromboembolism [Z86.718]  Deep vein thrombosis (DVT) of popliteal vein of both lower extremities (HCC) (Resolved) [I82.433]                 Anticoagulation Episode Summary       INR check location:      Preferred lab:      Send INR reminders to:      Comments:            Anticoagulation Care Providers       Provider Role Specialty Phone number    [facility] Responsible  [phone]    Too Brito M.D.  Family Medicine [phone]                  Refer to Patient Findings for HPI:  Patient Findings       Positives:  Change in medications (added amiodarone 200mg)    Negatives:  Signs/symptoms of thrombosis, Signs/symptoms of bleeding, Laboratory test error suspected, Change in health, Change in alcohol use, Change in activity, Upcoming invasive procedure, Emergency department visit, Upcoming dental procedure, Missed doses, Extra doses, Change in diet/appetite, Hospital admission, Bruising, Other complaints            Vitals:    23 1055   BP: (!) 147/75   Pulse: 71         Verified current warfarin dosing schedule.    Pt is not on antiplatelet therapy      A/P   INR  supra-therapeutic.    Patient began amiodarone 200mg QD     Warfarin dosing recommendation: HOLD today's dose and decrease weekly dose to 5mg MWF and 2.5mg AOD.     Pt educated to contact our clinic with any changes in medications or s/s of bleeding or thrombosis. Pt is aware to seek immediate medical attention for falls, head injury or deep cuts.    Follow up appointment in 6 day(s).  Anushka Pearl, pharmacy  student   Brooklyn Subramanian, PharmD

## 2023-08-23 ENCOUNTER — OFFICE VISIT (OUTPATIENT)
Dept: MEDICAL GROUP | Facility: LAB | Age: 78
End: 2023-08-23
Payer: MEDICARE

## 2023-08-23 VITALS
TEMPERATURE: 98.7 F | HEIGHT: 71 IN | DIASTOLIC BLOOD PRESSURE: 52 MMHG | BODY MASS INDEX: 26.38 KG/M2 | HEART RATE: 64 BPM | WEIGHT: 188.4 LBS | OXYGEN SATURATION: 96 % | SYSTOLIC BLOOD PRESSURE: 106 MMHG | RESPIRATION RATE: 14 BRPM

## 2023-08-23 DIAGNOSIS — Z09 HOSPITAL DISCHARGE FOLLOW-UP: Primary | ICD-10-CM

## 2023-08-23 DIAGNOSIS — I48.91 ATRIAL FIBRILLATION, UNSPECIFIED TYPE (HCC): ICD-10-CM

## 2023-08-23 DIAGNOSIS — E11.42 CONTROLLED TYPE 2 DIABETES MELLITUS WITH DIABETIC POLYNEUROPATHY, WITHOUT LONG-TERM CURRENT USE OF INSULIN (HCC): ICD-10-CM

## 2023-08-23 PROCEDURE — 3074F SYST BP LT 130 MM HG: CPT | Performed by: FAMILY MEDICINE

## 2023-08-23 PROCEDURE — 3078F DIAST BP <80 MM HG: CPT | Performed by: FAMILY MEDICINE

## 2023-08-23 PROCEDURE — 99495 TRANSJ CARE MGMT MOD F2F 14D: CPT | Performed by: FAMILY MEDICINE

## 2023-08-23 ASSESSMENT — FIBROSIS 4 INDEX: FIB4 SCORE: 5.65

## 2023-08-23 NOTE — PROGRESS NOTES
Subjective:     Magnus Claudio is a 78 y.o. male who presents for Hospital Follow-up.    Transitional Care Management  TCM Outreach Date and Time: Filed (8/15/2023  9:09 AM)    Discharge Questions  Actual Discharge Date: 08/14/23  Now that you are home, how are you feeling?: Fair (Remains very tired; discussed the need for rest or nap periods)  Did you receive any new prescriptions?: Yes  Were you able to get them filled?: Yes  Meds to Bed or Pharmacy filled?: Pharmacy  Do you have any questions about your current medications or new medications (Review Med Rec)?: No  Do you have a follow up appointment scheduled with your PCP?: Yes  Appointment Date: 08/23/23  Appointment Time: 0940  Any issues or paperwork you wish to discuss with your PCP?: No  Does this patient qualify for the CCM program?: Yes (Referral to CCM made)    Transitional Care  Number of attempts made to contact patient: 1  Current or previous attempts competed within two business days of discharge? : Yes  Provided education regarding treatment plan, medications, self-management, ADLs?: No  Has patient completed an Advanced Directive?: No  Has the Care Manager's phone number provided?: No  Is there anything else I can help you with?: No    Discharge Summary  Chief Complaint: Chest pain  Admitting Diagnosis: Chest pain; SOB  Discharge Diagnosis: Chest pain; HTN        HPI:   Recently hospitalized for chest pain after ER visit earlier that day for atrial fibrillation with cardioversion.  Stress echo without reversible ischemia.  Meds were adjusted including starting a beta-blocker and statin.  Also continues on amiodarone and Coumadin.  He is interested in switching to DOAC and did have recent cardiology visit where referral was placed to pharmacy as there had been cost issues in the past.    Reports doing well at home, no repeat episodes of chest pain, rapid heart rate, palpitations.  They have noted that his blood pressures in the low range and  starting metoprolol, averaging 100-110s/50s.  He has had some persistent fatigue since the hospitalization as well.    Current medicines (including reconciliation performed today)  Current Outpatient Medications   Medication Sig Dispense Refill    metoprolol SR (TOPROL XL) 25 MG TABLET SR 24 HR Take 1 Tablet by mouth every evening. 30 Tablet 11    amiodarone (CORDARONE) 200 MG Tab Take 1 Tablet by mouth every day. After 2 weeks switch to once a day 30 Tablet 2    atorvastatin (LIPITOR) 40 MG Tab Take 1 Tablet by mouth every evening. 30 Tablet 0    warfarin (COUMADIN) 2.5 MG Tab Take 1-2 Tablets by mouth every day. As directed by [facility] (Patient taking differently: Take 2.5-5 mg by mouth every day. 2.5 mg on Monday and Friday   5 mg all other days) 180 Tablet 1    amLODIPine (NORVASC) 10 MG Tab Take 1 Tablet by mouth every day. 90 Tablet 3    losartan-hydrochlorothiazide (HYZAAR) 100-12.5 MG per tablet Take 1 Tablet by mouth every day. 100 Tablet 3    metFORMIN ER (GLUCOPHAGE XR) 500 MG TABLET SR 24 HR TAKE 2 TABLETS BY MOUTH TWICE DAILY (Patient taking differently: Take 1,000 mg by mouth 2 times a day. TAKE 2 TABLETS BY MOUTH TWICE DAILY) 360 Tablet 4    tamsulosin (FLOMAX) 0.4 MG capsule TAKE 1 CAPSULE BY MOUTH EVERY  Capsule 4    glimepiride (AMARYL) 4 MG Tab TAKE 1 TABLET BY MOUTH EVERY MORNING 100 Tablet 4    B Complex-Folic Acid (B COMPLEX-VITAMIN B12 PO) Take 1 Tablet by mouth every day.      therapeutic multivitamin-minerals (THERAGRAN-M) Tab Take 1 Tab by mouth every day.       No current facility-administered medications for this visit.       Allergies:   Morphine    Social History     Tobacco Use    Smoking status: Never    Smokeless tobacco: Never   Vaping Use    Vaping Use: Never used   Substance Use Topics    Alcohol use: Not Currently     Alcohol/week: 0.0 oz    Drug use: Never       ROS:  See HPI    Objective:     Vitals:    08/23/23 0941   BP: 106/52   BP Location: Left  "arm   Patient Position: Sitting   BP Cuff Size: Adult   Pulse: 64   Resp: 14   Temp: 37.1 °C (98.7 °F)   SpO2: 96%   Weight: 85.5 kg (188 lb 6.4 oz)   Height: 1.803 m (5' 11\")     Body mass index is 26.28 kg/m².    Physical Exam:  Gen: NAD. Alert  CV: RRR, no m/r/g  Lungs: CTAB  Neuro: Moves all 4 extremities, no facial droop.  Monofilament testing with a 10 gram force: sensation intact: decreased bilaterally  Visual Inspection: Feet with multiple sites of mild erythema and maceration.  Pedal pulses: decreased bilaterally     Assessment and Plan:   1. Hospital discharge follow-up    2. Atrial fibrillation, unspecified type (HCC)  Overall stable since recent hospitalization and cardioversion.  Continue warfarin, metoprolol, amiodarone.  Does have consult with pharmacy pending to consider DOAC but this has been cost prohibitive in the past.  Stopping amlodipine today as he has had low blood pressures since starting metoprolol and persistent fatigue.  Does have follow-up with cardiology scheduled next month.  - Comp Metabolic Panel; Future    3. Controlled type 2 diabetes mellitus with diabetic polyneuropathy, without long-term current use of insulin ([facility])  A1c 6.8% during recent hospitalization.  Continue glimepiride and metformin.  He does have chronic lower extremity neuropathy along with mild erythema and maceration on foot exam.  He follows with podiatrist regularly and will continue to keep close eye on this.    - Chart and discharge summary were reviewed.   - Hospitalization and results reviewed with patient.   - Medications reviewed including instructions regarding high risk medications, dosing and side effects.  - Recommended Services: No services needed at this time  - Advance directive/POLST on file?  No     Follow-up:Return in about 3 months (around 11/23/2023).    Face-to-face transitional care management services with MODERATE (today's visit is within 14 days post discharge & LACE+ score of 28-58) medical " decision complexity were provided.

## 2023-08-28 ENCOUNTER — HOSPITAL ENCOUNTER (OUTPATIENT)
Dept: LAB | Facility: MEDICAL CENTER | Age: 78
End: 2023-08-28
Attending: FAMILY MEDICINE
Payer: MEDICARE

## 2023-08-28 ENCOUNTER — ANTICOAGULATION VISIT (OUTPATIENT)
Dept: MEDICAL GROUP | Facility: MEDICAL CENTER | Age: 78
End: 2023-08-28
Payer: MEDICARE

## 2023-08-28 ENCOUNTER — TELEPHONE (OUTPATIENT)
Dept: CARDIOLOGY | Facility: MEDICAL CENTER | Age: 78
End: 2023-08-28

## 2023-08-28 VITALS — HEART RATE: 92 BPM | SYSTOLIC BLOOD PRESSURE: 115 MMHG | DIASTOLIC BLOOD PRESSURE: 68 MMHG

## 2023-08-28 DIAGNOSIS — I48.91 ATRIAL FIBRILLATION, UNSPECIFIED TYPE (HCC): ICD-10-CM

## 2023-08-28 DIAGNOSIS — Z86.718 HISTORY OF VENOUS THROMBOEMBOLISM: ICD-10-CM

## 2023-08-28 LAB
ALBUMIN SERPL BCP-MCNC: 4.2 G/DL (ref 3.2–4.9)
ALBUMIN/GLOB SERPL: 1.7 G/DL
ALP SERPL-CCNC: 55 U/L (ref 30–99)
ALT SERPL-CCNC: 25 U/L (ref 2–50)
ANION GAP SERPL CALC-SCNC: 11 MMOL/L (ref 7–16)
AST SERPL-CCNC: 27 U/L (ref 12–45)
BILIRUB SERPL-MCNC: 2.7 MG/DL (ref 0.1–1.5)
BUN SERPL-MCNC: 19 MG/DL (ref 8–22)
CALCIUM ALBUM COR SERPL-MCNC: 8.5 MG/DL (ref 8.5–10.5)
CALCIUM SERPL-MCNC: 8.7 MG/DL (ref 8.4–10.2)
CHLORIDE SERPL-SCNC: 104 MMOL/L (ref 96–112)
CO2 SERPL-SCNC: 22 MMOL/L (ref 20–33)
CREAT SERPL-MCNC: 1.34 MG/DL (ref 0.5–1.4)
GFR SERPLBLD CREATININE-BSD FMLA CKD-EPI: 54 ML/MIN/1.73 M 2
GLOBULIN SER CALC-MCNC: 2.5 G/DL (ref 1.9–3.5)
GLUCOSE SERPL-MCNC: 190 MG/DL (ref 65–99)
INR PPP: 3.3 (ref 2–3.5)
POTASSIUM SERPL-SCNC: 3.8 MMOL/L (ref 3.6–5.5)
PROT SERPL-MCNC: 6.7 G/DL (ref 6–8.2)
SODIUM SERPL-SCNC: 137 MMOL/L (ref 135–145)

## 2023-08-28 PROCEDURE — 3074F SYST BP LT 130 MM HG: CPT | Performed by: INTERNAL MEDICINE

## 2023-08-28 PROCEDURE — 3078F DIAST BP <80 MM HG: CPT | Performed by: INTERNAL MEDICINE

## 2023-08-28 PROCEDURE — 99211 OFF/OP EST MAY X REQ PHY/QHP: CPT | Performed by: INTERNAL MEDICINE

## 2023-08-28 PROCEDURE — 36415 COLL VENOUS BLD VENIPUNCTURE: CPT

## 2023-08-28 PROCEDURE — 80053 COMPREHEN METABOLIC PANEL: CPT

## 2023-08-28 PROCEDURE — 85610 PROTHROMBIN TIME: CPT | Performed by: INTERNAL MEDICINE

## 2023-08-28 NOTE — TELEPHONE ENCOUNTER
DB    Caller: Mely Claudio (Spouse)    Topic/issue: For the last 3 days, patient's HR has been in the 90s with no activity for 1 hour before it drops down. No other symptoms reported at this time. Mely is just concerned and would appreciate a call back to discuss.    Callback Number: [phone]    Thank you,  -Graciela DE LA GARZA

## 2023-08-28 NOTE — PROGRESS NOTES
Anticoagulation Summary  As of 8/28/2023      INR goal:  2.0-3.0   TTR:  75.8 % (4 y)   INR used for dosing:  3.30 (8/28/2023)   Warfarin maintenance plan:  5 mg (2.5 mg x 2) every Mon, Wed, Fri; 2.5 mg (2.5 mg x 1) all other days   Weekly warfarin total:  25 mg   Plan last modified:  Brooklyn Subramanian, PharmD (8/22/2023)   Next INR check:  9/7/2023   Target end date:  Indefinite    Indications    History of venous thromboembolism [Z86.718]  Deep vein thrombosis (DVT) of popliteal vein of both lower extremities (HCC) (Resolved) [I82.433]                 Anticoagulation Episode Summary       INR check location:      Preferred lab:      Send INR reminders to:      Comments:            Anticoagulation Care Providers       Provider Role Specialty Phone number    [facility] Responsible  [phone]    Too Brito M.D.  Family Medicine [phone]          Anticoagulation Patient Findings  Patient Findings       Positives:  Change in medications (Amiodarone decreased to 200 mg daily. Amlodipine discontinued.)    Negatives:  Signs/symptoms of thrombosis, Signs/symptoms of bleeding, Laboratory test error suspected, Change in health, Change in alcohol use, Change in activity, Upcoming invasive procedure, Emergency department visit, Upcoming dental procedure, Missed doses, Extra doses, Change in diet/appetite, Hospital admission, Bruising, Other complaints          HPI:   Magnus Claudio seen in clinic today, on anticoagulation therapy with warfarin due to history of atrial fibrillation and DVT.    Patient's previous INR was supratherapeutic at 4 on 08/22/23, at which time patient was instructed to hold x 1 then decrease regimen.  Patient returns to clinic today to recheck INR to ensure it is therapeutic and thus preventing possible clotting and/or bleeding/bruising complications.    CHADS-VASc = 4  (unadjusted ischemic stroke risk/year:  4%, which is low risk)    Does patient have any changes  to current medical/health status since last appt: No  Does patient have any signs/symptoms of bleeding and/or thrombosis since the last appt: No  Does patient have any interval changes to diet or medications since last appt: Yes, as noted above  Are there any complications or cost restrictions with current therapy: No     Does patient have [facility] PCP? Yes, Leobardo Wright M.D. (If not, please document discussion that patient must be seen at [facility])     Vitals:   Vitals:    08/28/23 1154   BP: 115/68   Pulse: 92        Asssessment:      INR supratherapeutic at 3.3, therefore at increased risk of bleeding  Reason(s) for out of range INR today:  DDI between warfarin and amiodarone, however since amiodarone dose was decreased, anticipate for warfarin requirements to increase.       Pt is not on antiplatelet therapy    Medication reconciliation completed today.    Plan:  Take 2.5 mg today, then Continue regimen as listed above.     Follow up:  Because warfarin is a high risk medication and current CHEST guidelines recommend regular monitoring intervals (few days up to 12 weeks), will have patient return to clinic in 1.5 weeks to recheck INR.    Adi Brewer, PharmD, BCACP

## 2023-08-28 NOTE — TELEPHONE ENCOUNTER
Phone Number Called: [phone]    Call outcome: Spoke to pt's wife, Mely    Message: Returned Mely's call about Magnus's increased HR for the last 3 days. Mely states that Magnus's HR is in the 90s several times a day and that it does not slow down upon resting.     BP is normal, and he is not experiencing any alarming symptoms. Pt was taken off Amlodipine 4 days ago due to low BP.     __________________________________    To DB: Please advise. Thank you.

## 2023-08-30 ENCOUNTER — HOSPITAL ENCOUNTER (EMERGENCY)
Facility: MEDICAL CENTER | Age: 78
End: 2023-08-30
Attending: STUDENT IN AN ORGANIZED HEALTH CARE EDUCATION/TRAINING PROGRAM
Payer: MEDICARE

## 2023-08-30 VITALS
BODY MASS INDEX: 26.36 KG/M2 | OXYGEN SATURATION: 90 % | SYSTOLIC BLOOD PRESSURE: 113 MMHG | TEMPERATURE: 99.9 F | DIASTOLIC BLOOD PRESSURE: 70 MMHG | WEIGHT: 188.27 LBS | HEART RATE: 97 BPM | RESPIRATION RATE: 16 BRPM | HEIGHT: 71 IN

## 2023-08-30 DIAGNOSIS — I48.91 ATRIAL FIBRILLATION WITH RAPID VENTRICULAR RESPONSE (HCC): ICD-10-CM

## 2023-08-30 LAB
ALBUMIN SERPL BCP-MCNC: 3.9 G/DL (ref 3.2–4.9)
ALBUMIN/GLOB SERPL: 1.3 G/DL
ALP SERPL-CCNC: 50 U/L (ref 30–99)
ALT SERPL-CCNC: 21 U/L (ref 2–50)
ANION GAP SERPL CALC-SCNC: 11 MMOL/L (ref 7–16)
AST SERPL-CCNC: 23 U/L (ref 12–45)
BASOPHILS # BLD AUTO: 0.1 % (ref 0–1.8)
BASOPHILS # BLD: 0.02 K/UL (ref 0–0.12)
BILIRUB SERPL-MCNC: 2.3 MG/DL (ref 0.1–1.5)
BUN SERPL-MCNC: 27 MG/DL (ref 8–22)
CALCIUM ALBUM COR SERPL-MCNC: 8.6 MG/DL (ref 8.5–10.5)
CALCIUM SERPL-MCNC: 8.5 MG/DL (ref 8.4–10.2)
CHLORIDE SERPL-SCNC: 103 MMOL/L (ref 96–112)
CO2 SERPL-SCNC: 23 MMOL/L (ref 20–33)
CREAT SERPL-MCNC: 1.62 MG/DL (ref 0.5–1.4)
EKG IMPRESSION: NORMAL
EOSINOPHIL # BLD AUTO: 0.07 K/UL (ref 0–0.51)
EOSINOPHIL NFR BLD: 0.5 % (ref 0–6.9)
ERYTHROCYTE [DISTWIDTH] IN BLOOD BY AUTOMATED COUNT: 43.8 FL (ref 35.9–50)
GFR SERPLBLD CREATININE-BSD FMLA CKD-EPI: 43 ML/MIN/1.73 M 2
GLOBULIN SER CALC-MCNC: 2.9 G/DL (ref 1.9–3.5)
GLUCOSE SERPL-MCNC: 118 MG/DL (ref 65–99)
HCT VFR BLD AUTO: 39.6 % (ref 42–52)
HGB BLD-MCNC: 13.3 G/DL (ref 14–18)
IMM GRANULOCYTES # BLD AUTO: 0.07 K/UL (ref 0–0.11)
IMM GRANULOCYTES NFR BLD AUTO: 0.5 % (ref 0–0.9)
INR PPP: 2.93 (ref 0.87–1.13)
LYMPHOCYTES # BLD AUTO: 1 K/UL (ref 1–4.8)
LYMPHOCYTES NFR BLD: 7.5 % (ref 22–41)
MAGNESIUM SERPL-MCNC: 1.7 MG/DL (ref 1.5–2.5)
MCH RBC QN AUTO: 29.4 PG (ref 27–33)
MCHC RBC AUTO-ENTMCNC: 33.6 G/DL (ref 32.3–36.5)
MCV RBC AUTO: 87.4 FL (ref 81.4–97.8)
MONOCYTES # BLD AUTO: 0.94 K/UL (ref 0–0.85)
MONOCYTES NFR BLD AUTO: 7 % (ref 0–13.4)
NEUTROPHILS # BLD AUTO: 11.31 K/UL (ref 1.82–7.42)
NEUTROPHILS NFR BLD: 84.4 % (ref 44–72)
NRBC # BLD AUTO: 0 K/UL
NRBC BLD-RTO: 0 /100 WBC (ref 0–0.2)
PLATELET # BLD AUTO: 150 K/UL (ref 164–446)
PMV BLD AUTO: 9.7 FL (ref 9–12.9)
POTASSIUM SERPL-SCNC: 3.7 MMOL/L (ref 3.6–5.5)
PROT SERPL-MCNC: 6.8 G/DL (ref 6–8.2)
PROTHROMBIN TIME: 31.9 SEC (ref 12–14.6)
RBC # BLD AUTO: 4.53 M/UL (ref 4.7–6.1)
SODIUM SERPL-SCNC: 137 MMOL/L (ref 135–145)
TROPONIN T SERPL-MCNC: 81 NG/L (ref 6–19)
WBC # BLD AUTO: 13.4 K/UL (ref 4.8–10.8)

## 2023-08-30 PROCEDURE — 700105 HCHG RX REV CODE 258: Performed by: STUDENT IN AN ORGANIZED HEALTH CARE EDUCATION/TRAINING PROGRAM

## 2023-08-30 PROCEDURE — 96374 THER/PROPH/DIAG INJ IV PUSH: CPT

## 2023-08-30 PROCEDURE — 99285 EMERGENCY DEPT VISIT HI MDM: CPT

## 2023-08-30 PROCEDURE — 36415 COLL VENOUS BLD VENIPUNCTURE: CPT

## 2023-08-30 PROCEDURE — 85025 COMPLETE CBC W/AUTO DIFF WBC: CPT

## 2023-08-30 PROCEDURE — 700101 HCHG RX REV CODE 250: Performed by: STUDENT IN AN ORGANIZED HEALTH CARE EDUCATION/TRAINING PROGRAM

## 2023-08-30 PROCEDURE — 80053 COMPREHEN METABOLIC PANEL: CPT

## 2023-08-30 PROCEDURE — 93005 ELECTROCARDIOGRAM TRACING: CPT | Performed by: STUDENT IN AN ORGANIZED HEALTH CARE EDUCATION/TRAINING PROGRAM

## 2023-08-30 PROCEDURE — 84484 ASSAY OF TROPONIN QUANT: CPT

## 2023-08-30 PROCEDURE — A9270 NON-COVERED ITEM OR SERVICE: HCPCS | Performed by: STUDENT IN AN ORGANIZED HEALTH CARE EDUCATION/TRAINING PROGRAM

## 2023-08-30 PROCEDURE — 85610 PROTHROMBIN TIME: CPT

## 2023-08-30 PROCEDURE — 700102 HCHG RX REV CODE 250 W/ 637 OVERRIDE(OP): Performed by: STUDENT IN AN ORGANIZED HEALTH CARE EDUCATION/TRAINING PROGRAM

## 2023-08-30 PROCEDURE — 83735 ASSAY OF MAGNESIUM: CPT

## 2023-08-30 RX ORDER — METOPROLOL TARTRATE 1 MG/ML
5 INJECTION, SOLUTION INTRAVENOUS ONCE
Status: COMPLETED | OUTPATIENT
Start: 2023-08-30 | End: 2023-08-30

## 2023-08-30 RX ORDER — GLIMEPIRIDE 4 MG/1
4 TABLET ORAL EVERY MORNING
Qty: 100 TABLET | Refills: 4 | Status: SHIPPED | OUTPATIENT
Start: 2023-08-30

## 2023-08-30 RX ORDER — SODIUM CHLORIDE 9 MG/ML
500 INJECTION, SOLUTION INTRAVENOUS ONCE
Status: COMPLETED | OUTPATIENT
Start: 2023-08-30 | End: 2023-08-30

## 2023-08-30 RX ADMIN — SODIUM CHLORIDE 500 ML: 9 INJECTION, SOLUTION INTRAVENOUS at 05:05

## 2023-08-30 RX ADMIN — METOPROLOL TARTRATE 25 MG: 25 TABLET, FILM COATED ORAL at 05:39

## 2023-08-30 RX ADMIN — SODIUM CHLORIDE 500 ML: 9 INJECTION, SOLUTION INTRAVENOUS at 06:30

## 2023-08-30 RX ADMIN — METOPROLOL TARTRATE 5 MG: 5 INJECTION INTRAVENOUS at 05:05

## 2023-08-30 ASSESSMENT — FIBROSIS 4 INDEX: FIB4 SCORE: 2.32

## 2023-08-30 NOTE — ED PROVIDER NOTES
ED Provider Note    CHIEF COMPLAINT  Chief Complaint   Patient presents with    Tachycardia     Patient reports increased heart rate according to watch. Patient spouse and patient monitor heart rate closely stating that with close watch heart rate remained tachycardia        EXTERNAL RECORDS REVIEWED  Patient has a history of SVT, rapid a-flutter that was unmasked with adenosine 8/10/2023.  He is already anticoagulated.  Consented to cardioversion which was done in the ED, rate controlled with diltiazem.  Return to normal sinus rhythm after cardioversion but then was evaluated in the ED again later the same day with chest pain.  He was then admitted with chest pain 8/13/2023, stress test at that visit was normal.  History of PE.  Anticoagulated.    HPI/ROS  LIMITATION TO HISTORY   Select: : None  OUTSIDE HISTORIAN(S):  Significant other wife    Magnus Claudio is a 78 y.o. male with PMH afib/aflutter who presents with concern for tachycardia.  Patient was watching his smart watch earlier today when he noticed his heart rate was increasing.  He denies any chest pain, shortness of breath, dizziness, or lightheadedness.  He states he was recently started on amiodarone and metoprolol after being seen here for a flutter.  He states he has been taking all of his medications as prescribed.    PAST MEDICAL HISTORY  Past Medical History:   Diagnosis Date    Cancer (HCC) 2021    Colon 20+ years ago and bladder    Anemia     Blood clotting disorder (HCC)     leg and lung    Cataract     Diabetes (HCC)     Diverticulitis     Hypertension     Vertigo, benign positional         SURGICAL HISTORY  Past Surgical History:   Procedure Laterality Date    INGUINAL HERNIA REPAIR ROBOTIC XI Left 2/11/2022    Procedure: REPAIR, HERNIA, INGUINAL, ROBOT-ASSISTED, USING DA NIKITA XI - WITH MESH PLACEMENT;  Surgeon: Nelson Denney M.D.;  Location: SURGERY [facility];  Service: Gen Robotic    OTHER  05/2021    bladder cancer surgery     CATARACT EXTRACTION WITH IOL      COLON RESECTION      EYE SURGERY Bilateral     Cataract surgery    LAMINOTOMY      AK RESEC LIVER,PART LOBECTOMY          FAMILY HISTORY  Family History   Problem Relation Age of Onset    Hypertension Father     Heart Attack Father     Cancer Mother         Breast    Diabetes Sister     Hyperlipidemia Neg Hx         unknown       SOCIAL HISTORY       CURRENT MEDICATIONS  Home Medications    Medication Sig Taking? Last Dose Authorizing Provider   metoprolol SR (TOPROL XL) 25 MG TABLET SR 24 HR Take 1 Tablet by mouth every evening.  8/29/2023 at 1930 MIRZA Benites   amiodarone (CORDARONE) 200 MG Tab Take 1 Tablet by mouth every day. After 2 weeks switch to once a day  8/29/2023 at 0800 MIGUEL Benites.   atorvastatin (LIPITOR) 40 MG Tab Take 1 Tablet by mouth every evening.  8/29/2023 at 1930 Nelson Ward M.D.   warfarin (COUMADIN) 2.5 MG Tab Take 1-2 Tablets by mouth every day. As directed by [facility]  Patient taking differently: Take 2.5-5 mg by mouth every day. 2.5 mg on Monday and Friday   5 mg all other days  8/29/2023 at 1930 ROSI Chaidez   losartan-hydrochlorothiazide (HYZAAR) 100-12.5 MG per tablet Take 1 Tablet by mouth every day.  8/29/2023 at 0800 Leobardo Wright M.D.   metFORMIN ER (GLUCOPHAGE XR) 500 MG TABLET SR 24 HR TAKE 2 TABLETS BY MOUTH TWICE DAILY  Patient taking differently: Take 1,000 mg by mouth 2 times a day. TAKE 2 TABLETS BY MOUTH TWICE DAILY  8/29/2023 at 0800 Leobardo Wright M.D.   tamsulosin (FLOMAX) 0.4 MG capsule TAKE 1 CAPSULE BY MOUTH EVERY DAY  8/29/2023 at 1930 Leobardo Wright M.D.   glimepiride (AMARYL) 4 MG Tab TAKE 1 TABLET BY MOUTH EVERY MORNING  8/29/2023 at 0800 Too Brito M.D.   B Complex-Folic Acid (B COMPLEX-VITAMIN B12 PO) Take 1 Tablet by mouth every day.  8/29/2023 at 0800 Physician Outpatient   therapeutic multivitamin-minerals  "(THERAGRAN-M) Tab Take 1 Tab by mouth every day.  8/29/2023 at 0800 Physician Outpatient       ALLERGIES  No Known Allergies    PHYSICAL EXAM  /71   Pulse (!) 104   Temp 37.4 °C (99.3 °F) (Temporal)   Resp 19   Ht 1.803 m (5' 11\")   Wt 85.4 kg (188 lb 4.4 oz)   SpO2 92%   Constitutional: Alert in no apparent distress.  HENT: No signs of trauma, Bilateral external ears normal, Nose normal.   Eyes: Pupils are equal and reactive, Conjunctiva normal, Non-icteric.   Neck: Normal range of motion, No tenderness, Supple, No stridor.   Cardiovascular: Regular rate and rhythm, no murmurs.   Thorax & Lungs: Normal breath sounds, No respiratory distress, No wheezing  Abdomen: Soft, No tenderness, No peritoneal signs, No masses, No pulsatile masses.   Skin: Warm, Dry, No erythema, No rash.   Back: No bony tenderness, No CVA tenderness.   Extremities: Intact distal pulses, No edema, No tenderness, No cyanosis  Musculoskeletal: Good range of motion in all major joints. No tenderness to palpation or major deformities noted.   Neurologic: Alert , Normal motor function, Normal speech, No focal deficits noted.   Psychiatric: Affect normal, Judgment normal, Mood normal.         DIAGNOSTIC STUDIES / PROCEDURES    LABS & EKG  I have independently interpreted this EKG     Results for orders placed or performed during the hospital encounter of 08/30/23   CBC WITH DIFFERENTIAL   Result Value Ref Range    WBC 13.4 (H) 4.8 - 10.8 K/uL    RBC 4.53 (L) 4.70 - 6.10 M/uL    Hemoglobin 13.3 (L) 14.0 - 18.0 g/dL    Hematocrit 39.6 (L) 42.0 - 52.0 %    MCV 87.4 81.4 - 97.8 fL    MCH 29.4 27.0 - 33.0 pg    MCHC 33.6 32.3 - 36.5 g/dL    RDW 43.8 35.9 - 50.0 fL    Platelet Count 150 (L) 164 - 446 K/uL    MPV 9.7 9.0 - 12.9 fL    Neutrophils-Polys 84.40 (H) 44.00 - 72.00 %    Lymphocytes 7.50 (L) 22.00 - 41.00 %    Monocytes 7.00 0.00 - 13.40 %    Eosinophils 0.50 0.00 - 6.90 %    Basophils 0.10 0.00 - 1.80 %    Immature Granulocytes 0.50 " 0.00 - 0.90 %    Nucleated RBC 0.00 0.00 - 0.20 /100 WBC    Neutrophils (Absolute) 11.31 (H) 1.82 - 7.42 K/uL    Lymphs (Absolute) 1.00 1.00 - 4.80 K/uL    Monos (Absolute) 0.94 (H) 0.00 - 0.85 K/uL    Eos (Absolute) 0.07 0.00 - 0.51 K/uL    Baso (Absolute) 0.02 0.00 - 0.12 K/uL    Immature Granulocytes (abs) 0.07 0.00 - 0.11 K/uL    NRBC (Absolute) 0.00 K/uL   COMP METABOLIC PANEL   Result Value Ref Range    Sodium 137 135 - 145 mmol/L    Potassium 3.7 3.6 - 5.5 mmol/L    Chloride 103 96 - 112 mmol/L    Co2 23 20 - 33 mmol/L    Anion Gap 11.0 7.0 - 16.0    Glucose 118 (H) 65 - 99 mg/dL    Bun 27 (H) 8 - 22 mg/dL    Creatinine 1.62 (H) 0.50 - 1.40 mg/dL    Calcium 8.5 8.4 - 10.2 mg/dL    Correct Calcium 8.6 8.5 - 10.5 mg/dL    AST(SGOT) 23 12 - 45 U/L    ALT(SGPT) 21 2 - 50 U/L    Alkaline Phosphatase 50 30 - 99 U/L    Total Bilirubin 2.3 (H) 0.1 - 1.5 mg/dL    Albumin 3.9 3.2 - 4.9 g/dL    Total Protein 6.8 6.0 - 8.2 g/dL    Globulin 2.9 1.9 - 3.5 g/dL    A-G Ratio 1.3 g/dL   TROPONIN   Result Value Ref Range    Troponin T 81 (H) 6 - 19 ng/L   MAGNESIUM   Result Value Ref Range    Magnesium 1.7 1.5 - 2.5 mg/dL   ESTIMATED GFR   Result Value Ref Range    GFR (CKD-EPI) 43 (A) >60 mL/min/1.73 m 2   EKG   Result Value Ref Range    Report       [facility] Emergency Dept.    Test Date:  2023  Pt Name:    GIORGIO SALMERON               Department: [facility]  MRN:        [id number]                      Room:  Gender:     Male                         Technician: [id number]  :        1945                   Requested By:ALBER Archuleta #:    [id number]                    Reading MD: Alber Amato    Measurements  Intervals                                Axis  Rate:       113                          P:          0  OR:         0                            QRS:        -25  QRSD:       108                          T:          114  QT:         306  QTc:        420    Interpretive  Statements  A-fib with RVR rate of 113, left axis, normal intervals, no ST elevations,  depressions, or T wave inversions, grossly unchanged from EKG 8/13/2023 apart  from A-fib instead of sinus rhythm  Electronically Signed On 08- 04:45:24 PDT by Heaven Amato             COURSE & MEDICAL DECISION MAKING    ED Observation Status? Yes; I am placing the patient in to an observation status due to a diagnostic uncertainty as well as therapeutic intensity. Patient placed in observation status at 4:02 AM, 8/30/2023.     Observation plan is as follows: Labs, EKG, metoprolol repeat evaluation      INITIAL ASSESSMENT, COURSE AND PLAN  Care Narrative: 78-year-old male with history of A-fib and a flutter presenting with rapid heart rate today.  On EKG he is in A-fib with RVR however does not extremely rapid is in the low low 100s to 1 teens.  He does not have any symptoms, no hypotension, no chest pain no shortness of breath dizziness or lightheadedness.  His heart rate is slightly rapid but not at a critical level.  No signs of ischemia on EKG.  Will check labs, electrolytes, troponin.  Will give small dose of metoprolol for rate control.    5:30 AM  Troponin is elevated at 81, however compared this to prior values and this is actually improved.  Will give dose of oral metoprolol 25 mg to assist with rate control.    6:22 AM  Patient signed out to Dr. Vaughn ERP, coags pending.  Patient remains with heart rate in the upper 90s to low 100s.    HYDRATION: Based on the patient's presentation of Tachycardia the patient was given IV fluids. IV Hydration was used because oral hydration was not adequate alone. Upon recheck following hydration, the patient was improved.      ADDITIONAL PROBLEM LIST    Atrial fibrillation with RVR      DISPOSITION AND DISCUSSIONS  I have discussed management of the patient with the following physicians and MEGHA's: Dr. Vaughn--ERP      Decision tools and prescription drugs considered  including, but not limited to: Medication modification metoprolol .    ED observation pending further work-up      FINAL DIAGNOSIS  1. Atrial fibrillation with rapid ventricular response (HCC)            CRITICAL CARE  The very real possibilty of a deterioration of this patient's condition required the highest level of my preparedness for sudden, emergent intervention.  I provided critical care services, which included medication orders, frequent reevaluations of the patient's condition and response to treatment, ordering and reviewing test results, and discussing the case with various consultants.  The critical care time associated with the care of the patient was 30 minutes. Review chart for interventions. This time is exclusive of any other billable procedures.     Electronically signed by: Heaven Amato M.D., 08/30/23 4:02 AM

## 2023-08-30 NOTE — DISCHARGE INSTRUCTIONS
Continue taking your metoprolol to help rate control your A-fib/a flutter.  Continue take your Coumadin and follow-up for your regular INR checks.    Please call your primary care doctor to discuss increasing the metoprolol dose.

## 2023-08-30 NOTE — TELEPHONE ENCOUNTER
Occupational Therapy  Visit Type: initial evaluation  Precautions:  Medical precautions:  fall risk; droplet precautions and contact precautions.    Lines:     Basic: O2, telemetry and continuous pulse oximetry      Lines in chart and on patient reviewed, precautions maintained throughout session.  Hearing: hard of hearing  Safety Measures: bed alarm and bed rails    SUBJECTIVE  Patient agreed to participate in therapy this date.  Patient mumbling, difficult to understand  Prior Living Situation  Information Provided By:: daughter  Type of Home: Nursing home (memory care unit)  Bathroom Shower/Tub: Walk-in shower  Bathroom Toilet: Raised  Bathroom Equipment: Shower chair  Home Equipment: Two-wheeled walker    Prior Communication/Cognition  Prior Cognition: Impaired    Prior Function  Eating: Modified Independent  Additional Comments: patient required assist for dressing and bathing, walked with rw, walked fast.  During treatment session therapist was wearing mask and patient was not wearing mask  Patient / Family Goal: unable to state    Pain     At onset of session (out of 10): 0    OBJECTIVE   Level of consciousness: alert    Orientation: Unable to assess    Arousal alertness: delayed responses to stimuli and generalized responses    Affect/Behavior: alert, calm and confused  Functional Communication/Cognition    Overall status:  Impaired    Form of communication:  Verbal     Attention span:  Difficulty attending to directions    Attention Span Impairment: distractibility    Transition between tasks: transition with cues.    Safety judgement: decreased awareness of need for assistance.    Awareness of deficits: decreased awareness of deficits.  Range of Motion (measured in degrees unless otherwise indicated)  Shoulder:   - Flexion (180):      • Left:  90   Right:  90  Elbow/Forearm:   - Flexion (140-150):      • Left: WFL      • Right: WFL  Wrist:   - Flexion (60-80):      • Left: WFL      • Right:  Returned call and spoke to patient's wife. She is concerned that patient will not be seen until 9/11/23. Recommended sooner follow up with DB for post hosp and they declined at this time.  Metoprolol was increased to 50mg nightly per ER physician. Patient and spouse asking if any other med changes are needed. ER precautions given for worsening symptoms.     DB: Please review and advise if any other changes recommended prior to 9/11/23 OV with SS. Thank you.    WFL  Finger/Thumb:   - Flexion:      • Left: WFL   Strength (out of 5 unless otherwise indicated)  Shoulder:   - Flexion:      • Left: 3-      • Right: 3-  Elbow/Forearm:   - Flexion:      • Left: 4;       • Right: 4   Wrist:   - Flexion:      • Left: 4      • Right: 4  Finger/Thumb:   - Finger Flexion:      • Left: 4      • Right: 4  Balance (trials in sec unless otherwise indicated)  Sitting:   - Static:  stand by assist    - Dynamic:  stand by assist  Standing - Firm Surface - Eyes Open:    - Static: minimal assist   - Dynamic:  minimal assist    Tone    Upper Extremity:  Left:  WFL  Right:  WFL  Bed mobility:    Rolling left: minimal assist    Rolling right: minimal assist    Repositioning in bed: total assist - dependent     Side-lying to sit: moderate assist    Sit to side-lying: minimal assist  Transfers:    Assistive devices: 2-wheeled walker and gait belt    Sit to stand: moderate assist    Stand to sit: moderate assist   Bed to chair: moderate assist, type:    Activities of Daily Living (ADLs):  Grooming/Oral Hygiene:     Grooming assist: moderate assist    Oral hygiene assist: moderate assist  Upper Body Dressing:    Assist: maximal assist  Lower Body Dressing:     Assist: maximal assist    Footwear assistance: maximal assist  Toilet transfer:     Assist: moderate assist  Toileting:     Assist: total assist - non-dependent             ASSESSMENT    Impairments: activity tolerance, balance, pain, bed mobility, decreased insight into deficits, communication and cognitive  Functional Limitations: bed mobility, functional mobility, grooming, bathing, toileting, showering, dressing, participating in meaningful/purposeful activities, functional transfers and IADLs  Personal Occupations Profile Affected: bathing/showering, functional mobility/transfers, personal device care, upper body dressing, lower body dressing, toileting/toilet hygiene, personal hygiene/grooming, sleep participation, meal  preparation/cleanup, rest/sleep preparation, social participation community, social participation family, social participation peer, social participation friend    Patient admitted from memory care with COVID.  Patient oriented x1, functional UE AROM and strength.  Mod assist to edge of bed, mod assist to walk in room.  Easily distracted.  Requires assist of another to manage distractions and redirect to task.  Limited with weakness.    Discharge Recommendations:   Recommendations for Discharge: OT IL: Patient is appropriate for daily Occupational Therapy               Therapy Diagnosis:   Decreased ability to perform self care tasks and functional transfers.  Skilled therapy is required to address these limitations in attempt to maximize the patient's independence.  Progress: progressing toward goals  Clinical decision making: Moderate - Patient has several limitations (3-5), comorbidities and/or complexities, as noted in detailed assessment above, that impact their occupational profile.  Resulting in several treatment options and minimal to moderate task modification consistent with moderate clinical decision making complexity.    End of Session:   Location: in bed  Safety measures: alarm system in place/re-engaged, call light within reach, equipment intact and lines intact  Handoff to: nurse  Education Provided:   Learning assessment:  - Primary learner: patient  - Are they ready to learn: yes  - Preferred learning style: verbal and demonstration  - Barriers to learning: cognitive  Education provided during session:  - Receiving education: patient  - Results of above outlined education: Needs reinforcement    PLAN  Suggestions for next session as indicated: OT Frequency: 3 days/week  Frequency Comments: 1/10, p3, 1   A minimum of 8 minutes per session x 1 week.  Interventions: activity tolerance training, ADL retraining, balance, bed mobility training, compensatory technique education, compensatory techniques,  safety training, functional transfer training and therapeutic activity  Agreement to plan and goals: patient agrees with goals and treatment plan      GOALS  Long Term Goals: (to be met by time of discharge from hospital)    Patient to improve toilet transfer to min assist  Patient to improve LE dressing to min assist  Patient to improve OFH to min assist, standing.  Documented in the chart in the following areas: Prior Level of Function. Assessment. Plan.      Therapy procedure time and total treatment time can be found documented on the Time Entry flowsheet

## 2023-08-30 NOTE — TELEPHONE ENCOUNTER
Received request via: Patient    Was the patient seen in the last year in this department? Yes  8/23/23  Does the patient have an active prescription (recently filled or refills available) for medication(s) requested? No    Does the patient have [facility] Plus and need 100 day supply (blood pressure, diabetes and cholesterol meds only)? Patient does not have SCP

## 2023-08-30 NOTE — TELEPHONE ENCOUNTER
DB    Caller: Melyarleen Claudio (wife)    Topic/issue: MEDICAL ADVICE    Per Mely;    Magnus has had some questions regarding his HR and the medication that was changed while at the ER this morning 8/30. Please advise.    Thank you,  Antoine RODRÍGUEZ    Callback Number: [phone] (home)

## 2023-08-30 NOTE — ED PROVIDER NOTES
Patient was turned over to me by Dr. Amato.  The patient was having atrial fibrillation with rapid ventricular response.  The patient is anticoagulated.  The give the another dose of metoprolol, and monitor the patient here for several hours, the patient's heart rate came down to the 80s.  INR is appropriate.  I believe the patient can be discharged home.  Discussed with the family to call cardiology for close follow-up.  I will have the patient double his metoprolol at home.

## 2023-08-30 NOTE — ED NOTES
Pt was re-evaluated by MD and is now cleared for d/c  dischg instructions given to pt and wife both verbally understands  d/c'ed to home in NAD

## 2023-08-30 NOTE — ED TRIAGE NOTES
"Chief Complaint   Patient presents with    Tachycardia     Patient reports increased heart rate according to watch. Patient spouse and patient monitor heart rate closely stating that with close watch heart rate remained tachycardia      /76   Pulse 100   Temp 37.4 °C (99.3 °F) (Temporal)   Resp 16   Ht 1.803 m (5' 11\")   Wt 85.4 kg (188 lb 4.4 oz)   SpO2 94%   BMI 26.26 kg/m²     "

## 2023-09-06 ENCOUNTER — TELEPHONE (OUTPATIENT)
Dept: CARDIOLOGY | Facility: MEDICAL CENTER | Age: 78
End: 2023-09-06
Payer: MEDICARE

## 2023-09-06 DIAGNOSIS — I48.91 ATRIAL FIBRILLATION, UNSPECIFIED TYPE (HCC): ICD-10-CM

## 2023-09-06 NOTE — TELEPHONE ENCOUNTER
LEONARD    Caller: Mely Claudio (Spouse)    Medication Name and Dosage: metoprolol SR (TOPROL XL) 25 MG    Medication amount left: 3 left     Preferred Pharmacy: [facility] - on file    Other questions (Topic): Pt spouse stated that the pt was told to take two a day, and when they went to get it refilled the pharmacy stated that it was too soon for a refill, please advise.     Callback Number (Will only call for issues): [phone] (home)      Thank you,     Sivakumar MONIQUE

## 2023-09-07 ENCOUNTER — ANTICOAGULATION VISIT (OUTPATIENT)
Dept: MEDICAL GROUP | Facility: MEDICAL CENTER | Age: 78
End: 2023-09-07
Payer: MEDICARE

## 2023-09-07 DIAGNOSIS — Z86.718 HISTORY OF VENOUS THROMBOEMBOLISM: ICD-10-CM

## 2023-09-07 LAB — INR PPP: 4.7 (ref 2–3.5)

## 2023-09-07 PROCEDURE — 99211 OFF/OP EST MAY X REQ PHY/QHP: CPT | Performed by: NURSE PRACTITIONER

## 2023-09-07 PROCEDURE — 85610 PROTHROMBIN TIME: CPT | Performed by: NURSE PRACTITIONER

## 2023-09-07 NOTE — PROGRESS NOTES
Anticoagulation Summary  As of 2023      INR goal:  2.0-3.0   TTR:  75.3 % (4 y)   INR used for dosin.70 (2023)   Warfarin maintenance plan:  2.5 mg (2.5 mg x 1) every day   Weekly warfarin total:  17.5 mg   Plan last modified:  Brooklyn Subramanian, PharmD (2023)   Next INR check:  2023   Target end date:  Indefinite    Indications    History of venous thromboembolism [Z86.718]  Deep vein thrombosis (DVT) of popliteal vein of both lower extremities (HCC) (Resolved) [I82.433]                 Anticoagulation Episode Summary       INR check location:      Preferred lab:      Send INR reminders to:      Comments:            Anticoagulation Care Providers       Provider Role Specialty Phone number    [facility] Responsible  [phone]    Too Brito M.D.  Family Medicine [phone]          Anticoagulation Patient Findings  Patient Findings       Positives:  Change in medications (started amiodarone )    Negatives:  Signs/symptoms of thrombosis, Signs/symptoms of bleeding, Laboratory test error suspected, Change in health, Change in alcohol use, Change in activity, Upcoming invasive procedure, Emergency department visit, Upcoming dental procedure, Missed doses, Extra doses, Change in diet/appetite, Hospital admission, Bruising, Other complaints                  HPI:   Magnus Claudio seen in clinic today, on anticoagulation therapy with warfarin due to history of atrial fibrillation and DVT.    Patient's previous INR was therapeutic at 2.93 on 23, at which time patient was instructed to continue on current regimen. Patient returns to clinic today to recheck INR to ensure it is therapeutic and thus preventing possible clotting and/or bleeding/bruising complications.    CHADS-VASc = 4  (unadjusted ischemic stroke risk/year:  4.8%, which is moderate risk)    Does patient have any changes to current medical/health status since last appt: No  Does patient have  any signs/symptoms of bleeding and/or thrombosis since the last appt: No  Does patient have any interval changes to diet or medications since last appt: Yes  Are there any complications or cost restrictions with current therapy: No     Does patient have [facility] PCP? Yes, Leobardo Wright M.D. (If not, please document discussion that patient must be [facility] at [facility])     There were no vitals filed for this visit.     Asssessment:      INR is subtherapeutic at 4.7, therefore increasing risk of bleeding  Reason(s) for out of range INR today:  patient started amiodarone on 08/13/23. DDI between amiodarone and warfarin can have delayed effect and increase INR.       Pt is not on antiplatelet therapy.    Medication reconciliation completed today.    Warfarin dosing recommendation:  HOLD today's dose then proceed with decreased weekly dose of 2.5mg/day for a 30% decrease in response to amiodarone interaction.     Follow up:  Because warfarin is a high risk medication and current CHEST guidelines recommend regular monitoring intervals (few days up to 12 weeks), will have patient return to clinic in 4 days to recheck INR.    Anushka Pearl, pharmacy student   Brooklyn Subramanian, PharmD

## 2023-09-08 RX ORDER — METOPROLOL SUCCINATE 25 MG/1
50 TABLET, EXTENDED RELEASE ORAL EVERY MORNING
Qty: 20 TABLET | Refills: 0 | Status: SHIPPED | OUTPATIENT
Start: 2023-09-08 | End: 2023-09-15 | Stop reason: SDUPTHER

## 2023-09-08 NOTE — TELEPHONE ENCOUNTER
LEONARD    Caller: Mely (wife)     Topic/issue: Wife is calling back and she wants to discuss the phone call she had with earlier     Callback Number: [phone]    Thank You,   Nina WILLSON

## 2023-09-08 NOTE — TELEPHONE ENCOUNTER
Called pt  [phone]   S/w wife Mely. Mely reports last ED visit metoprolol changed to BID. In reading d/c report from ED dated 8//30/23 reports shows 1x daily. Advised pt to consult with this medication change at upcoming appt with SS on 9/11/23 at 1:20pm. Mely verbalized understanding.

## 2023-09-08 NOTE — TELEPHONE ENCOUNTER
Short script sent to pharmacy per pt request    metoprolol SR (TOPROL XL) 25 MG TABLET SR 24 HR 20 Tablet 0/0 9/8/2023     Sig - Route: Take 2 Tablets by mouth every morning. - Oral    Sent to pharmacy as: Metoprolol Succinate ER 25 MG Oral Tablet Extended Release 24 Hour (Toprol XL)    Notes to Pharmacy: Increase dosage. Short script until follow up appt with provider.    Cosign for Ordering: Required by MIRZA Gaona-Prescribing Status: Receipt confirmed by pharmacy (9/8/2023 10:42 AM PDT)      Pharmacy    [id number] - [place], [place] - [address] AT [address]     ---------------------------------------------------------------------  Called Mely [phone]  Advised of new Rx approved by KATIA sent to pharmacy. Mely verbalized understanding and is appreciative of call back.

## 2023-09-08 NOTE — TELEPHONE ENCOUNTER
Called Mely [phone]  Mely reports ED provider changed Toprol to 50mg daily and does not have enough until FU with SS on 9/11/23. Mely is requesting a 10 day new Rx for Toprol 50 mg QD. Advised Mely that DB is currently out of the office and RN will send message to covering provider for recommendation. Preferred pharmacy verified. Mely verbalized understanding.    ----------------------------------------------------------------------------------    ED Provider Notes  Stanley Vaughn M.D. (Physician)   Emergency Medicine  Patient was turned over to me by Dr. Amato.  The patient was having atrial fibrillation with rapid ventricular response.  The patient is anticoagulated.  The give the another dose of metoprolol, and monitor the patient here for several hours, the patient's heart rate came down to the 80s.  INR is appropriate.  I believe the patient can be discharged home.  Discussed with the family to call cardiology for close follow-up.  I will have the patient double his metoprolol at home.            To KATIA as ADA for today

## 2023-09-11 ENCOUNTER — TELEPHONE (OUTPATIENT)
Dept: CARDIOLOGY | Facility: MEDICAL CENTER | Age: 78
End: 2023-09-11

## 2023-09-11 ENCOUNTER — ANTICOAGULATION VISIT (OUTPATIENT)
Dept: VASCULAR LAB | Facility: MEDICAL CENTER | Age: 78
End: 2023-09-11
Attending: INTERNAL MEDICINE
Payer: MEDICARE

## 2023-09-11 ENCOUNTER — OFFICE VISIT (OUTPATIENT)
Dept: CARDIOLOGY | Facility: MEDICAL CENTER | Age: 78
End: 2023-09-11
Attending: INTERNAL MEDICINE
Payer: MEDICARE

## 2023-09-11 VITALS
HEIGHT: 71 IN | HEART RATE: 92 BPM | OXYGEN SATURATION: 97 % | BODY MASS INDEX: 26.18 KG/M2 | WEIGHT: 187 LBS | SYSTOLIC BLOOD PRESSURE: 120 MMHG | RESPIRATION RATE: 16 BRPM | DIASTOLIC BLOOD PRESSURE: 76 MMHG

## 2023-09-11 DIAGNOSIS — D68.69 SECONDARY HYPERCOAGULABLE STATE (HCC): ICD-10-CM

## 2023-09-11 DIAGNOSIS — I48.3 TYPICAL ATRIAL FLUTTER (HCC): ICD-10-CM

## 2023-09-11 DIAGNOSIS — I48.91 ATRIAL FIBRILLATION, UNSPECIFIED TYPE (HCC): ICD-10-CM

## 2023-09-11 DIAGNOSIS — Z86.718 HISTORY OF VENOUS THROMBOEMBOLISM: ICD-10-CM

## 2023-09-11 LAB — INR PPP: 3.7 (ref 2–3.5)

## 2023-09-11 PROCEDURE — 99214 OFFICE O/P EST MOD 30 MIN: CPT | Performed by: INTERNAL MEDICINE

## 2023-09-11 PROCEDURE — 99212 OFFICE O/P EST SF 10 MIN: CPT | Performed by: NURSE PRACTITIONER

## 2023-09-11 PROCEDURE — 93005 ELECTROCARDIOGRAM TRACING: CPT | Performed by: INTERNAL MEDICINE

## 2023-09-11 PROCEDURE — 99212 OFFICE O/P EST SF 10 MIN: CPT | Performed by: INTERNAL MEDICINE

## 2023-09-11 PROCEDURE — 3074F SYST BP LT 130 MM HG: CPT | Performed by: INTERNAL MEDICINE

## 2023-09-11 PROCEDURE — 93010 ELECTROCARDIOGRAM REPORT: CPT | Performed by: INTERNAL MEDICINE

## 2023-09-11 PROCEDURE — 85610 PROTHROMBIN TIME: CPT

## 2023-09-11 PROCEDURE — 3078F DIAST BP <80 MM HG: CPT | Performed by: INTERNAL MEDICINE

## 2023-09-11 ASSESSMENT — FIBROSIS 4 INDEX: FIB4 SCORE: 2.64

## 2023-09-11 NOTE — PROGRESS NOTES
Arrhythmia Clinic Note (New Patient)    DOS: 9/11/2023    Referring physician: Yasmany Xavier MD    Chief complaint/Reason for consult: Atrial flutter    HPI:  Pt is a 77 yo M. History of remote colon ca. Has had DVT in the past. On coumadin for this. Recently with persistently tachycardia. Causes some increased fatigue for him. Found in ED to be in rapidly conducting flutter. Looks typical. Placed on amiodarone and dian agents. Still in flutter with mild RVR. Referred to EP for management.     ROS (+in BOLD):  Constitutional: Fevers/chills/fatigue/weightloss  HEENT: Blurry vision/eye pain/sore throat/hearing loss  Respiratory: Shortness of breath/cough  Cardiovascular: Chest pain/palpitations/edema/orthopnea/syncope  GI: Nausea/vomitting/diarrhea  MSK: Arthralgias/myagias/muscle weakness  Skin: Rash/sores  Neurological: Numbness/tremors/vertigo  Endocrine: Excessive thirst/polyuria/cold intolerance/heat intolerance  Psych: Depression/anxiety    Past Medical History:   Diagnosis Date    Anemia     Blood clotting disorder (HCC)     leg and lung    Cancer (HCC) 2021    Colon 20+ years ago and bladder    Cataract     Diabetes (HCC)     Diverticulitis     Hypertension     Vertigo, benign positional        Past Surgical History:   Procedure Laterality Date    INGUINAL HERNIA REPAIR ROBOTIC XI Left 2/11/2022    Procedure: REPAIR, HERNIA, INGUINAL, ROBOT-ASSISTED, USING EveryMoveI XI - WITH MESH PLACEMENT;  Surgeon: Nelson Denney M.D.;  Location: SURGERY [facility];  Service: Gen Robotic    OTHER  05/2021    bladder cancer surgery    CATARACT EXTRACTION WITH IOL      COLON RESECTION      EYE SURGERY Bilateral     Cataract surgery    LAMINOTOMY      SC RESEC LIVER,PART LOBECTOMY         Social History     Socioeconomic History    Marital status:      Spouse name: Not on file    Number of children: Not on file    Years of education: Not on file    Highest education level: Not on file   Occupational History    Not on  file   Tobacco Use    Smoking status: Never    Smokeless tobacco: Never   Vaping Use    Vaping Use: Never used   Substance and Sexual Activity    Alcohol use: Not Currently     Alcohol/week: 0.0 oz    Drug use: Never    Sexual activity: Not Currently     Partners: Female   Other Topics Concern    Not on file   Social History Narrative    Not on file     Social Determinants of Health     Financial Resource Strain: Not on file   Food Insecurity: Not on file   Transportation Needs: Not on file   Physical Activity: Not on file   Stress: Not on file   Social Connections: Not on file   Intimate Partner Violence: Not on file   Housing Stability: Not on file       Family History   Problem Relation Age of Onset    Hypertension Father     Heart Attack Father     Cancer Mother         Breast    Diabetes Sister     Hyperlipidemia Neg Hx         unknown       Allergies   Allergen Reactions    Morphine Unspecified     Hallucinations, 15+ years ago       Current Outpatient Medications   Medication Sig Dispense Refill    metoprolol SR (TOPROL XL) 25 MG TABLET SR 24 HR Take 2 Tablets by mouth every morning. 20 Tablet 0    glimepiride (AMARYL) 4 MG Tab Take 1 Tablet by mouth every morning. 100 Tablet 4    amiodarone (CORDARONE) 200 MG Tab Take 1 Tablet by mouth every day. After 2 weeks switch to once a day 30 Tablet 2    atorvastatin (LIPITOR) 40 MG Tab Take 1 Tablet by mouth every evening. 30 Tablet 0    warfarin (COUMADIN) 2.5 MG Tab Take 1-2 Tablets by mouth every day. As directed by [facility] (Patient taking differently: Take 2.5-5 mg by mouth every day. 2.5 mg on Monday and Friday   5 mg all other days) 180 Tablet 1    losartan-hydrochlorothiazide (HYZAAR) 100-12.5 MG per tablet Take 1 Tablet by mouth every day. 100 Tablet 3    metFORMIN ER (GLUCOPHAGE XR) 500 MG TABLET SR 24 HR TAKE 2 TABLETS BY MOUTH TWICE DAILY (Patient taking differently: Take 1,000 mg by mouth 2 times a day. TAKE 2 TABLETS BY MOUTH TWICE  "DAILY) 360 Tablet 4    tamsulosin (FLOMAX) 0.4 MG capsule TAKE 1 CAPSULE BY MOUTH EVERY  Capsule 4    B Complex-Folic Acid (B COMPLEX-VITAMIN B12 PO) Take 1 Tablet by mouth every day.      therapeutic multivitamin-minerals (THERAGRAN-M) Tab Take 1 Tab by mouth every day.       No current facility-administered medications for this visit.       Physical Exam:  Vitals:    09/11/23 1319   BP: 120/76   BP Location: Left arm   Patient Position: Sitting   BP Cuff Size: Adult   Pulse: 92   Resp: 16   SpO2: 97%   Weight: 84.8 kg (187 lb)   Height: 1.803 m (5' 11\")     General appearance: NAD, conversant  Eyes: anicteric sclerae, no lid-lag; PERRLA  HENT: Atraumatic; moist mucous membranes, no ulcerations  Neck: Trachea midline; FROM, supple, no thyromegaly  Lungs: CTA, with normal respiratory effort and no intercostal retractions  CV: RRR, no MRGs, no JVD  Abdomen: Soft, non-tender; normal bowel sounds, no HSM  Extremities: No peripheral edema. No clubbing or cyanosis.  Skin: Normal temperature, turgor and texture; no rash or ulcers  Psych: Appropriate affect, alert and oriented to person, place and time    Data:  Labs reviewed    Prior echo/stress reviewed:  LVEF 50%    EKG interpreted by me:  AFL    Impression/Plan:  1. Typical atrial flutter (HCC)  EKG      -Flutter appears CTI dependent  -I discussed relatively straightforward ablation approach targeting CTI to permanently treat the flutter circuit  -I discussed post ablation can come off amiodarone and see if there is residual AF, and frequently there is but very low burden instead of  the persistent flutter that is difficult to rate control  -Risks/benefits discussed and he is agreeable to proceed    Sandra Beckford MD        "

## 2023-09-11 NOTE — TELEPHONE ENCOUNTER
Patient scheduled for flutter ablation w/[facility] on 9-28-23 with Dr. Beckford. Patient has been instructed to check in at 10:30 for 12:30 case time. Hold Metformin and Amaryl am of. Message sent to authorizations. Emailed Carto.

## 2023-09-11 NOTE — TELEPHONE ENCOUNTER
----- Message from Sandra Beckford M.D. sent at 9/11/2023  1:58 PM PDT -----  Let's schedule flutter ablation asap. No meds to hold.    Sandra

## 2023-09-11 NOTE — PROGRESS NOTES
Anticoagulation Summary  As of 9/11/2023      INR goal:  2.0-3.0   TTR:  75.1 % (4 y)   INR used for dosing:  3.70 (9/11/2023)   Warfarin maintenance plan:  2.5 mg (2.5 mg x 1) every day   Weekly warfarin total:  17.5 mg   Plan last modified:  Brooklyn Subramanian, PharmD (9/7/2023)   Next INR check:  9/14/2023   Target end date:  Indefinite    Indications    History of venous thromboembolism [Z86.718]  Deep vein thrombosis (DVT) of popliteal vein of both lower extremities (HCC) (Resolved) [I82.433]  AF (atrial fibrillation) (HCC) [I48.91]  Secondary hypercoagulable state (HCC) [D68.69]                 Anticoagulation Episode Summary       INR check location:      Preferred lab:      Send INR reminders to:      Comments:            Anticoagulation Care Providers       Provider Role Specialty Phone number    [facility] Responsible  [phone]    Too Brito M.D.  [facility] [phone]                  Refer to Patient Findings for HPI:  Patient Findings       Positives:  Upcoming invasive procedure    Negatives:  Signs/symptoms of thrombosis, Signs/symptoms of bleeding, Laboratory test error suspected, Change in health, Change in alcohol use, Change in activity, Emergency department visit, Upcoming dental procedure, Missed doses, Extra doses, Change in medications, Change in diet/appetite, Hospital admission, Bruising, Other complaints    Comments:  Saw Dr Beckford today. He is planning to have an ablation. Date TBD.            There were no vitals filed for this visit.   pt declined vitals    Verified current warfarin dosing schedule.    Medications reconciled: Yes  Pt is not on antiplatelet therapy.      A/P   INR is supratherapeutic despite a dose hold and weekly dose decrease. INR has been trending high. Will reduce his dose further. Will follow INRs closely as he will be having an ablation soon.    Warfarin dosing recommendation: Hold tonight's dose then take warfarin as outlined on your  calendar.    Pt educated to contact our clinic with any changes in medications or s/s of bleeding or thrombosis. Pt is aware to seek immediate medical attention for falls, head injury or deep cuts.    Follow up appointment on Thursday.     CHRISTINA Martinez.

## 2023-09-12 ENCOUNTER — APPOINTMENT (OUTPATIENT)
Dept: ADMISSIONS | Facility: MEDICAL CENTER | Age: 78
End: 2023-09-12
Attending: INTERNAL MEDICINE
Payer: MEDICARE

## 2023-09-14 ENCOUNTER — ANTICOAGULATION VISIT (OUTPATIENT)
Dept: MEDICAL GROUP | Facility: MEDICAL CENTER | Age: 78
End: 2023-09-14
Payer: MEDICARE

## 2023-09-14 DIAGNOSIS — Z86.718 HISTORY OF VENOUS THROMBOEMBOLISM: ICD-10-CM

## 2023-09-14 DIAGNOSIS — D68.69 SECONDARY HYPERCOAGULABLE STATE (HCC): ICD-10-CM

## 2023-09-14 DIAGNOSIS — I48.91 ATRIAL FIBRILLATION, UNSPECIFIED TYPE (HCC): ICD-10-CM

## 2023-09-14 LAB — INR PPP: 2.5 (ref 2–3.5)

## 2023-09-14 PROCEDURE — 85610 PROTHROMBIN TIME: CPT | Performed by: NURSE PRACTITIONER

## 2023-09-14 PROCEDURE — 99211 OFF/OP EST MAY X REQ PHY/QHP: CPT | Performed by: NURSE PRACTITIONER

## 2023-09-14 NOTE — PROGRESS NOTES
OP Anticoagulation Service Note    Date: 2023    Anticoagulation Summary  As of 2023      INR goal:  2.0-3.0   TTR:  75.0 % (4 y)   INR used for dosin.50 (2023)   Warfarin maintenance plan:  1.25 mg (2.5 mg x 0.5) every Mon, Wed, Fri; 2.5 mg (2.5 mg x 1) all other days   Weekly warfarin total:  13.75 mg   Plan last modified:  Mitch Wilkinson, PharmD (2023)   Next INR check:  2023   Target end date:  Indefinite    Indications    History of venous thromboembolism [Z86.718]  Deep vein thrombosis (DVT) of popliteal vein of both lower extremities (HCC) (Resolved) [I82.433]  AF (atrial fibrillation) (HCC) [I48.91]  Secondary hypercoagulable state (HCC) [D68.69]                 Anticoagulation Episode Summary       INR check location:      Preferred lab:      Send INR reminders to:      Comments:            Anticoagulation Care Providers       Provider Role Specialty Phone number    [facility] Responsible  [phone]    Too Brito M.D.  Family Medicine [phone]          Anticoagulation Patient Findings  Patient Findings       Positives:  Upcoming invasive procedure (Ablation on )    Negatives:  Signs/symptoms of thrombosis, Signs/symptoms of bleeding, Laboratory test error suspected, Change in health, Change in alcohol use, Change in activity, Emergency department visit, Upcoming dental procedure, Missed doses, Extra doses, Change in medications, Change in diet/appetite, Hospital admission, Bruising, Other complaints            HPI:   Magnus Claudio is in the Anticoagulation Clinic today for an INR check on their anticoagulation therapy.     The reason for today's visit is to prevent morbidity and mortality from a blood clot and/or stroke and to reduce the risk of bleeding while on a anticoagulant.     PCP:  Leobardo Wright M.D.  [address] [address] [phone]    3 vitals included with today's appt-unless patient  declined:  (BP, HR, weight, ht, RR)   There were no vitals filed for this visit.    Verified current warfarin dosing schedule.    Medications reconciled: Yes  Pt is not on antiplatelet therapy    Assessment:   INR is therapeutic w/ warfarin TWD of 11.25 mg    Plan:  Instructed pt to begin newly decreased regimen of 1.25 mg M/W/F and 2.5 mg ROW (TWD of 13.75 mg).     Follow up:  Follow up appointment in 3 days.       Other info:  Pt educated to contact our clinic with any changes in medications or s/s of bleeding or thrombosis.  Education was provided today regarding tips to reduce their bleed risk and dietary constraints while on a anticoagulant.    National Recommendations:  The CHEST guidelines recommends frequent INR monitoring at regular intervals (a few days up to a max of 12 weeks) to ensure patients are on the proper dose of warfarin, and patients are not having any complications from therapy.  INRs can dramatically change over a short time period due to diet, medications, and medical conditions.     Mitch Wilkinson, PharmD, BCACP    [facility]  Phone [phone] fax [phone]

## 2023-09-15 ENCOUNTER — TELEPHONE (OUTPATIENT)
Dept: CARDIOLOGY | Facility: MEDICAL CENTER | Age: 78
End: 2023-09-15
Payer: MEDICARE

## 2023-09-15 DIAGNOSIS — I48.3 TYPICAL ATRIAL FLUTTER (HCC): ICD-10-CM

## 2023-09-15 DIAGNOSIS — R07.9 CHEST PAIN, UNSPECIFIED TYPE: ICD-10-CM

## 2023-09-15 DIAGNOSIS — I48.91 ATRIAL FIBRILLATION, UNSPECIFIED TYPE (HCC): ICD-10-CM

## 2023-09-15 RX ORDER — ATORVASTATIN CALCIUM 40 MG/1
40 TABLET, FILM COATED ORAL EVERY EVENING
Qty: 90 TABLET | Refills: 3 | Status: SHIPPED | OUTPATIENT
Start: 2023-09-15

## 2023-09-15 RX ORDER — AMIODARONE HYDROCHLORIDE 200 MG/1
200 TABLET ORAL DAILY
Qty: 90 TABLET | Refills: 0 | Status: SHIPPED | OUTPATIENT
Start: 2023-09-15 | End: 2023-10-25

## 2023-09-15 RX ORDER — METOPROLOL SUCCINATE 25 MG/1
50 TABLET, EXTENDED RELEASE ORAL EVERY MORNING
Qty: 180 TABLET | Refills: 3 | Status: SHIPPED | OUTPATIENT
Start: 2023-09-15 | End: 2023-10-25

## 2023-09-15 NOTE — TELEPHONE ENCOUNTER
Kamilla,    I recvd a call from this patient's wife, Mely. They are having a problem getting the Amio, Metoprolol and Lipitor filled.    Due to the increases dosing of the Metoprolol, the insurance is telling the pharmacy it's too early to fill. However, he only has a couple of days left.    They use the [facility] on [address].    Thank You,  Belem

## 2023-09-15 NOTE — TELEPHONE ENCOUNTER
S/w patients wife Mely, let her know I have sent in a new Rx to the pharmacy. Recommend patient wife call insurance to discuss this is a new prescription.   If they still will not refill, we will send samples to [facility] for patient.   Patients wife will call back if she still can not get Rx refilled through insurance.

## 2023-09-18 ENCOUNTER — ANTICOAGULATION VISIT (OUTPATIENT)
Dept: MEDICAL GROUP | Facility: MEDICAL CENTER | Age: 78
End: 2023-09-18
Payer: MEDICARE

## 2023-09-18 DIAGNOSIS — Z86.718 HISTORY OF VENOUS THROMBOEMBOLISM: ICD-10-CM

## 2023-09-18 DIAGNOSIS — D68.69 SECONDARY HYPERCOAGULABLE STATE (HCC): ICD-10-CM

## 2023-09-18 DIAGNOSIS — I48.91 ATRIAL FIBRILLATION, UNSPECIFIED TYPE (HCC): ICD-10-CM

## 2023-09-18 LAB — INR PPP: 2 (ref 2–3.5)

## 2023-09-18 PROCEDURE — 85610 PROTHROMBIN TIME: CPT | Performed by: INTERNAL MEDICINE

## 2023-09-18 PROCEDURE — 99211 OFF/OP EST MAY X REQ PHY/QHP: CPT | Performed by: INTERNAL MEDICINE

## 2023-09-18 NOTE — PROGRESS NOTES
Anticoagulation Summary  As of 2023      INR goal:  2.0-3.0   TTR:  75.1 % (4 y)   INR used for dosin.00 (2023)   Warfarin maintenance plan:  1.25 mg (2.5 mg x 0.5) every Mon, Wed, Fri; 2.5 mg (2.5 mg x 1) all other days   Weekly warfarin total:  13.75 mg   Plan last modified:  Mitch Wilkinson, PharmD (2023)   Next INR check:  2023   Target end date:  Indefinite    Indications    History of venous thromboembolism [Z86.718]  Deep vein thrombosis (DVT) of popliteal vein of both lower extremities (HCC) (Resolved) [I82.433]  AF (atrial fibrillation) (HCC) [I48.91]  Secondary hypercoagulable state (HCC) [D68.69]                 Anticoagulation Episode Summary       INR check location:      Preferred lab:      Send INR reminders to:      Comments:            Anticoagulation Care Providers       Provider Role Specialty Phone number    [facility] Responsible  [phone]    Too Brito M.D.  Family Medicine [phone]                  Refer to Patient Findings for HPI:  Patient Findings       Positives:  Upcoming invasive procedure (ablation )    Negatives:  Signs/symptoms of thrombosis, Signs/symptoms of bleeding, Laboratory test error suspected, Change in health, Change in alcohol use, Change in activity, Emergency department visit, Upcoming dental procedure, Missed doses, Extra doses, Change in medications, Change in diet/appetite, Hospital admission, Bruising, Other complaints            There were no vitals filed for this visit.  pt declined vitals    Verified current warfarin dosing schedule.    Medications reconciled: No  Pt is not on antiplatelet therapy.       A/P   INR is therapeutic    Warfarin dosing recommendation: Bolus 2.5 mg, then Continue current warfarin regimen as above without changes      Pt educated to contact our clinic with any changes in medications or s/s of bleeding or thrombosis. Pt is aware to seek immediate medical attention for falls,  head injury or deep cuts.    Follow up appointment in 4 day(s).    Josefina Steel, DeedeeD

## 2023-09-19 ENCOUNTER — PRE-ADMISSION TESTING (OUTPATIENT)
Dept: ADMISSIONS | Facility: MEDICAL CENTER | Age: 78
End: 2023-09-19
Attending: INTERNAL MEDICINE
Payer: MEDICARE

## 2023-09-19 LAB — EKG IMPRESSION: NORMAL

## 2023-09-21 ENCOUNTER — ANTICOAGULATION VISIT (OUTPATIENT)
Dept: MEDICAL GROUP | Facility: MEDICAL CENTER | Age: 78
End: 2023-09-21
Payer: MEDICARE

## 2023-09-21 DIAGNOSIS — I48.91 ATRIAL FIBRILLATION, UNSPECIFIED TYPE (HCC): ICD-10-CM

## 2023-09-21 DIAGNOSIS — Z86.718 HISTORY OF VENOUS THROMBOEMBOLISM: ICD-10-CM

## 2023-09-21 DIAGNOSIS — D68.69 SECONDARY HYPERCOAGULABLE STATE (HCC): ICD-10-CM

## 2023-09-21 LAB — INR PPP: 1.8 (ref 2–3.5)

## 2023-09-21 PROCEDURE — 85610 PROTHROMBIN TIME: CPT | Performed by: STUDENT IN AN ORGANIZED HEALTH CARE EDUCATION/TRAINING PROGRAM

## 2023-09-21 PROCEDURE — 99211 OFF/OP EST MAY X REQ PHY/QHP: CPT | Performed by: STUDENT IN AN ORGANIZED HEALTH CARE EDUCATION/TRAINING PROGRAM

## 2023-09-21 NOTE — PROGRESS NOTES
OP Anticoagulation Service Note    Date: 2023    Anticoagulation Summary  As of 2023      INR goal:  2.0-3.0   TTR:  74.9 % (4 y)   INR used for dosin.80 (2023)   Warfarin maintenance plan:  1.25 mg (2.5 mg x 0.5) every Mon, Fri; 2.5 mg (2.5 mg x 1) all other days   Weekly warfarin total:  15 mg   Plan last modified:  Deedee JuanD (2023)   Next INR check:  2023   Target end date:  Indefinite    Indications    History of venous thromboembolism [Z86.718]  Deep vein thrombosis (DVT) of popliteal vein of both lower extremities (HCC) (Resolved) [I82.433]  AF (atrial fibrillation) (HCC) [I48.91]  Secondary hypercoagulable state (HCC) [D68.69]                 Anticoagulation Episode Summary       INR check location:      Preferred lab:      Send INR reminders to:      Comments:            Anticoagulation Care Providers       Provider Role Specialty Phone number    [facility] Responsible  [phone]    Too Brito M.D.  Family Medicine [phone]          Anticoagulation Patient Findings  Patient Findings       Positives:  Upcoming invasive procedure (Ablation on )    Negatives:  Signs/symptoms of thrombosis, Signs/symptoms of bleeding, Laboratory test error suspected, Change in health, Change in alcohol use, Change in activity, Emergency department visit, Upcoming dental procedure, Missed doses, Extra doses, Change in medications, Change in diet/appetite, Hospital admission, Bruising, Other complaints            HPI:   Magnus Claudio is in the Anticoagulation Clinic today for an INR check on their anticoagulation therapy.     The reason for today's visit is to prevent morbidity and mortality from a blood clot and/or stroke and to reduce the risk of bleeding while on a anticoagulant.     PCP:  Leobardo Wright M.D.  [address] [address] [phone]    3 vitals included with today's appt-unless patient  declined:  (BP, HR, weight, ht, RR)   There were no vitals filed for this visit.    Verified current warfarin dosing schedule.    Medications reconciled: Yes  Pt is not on antiplatelet therapy    Assessment:   INR is subtherapeutic    Plan:  Instructed pt to BOLUS x 1 dose w/ 3.75 mg today and to then begin newly increased regimen of 1.25 mg Mon/Fri and 2.5 mg ROW.    Follow up:  Follow up appointment in 3 days.       Other info:  Pt educated to contact our clinic with any changes in medications or s/s of bleeding or thrombosis.  Education was provided today regarding tips to reduce their bleed risk and dietary constraints while on a anticoagulant.    National Recommendations:  The CHEST guidelines recommends frequent INR monitoring at regular intervals (a few days up to a max of 12 weeks) to ensure patients are on the proper dose of warfarin, and patients are not having any complications from therapy.  INRs can dramatically change over a short time period due to diet, medications, and medical conditions.     Mitch Wilkinson, PharmD, BCACP    [facility]  Phone [phone] fax [phone]

## 2023-09-25 ENCOUNTER — ANTICOAGULATION VISIT (OUTPATIENT)
Dept: MEDICAL GROUP | Facility: MEDICAL CENTER | Age: 78
End: 2023-09-25
Payer: MEDICARE

## 2023-09-25 DIAGNOSIS — I48.91 ATRIAL FIBRILLATION, UNSPECIFIED TYPE (HCC): ICD-10-CM

## 2023-09-25 DIAGNOSIS — D68.69 SECONDARY HYPERCOAGULABLE STATE (HCC): ICD-10-CM

## 2023-09-25 DIAGNOSIS — Z86.718 HISTORY OF VENOUS THROMBOEMBOLISM: ICD-10-CM

## 2023-09-25 LAB — INR PPP: 1.7 (ref 2–3.5)

## 2023-09-25 PROCEDURE — 85610 PROTHROMBIN TIME: CPT | Performed by: PHYSICIAN ASSISTANT

## 2023-09-25 PROCEDURE — 99211 OFF/OP EST MAY X REQ PHY/QHP: CPT | Mod: 25 | Performed by: PHYSICIAN ASSISTANT

## 2023-09-25 NOTE — PROGRESS NOTES
Anticoagulation Summary  As of 2023      INR goal:  2.0-3.0   TTR:  74.7 % (4 y)   INR used for dosin.70 (2023)   Warfarin maintenance plan:  1.25 mg (2.5 mg x 0.5) every Mon, Fri; 2.5 mg (2.5 mg x 1) all other days   Weekly warfarin total:  15 mg   Plan last modified:  Mitch Wilkinson, PharmD (2023)   Next INR check:  2023   Target end date:  Indefinite    Indications    History of venous thromboembolism [Z86.718]  Deep vein thrombosis (DVT) of popliteal vein of both lower extremities (HCC) (Resolved) [I82.433]  AF (atrial fibrillation) (HCC) [I48.91]  Secondary hypercoagulable state (HCC) [D68.69]                 Anticoagulation Episode Summary       INR check location:      Preferred lab:      Send INR reminders to:      Comments:            Anticoagulation Care Providers       Provider Role Specialty Phone number    [facility] Responsible  [phone]    Too Brito M.D.  Family Medicine [phone]                  Refer to Patient Findings for HPI:  Patient Findings       Positives:  Upcoming invasive procedure (patient having ablation )    Negatives:  Signs/symptoms of thrombosis, Signs/symptoms of bleeding, Laboratory test error suspected, Change in health, Change in alcohol use, Change in activity, Emergency department visit, Upcoming dental procedure, Missed doses, Extra doses, Change in medications, Change in diet/appetite, Hospital admission, Bruising, Other complaints            There were no vitals filed for this visit.  pt declined vitals    Verified current warfarin dosing schedule.    Medications reconciled: Yes  Pt is not on antiplatelet therapy.      A/P   INR is subtherapeutic    Warfarin dosing recommendation: Increase to 3.75 mg x2 days    Pt educated to contact our clinic with any changes in medications or s/s of bleeding or thrombosis. Pt is aware to seek immediate medical attention for falls, head injury or deep cuts.    Follow up  appointment in 2 day(s) due to upcoming ablation - patient will go to lab.    Josefina Steel, DeedeeD

## 2023-09-26 ENCOUNTER — PRE-ADMISSION TESTING (OUTPATIENT)
Dept: ADMISSIONS | Facility: MEDICAL CENTER | Age: 78
End: 2023-09-26
Attending: INTERNAL MEDICINE
Payer: MEDICARE

## 2023-09-26 DIAGNOSIS — Z01.812 PRE-OPERATIVE LABORATORY EXAMINATION: ICD-10-CM

## 2023-09-26 DIAGNOSIS — Z01.810 PRE-OPERATIVE CARDIOVASCULAR EXAMINATION: ICD-10-CM

## 2023-09-26 LAB
ALBUMIN SERPL BCP-MCNC: 4.4 G/DL (ref 3.2–4.9)
ALBUMIN/GLOB SERPL: 1.8 G/DL
ALP SERPL-CCNC: 56 U/L (ref 30–99)
ALT SERPL-CCNC: 31 U/L (ref 2–50)
ANION GAP SERPL CALC-SCNC: 10 MMOL/L (ref 7–16)
AST SERPL-CCNC: 37 U/L (ref 12–45)
BASOPHILS # BLD AUTO: 0.4 % (ref 0–1.8)
BASOPHILS # BLD: 0.03 K/UL (ref 0–0.12)
BILIRUB SERPL-MCNC: 3.5 MG/DL (ref 0.1–1.5)
BUN SERPL-MCNC: 19 MG/DL (ref 8–22)
CALCIUM ALBUM COR SERPL-MCNC: 8.3 MG/DL (ref 8.5–10.5)
CALCIUM SERPL-MCNC: 8.6 MG/DL (ref 8.5–10.5)
CHLORIDE SERPL-SCNC: 106 MMOL/L (ref 96–112)
CO2 SERPL-SCNC: 26 MMOL/L (ref 20–33)
CREAT SERPL-MCNC: 1.48 MG/DL (ref 0.5–1.4)
EKG IMPRESSION: NORMAL
EOSINOPHIL # BLD AUTO: 0.15 K/UL (ref 0–0.51)
EOSINOPHIL NFR BLD: 2 % (ref 0–6.9)
ERYTHROCYTE [DISTWIDTH] IN BLOOD BY AUTOMATED COUNT: 46.7 FL (ref 35.9–50)
GFR SERPLBLD CREATININE-BSD FMLA CKD-EPI: 48 ML/MIN/1.73 M 2
GLOBULIN SER CALC-MCNC: 2.4 G/DL (ref 1.9–3.5)
GLUCOSE SERPL-MCNC: 167 MG/DL (ref 65–99)
HCT VFR BLD AUTO: 41.3 % (ref 42–52)
HGB BLD-MCNC: 13.6 G/DL (ref 14–18)
IMM GRANULOCYTES # BLD AUTO: 0.02 K/UL (ref 0–0.11)
IMM GRANULOCYTES NFR BLD AUTO: 0.3 % (ref 0–0.9)
INR PPP: 1.82 (ref 0.87–1.13)
LYMPHOCYTES # BLD AUTO: 1.24 K/UL (ref 1–4.8)
LYMPHOCYTES NFR BLD: 16.8 % (ref 22–41)
MCH RBC QN AUTO: 29.3 PG (ref 27–33)
MCHC RBC AUTO-ENTMCNC: 32.9 G/DL (ref 32.3–36.5)
MCV RBC AUTO: 89 FL (ref 81.4–97.8)
MONOCYTES # BLD AUTO: 0.5 K/UL (ref 0–0.85)
MONOCYTES NFR BLD AUTO: 6.8 % (ref 0–13.4)
NEUTROPHILS # BLD AUTO: 5.45 K/UL (ref 1.82–7.42)
NEUTROPHILS NFR BLD: 73.7 % (ref 44–72)
NRBC # BLD AUTO: 0 K/UL
NRBC BLD-RTO: 0 /100 WBC (ref 0–0.2)
PLATELET # BLD AUTO: 154 K/UL (ref 164–446)
PMV BLD AUTO: 10.4 FL (ref 9–12.9)
POTASSIUM SERPL-SCNC: 4.1 MMOL/L (ref 3.6–5.5)
PROT SERPL-MCNC: 6.8 G/DL (ref 6–8.2)
PROTHROMBIN TIME: 21.3 SEC (ref 12–14.6)
RBC # BLD AUTO: 4.64 M/UL (ref 4.7–6.1)
SODIUM SERPL-SCNC: 142 MMOL/L (ref 135–145)
WBC # BLD AUTO: 7.4 K/UL (ref 4.8–10.8)

## 2023-09-26 PROCEDURE — 85610 PROTHROMBIN TIME: CPT

## 2023-09-26 PROCEDURE — 93005 ELECTROCARDIOGRAM TRACING: CPT

## 2023-09-26 PROCEDURE — 36415 COLL VENOUS BLD VENIPUNCTURE: CPT

## 2023-09-26 PROCEDURE — 93010 ELECTROCARDIOGRAM REPORT: CPT | Performed by: INTERNAL MEDICINE

## 2023-09-26 PROCEDURE — 85025 COMPLETE CBC W/AUTO DIFF WBC: CPT

## 2023-09-26 PROCEDURE — 80053 COMPREHEN METABOLIC PANEL: CPT

## 2023-09-27 ENCOUNTER — ANTICOAGULATION VISIT (OUTPATIENT)
Dept: VASCULAR LAB | Facility: MEDICAL CENTER | Age: 78
End: 2023-09-27
Attending: INTERNAL MEDICINE
Payer: MEDICARE

## 2023-09-27 DIAGNOSIS — D68.69 SECONDARY HYPERCOAGULABLE STATE (HCC): ICD-10-CM

## 2023-09-27 DIAGNOSIS — I48.91 ATRIAL FIBRILLATION, UNSPECIFIED TYPE (HCC): ICD-10-CM

## 2023-09-27 DIAGNOSIS — Z86.718 HISTORY OF VENOUS THROMBOEMBOLISM: ICD-10-CM

## 2023-09-27 LAB — INR PPP: 1.9 (ref 2–3.5)

## 2023-09-27 PROCEDURE — 85610 PROTHROMBIN TIME: CPT

## 2023-09-27 PROCEDURE — 99212 OFFICE O/P EST SF 10 MIN: CPT

## 2023-09-27 NOTE — PROGRESS NOTES
Anticoagulation Summary  As of 2023      INR goal:  2.0-3.0   TTR:  74.7 % (4.1 y)   INR used for dosin.90 (2023)   Warfarin maintenance plan:  1.25 mg (2.5 mg x 0.5) every Mon, Fri; 2.5 mg (2.5 mg x 1) all other days   Weekly warfarin total:  15 mg   Plan last modified:  Mitch Wilkinson PharmD (2023)   Next INR check:  2023   Target end date:  Indefinite    Indications    History of venous thromboembolism [Z86.718]  Deep vein thrombosis (DVT) of popliteal vein of both lower extremities (HCC) (Resolved) [I82.433]  AF (atrial fibrillation) (HCC) [I48.91]  Secondary hypercoagulable state (HCC) [D68.69]                 Anticoagulation Episode Summary       INR check location:      Preferred lab:      Send INR reminders to:      Comments:            Anticoagulation Care Providers       Provider Role Specialty Phone number    [facility] Responsible  [phone]    Too Brito M.D.  Family Medicine [phone]                  Refer to Patient Findings for HPI:       pt declined vitals    Verified current warfarin dosing schedule.    Pt is not on antiplatelet therapy.      A/P   INR is subtherapeutic    Warfarin dosing recommendation: INR is trending up, will continue with current regimen to prevent INR from going too high for ablation. Follow up INR 24 hrs after ablation.     Pt educated to contact our clinic with any changes in medications or s/s of bleeding or thrombosis. Pt is aware to seek immediate medical attention for falls, head injury or deep cuts.    Follow up appointment in 2 days.     Deo Gil, DeedeeD

## 2023-09-28 ENCOUNTER — PHARMACY VISIT (OUTPATIENT)
Dept: PHARMACY | Facility: MEDICAL CENTER | Age: 78
End: 2023-09-28
Payer: COMMERCIAL

## 2023-09-28 ENCOUNTER — ANESTHESIA (OUTPATIENT)
Dept: CARDIOLOGY | Facility: MEDICAL CENTER | Age: 78
End: 2023-09-28
Payer: MEDICARE

## 2023-09-28 ENCOUNTER — APPOINTMENT (OUTPATIENT)
Dept: CARDIOLOGY | Facility: MEDICAL CENTER | Age: 78
End: 2023-09-28
Attending: INTERNAL MEDICINE
Payer: MEDICARE

## 2023-09-28 ENCOUNTER — ANESTHESIA EVENT (OUTPATIENT)
Dept: CARDIOLOGY | Facility: MEDICAL CENTER | Age: 78
End: 2023-09-28
Payer: MEDICARE

## 2023-09-28 ENCOUNTER — HOSPITAL ENCOUNTER (OUTPATIENT)
Facility: MEDICAL CENTER | Age: 78
End: 2023-09-28
Attending: INTERNAL MEDICINE | Admitting: INTERNAL MEDICINE
Payer: MEDICARE

## 2023-09-28 VITALS
WEIGHT: 183.86 LBS | TEMPERATURE: 97 F | HEART RATE: 63 BPM | SYSTOLIC BLOOD PRESSURE: 130 MMHG | OXYGEN SATURATION: 93 % | DIASTOLIC BLOOD PRESSURE: 74 MMHG | BODY MASS INDEX: 25.74 KG/M2 | HEIGHT: 71 IN | RESPIRATION RATE: 17 BRPM

## 2023-09-28 DIAGNOSIS — I48.3 TYPICAL ATRIAL FLUTTER (HCC): ICD-10-CM

## 2023-09-28 DIAGNOSIS — Z98.890 H/O CARDIAC RADIOFREQUENCY ABLATION: ICD-10-CM

## 2023-09-28 LAB
EKG IMPRESSION: NORMAL
GLUCOSE BLD STRIP.AUTO-MCNC: 115 MG/DL (ref 65–99)
GLUCOSE BLD STRIP.AUTO-MCNC: 138 MG/DL (ref 65–99)
INR PPP: 1.96 (ref 0.87–1.13)
LV EJECT FRACT  99904: 45
PROTHROMBIN TIME: 22.6 SEC (ref 12–14.6)

## 2023-09-28 PROCEDURE — 160046 HCHG PACU - 1ST 60 MINS PHASE II

## 2023-09-28 PROCEDURE — 700101 HCHG RX REV CODE 250

## 2023-09-28 PROCEDURE — 93662 INTRACARDIAC ECG (ICE): CPT

## 2023-09-28 PROCEDURE — 700105 HCHG RX REV CODE 258: Performed by: INTERNAL MEDICINE

## 2023-09-28 PROCEDURE — 700101 HCHG RX REV CODE 250: Performed by: ANESTHESIOLOGY

## 2023-09-28 PROCEDURE — 93662 INTRACARDIAC ECG (ICE): CPT | Mod: 26 | Performed by: INTERNAL MEDICINE

## 2023-09-28 PROCEDURE — 160035 HCHG PACU - 1ST 60 MINS PHASE I

## 2023-09-28 PROCEDURE — RXMED WILLOW AMBULATORY MEDICATION CHARGE: Performed by: NURSE PRACTITIONER

## 2023-09-28 PROCEDURE — 93325 DOPPLER ECHO COLOR FLOW MAPG: CPT

## 2023-09-28 PROCEDURE — 160002 HCHG RECOVERY MINUTES (STAT)

## 2023-09-28 PROCEDURE — 82962 GLUCOSE BLOOD TEST: CPT

## 2023-09-28 PROCEDURE — 700111 HCHG RX REV CODE 636 W/ 250 OVERRIDE (IP)

## 2023-09-28 PROCEDURE — 700111 HCHG RX REV CODE 636 W/ 250 OVERRIDE (IP): Performed by: ANESTHESIOLOGY

## 2023-09-28 PROCEDURE — 93653 COMPRE EP EVAL TX SVT: CPT | Performed by: INTERNAL MEDICINE

## 2023-09-28 PROCEDURE — 700105 HCHG RX REV CODE 258: Performed by: ANESTHESIOLOGY

## 2023-09-28 PROCEDURE — 85610 PROTHROMBIN TIME: CPT

## 2023-09-28 PROCEDURE — 93010 ELECTROCARDIOGRAM REPORT: CPT | Performed by: INTERNAL MEDICINE

## 2023-09-28 PROCEDURE — 93005 ELECTROCARDIOGRAM TRACING: CPT | Performed by: INTERNAL MEDICINE

## 2023-09-28 RX ORDER — LIDOCAINE HYDROCHLORIDE 20 MG/ML
INJECTION, SOLUTION INFILTRATION; PERINEURAL
Status: COMPLETED
Start: 2023-09-28 | End: 2023-09-28

## 2023-09-28 RX ORDER — DEXAMETHASONE SODIUM PHOSPHATE 4 MG/ML
INJECTION, SOLUTION INTRA-ARTICULAR; INTRALESIONAL; INTRAMUSCULAR; INTRAVENOUS; SOFT TISSUE PRN
Status: DISCONTINUED | OUTPATIENT
Start: 2023-09-28 | End: 2023-09-28 | Stop reason: SURG

## 2023-09-28 RX ORDER — HYDROMORPHONE HYDROCHLORIDE 1 MG/ML
0.2 INJECTION, SOLUTION INTRAMUSCULAR; INTRAVENOUS; SUBCUTANEOUS
Status: DISCONTINUED | OUTPATIENT
Start: 2023-09-28 | End: 2023-09-28 | Stop reason: HOSPADM

## 2023-09-28 RX ORDER — BUPIVACAINE HYDROCHLORIDE 5 MG/ML
INJECTION, SOLUTION EPIDURAL; INTRACAUDAL
Status: COMPLETED
Start: 2023-09-28 | End: 2023-09-28

## 2023-09-28 RX ORDER — ACETAMINOPHEN 325 MG/1
650 TABLET ORAL EVERY 4 HOURS PRN
Status: DISCONTINUED | OUTPATIENT
Start: 2023-09-28 | End: 2023-09-28 | Stop reason: HOSPADM

## 2023-09-28 RX ORDER — HYDROMORPHONE HYDROCHLORIDE 1 MG/ML
0.4 INJECTION, SOLUTION INTRAMUSCULAR; INTRAVENOUS; SUBCUTANEOUS
Status: DISCONTINUED | OUTPATIENT
Start: 2023-09-28 | End: 2023-09-28 | Stop reason: HOSPADM

## 2023-09-28 RX ORDER — DIPHENHYDRAMINE HYDROCHLORIDE 50 MG/ML
12.5 INJECTION INTRAMUSCULAR; INTRAVENOUS
Status: DISCONTINUED | OUTPATIENT
Start: 2023-09-28 | End: 2023-09-28 | Stop reason: HOSPADM

## 2023-09-28 RX ORDER — ONDANSETRON 2 MG/ML
4 INJECTION INTRAMUSCULAR; INTRAVENOUS
Status: DISCONTINUED | OUTPATIENT
Start: 2023-09-28 | End: 2023-09-28 | Stop reason: HOSPADM

## 2023-09-28 RX ORDER — SODIUM CHLORIDE, SODIUM LACTATE, POTASSIUM CHLORIDE, CALCIUM CHLORIDE 600; 310; 30; 20 MG/100ML; MG/100ML; MG/100ML; MG/100ML
INJECTION, SOLUTION INTRAVENOUS CONTINUOUS
Status: DISCONTINUED | OUTPATIENT
Start: 2023-09-28 | End: 2023-09-28 | Stop reason: HOSPADM

## 2023-09-28 RX ORDER — HYDROMORPHONE HYDROCHLORIDE 1 MG/ML
0.1 INJECTION, SOLUTION INTRAMUSCULAR; INTRAVENOUS; SUBCUTANEOUS
Status: DISCONTINUED | OUTPATIENT
Start: 2023-09-28 | End: 2023-09-28 | Stop reason: HOSPADM

## 2023-09-28 RX ORDER — LIDOCAINE HYDROCHLORIDE 20 MG/ML
INJECTION, SOLUTION EPIDURAL; INFILTRATION; INTRACAUDAL; PERINEURAL PRN
Status: DISCONTINUED | OUTPATIENT
Start: 2023-09-28 | End: 2023-09-28 | Stop reason: SURG

## 2023-09-28 RX ORDER — HALOPERIDOL 5 MG/ML
1 INJECTION INTRAMUSCULAR
Status: DISCONTINUED | OUTPATIENT
Start: 2023-09-28 | End: 2023-09-28 | Stop reason: HOSPADM

## 2023-09-28 RX ORDER — ONDANSETRON 2 MG/ML
4 INJECTION INTRAMUSCULAR; INTRAVENOUS EVERY 6 HOURS PRN
Status: DISCONTINUED | OUTPATIENT
Start: 2023-09-28 | End: 2023-09-28 | Stop reason: HOSPADM

## 2023-09-28 RX ORDER — DEXTROSE MONOHYDRATE 25 G/50ML
25 INJECTION, SOLUTION INTRAVENOUS
Status: DISCONTINUED | OUTPATIENT
Start: 2023-09-28 | End: 2023-09-28 | Stop reason: HOSPADM

## 2023-09-28 RX ORDER — ONDANSETRON 2 MG/ML
INJECTION INTRAMUSCULAR; INTRAVENOUS PRN
Status: DISCONTINUED | OUTPATIENT
Start: 2023-09-28 | End: 2023-09-28 | Stop reason: SURG

## 2023-09-28 RX ORDER — MEPERIDINE HYDROCHLORIDE 25 MG/ML
12.5 INJECTION INTRAMUSCULAR; INTRAVENOUS; SUBCUTANEOUS
Status: DISCONTINUED | OUTPATIENT
Start: 2023-09-28 | End: 2023-09-28 | Stop reason: HOSPADM

## 2023-09-28 RX ORDER — HYDRALAZINE HYDROCHLORIDE 20 MG/ML
5 INJECTION INTRAMUSCULAR; INTRAVENOUS
Status: DISCONTINUED | OUTPATIENT
Start: 2023-09-28 | End: 2023-09-28 | Stop reason: HOSPADM

## 2023-09-28 RX ORDER — HEPARIN SODIUM 200 [USP'U]/100ML
INJECTION, SOLUTION INTRAVENOUS
Status: COMPLETED
Start: 2023-09-28 | End: 2023-09-28

## 2023-09-28 RX ORDER — OXYCODONE HCL 5 MG/5 ML
10 SOLUTION, ORAL ORAL
Status: DISCONTINUED | OUTPATIENT
Start: 2023-09-28 | End: 2023-09-28 | Stop reason: HOSPADM

## 2023-09-28 RX ORDER — OXYCODONE HCL 5 MG/5 ML
5 SOLUTION, ORAL ORAL
Status: DISCONTINUED | OUTPATIENT
Start: 2023-09-28 | End: 2023-09-28 | Stop reason: HOSPADM

## 2023-09-28 RX ORDER — SODIUM CHLORIDE, SODIUM LACTATE, POTASSIUM CHLORIDE, CALCIUM CHLORIDE 600; 310; 30; 20 MG/100ML; MG/100ML; MG/100ML; MG/100ML
INJECTION, SOLUTION INTRAVENOUS
Status: DISCONTINUED | OUTPATIENT
Start: 2023-09-28 | End: 2023-09-28 | Stop reason: SURG

## 2023-09-28 RX ORDER — MIDAZOLAM HYDROCHLORIDE 1 MG/ML
INJECTION INTRAMUSCULAR; INTRAVENOUS
Status: COMPLETED
Start: 2023-09-28 | End: 2023-09-28

## 2023-09-28 RX ORDER — HEPARIN SODIUM 1000 [USP'U]/ML
INJECTION, SOLUTION INTRAVENOUS; SUBCUTANEOUS
Status: COMPLETED
Start: 2023-09-28 | End: 2023-09-28

## 2023-09-28 RX ADMIN — FENTANYL CITRATE 50 MCG: 50 INJECTION, SOLUTION INTRAMUSCULAR; INTRAVENOUS at 13:36

## 2023-09-28 RX ADMIN — ROCURONIUM BROMIDE 50 MG: 10 INJECTION, SOLUTION INTRAVENOUS at 12:54

## 2023-09-28 RX ADMIN — HEPARIN SODIUM 6000 UNITS: 200 INJECTION, SOLUTION INTRAVENOUS at 13:00

## 2023-09-28 RX ADMIN — ONDANSETRON 4 MG: 2 INJECTION INTRAMUSCULAR; INTRAVENOUS at 13:29

## 2023-09-28 RX ADMIN — PROPOFOL 200 MG: 10 INJECTION, EMULSION INTRAVENOUS at 12:54

## 2023-09-28 RX ADMIN — LIDOCAINE HYDROCHLORIDE: 20 INJECTION, SOLUTION INFILTRATION; PERINEURAL at 12:47

## 2023-09-28 RX ADMIN — LIDOCAINE HYDROCHLORIDE 100 MG: 20 INJECTION, SOLUTION EPIDURAL; INFILTRATION; INTRACAUDAL at 12:54

## 2023-09-28 RX ADMIN — BUPIVACAINE HYDROCHLORIDE: 5 INJECTION, SOLUTION EPIDURAL; INTRACAUDAL at 12:47

## 2023-09-28 RX ADMIN — FENTANYL CITRATE 50 MCG: 50 INJECTION, SOLUTION INTRAMUSCULAR; INTRAVENOUS at 13:27

## 2023-09-28 RX ADMIN — SUGAMMADEX 200 MG: 100 INJECTION, SOLUTION INTRAVENOUS at 13:34

## 2023-09-28 RX ADMIN — SODIUM CHLORIDE, POTASSIUM CHLORIDE, SODIUM LACTATE AND CALCIUM CHLORIDE: 600; 310; 30; 20 INJECTION, SOLUTION INTRAVENOUS at 12:48

## 2023-09-28 RX ADMIN — MIDAZOLAM 2 MG: 1 INJECTION, SOLUTION INTRAMUSCULAR; INTRAVENOUS at 12:48

## 2023-09-28 RX ADMIN — DEXAMETHASONE SODIUM PHOSPHATE 4 MG: 4 INJECTION INTRA-ARTICULAR; INTRALESIONAL; INTRAMUSCULAR; INTRAVENOUS; SOFT TISSUE at 12:54

## 2023-09-28 RX ADMIN — SODIUM CHLORIDE, POTASSIUM CHLORIDE, SODIUM LACTATE AND CALCIUM CHLORIDE: 600; 310; 30; 20 INJECTION, SOLUTION INTRAVENOUS at 11:29

## 2023-09-28 ASSESSMENT — PAIN DESCRIPTION - PAIN TYPE
TYPE: ACUTE PAIN
TYPE: SURGICAL PAIN
TYPE: ACUTE PAIN
TYPE: SURGICAL PAIN
TYPE: ACUTE PAIN
TYPE: ACUTE PAIN
TYPE: SURGICAL PAIN

## 2023-09-28 ASSESSMENT — FIBROSIS 4 INDEX: FIB4 SCORE: 3.41

## 2023-09-28 NOTE — PROGRESS NOTES
Seen in afternoon same day discharge EP rounds.  S/P Typical Atrial ablation with RA CTI line with bidirectional isthmus block by Dr Beckford.        Conclusions per Op Note dated :  Baseline Rhythm:   Atrial flutter  msec  Intervals:  HV 65 msec  AVBCL 410 msec.   CL 1100 msec in sinus post ablation  Arrhythmias:  1. Sustained typical atrial flutter,  ms with 2:1 AV conduction.   Ablation:  Radiofrequency energy was applied using 3.5 mmsaline irrigated catheter, power 40 W, total 11 RF applications, RF time 286_ seconds to create a cavotricuspid isthmus line between the tricuspid annulus and Eustachian ridge/inferior vena cava to produce bidirectional isthmus conduction block.  Post Ablation Testin. No inducible atrial flutter with CS pacing post ablation.  2. Trans-isthmus conduction time pacing from proximal  msec.  3. Trans-isthmus conduction time pacing from lateral to the isthmus line 200 msec.     Monitored rhythm has been sinus rhythm throughout his monitored recovery. Vital signs are stable. Right femoral access site is clean and dry      I have verified that new discharge prescriptions have been sent to correct pharmacy, patient has active anticoagulation prescription.  We specifically discussed that he will be stopping his Warfarin and taking renal dose Xarelto for the next month.  After Xarelto prescription is completed he will need to resume warfarin per [facility] coumadin clinic.        Discharge instructions discussed with patient:  [facility] Post Ablation Patient Instructions:  No lifting > 10 lbs x 1 week.    No soaking in baths, hot tubs, pools x 1 week.  May shower the day after discharge and take off groin dressings and leave uncovered.  Continue to monitor sites daily for warmth, redness, discolored drainage.  It is common to have a small lump in the area where the cather was (usually the size of a small marble); this will go away but takes  approximately 6 weeks to normalize.   3.   Please take all medications as prescribed to you; please do not stop any medications prescribed post ablation unless directed by your healthcare provider.    4.   Please do not miss any doses of your blood thinner (if you have been started on, or take chronic blood thinners) without discussion with your healthcare provider first.   5.   Please walk and take deep breaths after discharge.  After discharge, if  experiences neurological changes/signs of stroke, high fever, you should be seen in the emergency dept.   6.   It is possible you may experience some chest discomfort or chest tightness post ablation.  This is usually secondary to inflammation and irritation of the tissues at the area of the ablation.  If this occurs, it is advised to try 400 mg of Ibuprofen with food as needed up to three times a day for a maximum of two days.  This should help to decrease pain and tissue inflammation.          **Please notify the office ([phone]) if this occurs.         ** DO NOT TAKE Ibuprofen IF HISTORY OF SIGNIFICANT BLEEDING OR KIDNEY DISEASE WITHOUT DISCUSSING WITH YOUR CARDIOLOGY PROVIDER FIRST.          ** If pain becomes severe or you have additional symptoms you may need to be medically evaluated; please contact the cardiology office ([phone]) for further direction.   7.  Please contact call our office ([phone]) or message via [employer] if you have any questions or concerns post procedurally.  8. You need to be seen for post ablation follow up 2-4 weeks post procedure.  If you do not have a follow up appointment scheduled, please call [phone] to schedule your follow up appointment.

## 2023-09-28 NOTE — OR NURSING
1610 - Recieved report from Angeles MORRELL. Patient arrived to phase II room 30, now sitting in a recliner. Right groin site CDI, soft. 2+ pedal pulse. Patient denies pain or nausea at this time.    1620 -Discharge instructions discussed with wife Mely. All questions answered. Verbalized understanding.     1646 - Patient used the restroom. Pt escorted out of department in by ambulation with all belongings. Discharged home to responsible adult. PIV removed with tip intact. Right groin site CDI, soft, 2+ pedal pulse.

## 2023-09-28 NOTE — ANESTHESIA PROCEDURE NOTES
Airway    Date/Time: 9/28/2023 12:55 PM    Performed by: Trent Wright M.D.  Authorized by: Trent Wright M.D.    Location:  OR  Urgency:  Elective  Difficult Airway: No    Indications for Airway Management:  Anesthesia      Spontaneous Ventilation: absent    Sedation Level:  Deep  Preoxygenated: Yes    Patient Position:  Sniffing  Final Airway Type:  Endotracheal airway  Final Endotracheal Airway:  ETT  Cuffed: Yes    Technique Used for Successful ETT Placement:  Direct laryngoscopy  Devices/Methods Used in Placement:  Intubating stylet    Insertion Site:  Oral  Blade Type:  Armen  Laryngoscope Blade/Videolaryngoscope Blade Size:  4  ETT Size (mm):  7.5  Measured from:  Teeth  ETT to Teeth (cm):  23  Placement Verified by: auscultation and capnometry    Cormack-Lehane Classification:  Grade I - full view of glottis  Number of Attempts at Approach:  1

## 2023-09-28 NOTE — OP REPORT
[facility] EP PROCEDURE NOTE    Procedure(s) Performed:   Supraventricular tachycardia ablation (atrial flutter ablation)   Intracardiac echocardiography    Indication(s): Typical atrial flutter    : Sandra Beckford M.D.    Assistant(s): None    Anesthesia: General anesthesia with Dr. Tretn Wright    Specimen(s) Removed: None    Estimated Blood Loss:  20cc    Complications:  None    Description of Procedure:    After informed written consent, the patient was brought to the EP lab in the fasting, non-sedated state. The patient was prepped and draped in the usual sterile fashion. Femoral venous access was obtained using the modified Seldinger technique. In the right femoral vein, 3 sheaths (6,8,8 Fr) were inserted over 0.035” guidewires. A deflectable decapolar catheter was advanced to the CS position. An 8 Fr [employer] SoundStar ICE catheter was advanced to the RA and used to identify the CTI and visualize our ablation catheter for contact, effusion, thrombus formation during the case. One 8Fr saline irrigated ablation catheter with 3.5mm distal electrode and location sensor for the CARTO system ([employer] ST SF) was inserted into an 8.5 Fr sheath (RAMP) for electroanatomical 3D mapping and radiofrequency ablation.  At the end of the procedure, the catheter and sheaths were removed, and hemostasis was achieved by manual compression. Following recovery from anesthesia, the patient was transferred to the PACU in good condition.    Baseline Rhythm:   Atrial flutter  msec    Intervals:  HV 65 msec  AVBCL 410 msec.   CL 1100 msec in sinus post ablation    Arrhythmias:  1. Sustained typical atrial flutter,  ms with 2:1 AV conduction.     Mapping:  Electroanatomical 3D (CARTO FAM) map of CS and right atrium around the cavotricuspid isthmus was created during CS pacing.    Ablation:  Radiofrequency energy was applied using 3.5 mmsaline irrigated catheter, power 40 W, total 11 RF applications, RF time 286_  seconds to create a cavotricuspid isthmus line between the tricuspid annulus and Eustachian ridge/inferior vena cava to produce bidirectional isthmus conduction block.    Post Ablation Testin. No inducible atrial flutter with CS pacing post ablation.  2. Trans-isthmus conduction time pacing from proximal  msec.  3. Trans-isthmus conduction time pacing from lateral to the isthmus line 200 msec.    Fluoroscopy Time: 1.1 minutes    Impressions/Plan:   1. Typical right atrial flutter.  2. Successful catheter mediated ablation of right atrial flutter.  3. Transfer to monitored bed overnight  4. 3-4 weeks of Eliquis, stop warfarin while on Eliquis

## 2023-09-28 NOTE — ANESTHESIA PREPROCEDURE EVALUATION
Anesthesia Start Date/Time: 09/28/23 1248    Scheduled providers: Sandra Beckford M.D.; Trent Wright M.D.    Procedure: CL-EP ABLATION ATRIAL FLUTTER    Diagnosis:       Typical atrial flutter (HCC) [I48.3]      Typical atrial flutter [I48.3]    Indications: See Associated Dx    Location: [facility] - Cath Lab - [facility]          Relevant Problems   CARDIAC   (positive) AF (atrial fibrillation) (HCC)   (positive) Essential hypertension   (positive) Pulmonary hypertension (HCC)      ENDO   (positive) Controlled type 2 diabetes mellitus without complication, without long-term current use of insulin (HCC)      Other   (positive) Decreased hearing of both ears   (positive) Secondary hypercoagulable state (HCC)       Physical Exam    Airway   Mallampati: II  TM distance: >3 FB  Neck ROM: full       Cardiovascular - normal exam  Rhythm: regular  Rate: abnormal  (-) murmur     Dental - normal exam           Pulmonary - normal exam  Breath sounds clear to auscultation     Abdominal    Neurological - normal exam                 Anesthesia Plan    ASA 3   ASA physical status 3 criteria: a thrombophilic disease requiring anticoagulation    Plan - general       Airway plan will be ETT  FRANCISCO Planned        Induction: intravenous      Pertinent diagnostic labs and testing reviewed    Informed Consent:    Anesthetic plan and risks discussed with patient and spouse.

## 2023-09-28 NOTE — ANESTHESIA PROCEDURE NOTES
FRANCISCO    Date/Time: 9/28/2023 1:00 PM    Performed by: Trent Wright M.D.  Authorized by: Trent Wright M.D.    Start Time:9/28/2023 1:00 PM  Preanesthetic Checklist: patient identified, IV checked, risks and benefits discussed, surgical consent, monitors and equipment checked, pre-op evaluation and timeout performed    Indication for FRANCISCO: diagnostic   Patient Location: OR  Intubated: Yes  Bite Block: No  Heart Visualized: Yes  Insertion: atraumatic    **See FULL FRANCISCO report in patient's chart via [employer]**

## 2023-09-28 NOTE — DISCHARGE INSTRUCTIONS
HOME CARE INSTRUCTIONS    ACTIVITY: Rest and take it easy for the first 24 hours.  A responsible adult is recommended to remain with you during that time.  It is normal to feel sleepy.  We encourage you to not do anything that requires balance, judgment or coordination.    FOR 24 HOURS DO NOT:  Drive, operate machinery or run household appliances.  Drink beer or alcoholic beverages.  Make important decisions or sign legal documents.    SPECIAL INSTRUCTIONS: Start Xarelto tonight  ENDOSCOPY HOME CARE INSTRUCTIONS    FRANCISCO - TRANSESOPHAGEAL ECHOCARDIOGRAM  1. Don't drive or drink alcohol for 24 hours. The medication you received will make you too drowsy.  2. If you begin to vomit bloody material, or develop black or bloody stools, call your doctor as soon as possible.  3. If you have any neck, chest, abdominal pain or temp of 100 degrees, call your doctor.    You should call 911 if you develop problems with breathing or chest pain.  If any questions arise, call your doctor. If your doctor is not available. You can also call the [facility] open 24 hours/day, 7 days/week and speak to a nurse at [phone], or toll free [phone].  I acknowledge receipt and understanding of these Home Care Instructions.    [facility] Post Ablation Patient Instructions:  No lifting > 10 lbs x 1 week.      No soaking in baths, hot tubs, pools x 1 week.  May shower the day after discharge and take off groin dressings and leave  sites uncovered.  Continue to monitor sites daily for warmth, redness, discolored drainage.  It is common to have a small lump in the area where the cather was (usually the size of a marble); this will go away but takes approximately 6 weeks to normalize.     3.   Please take all medications as prescribed to you; please do not stop any medications prescribed post ablation unless directed by your healthcare provider.      4.   Please do not miss any doses of your blood  thinner (if you have been started on, or take chronic blood thinners) without discussion with your healthcare provider first.     5.   Please walk and take deep breaths after discharge.  After discharge, if you experience neurological changes/signs of stroke or high fever you should be seen in the emergency dept.     6.   It is possible you may experience some chest discomfort or chest tightness post ablation.  This is usually secondary to inflammation and irritation of the tissues at the area of the ablation.  If this occurs, it is advised to try 400 mg of Ibuprofen with food as needed up to three times a day for a maximum of two days.  This should help to decrease pain and tissue inflammation.          **Please notify the office ([phone]) if this occurs.         ** DO NOT TAKE Ibuprofen IF HISTORY OF ALLERGY, SIGNIFICANT BLEEDING OR KIDNEY DISEASE WITHOUT DISCUSSING WITH YOUR CARDIOLOGY PROVIDER FIRST.          ** If pain becomes severe or you have additional symptoms you may need to be medically evaluated; please contact the cardiology office ([phone]) for further direction.     7.  Please contact call our office ([phone]) or message via [employer] if you have any questions or concerns post procedurally.    8. You need to be seen for post ablation follow up 3-4 weeks post procedure. An appointment is scheduled for you.  Please contact the office ([phone]) if you need to change your appointment.        DIET: To avoid nausea, slowly advance diet as tolerated, avoiding spicy or greasy foods for the first day.  Add more substantial food to your diet according to your physician's instructions.  INCREASE FLUIDS AND FIBER TO AVOID CONSTIPATION.    MEDICATIONS: Resume taking daily medication.  Take prescribed pain medication with food.  If no medication is prescribed, you may take non-aspirin pain medication if needed.  PAIN MEDICATION CAN BE VERY CONSTIPATING.  Take a stool softener or laxative  such as senokot, pericolace, or milk of magnesia if needed.    Prescription given for Xarelto      A follow-up appointment should be arranged with your doctor in 3-4 weeks; call to schedule.    You should CALL YOUR PHYSICIAN if you develop:  Fever greater than 101 degrees F.  Pain not relieved by medication, or persistent nausea or vomiting.  Excessive bleeding (blood soaking through dressing) or unexpected drainage from the wound.  Extreme redness or swelling around the incision site, drainage of pus or foul smelling drainage.  Inability to urinate or empty your bladder within 8 hours.  Problems with breathing or chest pain.    You should call 911 if you develop problems with breathing or chest pain.  If you are unable to contact your doctor or surgical center, you should go to the nearest emergency room or urgent care center.  Physician's telephone #: [phone]    MILD FLU-LIKE SYMPTOMS ARE NORMAL.  YOU MAY EXPERIENCE GENERALIZED MUSCLE ACHES, THROAT IRRITATION, HEADACHE AND/OR SOME NAUSEA.    If any questions arise, call your doctor.  If your doctor is not available, please feel free to call the Surgical Center at **.  The Center is open Monday through Friday from 7AM to 7PM.      A registered nurse may call you a few days after your surgery to see how you are doing after your procedure.    You may also receive a survey in the mail within the next two weeks and we ask that you take a few moments to complete the survey and return it to us.  Our goal is to provide you with very good care and we value your comments.

## 2023-09-28 NOTE — OR NURSING
Assume care for pt in pre-op.   Patient allergies and NPO status verified.   Belongings secured / at bedside, will lock-up belongings prior going to sx.   Patient verbalizes understanding of pain scale, expected course of stay and plan of care.   IV access established to L-FA 20G; LR infusing at 10mL/hr  Blood samples sent to lab for PT/INR.   Call light within reach. No further needs at this time.   Hourly rounding in place.

## 2023-09-28 NOTE — ANESTHESIA TIME REPORT
Anesthesia Start and Stop Event Times     Date Time Event    9/28/2023 1151 Ready for Procedure     1248 Anesthesia Start     1347 Anesthesia Stop        Responsible Staff  09/28/23    Name Role Begin End    Trent Wright M.D. Anesth 1248 1347        Overtime Reason:  no overtime (within assigned shift)    Comments:

## 2023-09-28 NOTE — OR NURSING
1344 Patient arrived to PACU from cath lab.  Report from anesthesia and RN.  Patient is sleeping at this time.  Dressing to right groin is clean, dry and soft.    1400 Patient waking up, updated on plan of care.  1406 Wife Mely called and updated on patient status.  1425 EKG complete.  1530 Right groin is clean, dry and soft, head of bed elevated.  1545 Patient ambulated, tolerated well.  Back to [facility], right groin is clean, dry and soft.  1555 Report called to Jayden RN.    1605 Patient transferred to Phase 2.    Appropriate/Calm

## 2023-09-28 NOTE — ANESTHESIA POSTPROCEDURE EVALUATION
Patient: Magnus Claudio    Procedure Summary     Date: 09/28/23 Room / Location: [facility] - Cath Lab [facility]    Anesthesia Start: 1248 Anesthesia Stop: 1347    Procedure: CL-EP ABLATION ATRIAL FLUTTER Diagnosis:       Typical atrial flutter (HCC)      Typical atrial flutter      (See Associated Dx)    Scheduled Providers: Sandra Beckford M.D.; Trent Wright M.D. Responsible Provider: Trent Wright M.D.    Anesthesia Type: general ASA Status: 3          Final Anesthesia Type: general  Last vitals  BP   Blood Pressure : 116/51    Temp   36.1 °C (96.9 °F)    Pulse   (!) 58   Resp   12    SpO2   93 %      Anesthesia Post Evaluation    Patient location during evaluation: PACU  Level of consciousness: awake and alert    Airway patency: patent  Anesthetic complications: no  Cardiovascular status: hemodynamically stable  Respiratory status: acceptable  Hydration status: euvolemic    PONV: none          No notable events documented.     Nurse Pain Score: 0 (NPRS)

## 2023-09-29 ENCOUNTER — ANTICOAGULATION VISIT (OUTPATIENT)
Dept: VASCULAR LAB | Facility: MEDICAL CENTER | Age: 78
End: 2023-09-29
Attending: INTERNAL MEDICINE
Payer: MEDICARE

## 2023-09-29 DIAGNOSIS — I48.91 ATRIAL FIBRILLATION, UNSPECIFIED TYPE (HCC): ICD-10-CM

## 2023-09-29 DIAGNOSIS — D68.69 SECONDARY HYPERCOAGULABLE STATE (HCC): ICD-10-CM

## 2023-09-29 DIAGNOSIS — Z86.718 HISTORY OF VENOUS THROMBOEMBOLISM: ICD-10-CM

## 2023-09-29 LAB — INR PPP: 3.7 (ref 2–3.5)

## 2023-09-29 PROCEDURE — 99212 OFFICE O/P EST SF 10 MIN: CPT

## 2023-09-29 PROCEDURE — 85610 PROTHROMBIN TIME: CPT

## 2023-09-29 PROCEDURE — RXMED WILLOW AMBULATORY MEDICATION CHARGE: Performed by: INTERNAL MEDICINE

## 2023-09-29 NOTE — PROGRESS NOTES
NEW DOAC   Anticoagulation Summary  As of 9/29/2023      INR goal:  2.0-3.0   TTR:  74.6 % (4.1 y)   INR used for dosing:  3.70 (9/29/2023)   Warfarin maintenance plan:  1.25 mg (2.5 mg x 0.5) every Mon, Fri; 2.5 mg (2.5 mg x 1) all other days   Weekly warfarin total:  15 mg   Plan last modified:  Deedee JuanD (9/21/2023)   Next INR check:  10/16/2023   Target end date:  Indefinite    Indications    History of venous thromboembolism [Z86.718]  Deep vein thrombosis (DVT) of popliteal vein of both lower extremities (HCC) (Resolved) [I82.433]  AF (atrial fibrillation) (HCC) [I48.91]  Secondary hypercoagulable state (HCC) [D68.69]                 Anticoagulation Episode Summary       INR check location:      Preferred lab:      Send INR reminders to:      Comments:            Anticoagulation Care Providers       Provider Role Specialty Phone number    [facility] Responsible  [phone]    Too Brito M.D.  [facility] [phone]          Anticoagulation Patient Findings      PCP: Leobardo Wright M.D.  Cardiologist: Dr Sandra Beckford  Dx: Atrial fibrillation/VTE  CHADSVASC = n/a  HAS-BLED = 2 (age, labile INR)  Target End Date = Indefinite    Pt Hx: Patient previously on warfarin - just had ablation done yesterday. Was changed to Xarelto 15 mg per APRN upon discharge and charged to hospital bill. Previously patient states they were unable to afford DOAC. Due to history of VTE will increase dose back to Xarelto 20 mg daily and continue to monitor renal function and CBC closely.     Labs:  Lab Results   Component Value Date/Time    WBC 7.4 09/26/2023 11:01 AM    RBC 4.64 (L) 09/26/2023 11:01 AM    HEMOGLOBIN 13.6 (L) 09/26/2023 11:01 AM    HEMATOCRIT 41.3 (L) 09/26/2023 11:01 AM    MCV 89.0 09/26/2023 11:01 AM    MCH 29.3 09/26/2023 11:01 AM    MCHC 32.9 09/26/2023 11:01 AM    MPV 10.4 09/26/2023 11:01 AM    NEUTSPOLYS 73.70 (H) 09/26/2023 11:01 AM    LYMPHOCYTES 16.80 (L)  09/26/2023 11:01 AM    MONOCYTES 6.80 09/26/2023 11:01 AM    EOSINOPHILS 2.00 09/26/2023 11:01 AM    BASOPHILS 0.40 09/26/2023 11:01 AM      Lab Results   Component Value Date/Time    SODIUM 142 09/26/2023 11:01 AM    POTASSIUM 4.1 09/26/2023 11:01 AM    CHLORIDE 106 09/26/2023 11:01 AM    CO2 26 09/26/2023 11:01 AM    GLUCOSE 167 (H) 09/26/2023 11:01 AM    BUN 19 09/26/2023 11:01 AM    CREATININE 1.48 (H) 09/26/2023 11:01 AM          Pt is new to Xarelto and new to [facility]. Discussed:   Indication for DOAC therapy.  Importance of monitoring and compliance.   Monitoring parameters, signs and symptoms of bleeding or clotting.  DOAC therapy, side effects, potential DDIs, OTC medications  Hormonal therapy -none  Pregnancy -n/a  DDI -none  Lifestyle safety, ie smoking, ETOH, hobby safety, fall safety/prevention  Procedures for missed doses or suspected missed doses, surgeries/procedures, travel, dental work, any medication changes    Based on patient's supratherapeutic INR today - instructed patient to HOLD warfarin tonight, consume V8 juice and then resume Xarelto 15 mg tomorrow. Patient to  Xarelto 20 mg from the pharmacy and replace 15 mg daily.     DOAC affordable = unknown    Samples provided: no    Labs to be completed prior to next f/u - CBC, CMP    F/U - 3 weeks    Josefina Steel PharmD

## 2023-09-29 NOTE — TELEPHONE ENCOUNTER
Called and spoke to Pt's wife, Mely @ [phone]. Per Mely,she stated that [facility] is charging the Pt for $284/30DS. I informed Pt that if she wants to initiate the fill with [facility] it will be, $45/30DS. She consented to go ahead and send the medication out to [facility].     SHAILESH Toth, PhT  Pharmacy Liaison  P: [phone]  9/29/2023 4:14 PM

## 2023-09-29 NOTE — TELEPHONE ENCOUNTER
Received Refill PA request via [employer]  for XARELTO 20MG TABLETS. (Quantity:90, Day Supply:90)     Insurance: [employer] D   Member ID:  [id number]  BIN: [id number]  PCN: [id number]  Group: [id number]     Ran Test claim via [place] & medication Pays for a $135/90DS ; $45/30DS copay. Will outreach to patient to offer specialty pharmacy services and or release to preferred pharmacy      SHAILESH Toth, PhT  Pharmacy Liaison  P: [phone]  9/29/2023 3:59 PM

## 2023-10-02 ENCOUNTER — PHARMACY VISIT (OUTPATIENT)
Dept: PHARMACY | Facility: MEDICAL CENTER | Age: 78
End: 2023-10-02
Payer: COMMERCIAL

## 2023-10-09 ENCOUNTER — HOSPITAL ENCOUNTER (OUTPATIENT)
Dept: LAB | Facility: MEDICAL CENTER | Age: 78
End: 2023-10-09
Attending: INTERNAL MEDICINE
Payer: MEDICARE

## 2023-10-09 DIAGNOSIS — I48.91 ATRIAL FIBRILLATION, UNSPECIFIED TYPE (HCC): ICD-10-CM

## 2023-10-09 DIAGNOSIS — Z86.718 HISTORY OF VENOUS THROMBOEMBOLISM: ICD-10-CM

## 2023-10-09 LAB
ALBUMIN SERPL BCP-MCNC: 4 G/DL (ref 3.2–4.9)
ALBUMIN/GLOB SERPL: 1.4 G/DL
ALP SERPL-CCNC: 51 U/L (ref 30–99)
ALT SERPL-CCNC: 28 U/L (ref 2–50)
ANION GAP SERPL CALC-SCNC: 10 MMOL/L (ref 7–16)
AST SERPL-CCNC: 29 U/L (ref 12–45)
BILIRUB SERPL-MCNC: 2.6 MG/DL (ref 0.1–1.5)
BUN SERPL-MCNC: 20 MG/DL (ref 8–22)
CALCIUM ALBUM COR SERPL-MCNC: 8.6 MG/DL (ref 8.5–10.5)
CALCIUM SERPL-MCNC: 8.6 MG/DL (ref 8.4–10.2)
CHLORIDE SERPL-SCNC: 106 MMOL/L (ref 96–112)
CO2 SERPL-SCNC: 25 MMOL/L (ref 20–33)
CREAT SERPL-MCNC: 1.49 MG/DL (ref 0.5–1.4)
ERYTHROCYTE [DISTWIDTH] IN BLOOD BY AUTOMATED COUNT: 46.9 FL (ref 35.9–50)
GFR SERPLBLD CREATININE-BSD FMLA CKD-EPI: 47 ML/MIN/1.73 M 2
GLOBULIN SER CALC-MCNC: 2.9 G/DL (ref 1.9–3.5)
GLUCOSE SERPL-MCNC: 106 MG/DL (ref 65–99)
HCT VFR BLD AUTO: 39.3 % (ref 42–52)
HGB BLD-MCNC: 13.1 G/DL (ref 14–18)
MCH RBC QN AUTO: 29.8 PG (ref 27–33)
MCHC RBC AUTO-ENTMCNC: 33.3 G/DL (ref 32.3–36.5)
MCV RBC AUTO: 89.5 FL (ref 81.4–97.8)
PLATELET # BLD AUTO: 142 K/UL (ref 164–446)
PMV BLD AUTO: 9.5 FL (ref 9–12.9)
POTASSIUM SERPL-SCNC: 4 MMOL/L (ref 3.6–5.5)
PROT SERPL-MCNC: 6.9 G/DL (ref 6–8.2)
RBC # BLD AUTO: 4.39 M/UL (ref 4.7–6.1)
SODIUM SERPL-SCNC: 141 MMOL/L (ref 135–145)
WBC # BLD AUTO: 6.6 K/UL (ref 4.8–10.8)

## 2023-10-09 PROCEDURE — 85027 COMPLETE CBC AUTOMATED: CPT

## 2023-10-09 PROCEDURE — 80053 COMPREHEN METABOLIC PANEL: CPT

## 2023-10-09 PROCEDURE — 36415 COLL VENOUS BLD VENIPUNCTURE: CPT

## 2023-10-16 ENCOUNTER — ANTICOAGULATION VISIT (OUTPATIENT)
Dept: MEDICAL GROUP | Facility: MEDICAL CENTER | Age: 78
End: 2023-10-16
Payer: MEDICARE

## 2023-10-16 ENCOUNTER — TELEPHONE (OUTPATIENT)
Dept: CARDIOLOGY | Facility: MEDICAL CENTER | Age: 78
End: 2023-10-16

## 2023-10-16 VITALS — HEART RATE: 57 BPM | SYSTOLIC BLOOD PRESSURE: 160 MMHG | DIASTOLIC BLOOD PRESSURE: 69 MMHG

## 2023-10-16 DIAGNOSIS — I48.91 ATRIAL FIBRILLATION, UNSPECIFIED TYPE (HCC): ICD-10-CM

## 2023-10-16 DIAGNOSIS — D68.69 SECONDARY HYPERCOAGULABLE STATE (HCC): ICD-10-CM

## 2023-10-16 DIAGNOSIS — Z86.718 HISTORY OF VENOUS THROMBOEMBOLISM: ICD-10-CM

## 2023-10-16 PROCEDURE — 99211 OFF/OP EST MAY X REQ PHY/QHP: CPT | Performed by: INTERNAL MEDICINE

## 2023-10-16 PROCEDURE — 3078F DIAST BP <80 MM HG: CPT | Performed by: INTERNAL MEDICINE

## 2023-10-16 PROCEDURE — 3077F SYST BP >= 140 MM HG: CPT | Performed by: INTERNAL MEDICINE

## 2023-10-16 NOTE — TELEPHONE ENCOUNTER
SS    Caller: Mely Claudio (Spouse)    Topic/issue: Mely called in regards to pt's low heart rate, she stated that its been low through the weekend around 40-50, Mely  stated that she called the on call doctor and they had the pt hold certain medication and then have the pt call [facility] office to discuss further please advise.      Callback Number: [phone] (home)      Thank you,     Sivakumar MONIQUE

## 2023-10-16 NOTE — TELEPHONE ENCOUNTER
Phone Number Called: [phone]    Call outcome:  spoke to patients wifegerri    Message: Called to discuss patients concerns regarding low HR.     Patient wife called on call doctor over the weekend, patients wife stated the doctor told her to hold metoprolol.  This morning patients heart rate has been the 50's, feeling fine, no other symptoms.   Patient reports still taking amio, xarelto, losartan-HCTZ.  Held metoprolol last night.   Patients reports normal BP. No recent readings.   Will send to  for further recommendations.

## 2023-10-16 NOTE — Clinical Note
Hello,  Just FYI - Patient BP during anticoagulation visit was 160/69 today. Unable to increase dose of losartan/HCTZ as he is on maximum dose already. Recommend that he follow up with you for potential need for medication changes.   Thank you! Josefina Steel, DeedeeD

## 2023-10-19 NOTE — TELEPHONE ENCOUNTER
S/w patient regarding metoprolol. Okay to hold until f/u with EA. Patient and wife verbalized understanding.

## 2023-10-23 PROCEDURE — RXMED WILLOW AMBULATORY MEDICATION CHARGE: Performed by: INTERNAL MEDICINE

## 2023-10-23 NOTE — TELEPHONE ENCOUNTER
Contact:  Phone number:[phone] (mobile)    Name of person spoken with and relationship to patient: LISA, WIFE    Patient’s Adherence:  How patient is doing on medication: Very Well    How many missed doses and reason: 0 N/A    Any new medications: no    Any new conditions: no    Any new allergies: no    Any new side effects: no    Any new diagnoses: no    How many doses remainin    Did patient want to speak with pharmacist: No   Delivery:  Delivery date and method: 10/24/2023 via     Needs by Date: 2023    Signature required: No     Any additional details for :  LEAVE AT FRONT DOOR    Teach Appointment Date:  N/A   Shipping Address:  [address] [place] [address] [address] [place] [address]   Medication(name,strength and dose):  XARELTO 20MG TABLETS    Copay:  $45   Payment Method:  Credit card on file   Supplies:  NA   Additional Information:  Called and spoke to Pt's wife,Lisa @ [phone] to coordinate Pt's Xarelto. Per wife, Pt has 6 more tablets remaining and requested to only fill for another 30 day supply. Pt has no additional concerns or questions at this time, and she verbalized understanding that Xarelto will be delivered on 10/24 via .        SHAILESH Toth, PhT  Pharmacy Liaison (Rx Coordinator)  P: [phone]  10/23/2023 11:37 AM

## 2023-10-24 ENCOUNTER — PHARMACY VISIT (OUTPATIENT)
Dept: PHARMACY | Facility: MEDICAL CENTER | Age: 78
End: 2023-10-24
Payer: COMMERCIAL

## 2023-10-24 DIAGNOSIS — I48.91 ATRIAL FIBRILLATION, UNSPECIFIED TYPE (HCC): ICD-10-CM

## 2023-10-25 ENCOUNTER — OFFICE VISIT (OUTPATIENT)
Dept: CARDIOLOGY | Facility: MEDICAL CENTER | Age: 78
End: 2023-10-25
Attending: NURSE PRACTITIONER
Payer: MEDICARE

## 2023-10-25 VITALS
OXYGEN SATURATION: 97 % | SYSTOLIC BLOOD PRESSURE: 130 MMHG | HEIGHT: 71 IN | HEART RATE: 64 BPM | RESPIRATION RATE: 16 BRPM | DIASTOLIC BLOOD PRESSURE: 72 MMHG | WEIGHT: 180 LBS | BODY MASS INDEX: 25.2 KG/M2

## 2023-10-25 DIAGNOSIS — I10 ESSENTIAL HYPERTENSION: ICD-10-CM

## 2023-10-25 DIAGNOSIS — D68.69 SECONDARY HYPERCOAGULABLE STATE (HCC): ICD-10-CM

## 2023-10-25 DIAGNOSIS — I48.3 TYPICAL ATRIAL FLUTTER (HCC): ICD-10-CM

## 2023-10-25 DIAGNOSIS — I48.91 ATRIAL FIBRILLATION, UNSPECIFIED TYPE (HCC): ICD-10-CM

## 2023-10-25 DIAGNOSIS — Z98.890 H/O CARDIAC RADIOFREQUENCY ABLATION: ICD-10-CM

## 2023-10-25 PROCEDURE — 3075F SYST BP GE 130 - 139MM HG: CPT | Performed by: NURSE PRACTITIONER

## 2023-10-25 PROCEDURE — 99214 OFFICE O/P EST MOD 30 MIN: CPT | Mod: 25 | Performed by: NURSE PRACTITIONER

## 2023-10-25 PROCEDURE — 93010 ELECTROCARDIOGRAM REPORT: CPT | Performed by: NURSE PRACTITIONER

## 2023-10-25 PROCEDURE — 3078F DIAST BP <80 MM HG: CPT | Performed by: NURSE PRACTITIONER

## 2023-10-25 PROCEDURE — 99212 OFFICE O/P EST SF 10 MIN: CPT | Performed by: NURSE PRACTITIONER

## 2023-10-25 PROCEDURE — 93005 ELECTROCARDIOGRAM TRACING: CPT | Performed by: NURSE PRACTITIONER

## 2023-10-25 RX ORDER — METOPROLOL SUCCINATE 25 MG/1
25 TABLET, EXTENDED RELEASE ORAL EVERY EVENING
COMMUNITY
Start: 2023-10-25 | End: 2023-11-02

## 2023-10-25 ASSESSMENT — ENCOUNTER SYMPTOMS
HEADACHES: 0
TREMORS: 0
FOCAL WEAKNESS: 0
TINGLING: 0
VOMITING: 0
SPEECH CHANGE: 0
NAUSEA: 0
WEIGHT LOSS: 0
HEARTBURN: 0
FEVER: 0
PND: 0
WHEEZING: 0
ORTHOPNEA: 0
CHILLS: 0
SENSORY CHANGE: 0
SHORTNESS OF BREATH: 0
LOSS OF CONSCIOUSNESS: 0
HEMOPTYSIS: 0
PALPITATIONS: 0
COUGH: 0
SPUTUM PRODUCTION: 0
DIZZINESS: 0

## 2023-10-25 ASSESSMENT — FIBROSIS 4 INDEX: FIB4 SCORE: 3.05

## 2023-10-25 NOTE — PROGRESS NOTES
Chief Complaint   Patient presents with    Atrial Flutter     FV DX: Atrial Flutter       Subjective     Magnus Claudio is a 78 y.o. male who presents today for procedural after typical atrial flutter ablation by Dr. Beckford 9/28/2023.    Procedure completed with successful CTI line with bidirectional isthmus block.    Is followed by Dr. Beckford for EP.  Additional medical history significant for prior DVT for which he is on longitudinal Coumadin, antiarrhythmic medication use in the form of amiodarone, hypertension, diabetes, about history of colon and bladder cancer    Today in follow-up he reports that he is overall doing well.  He was recommended to hold his 50 mg dose of Toprol-XL secondary to heart rates in the 40s, he was not apparently symptomatic with these.  Otherwise has not had any evidence of tachycardia or arrhythmia post ablation.  No chest pain or shortness of breath.  No new paresthesias.  No dizziness syncope or presyncope.  No lower extremity edema.    Past Medical History:   Diagnosis Date    Anemia     Arrhythmia 08/10/2023    Blood clotting disorder (HCC)     leg and lung    Cancer (HCC) 2021    Colon 20+ years ago and bladder    Cataract     Bilat IOL    Diabetes (HCC)     Diverticulitis     H/O resection of liver     20 years ago    History of colon resection     20 years ago    Hypertension     Urinary bladder disorder 3 yrs ago    Bladder cancer    Vertigo, benign positional      Past Surgical History:   Procedure Laterality Date    INGUINAL HERNIA REPAIR ROBOTIC XI Left 02/11/2022    Procedure: REPAIR, HERNIA, INGUINAL, ROBOT-ASSISTED, USING DA NIKITA XI - WITH MESH PLACEMENT;  Surgeon: Nelson Denney M.D.;  Location: SURGERY [facility];  Service: Gen Robotic    OTHER  05/2021    bladder cancer surgery    CATARACT EXTRACTION WITH IOL      COLON RESECTION      EYE SURGERY Bilateral     Cataract surgery    LAMINOTOMY      RI RESEC LIVER,PART LOBECTOMY       Family History   Problem Relation  Age of Onset    Hypertension Father     Heart Attack Father     Cancer Mother         Breast    Breast Cancer Mother     Diabetes Sister     Hyperlipidemia Neg Hx         unknown     Social History     Socioeconomic History    Marital status:      Spouse name: Not on file    Number of children: Not on file    Years of education: Not on file    Highest education level: Not on file   Occupational History    Not on file   Tobacco Use    Smoking status: Never    Smokeless tobacco: Never   Vaping Use    Vaping Use: Never used   Substance and Sexual Activity    Alcohol use: Not Currently    Drug use: Never    Sexual activity: Not Currently     Partners: Female   Other Topics Concern    Not on file   Social History Narrative    Not on file     Social Determinants of Health     Financial Resource Strain: Not on file   Food Insecurity: Not on file   Transportation Needs: Not on file   Physical Activity: Not on file   Stress: Not on file   Social Connections: Not on file   Intimate Partner Violence: Not on file   Housing Stability: Not on file     Allergies   Allergen Reactions    Morphine Unspecified     Hallucinations, 15+ years ago     Outpatient Encounter Medications as of 10/25/2023   Medication Sig Dispense Refill    rivaroxaban (XARELTO) 20 MG Tab tablet Take 1 Tablet by mouth with dinner. 90 Tablet 1    amiodarone (CORDARONE) 200 MG Tab Take 1 Tablet by mouth every day. After 2 weeks switch to once a day 90 Tablet 0    metoprolol SR (TOPROL XL) 25 MG TABLET SR 24 HR Take 2 Tablets by mouth every morning. (Patient taking differently: Take 50 mg by mouth every evening.) 180 Tablet 3    atorvastatin (LIPITOR) 40 MG Tab Take 1 Tablet by mouth every evening. 90 Tablet 3    glimepiride (AMARYL) 4 MG Tab Take 1 Tablet by mouth every morning. 100 Tablet 4    losartan-hydrochlorothiazide (HYZAAR) 100-12.5 MG per tablet Take 1 Tablet by mouth every day. 100 Tablet 3    metFORMIN ER (GLUCOPHAGE XR) 500 MG TABLET SR 24 HR  "TAKE 2 TABLETS BY MOUTH TWICE DAILY (Patient taking differently: Take 1,000 mg by mouth 2 times a day. TAKE 2 TABLETS BY MOUTH TWICE DAILY) 360 Tablet 4    tamsulosin (FLOMAX) 0.4 MG capsule TAKE 1 CAPSULE BY MOUTH EVERY  Capsule 4    B Complex-Folic Acid (B COMPLEX-VITAMIN B12 PO) Take 1 Tablet by mouth every day.      therapeutic multivitamin-minerals (THERAGRAN-M) Tab Take 1 Tab by mouth every day.       No facility-administered encounter medications on file as of 10/25/2023.     Review of Systems   Constitutional:  Negative for chills, fever, malaise/fatigue and weight loss.   HENT:  Negative for tinnitus.    Respiratory:  Negative for cough, hemoptysis, sputum production, shortness of breath and wheezing.    Cardiovascular:  Negative for chest pain, palpitations, orthopnea, leg swelling and PND.   Gastrointestinal:  Negative for heartburn, nausea and vomiting.   Neurological:  Negative for dizziness, tingling, tremors, sensory change, speech change, focal weakness, loss of consciousness and headaches.   All other systems reviewed and are negative.             Objective     /72 (BP Location: Left arm, Patient Position: Sitting, BP Cuff Size: Adult)   Pulse 64   Resp 16   Ht 1.803 m (5' 11\")   Wt 81.6 kg (180 lb)   SpO2 97%   BMI 25.10 kg/m²     Physical Exam  Vitals reviewed.   Constitutional:       Appearance: Normal appearance. He is well-developed.   HENT:      Head: Normocephalic and atraumatic.      Mouth/Throat:      Mouth: Mucous membranes are moist.      Pharynx: Oropharynx is clear.   Eyes:      Extraocular Movements: Extraocular movements intact.      Conjunctiva/sclera: Conjunctivae normal.      Pupils: Pupils are equal, round, and reactive to light.   Neck:      Vascular: No JVD.   Cardiovascular:      Rate and Rhythm: Normal rate and regular rhythm.      Pulses: Normal pulses.      Heart sounds: Normal heart sounds. No murmur heard.     No friction rub. No gallop.   Pulmonary:     "  Effort: Pulmonary effort is normal.      Breath sounds: Normal breath sounds. No wheezing or rales.   Chest:      Chest wall: No tenderness.   Musculoskeletal:         General: Normal range of motion.      Cervical back: Normal range of motion and neck supple.      Right lower leg: No edema.      Left lower leg: No edema.   Skin:     General: Skin is warm and dry.      Capillary Refill: Capillary refill takes 2 to 3 seconds.   Neurological:      Mental Status: He is alert and oriented to person, place, and time.   Psychiatric:         Mood and Affect: Mood normal.         Behavior: Behavior normal.         Thought Content: Thought content normal.         Judgment: Judgment normal.                Assessment & Plan     1. Typical atrial flutter (HCC)  EKG      2. H/O cardiac radiofrequency ablation        3. Atrial fibrillation, unspecified type (HCC)  metoprolol SR (TOPROL XL) 25 MG TABLET SR 24 HR      4. Essential hypertension        5. Secondary hypercoagulable state (HCC)            Medical Decision Making: Today's Assessment/Status/Plan:   1.  Typical atrial flutter  2.  Ablation  - Status post ablation 9/28/2023 with Dr. Beckford for typical atrial flutter with RA CTI line with noted bidirectional isthmus block.  -Clinically he is doing well post ablation.  He is in sinus rhythm on twelve-lead EKG today.  - He is still on amiodarone, 200 mg daily.  Discussed we will go ahead and stop this medication at this time.  Discussed amiodarone washout timeframe.   -Would start low-dose Toprol 25 mg nightly a couple days after amiodarone stopped.  Discussed could continue to monitor heart rates if sustaining in the mid to low 40s again to notify the office.  -We additionally discussed small risk of atrial fibrillation persons who have atrial flutter.  We will evaluate for this ongoing.    3.  Hypertension  - Blood pressure borderline at 130 today.  His blood pressure log which she is to clinic shows mild to moderate  hypertension with peak systolic blood pressure 157 peak diastolic blood pressure 89.  -Discussed adding back low-dose Toprol will help this a little bit.  If blood pressure remains above 130/80 would recommend follow-up and further medication adjustment with PCP.    4.  Anticoagulation  - Previously on warfarin, changed post ablation.  Now on Xarelto per Dr. Ny.  We discussed as long as Xarelto is affordable for him can continue long-term as he was having trouble with labile INRs.    Return to clinic as scheduled with Dr. Beckford in December, sooner if clinical condition changes.  He will reach out to us via [employer] or telephone call should questions or concerns arise.    PLEASE NOTE: This Note was created using voice recognition Software. I have made every reasonable attempt to correct obvious errors, but I expect that there are errors of grammar and possibly content that I did not discover before finalizing the note

## 2023-11-02 ENCOUNTER — HOSPITAL ENCOUNTER (OUTPATIENT)
Facility: MEDICAL CENTER | Age: 78
End: 2023-11-02
Attending: FAMILY MEDICINE
Payer: MEDICARE

## 2023-11-02 ENCOUNTER — OFFICE VISIT (OUTPATIENT)
Dept: MEDICAL GROUP | Facility: LAB | Age: 78
End: 2023-11-02
Payer: MEDICARE

## 2023-11-02 VITALS
HEART RATE: 68 BPM | HEIGHT: 71 IN | OXYGEN SATURATION: 98 % | WEIGHT: 181.2 LBS | DIASTOLIC BLOOD PRESSURE: 58 MMHG | BODY MASS INDEX: 25.37 KG/M2 | RESPIRATION RATE: 12 BRPM | TEMPERATURE: 97.8 F | SYSTOLIC BLOOD PRESSURE: 130 MMHG

## 2023-11-02 DIAGNOSIS — E11.9 CONTROLLED TYPE 2 DIABETES MELLITUS WITHOUT COMPLICATION, WITHOUT LONG-TERM CURRENT USE OF INSULIN (HCC): ICD-10-CM

## 2023-11-02 DIAGNOSIS — I10 ESSENTIAL HYPERTENSION: ICD-10-CM

## 2023-11-02 DIAGNOSIS — Z23 NEED FOR VACCINATION: ICD-10-CM

## 2023-11-02 LAB
CREAT UR-MCNC: 156.66 MG/DL
MICROALBUMIN UR-MCNC: 1.7 MG/DL
MICROALBUMIN/CREAT UR: 11 MG/G (ref 0–30)

## 2023-11-02 PROCEDURE — 90662 IIV NO PRSV INCREASED AG IM: CPT | Performed by: FAMILY MEDICINE

## 2023-11-02 PROCEDURE — G0008 ADMIN INFLUENZA VIRUS VAC: HCPCS | Performed by: FAMILY MEDICINE

## 2023-11-02 PROCEDURE — 3075F SYST BP GE 130 - 139MM HG: CPT | Performed by: FAMILY MEDICINE

## 2023-11-02 PROCEDURE — 82570 ASSAY OF URINE CREATININE: CPT

## 2023-11-02 PROCEDURE — 99214 OFFICE O/P EST MOD 30 MIN: CPT | Mod: 25 | Performed by: FAMILY MEDICINE

## 2023-11-02 PROCEDURE — 3078F DIAST BP <80 MM HG: CPT | Performed by: FAMILY MEDICINE

## 2023-11-02 PROCEDURE — 82043 UR ALBUMIN QUANTITATIVE: CPT

## 2023-11-02 RX ORDER — APIXABAN 5 MG/1
TABLET, FILM COATED ORAL
COMMUNITY
Start: 2023-09-28 | End: 2023-11-02

## 2023-11-02 ASSESSMENT — FIBROSIS 4 INDEX: FIB4 SCORE: 3.05

## 2023-11-02 NOTE — PROGRESS NOTES
"Subjective:     CC: BP follow up    HPI:   Magnus presents today to follow-up on blood pressure and diabetes.  Has been following with cardiology for a flutter post ablation and has been doing well.  Amiodarone was stopped and they wanted him to start low-dose Toprol 25 mg a few days after this.  He does continue on Xarelto.    Medications, past medical history, allergies, and social history have been reviewed and updated.      Objective:       Exam:  /58 (BP Location: Right arm, Patient Position: Sitting, BP Cuff Size: Adult)   Pulse 68   Temp 36.6 °C (97.8 °F)   Resp 12   Ht 1.803 m (5' 11\")   Wt 82.2 kg (181 lb 3.2 oz)   SpO2 98%   BMI 25.27 kg/m²  Body mass index is 25.27 kg/m².    Constitutional: Alert. Well appearing. No distress.  Skin: Warm, dry, good turgor, no visible rashes.  ENMT: Moist mucous membranes. Normal dentition.  Respiratory: Normal effort. Lungs are clear to auscultation bilaterally.  Cardiovascular: Regular rate and rhythm. Normal S1/S2. No murmurs, rubs or gallops.   Neuro: Moves all four extremities. No facial droop.  Psych: Answers questions appropriately. Normal affect and mood.      Assessment & Plan:     78 y.o. male with the following -     1. Essential hypertension  BP at goal today.  After visit it was noted that cardiology had wanted him to start back on Toprol 25 mg daily a few days after starting amiodarone.  It does not appear he has done this yet some messages sent to clarify.  Continue losartan-HCTZ for now.    2. Need for vaccination  - INFLUENZA VACCINE, HIGH DOSE (65+ ONLY)    3. Controlled type 2 diabetes mellitus without complication, without long-term current use of insulin (HCC)  Has been well controlled and stable with A1c consistently less than 7 over the past year.  Last A1c 6.8% on 8/13.  Continue metformin 1000 mg twice daily.  - MICROALBUMIN CREAT RATIO URINE; Future      Please note that this note was created using voice recognition software.      "

## 2023-11-21 ENCOUNTER — TELEPHONE (OUTPATIENT)
Dept: PHARMACY | Facility: MEDICAL CENTER | Age: 78
End: 2023-11-21
Payer: MEDICARE

## 2023-11-21 PROCEDURE — RXMED WILLOW AMBULATORY MEDICATION CHARGE: Performed by: INTERNAL MEDICINE

## 2023-11-21 NOTE — TELEPHONE ENCOUNTER
Contact:  [id number] Magnus Claudio         Phone number: [phone]    Name of person spoken with and relationship to patient: Mely, spouse   Patient’s Adherence:            How patient is doing on medication:  well    How many missed doses and reason: 0    Any new medications: no    Any new conditions: no    Any new allergies: no    Any new side effects: no     Any new diagnoses: no     How many doses remaining:  10    Did patient want to speak with pharmacist: no  Delivery:            Delivery date and method:  11/29     Needs by Date:  11/30    Signature required:  no    Any additional details for :  knock first. If no answer may leave at door  Teach Appointment Date:  na  Shipping Address:  [address], [place] [address]  Medication(name, strength and dose):  Xarelto 20 MG Tabs qd with pm meal  Copay: $135  Payment Method: ccof  Supplies:  na  Additional info:  RONNIE Boone (spouse) She stated doing well on the medication. She opted to get #90/90ds now that they know he is doing well on the medication. No further questions/concerns at this time

## 2023-11-22 ENCOUNTER — OFFICE VISIT (OUTPATIENT)
Dept: MEDICAL GROUP | Facility: LAB | Age: 78
End: 2023-11-22
Payer: MEDICARE

## 2023-11-22 VITALS
RESPIRATION RATE: 16 BRPM | TEMPERATURE: 97.3 F | BODY MASS INDEX: 25.37 KG/M2 | SYSTOLIC BLOOD PRESSURE: 116 MMHG | OXYGEN SATURATION: 98 % | DIASTOLIC BLOOD PRESSURE: 60 MMHG | HEART RATE: 69 BPM | WEIGHT: 181.2 LBS | HEIGHT: 71 IN

## 2023-11-22 DIAGNOSIS — I10 ESSENTIAL HYPERTENSION: ICD-10-CM

## 2023-11-22 DIAGNOSIS — E11.9 CONTROLLED TYPE 2 DIABETES MELLITUS WITHOUT COMPLICATION, WITHOUT LONG-TERM CURRENT USE OF INSULIN (HCC): ICD-10-CM

## 2023-11-22 LAB
HBA1C MFR BLD: 6 % (ref ?–5.8)
POCT INT CON NEG: NEGATIVE
POCT INT CON POS: POSITIVE

## 2023-11-22 PROCEDURE — 3078F DIAST BP <80 MM HG: CPT | Performed by: FAMILY MEDICINE

## 2023-11-22 PROCEDURE — 3074F SYST BP LT 130 MM HG: CPT | Performed by: FAMILY MEDICINE

## 2023-11-22 PROCEDURE — 99214 OFFICE O/P EST MOD 30 MIN: CPT | Performed by: FAMILY MEDICINE

## 2023-11-22 PROCEDURE — 83036 HEMOGLOBIN GLYCOSYLATED A1C: CPT | Performed by: FAMILY MEDICINE

## 2023-11-22 ASSESSMENT — FIBROSIS 4 INDEX: FIB4 SCORE: 3.05

## 2023-11-22 NOTE — PROGRESS NOTES
"Subjective:     CC: Follow up    HPI:   Magnus presents today follow-up on type 2 diabetes and hypertension.  A1c today is 6.0%, home blood pressure log revealed with average 120/70's, a few elevated levels in a few slightly lower.  No issues with lightheadedness.    Medications, past medical history, allergies, and social history have been reviewed and updated.      Objective:       Exam:  /60 (BP Location: Right arm, Patient Position: Sitting, BP Cuff Size: Adult)   Pulse 69   Temp 36.3 °C (97.3 °F) (Temporal)   Resp 16   Ht 1.803 m (5' 11\")   Wt 82.2 kg (181 lb 3.2 oz)   SpO2 98%   BMI 25.27 kg/m²  Body mass index is 25.27 kg/m².    Constitutional: Alert. Well appearing. No distress.  Skin: Warm, dry, good turgor, no visible rashes.  Respiratory: Normal effort.  Neuro: Moves all four extremities. No facial droop.  Psych: Answers questions appropriately. Normal affect and mood.    Assessment & Plan:     78 y.o. male with the following -     1. Controlled type 2 diabetes mellitus without complication, without long-term current use of insulin (HCC)  A!C 6.0.  Continue metformin 1000 mg twice daily, glimepiride 4 mg daily  - POCT Hemoglobin A1C    2. Essential hypertension  Blood pressure at goal.  Continue losartan-HCTZ 100-12.5 mg.    Patient is moving to [facility] within the next few weeks and will plan to establish with new primary care provider there.  Can provide refills in the short-term if needed.    Please note that this note was created using voice recognition software.      "

## 2023-11-27 DIAGNOSIS — R35.1 NOCTURIA: ICD-10-CM

## 2023-11-27 DIAGNOSIS — I48.3 TYPICAL ATRIAL FLUTTER (HCC): ICD-10-CM

## 2023-11-27 NOTE — PROGRESS NOTES
Chief Complaint   Patient presents with    Atrial Flutter     F/v Dx:Typical atrial flutter       Subjective     Magnus Claudio is a 78 y.o. [facility]     He has a history of typical atrial flutter ablation with Dr. Beckford 9/28/23 with RA CTI line and bilateral isthmus block.     Additional medical history significant for prior DVT, indefinite warfarin use, AAD use with Amiodarone, HTN, diabetes.     At our last office visit approximately one month ago Amiodarone was stopped and he was recommended to start on low dose Toprolol XL 25 mg daily.     Today in follow up he reports overall doing well since her last office visit.  He does not report known episodes of arrhythmia or symptoms of potential arrhythmia.  He ended up not starting low-dose Toprol.  He has brought his blood pressure and heart rate log for review today.  All heart rates within acceptable limits in the 60s.  Blood pressures mostly controlled however a few systolic greater than 135, few diastolic greater than 80.    Reports good exertional/activity tolerance.    Past Medical History:   Diagnosis Date    Anemia     Arrhythmia 08/10/2023    Blood clotting disorder (HCC)     leg and lung    Cancer (HCC) 2021    Colon 20+ years ago and bladder    Cataract     Bilat IOL    Diabetes (HCC)     Diverticulitis     H/O resection of liver     20 years ago    History of colon resection     20 years ago    Hypertension     Urinary bladder disorder 3 yrs ago    Bladder cancer    Vertigo, benign positional      Past Surgical History:   Procedure Laterality Date    INGUINAL HERNIA REPAIR ROBOTIC XI Left 02/11/2022    Procedure: REPAIR, HERNIA, INGUINAL, ROBOT-ASSISTED, USING DA NIKITA XI - WITH MESH PLACEMENT;  Surgeon: Nelson Denney M.D.;  Location: SURGERY [facility];  Service: Gen Robotic    OTHER  05/2021    bladder cancer surgery    CATARACT EXTRACTION WITH IOL      COLON RESECTION      EYE SURGERY Bilateral      Cataract surgery    LAMINOTOMY      OK RESEC LIVER,PART LOBECTOMY       Family History   Problem Relation Age of Onset    Hypertension Father     Heart Attack Father     Cancer Mother         Breast    Breast Cancer Mother     Diabetes Sister     Hyperlipidemia Neg Hx         unknown     Social History     Socioeconomic History    Marital status:      Spouse name: Not on file    Number of children: Not on file    Years of education: Not on file    Highest education level: Not on file   Occupational History    Not on file   Tobacco Use    Smoking status: Never    Smokeless tobacco: Never   Vaping Use    Vaping Use: Never used   Substance and Sexual Activity    Alcohol use: Not Currently    Drug use: Never    Sexual activity: Not Currently     Partners: Female   Other Topics Concern    Not on file   Social History Narrative    Not on file     Social Determinants of Health     Financial Resource Strain: Not on file   Food Insecurity: Not on file   Transportation Needs: Not on file   Physical Activity: Not on file   Stress: Not on file   Social Connections: Not on file   Intimate Partner Violence: Not on file   Housing Stability: Not on file     Allergies   Allergen Reactions    Morphine Unspecified     Hallucinations, 15+ years ago     Outpatient Encounter Medications as of 11/28/2023   Medication Sig Dispense Refill    rivaroxaban (XARELTO) 20 MG Tab tablet Take 1 Tablet by mouth with dinner. 90 Tablet 1    atorvastatin (LIPITOR) 40 MG Tab Take 1 Tablet by mouth every evening. 90 Tablet 3    glimepiride (AMARYL) 4 MG Tab Take 1 Tablet by mouth every morning. 100 Tablet 4    losartan-hydrochlorothiazide (HYZAAR) 100-12.5 MG per tablet Take 1 Tablet by mouth every day. 100 Tablet 3    metFORMIN ER (GLUCOPHAGE XR) 500 MG TABLET SR 24 HR TAKE 2 TABLETS BY MOUTH TWICE DAILY (Patient taking differently: Take 1,000 mg by mouth 2 times a day. TAKE 2 TABLETS BY MOUTH TWICE DAILY) 360 Tablet 4    tamsulosin (FLOMAX) 0.4  "MG capsule TAKE 1 CAPSULE BY MOUTH EVERY  Capsule 4    B Complex-Folic Acid (B COMPLEX-VITAMIN B12 PO) Take 1 Tablet by mouth every day.      therapeutic multivitamin-minerals (THERAGRAN-M) Tab Take 1 Tab by mouth every day.       No facility-administered encounter medications on file as of 11/28/2023.     Review of Systems   Constitutional:  Negative for chills, fever, malaise/fatigue and weight loss.   HENT:  Negative for tinnitus.    Respiratory:  Negative for cough, hemoptysis, sputum production, shortness of breath and wheezing.    Cardiovascular:  Negative for chest pain, palpitations, orthopnea, leg swelling and PND.   Gastrointestinal:  Negative for heartburn, nausea and vomiting.   Neurological:  Negative for dizziness, tingling, tremors, sensory change, speech change, focal weakness, loss of consciousness and headaches.              Objective     /72 (BP Location: Left arm, Patient Position: Sitting, BP Cuff Size: Adult)   Pulse 64   Resp 14   Ht 1.803 m (5' 11\")   Wt 82.1 kg (181 lb)   SpO2 96%   BMI 25.24 kg/m²     Physical Exam  Vitals reviewed.   Constitutional:       Appearance: Normal appearance. He is well-developed.   HENT:      Head: Normocephalic and atraumatic.      Mouth/Throat:      Mouth: Mucous membranes are moist.      Pharynx: Oropharynx is clear.   Eyes:      Extraocular Movements: Extraocular movements intact.      Conjunctiva/sclera: Conjunctivae normal.      Pupils: Pupils are equal, round, and reactive to light.   Neck:      Vascular: No JVD.   Cardiovascular:      Rate and Rhythm: Normal rate and regular rhythm.      Pulses: Normal pulses.      Heart sounds: Normal heart sounds. No murmur heard.     No friction rub. No gallop.   Pulmonary:      Effort: Pulmonary effort is normal.      Breath sounds: Normal breath sounds. No wheezing or rales.   Chest:      Chest wall: No tenderness.   Musculoskeletal:         General: Normal range of motion.      Cervical back: " Normal range of motion and neck supple.      Right lower leg: No edema.      Left lower leg: No edema.   Skin:     General: Skin is warm and dry.      Capillary Refill: Capillary refill takes 2 to 3 seconds.   Neurological:      Mental Status: He is alert and oriented to person, place, and time.   Psychiatric:         Mood and Affect: Mood normal.         Behavior: Behavior normal.         Thought Content: Thought content normal.         Judgment: Judgment normal.     Twelve-lead ECG 11/28/2023: Sinus rhythm, rate 64       Assessment & Plan     1. Typical atrial flutter (HCC)  EKG      2. H/O cardiac radiofrequency ablation        3. Essential hypertension        4. History of venous thromboembolism        5. Secondary hypercoagulable state (HCC)            Medical Decision Making: Today's Assessment/Status/Plan:   1.  Typical atrial flutter  2.  Status post ablation  - Ablation 9/28/2023 by Dr. Beckford with right atrial CTI line with bilateral isthmus block.  -He has been off amiodarone for approximately 1 month.  No evidence of arrhythmia recurrence/atrial fibrillation.  -He is not on dian agents per his decision.    3.  Hypertension  - Blood pressure is controlled in office today per ACC guidelines.  Home log reveals a few a.m. mild elevations.  - he will continue Hyzaar as per directed per PCP, further adjustments per PCP.    4.  History of DVT  5.  Anticoagulation  - Indefinite Xarelto.  Was previously taking warfarin, however was having labile INRs. Xarelto is affordable for them so he will continue at this time.    He is moving to [facility] in the near future.  He wants to establish with a cardiologist down there soon after moving.  Follow-up as needed.    PLEASE NOTE: This Note was created using voice recognition Software. I have made every reasonable attempt to correct obvious errors, but I expect that there are errors of grammar and possibly content that I did not discover before finalizing the  note

## 2023-11-28 ENCOUNTER — OFFICE VISIT (OUTPATIENT)
Dept: CARDIOLOGY | Facility: MEDICAL CENTER | Age: 78
End: 2023-11-28
Attending: NURSE PRACTITIONER
Payer: MEDICARE

## 2023-11-28 VITALS
SYSTOLIC BLOOD PRESSURE: 114 MMHG | HEIGHT: 71 IN | HEART RATE: 64 BPM | OXYGEN SATURATION: 96 % | DIASTOLIC BLOOD PRESSURE: 72 MMHG | RESPIRATION RATE: 14 BRPM | WEIGHT: 181 LBS | BODY MASS INDEX: 25.34 KG/M2

## 2023-11-28 DIAGNOSIS — Z98.890 H/O CARDIAC RADIOFREQUENCY ABLATION: ICD-10-CM

## 2023-11-28 DIAGNOSIS — I48.3 TYPICAL ATRIAL FLUTTER (HCC): ICD-10-CM

## 2023-11-28 DIAGNOSIS — I10 ESSENTIAL HYPERTENSION: ICD-10-CM

## 2023-11-28 DIAGNOSIS — D68.69 SECONDARY HYPERCOAGULABLE STATE (HCC): ICD-10-CM

## 2023-11-28 DIAGNOSIS — Z86.718 HISTORY OF VENOUS THROMBOEMBOLISM: ICD-10-CM

## 2023-11-28 PROCEDURE — 93005 ELECTROCARDIOGRAM TRACING: CPT | Performed by: NURSE PRACTITIONER

## 2023-11-28 PROCEDURE — 99214 OFFICE O/P EST MOD 30 MIN: CPT | Mod: 25 | Performed by: NURSE PRACTITIONER

## 2023-11-28 PROCEDURE — 3074F SYST BP LT 130 MM HG: CPT | Performed by: NURSE PRACTITIONER

## 2023-11-28 PROCEDURE — 3078F DIAST BP <80 MM HG: CPT | Performed by: NURSE PRACTITIONER

## 2023-11-28 PROCEDURE — 99212 OFFICE O/P EST SF 10 MIN: CPT | Performed by: NURSE PRACTITIONER

## 2023-11-28 RX ORDER — AMIODARONE HYDROCHLORIDE 200 MG/1
200 TABLET ORAL DAILY
Qty: 90 TABLET | OUTPATIENT
Start: 2023-11-28

## 2023-11-28 RX ORDER — TAMSULOSIN HYDROCHLORIDE 0.4 MG/1
0.4 CAPSULE ORAL DAILY
Qty: 100 CAPSULE | Refills: 4 | Status: SHIPPED | OUTPATIENT
Start: 2023-11-28

## 2023-11-28 ASSESSMENT — ENCOUNTER SYMPTOMS
ORTHOPNEA: 0
TINGLING: 0
NAUSEA: 0
WHEEZING: 0
FOCAL WEAKNESS: 0
COUGH: 0
SPEECH CHANGE: 0
CHILLS: 0
HEADACHES: 0
SHORTNESS OF BREATH: 0
DIZZINESS: 0
WEIGHT LOSS: 0
TREMORS: 0
VOMITING: 0
PND: 0
PALPITATIONS: 0
LOSS OF CONSCIOUSNESS: 0
HEARTBURN: 0
FEVER: 0
SPUTUM PRODUCTION: 0
HEMOPTYSIS: 0
SENSORY CHANGE: 0

## 2023-11-28 ASSESSMENT — FIBROSIS 4 INDEX: FIB4 SCORE: 3.05

## 2023-11-29 ENCOUNTER — PHARMACY VISIT (OUTPATIENT)
Dept: PHARMACY | Facility: MEDICAL CENTER | Age: 78
End: 2023-11-29
Payer: COMMERCIAL

## 2023-11-29 ENCOUNTER — PATIENT MESSAGE (OUTPATIENT)
Dept: HEALTH INFORMATION MANAGEMENT | Facility: OTHER | Age: 78
End: 2023-11-29

## 2023-12-04 RX ORDER — WARFARIN SODIUM 2.5 MG/1
2.5 TABLET ORAL DAILY
COMMUNITY

## 2023-12-04 RX ORDER — WARFARIN SODIUM 2.5 MG/1
2.5 TABLET ORAL DAILY
Qty: 30 TABLET | Refills: 0 | OUTPATIENT
Start: 2023-12-04

## 2023-12-04 NOTE — TELEPHONE ENCOUNTER
Received request via: Pharmacy    Was the patient seen in the last year in this department? Yes f/u with Cardiology [facility] 11/28/23    Does the patient have an active prescription (recently filled or refills available) for medication(s) requested? No    Does the patient have USP Plus and need 100 day supply (blood pressure, diabetes and cholesterol meds only)? Patient does not have SCP

## 2023-12-13 LAB — EKG IMPRESSION: NORMAL

## 2023-12-29 LAB — EKG IMPRESSION: NORMAL

## 2024-02-20 ENCOUNTER — TELEPHONE (OUTPATIENT)
Dept: PHARMACY | Facility: MEDICAL CENTER | Age: 79
End: 2024-02-20
Payer: MEDICARE

## 2024-02-20 NOTE — TELEPHONE ENCOUNTER
RONNIE Boone, spouse. She informed that she has moved to [place] and wanted to know if can still use services. I explained that she could still fill for patient, but with being outside of [place], they would have to  vs /mail. She understood. closing card. she is aware. They live way too far to come . She was very appreciative of our services.

## 2024-05-14 DIAGNOSIS — Z79.01 CHRONIC ANTICOAGULATION: ICD-10-CM

## 2024-06-04 ENCOUNTER — PATIENT MESSAGE (OUTPATIENT)
Dept: VASCULAR LAB | Facility: MEDICAL CENTER | Age: 79
End: 2024-06-04
Payer: MEDICARE

## 2024-06-04 DIAGNOSIS — Z79.01 CHRONIC ANTICOAGULATION: ICD-10-CM

## 2024-06-06 DIAGNOSIS — I10 ESSENTIAL HYPERTENSION: ICD-10-CM

## 2024-06-06 RX ORDER — LOSARTAN POTASSIUM AND HYDROCHLOROTHIAZIDE 12.5; 1 MG/1; MG/1
1 TABLET ORAL DAILY
Qty: 100 TABLET | Refills: 1 | Status: SHIPPED | OUTPATIENT
Start: 2024-06-06

## 2024-06-06 NOTE — TELEPHONE ENCOUNTER
Received request via: Pharmacy    Was the patient seen in the last year in this department? Yes  11/22/23  Does the patient have an active prescription (recently filled or refills available) for medication(s) requested? No    Pharmacy Name: [facility]    Does the patient have group home Plus and need 100 day supply (blood pressure, diabetes and cholesterol meds only)? Patient does not have SCP

## 2024-11-22 RX ORDER — GLIMEPIRIDE 4 MG/1
4 TABLET ORAL EVERY MORNING
Qty: 100 TABLET | Refills: 0 | Status: SHIPPED | OUTPATIENT
Start: 2024-11-22

## 2024-11-22 NOTE — TELEPHONE ENCOUNTER
Received request via: Pharmacy    Was the patient seen in the last year in this department? No    Does the patient have an active prescription (recently filled or refills available) for medication(s) requested? No    Pharmacy Name:   [id number] - [place], [place] - [address] [facility]  [address] [place] [address]  Phone: [phone] Fax: [phone]       Does the patient have detention Plus and need 100-day supply? (This applies to ALL medications) Patient does not have SCP

## (undated) DEVICE — NEEDLE INSFL 120MM 14GA VRRS - (20/BX)

## (undated) DEVICE — SET EXTENSION WITH 2 PORTS (48EA/CA) ***PART #2C8610 IS A SUBSTITUTE*****

## (undated) DEVICE — GOWN WARMING STANDARD FLEX - (30/CA)

## (undated) DEVICE — DRAPE ARM  BOX OF 20

## (undated) DEVICE — SYSTEM CLEARIFY VISUALIZATION (10EA/PK)

## (undated) DEVICE — CHLORAPREP 26 ML APPLICATOR - ORANGE TINT(25/CA)

## (undated) DEVICE — SPONGE GAUZESTER. 2X2 4-PL - (2/PK 50PK/BX 30BX/CS)

## (undated) DEVICE — DRESSING TRANSPARENT FILM TEGADERM 2.375 X 2.75"  (100EA/BX)"

## (undated) DEVICE — GLOVE BIOGEL INDICATOR SZ 7.5 SURGICAL PF LTX - (50PR/BX 4BX/CA)

## (undated) DEVICE — HEAD HOLDER JUNIOR/ADULT

## (undated) DEVICE — OBTURATOR BLADELESS STANDARD 8MM (6EA/BX)

## (undated) DEVICE — LACTATED RINGERS INJ 1000 ML - (14EA/CA 60CA/PF)

## (undated) DEVICE — NEEDLE DRIVER MEGA SUTURECUT DA VINCI 15X'S REUSABLE

## (undated) DEVICE — TOWEL STOP TIMEOUT SAFETY FLAG (40EA/CA)

## (undated) DEVICE — SLEEVE, VASO, THIGH, MED

## (undated) DEVICE — SET TUBING PNEUMOCLEAR HIGH FLOW SMOKE EVACUATION (10EA/BX)

## (undated) DEVICE — KIT ANESTHESIA W/CIRCUIT & 3/LT BAG W/FILTER (20EA/CA)

## (undated) DEVICE — Device

## (undated) DEVICE — ELECTRODE DUAL RETURN W/ CORD - (50/PK)

## (undated) DEVICE — GLOVE BIOGEL SZ 7.5 SURGICAL PF LTX - (50PR/BX 4BX/CA)

## (undated) DEVICE — SUTURE GENERAL

## (undated) DEVICE — DRAPE COLUMN  BOX OF 20

## (undated) DEVICE — BIPOLAR FORCE DA VINCI 12X'S REISABLE

## (undated) DEVICE — CANISTER SUCTION 3000ML MECHANICAL FILTER AUTO SHUTOFF MEDI-VAC NONSTERILE LF DISP  (40EA/CA)

## (undated) DEVICE — MASK ANESTHESIA ADULT  - (100/CA)

## (undated) DEVICE — SUTURE 2-0 20CM STRATAFIX SPIRAL SH NEEDLE (12/BX)

## (undated) DEVICE — SHEARS MONOPOLAR CURVED  DA VINCI 10X'S REUSABLE

## (undated) DEVICE — SUTURE 4-0 VICRYL PLUS FS-2 - 27 INCH (36/BX)

## (undated) DEVICE — COVER TIP ENDOWRIST HOT SHEAR - (10EA/BX) DA VINCI

## (undated) DEVICE — SET LEADWIRE 5 LEAD BEDSIDE DISPOSABLE ECG (1SET OF 5/EA)

## (undated) DEVICE — PROTECTOR ULNA NERVE - (36PR/CA)

## (undated) DEVICE — ELECTRODE 850 FOAM ADHESIVE - HYDROGEL RADIOTRNSPRNT (50/PK)

## (undated) DEVICE — TUBING CLEARLINK DUO-VENT - C-FLO (48EA/CA)

## (undated) DEVICE — SUCTION INSTRUMENT YANKAUER BULBOUS TIP W/O VENT (50EA/CA)

## (undated) DEVICE — TUBE CONNECT SUCTION CLEAR 120 X 1/4" (50EA/CA)"

## (undated) DEVICE — ROBOTIC SURGERY SERVICES

## (undated) DEVICE — SENSOR SPO2 NEO LNCS ADHESIVE (20/BX) SEE USER NOTES

## (undated) DEVICE — SEAL 5MM-8MM UNIVERSAL  BOX OF 10